# Patient Record
Sex: FEMALE | Race: WHITE | NOT HISPANIC OR LATINO | Employment: OTHER | URBAN - METROPOLITAN AREA
[De-identification: names, ages, dates, MRNs, and addresses within clinical notes are randomized per-mention and may not be internally consistent; named-entity substitution may affect disease eponyms.]

---

## 2017-08-17 DIAGNOSIS — Z12.31 ENCOUNTER FOR SCREENING MAMMOGRAM FOR MALIGNANT NEOPLASM OF BREAST: Primary | ICD-10-CM

## 2017-08-24 ENCOUNTER — HOSPITAL ENCOUNTER (OUTPATIENT)
Dept: RADIOLOGY | Facility: HOSPITAL | Age: 77
Discharge: HOME | End: 2017-08-24
Attending: OBSTETRICS & GYNECOLOGY
Payer: MEDICARE

## 2017-08-24 DIAGNOSIS — Z12.31 ENCOUNTER FOR SCREENING MAMMOGRAM FOR MALIGNANT NEOPLASM OF BREAST: ICD-10-CM

## 2017-08-24 PROCEDURE — 77067 SCR MAMMO BI INCL CAD: CPT | Mod: 26,,, | Performed by: RADIOLOGY

## 2017-08-24 PROCEDURE — 77063 BREAST TOMOSYNTHESIS BI: CPT | Mod: 26,,, | Performed by: RADIOLOGY

## 2017-08-24 PROCEDURE — 77067 SCR MAMMO BI INCL CAD: CPT | Mod: TC

## 2017-09-06 DIAGNOSIS — M89.9 DISORDER OF BONE: Primary | ICD-10-CM

## 2017-09-07 ENCOUNTER — HOSPITAL ENCOUNTER (OUTPATIENT)
Dept: RADIOLOGY | Facility: HOSPITAL | Age: 77
Discharge: HOME | End: 2017-09-07
Attending: FAMILY MEDICINE
Payer: MEDICARE

## 2017-09-07 DIAGNOSIS — M89.9 DISORDER OF BONE: ICD-10-CM

## 2017-09-07 PROCEDURE — 77080 DXA BONE DENSITY AXIAL: CPT | Mod: 26,,, | Performed by: RADIOLOGY

## 2017-09-07 PROCEDURE — 77080 DXA BONE DENSITY AXIAL: CPT | Mod: TC

## 2017-12-20 ENCOUNTER — HOSPITAL ENCOUNTER (INPATIENT)
Facility: HOSPITAL | Age: 77
LOS: 3 days | Discharge: HOME | DRG: 041 | End: 2017-12-23
Attending: EMERGENCY MEDICINE | Admitting: PSYCHIATRY & NEUROLOGY
Payer: MEDICARE

## 2017-12-20 DIAGNOSIS — Z92.82 S/P ADMN TPA IN DIFF FAC W/N LAST 24 HR BEF ADM TO CRNT FAC: ICD-10-CM

## 2017-12-20 DIAGNOSIS — I63.412 EMBOLIC STROKE INVOLVING LEFT MIDDLE CEREBRAL ARTERY: Primary | ICD-10-CM

## 2017-12-20 DIAGNOSIS — Z95.0 PACEMAKER: ICD-10-CM

## 2017-12-20 DIAGNOSIS — I63.512 ACUTE ISCHEMIC LEFT MCA STROKE: ICD-10-CM

## 2017-12-20 DIAGNOSIS — Z92.82 TISSUE PLASMINOGEN ACTIVATOR (T-PA) ADMINISTERED AT OTHER FACILITY WITHIN 24 HOURS PRIOR TO CURRENT ADMISSION: ICD-10-CM

## 2017-12-20 DIAGNOSIS — I63.9 CEREBROVASCULAR ACCIDENT (CVA), UNSPECIFIED MECHANISM: ICD-10-CM

## 2017-12-20 DIAGNOSIS — I44.2 COMPLETE HEART BLOCK: ICD-10-CM

## 2017-12-20 DIAGNOSIS — Z95.0 S/P PLACEMENT OF CARDIAC PACEMAKER: ICD-10-CM

## 2017-12-20 PROBLEM — E78.5 HYPERLIPIDEMIA: Status: ACTIVE | Noted: 2017-12-20

## 2017-12-20 PROBLEM — R47.01 APHASIA: Status: ACTIVE | Noted: 2017-12-20

## 2017-12-20 PROBLEM — I10 ESSENTIAL HYPERTENSION: Status: ACTIVE | Noted: 2017-12-20

## 2017-12-20 LAB
ABO + RH BLD: NORMAL
ALBUMIN SERPL BCP-MCNC: 3.5 G/DL
ALP SERPL-CCNC: 91 U/L
ALT SERPL W/O P-5'-P-CCNC: 22 U/L
ANION GAP SERPL CALC-SCNC: 9 MMOL/L
APTT BLDCRRT: 26.3 SEC
AST SERPL-CCNC: 24 U/L
BACTERIA #/AREA URNS AUTO: ABNORMAL /HPF
BASOPHILS # BLD AUTO: 0.05 K/UL
BASOPHILS NFR BLD: 0.6 %
BILIRUB SERPL-MCNC: 0.6 MG/DL
BILIRUB UR QL STRIP: NEGATIVE
BLD GP AB SCN CELLS X3 SERPL QL: NORMAL
BUN SERPL-MCNC: 17 MG/DL
CALCIUM SERPL-MCNC: 9.4 MG/DL
CHLORIDE SERPL-SCNC: 105 MMOL/L
CHOLEST SERPL-MCNC: 229 MG/DL
CHOLEST/HDLC SERPL: 3.3 {RATIO}
CK MB SERPL-MCNC: 2.3 NG/ML
CK MB SERPL-RTO: 2.4 %
CK SERPL-CCNC: 97 U/L
CLARITY UR REFRACT.AUTO: CLEAR
CO2 SERPL-SCNC: 24 MMOL/L
COLOR UR AUTO: ABNORMAL
CREAT SERPL-MCNC: 0.8 MG/DL
DIFFERENTIAL METHOD: ABNORMAL
EOSINOPHIL # BLD AUTO: 0.1 K/UL
EOSINOPHIL NFR BLD: 1.2 %
ERYTHROCYTE [DISTWIDTH] IN BLOOD BY AUTOMATED COUNT: 14.1 %
EST. GFR  (AFRICAN AMERICAN): >60 ML/MIN/1.73 M^2
EST. GFR  (NON AFRICAN AMERICAN): >60 ML/MIN/1.73 M^2
ESTIMATED AVG GLUCOSE: 111 MG/DL
GLUCOSE SERPL-MCNC: 126 MG/DL
GLUCOSE UR QL STRIP: NEGATIVE
HBA1C MFR BLD HPLC: 5.5 %
HCT VFR BLD AUTO: 40.5 %
HDLC SERPL-MCNC: 69 MG/DL
HDLC SERPL: 30.1 %
HGB BLD-MCNC: 13 G/DL
HGB UR QL STRIP: ABNORMAL
HYALINE CASTS UR QL AUTO: 0 /LPF
IMM GRANULOCYTES # BLD AUTO: 0.02 K/UL
IMM GRANULOCYTES NFR BLD AUTO: 0.2 %
INR PPP: 1.3
KETONES UR QL STRIP: NEGATIVE
LDLC SERPL CALC-MCNC: 135.4 MG/DL
LEUKOCYTE ESTERASE UR QL STRIP: ABNORMAL
LYMPHOCYTES # BLD AUTO: 1.5 K/UL
LYMPHOCYTES NFR BLD: 16.7 %
MAGNESIUM SERPL-MCNC: 1.9 MG/DL
MCH RBC QN AUTO: 28.9 PG
MCHC RBC AUTO-ENTMCNC: 32.1 G/DL
MCV RBC AUTO: 90 FL
MICROSCOPIC COMMENT: ABNORMAL
MONOCYTES # BLD AUTO: 0.8 K/UL
MONOCYTES NFR BLD: 8.4 %
NEUTROPHILS # BLD AUTO: 6.5 K/UL
NEUTROPHILS NFR BLD: 72.9 %
NITRITE UR QL STRIP: NEGATIVE
NONHDLC SERPL-MCNC: 160 MG/DL
NRBC BLD-RTO: 0 /100 WBC
PH UR STRIP: 8 [PH] (ref 5–8)
PHOSPHATE SERPL-MCNC: 2.9 MG/DL
PLATELET # BLD AUTO: 286 K/UL
PMV BLD AUTO: 10.1 FL
POCT GLUCOSE: 132 MG/DL (ref 70–110)
POTASSIUM SERPL-SCNC: 4.1 MMOL/L
POTASSIUM SERPL-SCNC: 4.1 MMOL/L
PROT SERPL-MCNC: 6.8 G/DL
PROT UR QL STRIP: ABNORMAL
PROTHROMBIN TIME: 13.2 SEC
RBC # BLD AUTO: 4.5 M/UL
RBC #/AREA URNS AUTO: 3 /HPF (ref 0–4)
SODIUM SERPL-SCNC: 138 MMOL/L
SP GR UR STRIP: 1.02 (ref 1–1.03)
TRIGL SERPL-MCNC: 123 MG/DL
TROPONIN I SERPL DL<=0.01 NG/ML-MCNC: <0.006 NG/ML
TSH SERPL DL<=0.005 MIU/L-ACNC: 1.76 UIU/ML
URN SPEC COLLECT METH UR: ABNORMAL
UROBILINOGEN UR STRIP-ACNC: NEGATIVE EU/DL
WBC # BLD AUTO: 8.93 K/UL
WBC #/AREA URNS AUTO: 9 /HPF (ref 0–5)

## 2017-12-20 PROCEDURE — 99291 CRITICAL CARE FIRST HOUR: CPT | Mod: ,,, | Performed by: EMERGENCY MEDICINE

## 2017-12-20 PROCEDURE — 87086 URINE CULTURE/COLONY COUNT: CPT

## 2017-12-20 PROCEDURE — 84484 ASSAY OF TROPONIN QUANT: CPT

## 2017-12-20 PROCEDURE — A4216 STERILE WATER/SALINE, 10 ML: HCPCS | Performed by: PHYSICIAN ASSISTANT

## 2017-12-20 PROCEDURE — 80061 LIPID PANEL: CPT

## 2017-12-20 PROCEDURE — 85025 COMPLETE CBC W/AUTO DIFF WBC: CPT

## 2017-12-20 PROCEDURE — 25000003 PHARM REV CODE 250: Performed by: PHYSICIAN ASSISTANT

## 2017-12-20 PROCEDURE — 20000000 HC ICU ROOM

## 2017-12-20 PROCEDURE — 93010 ELECTROCARDIOGRAM REPORT: CPT | Mod: 76,,, | Performed by: INTERNAL MEDICINE

## 2017-12-20 PROCEDURE — 84100 ASSAY OF PHOSPHORUS: CPT

## 2017-12-20 PROCEDURE — 63600175 PHARM REV CODE 636 W HCPCS

## 2017-12-20 PROCEDURE — 93005 ELECTROCARDIOGRAM TRACING: CPT

## 2017-12-20 PROCEDURE — 86900 BLOOD TYPING SEROLOGIC ABO: CPT

## 2017-12-20 PROCEDURE — 25000003 PHARM REV CODE 250: Performed by: EMERGENCY MEDICINE

## 2017-12-20 PROCEDURE — 85730 THROMBOPLASTIN TIME PARTIAL: CPT

## 2017-12-20 PROCEDURE — 82553 CREATINE MB FRACTION: CPT

## 2017-12-20 PROCEDURE — 80053 COMPREHEN METABOLIC PANEL: CPT

## 2017-12-20 PROCEDURE — 83036 HEMOGLOBIN GLYCOSYLATED A1C: CPT

## 2017-12-20 PROCEDURE — 25500020 PHARM REV CODE 255: Performed by: EMERGENCY MEDICINE

## 2017-12-20 PROCEDURE — 86850 RBC ANTIBODY SCREEN: CPT

## 2017-12-20 PROCEDURE — 80307 DRUG TEST PRSMV CHEM ANLYZR: CPT

## 2017-12-20 PROCEDURE — 82962 GLUCOSE BLOOD TEST: CPT

## 2017-12-20 PROCEDURE — 84132 ASSAY OF SERUM POTASSIUM: CPT

## 2017-12-20 PROCEDURE — 83735 ASSAY OF MAGNESIUM: CPT

## 2017-12-20 PROCEDURE — 96365 THER/PROPH/DIAG IV INF INIT: CPT

## 2017-12-20 PROCEDURE — 85610 PROTHROMBIN TIME: CPT

## 2017-12-20 PROCEDURE — 81001 URINALYSIS AUTO W/SCOPE: CPT

## 2017-12-20 PROCEDURE — 99233 SBSQ HOSP IP/OBS HIGH 50: CPT | Mod: GC,,, | Performed by: PSYCHIATRY & NEUROLOGY

## 2017-12-20 PROCEDURE — 93010 ELECTROCARDIOGRAM REPORT: CPT | Mod: ,,, | Performed by: INTERNAL MEDICINE

## 2017-12-20 PROCEDURE — 99223 1ST HOSP IP/OBS HIGH 75: CPT | Mod: AI,GC,, | Performed by: PSYCHIATRY & NEUROLOGY

## 2017-12-20 PROCEDURE — 99291 CRITICAL CARE FIRST HOUR: CPT | Mod: 25

## 2017-12-20 PROCEDURE — 96366 THER/PROPH/DIAG IV INF ADDON: CPT

## 2017-12-20 PROCEDURE — 84443 ASSAY THYROID STIM HORMONE: CPT

## 2017-12-20 RX ORDER — LABETALOL HYDROCHLORIDE 5 MG/ML
10 INJECTION, SOLUTION INTRAVENOUS
Status: DISCONTINUED | OUTPATIENT
Start: 2017-12-20 | End: 2017-12-21

## 2017-12-20 RX ORDER — ONDANSETRON 2 MG/ML
INJECTION INTRAMUSCULAR; INTRAVENOUS
Status: COMPLETED
Start: 2017-12-20 | End: 2017-12-20

## 2017-12-20 RX ORDER — SODIUM CHLORIDE 9 MG/ML
INJECTION, SOLUTION INTRAVENOUS CONTINUOUS
Status: DISCONTINUED | OUTPATIENT
Start: 2017-12-20 | End: 2017-12-22

## 2017-12-20 RX ORDER — EPINEPHRINE 0.1 MG/ML
INJECTION INTRAVENOUS
Status: DISPENSED
Start: 2017-12-20 | End: 2017-12-21

## 2017-12-20 RX ORDER — AMITRIPTYLINE HYDROCHLORIDE 50 MG/1
50 TABLET, FILM COATED ORAL NIGHTLY
COMMUNITY
End: 2020-02-10 | Stop reason: SDUPTHER

## 2017-12-20 RX ORDER — SODIUM CHLORIDE 0.9 % (FLUSH) 0.9 %
3 SYRINGE (ML) INJECTION EVERY 8 HOURS
Status: DISCONTINUED | OUTPATIENT
Start: 2017-12-20 | End: 2017-12-23 | Stop reason: HOSPADM

## 2017-12-20 RX ORDER — ATORVASTATIN CALCIUM 20 MG/1
40 TABLET, FILM COATED ORAL DAILY
Status: DISCONTINUED | OUTPATIENT
Start: 2017-12-21 | End: 2017-12-23 | Stop reason: HOSPADM

## 2017-12-20 RX ORDER — NICARDIPINE HYDROCHLORIDE 0.2 MG/ML
5 INJECTION INTRAVENOUS CONTINUOUS
Status: DISCONTINUED | OUTPATIENT
Start: 2017-12-20 | End: 2017-12-21

## 2017-12-20 RX ORDER — EPINEPHRINE 1 MG/ML
INJECTION INTRAMUSCULAR; INTRAVENOUS; SUBCUTANEOUS
Status: DISCONTINUED
Start: 2017-12-20 | End: 2017-12-20 | Stop reason: WASHOUT

## 2017-12-20 RX ORDER — ATROPINE SULFATE 0.4 MG/ML
INJECTION, SOLUTION ENDOTRACHEAL; INTRAMEDULLARY; INTRAMUSCULAR; INTRAVENOUS; SUBCUTANEOUS
Status: DISPENSED
Start: 2017-12-20 | End: 2017-12-21

## 2017-12-20 RX ORDER — OMEPRAZOLE 20 MG/1
20 CAPSULE, DELAYED RELEASE ORAL DAILY
COMMUNITY
End: 2019-11-07

## 2017-12-20 RX ORDER — HYDROCHLOROTHIAZIDE 12.5 MG/1
12.5 CAPSULE ORAL DAILY PRN
COMMUNITY
End: 2019-02-26

## 2017-12-20 RX ORDER — ACETAMINOPHEN 500 MG
5000 TABLET ORAL DAILY
COMMUNITY

## 2017-12-20 RX ORDER — LOSARTAN POTASSIUM 50 MG/1
100 TABLET ORAL DAILY
Status: DISCONTINUED | OUTPATIENT
Start: 2017-12-21 | End: 2017-12-23 | Stop reason: HOSPADM

## 2017-12-20 RX ORDER — LOSARTAN POTASSIUM 100 MG/1
100 TABLET ORAL DAILY
COMMUNITY
End: 2019-02-26 | Stop reason: ALTCHOICE

## 2017-12-20 RX ORDER — ATORVASTATIN CALCIUM 80 MG/1
80 TABLET, FILM COATED ORAL DAILY
COMMUNITY
End: 2020-02-10 | Stop reason: SDUPTHER

## 2017-12-20 RX ADMIN — ONDANSETRON 4 MG: 2 INJECTION INTRAMUSCULAR; INTRAVENOUS at 10:12

## 2017-12-20 RX ADMIN — NICARDIPINE HYDROCHLORIDE 5 MG/HR: 0.2 INJECTION, SOLUTION INTRAVENOUS at 03:12

## 2017-12-20 RX ADMIN — Medication 3 ML: at 10:12

## 2017-12-20 RX ADMIN — IOHEXOL 100 ML: 350 INJECTION, SOLUTION INTRAVENOUS at 03:12

## 2017-12-20 RX ADMIN — SODIUM CHLORIDE: 0.9 INJECTION, SOLUTION INTRAVENOUS at 08:12

## 2017-12-20 NOTE — H&P
History & Physical  Neurocritical Care    Admit Date: 12/20/2017  LOS: 0    Code Status: Full Code     CC: Left sided hemiparesis and aphasia started at 1100 today    SUBJECTIVE:     History of Present Illness:  Mrs. Abdul is 76 y/o  female with unknown significant PMHx. Patient denies any significant PMHx or take any medications at home.  Patient was alone in the ED room, no family members around. Patient was transfer from outside facility s/p tPA. Patient presented to [facility] in [place] with L sided hemiparesis and aphasia. As per chart review, patient was with  having lunch around 11:00 am when  noted L sided weakness and aphasia. Patient was brought in to [facility], and stroke code was activated, with initially NIHSS of 8. Patient received tPA@12:43 and it finalized @ 13:45 pm, then [place] [facility] [place]. Patient had CT stroke multi-phase w/o LVO noted. Patient is alert and awake but not oriented. She is able to follow some commands. Mild L nasolabial fold flattened was noted. Left hemiparesis resolved while patient was receiving tPA. She has loss of comprehension and she was unable to name some objects/read full sentences, also R hemineglect was noted.  Patient admitted to [facility] for Q1h neurocheck.  Patient was c/o mild headache. She denies dizziness, blurred vision, difficulty speaking, activity like seizures, sob, chest pain, abd pain, nauseas/vomiting.     No past medical history on file.  No past surgical history on file.  No current facility-administered medications on file prior to encounter.      No current outpatient prescriptions on file prior to encounter.     Review of patient's allergies indicates:  No Known Allergies  No family history on file.  Social History   Substance Use Topics    Smoking status: Not on file    Smokeless tobacco: Not on file    Alcohol use Not on file      Review of Symptoms:  Constitutional: Denies  fevers, weight loss, chills, or weakness.  Eyes: Denies changes in vision.  ENT: Denies dysphagia, nasal discharge, ear pain or discharge.  Cardiovascular: Denies chest pain, palpitations, orthopnea, or claudication.  Respiratory: Denies shortness of breath, cough, hemoptysis, or wheezing.  GI: Denies nausea/vomitting, hematochezia, melena, abd pain, or changes in appetite.  : Denies dysuria, incontinence, or hematuria.  Musculoskeletal: Denies joint pain or myalgias.  Skin/breast: Denies rashes, lumps, lesions, or discharge.  Neurologic: Denies dizziness, vertigo, or paresthesias. C/o headache  Psychiatric: Denies changes in mood or hallucinations.  Endocrine: Denies polyuria, polydipsia, heat/cold intolerance.  Hematologic/Lymph: Denies lymphadenopathy, easy bruising or easy bleeding.  Allergic/Immunologic: Denies rash, rhinitis.     OBJECTIVE:   Vital Signs (Most Recent):   Temp: 98.1 °F (36.7 °C) (12/20/17 1519)  Pulse: (!) 56 (12/20/17 1600)  Resp: 16 (12/20/17 1600)  BP: (!) 162/70 (12/20/17 1600)  SpO2: 98 % (12/20/17 1600)    Vital Signs (24h Range):   Temp:  [98.1 °F (36.7 °C)] 98.1 °F (36.7 °C)  Pulse:  [55-76] 56  Resp:  [16] 16  SpO2:  [98 %] 98 %  BP: (162-193)/(70-81) 162/70    ICP/CPP (Last 24h):        I & O (Last 24h):  No intake or output data in the 24 hours ending 12/20/17 1620  Physical Exam:  GA: Alert, comfortable, no acute distress.   HEENT: No scleral icterus or JVD.   Pulmonary: Clear to auscultation A/P/L. No wheezing, crackles, or rhonchi.  Cardiac: RRR S1 & S2 w/o rubs/murmurs/gallops.   Abdominal: Bowel sounds present x 4. No appreciable hepatosplenomegaly.  Skin: No jaundice, rashes, or visible lesions.  Neuro:  --GCS: E4 V5M6  --Mental Status: Alert, no oriented, follows some commands  --CN II-XII grossly intact, except L flattened nasolabial fold noted  --Pupils 3mm, PERRL.   --Corneal reflex, gag, cough intact.  --LUE strength: 5/5  --RUE strength: 5/5  --LLE strength: 5/5  --RLE  strength: 5/5    NIH Stroke Scale     1a. Level of consciousness 0=alert; keenly responsive   1b. LOC questions 1=Answers one task correctly   1c. LOC commands 1=Answers one task correctly   2. Best gaze 0=normal   3. Visual 0=No visual loss   4.  Facial palsy 1=Minor paralysis (flattened nasolabial fold, asymmetric on smiling)   5.  Motor left arm 0=No drift, limb holds 90 (or 45) degrees for full 10 seconds   5b. Motor right arm 0=No drift, limb holds 90 (or 45) degrees for full 10 seconds   6a. Motor left leg 0=No drift, limb holds 90 (or 45) degrees for full 10 seconds   6b. Motor right leg 0=No drift, limb holds 90 (or 45) degrees for full 10 seconds   7. Limb ataxia 0=Absent   8. Sensory 0=Normal; no sensory loss   9. Best language 1=Mild to moderate aphasia; some obvious loss of fluency or facility of comprehension without significant limitation on ideas expressed or form of expression.   10. Dysarthria 0=Normal   11.  Extinction and inattention 1=Visual, tactile, auditory, spatial or personal inattention or extinction to bilateral simultaneous stimulation in one of the sensory modalities    Total 5         Lines/Drains/Airway:          Nutrition/Tube Feeds:   Current Diet Order   Procedures    Diet NPO     If pass on Nursing Dysphagia Screen, then can give sips of water and medications.  No food intake until passes SHADI or evaluated by speech.       Labs:  ABG: No results for input(s): PH, PO2, PCO2, HCO3, POCSATURATED, BE in the last 24 hours.  BMP:No results for input(s): NA, K, CL, CO2, BUN, CREATININE, GLU, MG, PHOS in the last 24 hours.  LFT: No results found for: AST, ALT, GGT, ALKPHOS, BILITOT, ALBUMIN, PROT  CBC: No results found for: WBC, HGB, HCT, MCV, PLT  Microbiology x 7d:   Microbiology Results (last 7 days)     ** No results found for the last 168 hours. **        Imaging:  I personally reviewed the above image.    ASSESSMENT/PLAN:   Mrs. Abdul is 76 y/o  female with unknown  significant PMHx, who was transfer from outside facility s/p tPA. Patient presented to [facility] in [place] with L sided hemiparesis and aphasia. Patient received tPA@12:43 and it finalized @ 13:45 pm. Patient admitted to [facility] for Q1h neurocheck  Patient Active Problem List    Diagnosis Date Noted    S/P admn tPA in diff fac w/n last 24 hr bef adm to crnt fac 12/20/2017     Neuro/pain:   -Presented with L sided hemiparesis and aphasia, initial NIHSS was 8, received tPA@12:43, no paresis noted and NIHSS is 5, persists receptive aphasia  -Neuro q 1hr  -HOB >30  -CT stroke multi-phase pending official report. No show LVO  -MRI ordered  -After 24 hours window, start on ASA 81 mg for life/Plavix 75 mg for 21 days  -Neuro vascular team consulted  -PT/OT/ST consulted    Pulmonary:   -No current issues    Cardiac:   -Goal Bp< 185/105 mmHg  -Labetalol and hydralazine PRN  -EKG ordered  -ECHO ordered    Renal:    -Pending labs  -Strict I/O    ID:   -Afebrile, CBC ordered    Hem/Onc:   -CBC, PTT, PT/INR ordered    Endocrine:    -Lipid panel, A1c ordered, TSH  -No hx of DM or HLD  -SSI and hypoglycemia protocol ordered  -POCT glucose Q6hr ordered  -Keep Bg<160  -Start Atorvastatin 40 mg for secondary stroke prevention    Fluids/Electrolytes/Nutrition/GI:   -Keep NPO until pass swallow test  -Bowel movement regimen ordered  -Zofran 4 mg Q6h PRN for nauseas  -Pending CMP, Mg, Phosphorus    Uninterrupted Critical Care/Counseling Time (not including procedures):  35 min    MACARIO [facility]enoPho[facility] Neuro Critical Care

## 2017-12-20 NOTE — HPI
Ms. Abdul is a 78yo F with hypertension and hyperlipidemia who presents to [facility] with left (?) sided weakness and aphasia. Last known normal at about 11am. Patient was seen via telestroke and recommended tPA at 12:43pm. Soon after tPA started patient's symptoms improved. In transport to [facility] [place] her blood pressure remained elevated up to 230 systolic so cardene drip was started. CTA MP on arrival was negative for LVO. Patient does not have focal weakness but is still aphasic and confused.

## 2017-12-20 NOTE — SUBJECTIVE & OBJECTIVE
No past medical history on file.  No past surgical history on file.  No family history on file.  Social History   Substance Use Topics    Smoking status: Not on file    Smokeless tobacco: Not on file    Alcohol use Not on file     Review of patient's allergies indicates:  No Known Allergies    Medications: I have reviewed the current medication administration record.      (Not in a hospital admission)    Review of Systems   Constitutional: Negative for fever.   HENT: Negative for trouble swallowing.    Eyes: Negative for visual disturbance.   Respiratory: Negative for shortness of breath.    Cardiovascular: Negative for chest pain.   Gastrointestinal: Positive for nausea. Negative for vomiting.   Endocrine: Negative for polyuria.   Genitourinary: Negative for dysuria.   Musculoskeletal: Negative for gait problem.   Skin: Negative for rash.   Allergic/Immunologic: Negative for immunocompromised state.   Neurological: Positive for speech difficulty and headaches. Negative for facial asymmetry, weakness and numbness.   Psychiatric/Behavioral: Positive for confusion. Negative for agitation and behavioral problems.     Objective:     Vital Signs (Most Recent):  Temp: 98.1 °F (36.7 °C) (12/20/17 1519)  Pulse: (!) 56 (12/20/17 1600)  Resp: 16 (12/20/17 1600)  BP: (!) 162/70 (12/20/17 1600)  SpO2: 98 % (12/20/17 1600)    Vital Signs Range (Last 24H):  Temp:  [98.1 °F (36.7 °C)]   Pulse:  [56-76]   Resp:  [16]   BP: (162-180)/(70-76)   SpO2:  [98 %]     Physical Exam   Constitutional: She appears well-developed and well-nourished. No distress.   HENT:   Head: Normocephalic and atraumatic.   Eyes: EOM are normal. Pupils are equal, round, and reactive to light.   Neck: Normal range of motion. Neck supple.   Cardiovascular: Normal rate.    Pulmonary/Chest: Effort normal.   Abdominal: Soft.   Musculoskeletal: Normal range of motion.   Neurological: She is alert.   Oriented to self, not time    Skin: Skin is warm and dry.  She is not diaphoretic.   Psychiatric:   confused       Neurological Exam:   LOC: alert  Attention Span: poor  Language: Global aphasia  Articulation: No dysarthria  Orientation: Not oriented to time  Visual Fields: Full  EOM (CN III, IV, VI): Full/intact  Pupils (CN II, III): PERRL  Facial Sensation (CN V): Normal  Facial Movement (CN VII): Symmetric facial expression    Gag Reflex: present  Reflexes: not performed  Motor: no drift x4 extremities  Cebellar: No evidence of appendicular or axial ataxia  Sensation: Intact to light touch, temperature and vibration  Tone: Normal tone throughout      Laboratory:  CMP: No results for input(s): GLUCOSE, CALCIUM, ALBUMIN, PROT, NA, K, CO2, CL, BUN, CREATININE, ALKPHOS, ALT, AST, BILITOT in the last 24 hours.  CBC: No results for input(s): WBC, RBC, HGB, HCT, PLT, MCV, MCH, MCHC in the last 168 hours.  Lipid Panel: No results for input(s): CHOL, LDLCALC, HDL, TRIG in the last 168 hours.  Coagulation: No results for input(s): PT, INR, APTT in the last 168 hours.  Hgb A1C: No results for input(s): HGBA1C in the last 168 hours.  TSH: No results for input(s): TSH in the last 168 hours.    Diagnostic Results:      Brain imaging:  [facility] 12/20/17   Awaiting official read  Patient has extensive white matter disease and generalized atrophy       Vessel Imaging:  [facility]  12/20/17: No LVO noted    Cardiac Evaluation:   ECHO pending   ECG: sinus tachycardia

## 2017-12-20 NOTE — CONSULTS
[facility]  Vascular Neurology  Comprehensive Stroke Center  Consult Note    Inpatient consult to Vascular (Stroke) Neurology  Consult performed by: NAIDA JOHNSTON  Consult ordered by: HELIO VEGA  Reason for consult: aphasia    Inpatient consult to Vascular (Stroke) Neurology  Consult performed by: NAIDA JOHNSTON  Consult ordered by: KAYLA VEGA  Reason for consult: aphasia        Assessment/Plan:     Patient is a 77 y.o. year old female with:    * Acute ischemic left MCA stroke    Ms. Abdul is a 76yo F with aphasia s/p tPA. Reportedly had RSW prior to tPA administration but no weakness appreciated on exam at [facility] [place]. CTA MP without LVO. She is admitted to [facility] post-tPA    -Antithrombotics for secondary stroke prevention: None: Reason:  Hold all Antithrombotics x 24 hours after IV t-PA administration  -Statins for secondary stroke prevention and hyperlipidemia, if present: Atorvastatin- 40 mg oral daily, check LDL   -Aggressive risk factor modification: Hypertension, hyperlipidemia  -Rehab Efforts: Physical Therapy, Occupational Therapy, Speech and Language Pathology  -Diagnostics: Ordered/Pending HgbA1C to assess blood glucose level   Lipid Profile to assess cholesterol levels   MRI head without contrast to assess brain parenchyma   Trans-thoracic cardiac echo to assess cardiac function/status   TSH to assess thyroid function  -VTE Prophylaxis: None: Reason for No Pharmacological VTE Prophylaxis: Mechanical prophylaxis: Place SCDs         S/P admn tPA in diff fac w/n last 24 hr bef adm to crnt fac    Administered at 12:43 pm on 12/20/17         Aphasia    Likely secondary to ischemic infarct   Speech therapy evaluate & treat         Hyperlipidemia    Stroke risk factor  Patient reportedly takes medication at home for cholesterol but does not recall name  Check lipid panel  Atorvastatin 40mg daily         Essential hypertension    Stroke risk factor  Goal BP <180 post tPA             STROKE DOCUMENTATION     Acute Stroke Times   Last Known Normal Date: 12/20/17  Last Known Normal Time: 1100  Symptom Onset Date: 12/20/17  Symptom Onset Time: 1100  Stroke Team Called Date: 12/20/17  Stroke Team Called Time: 1519 (arrival to [facility] [place])  Stroke Team Arrival Date: 12/20/17  Stroke Team Arrival Time: 1519  CT Interpretation Time: 1526  Decision to Treat Time for Alteplase: 1243  Decision to Treat Time for IR:  (n/a )    NIH Scale:  1a. Level Of Consciousness: 0-->Alert: keenly responsive  1b. LOC Questions: 1-->Answers one question correctly  1c. LOC Commands: 0-->Performs both tasks correctly  2. Best Gaze: 0-->Normal  3. Visual: 0-->No visual loss  4. Facial Palsy: 0-->Normal symmetrical movements  5a. Motor Arm, Left: 0-->No drift: limb holds 90 (or 45) degrees for full 10 secs  5b. Motor Arm, Right: 0-->No drift: limb holds 90 (or 45) degrees for full 10 secs  6a. Motor Leg, Left: 0-->No drift: leg holds 30 degree position for full 5 secs  6b. Motor Leg, Right: 0-->No drift: leg holds 30 degree position for full 5 secs  7. Limb Ataxia: 0-->Absent  8. Sensory: 0-->Normal: no sensory loss  9. Best Language: 1-->Mild-to-moderate aphasia: some obvious loss of fluency or facility of comprehension, without significant limitation on ideas expressed or form of expression. Reduction of speech and/or comprehension, however, makes conversation. . . (see row details)  10. Dysarthria: 0-->Normal  11. Extinction and Inattention (formerly Neglect): 0-->No abnormality  Total (NIH Stroke Scale): 2    Modified Adams Score: 0  [place] Coma Scale:14   ABCD2 Score:    [id number] Score:   HAS -BLED Score:   ICH Score:   Hunt & Bravo Classification:       Thrombolysis Candidate? Yes, given prior to arrival at outside hospital      Interventional Revascularization Candidate?   Is the patient eligible for mechanical endovascular reperfusion (JYOTI)?  No; No large vessel occlusion      Hemorrhagic change of an Ischemic  Stroke: Does this patient have an ischemic stroke with hemorrhagic changes? No     Subjective:     History of Present Illness:  Ms. Abdul is a 78yo F with hypertension and hyperlipidemia who presents to [facility] with left (?) sided weakness and aphasia. Last known normal at about 11am. Patient was seen via telestroke and recommended tPA at 12:43pm. Soon after tPA started patient's symptoms improved. In transport to [facility] her blood pressure remained elevated up to 230 systolic so cardene drip was started. CTA MP on arrival was negative for LVO. Patient does not have focal weakness but is still aphasic and confused.         No past medical history on file.  No past surgical history on file.  No family history on file.  Social History   Substance Use Topics    Smoking status: Not on file    Smokeless tobacco: Not on file    Alcohol use Not on file     Review of patient's allergies indicates:  No Known Allergies    Medications: I have reviewed the current medication administration record.      (Not in a hospital admission)    Review of Systems   Constitutional: Negative for fever.   HENT: Negative for trouble swallowing.    Eyes: Negative for visual disturbance.   Respiratory: Negative for shortness of breath.    Cardiovascular: Negative for chest pain.   Gastrointestinal: Positive for nausea. Negative for vomiting.   Endocrine: Negative for polyuria.   Genitourinary: Negative for dysuria.   Musculoskeletal: Negative for gait problem.   Skin: Negative for rash.   Allergic/Immunologic: Negative for immunocompromised state.   Neurological: Positive for speech difficulty and headaches. Negative for facial asymmetry, weakness and numbness.   Psychiatric/Behavioral: Positive for confusion. Negative for agitation and behavioral problems.     Objective:     Vital Signs (Most Recent):  Temp: 98.1 °F (36.7 °C) (12/20/17 1519)  Pulse: (!) 56 (12/20/17 1600)  Resp: 16 (12/20/17 1600)  BP: (!) 162/70 (12/20/17  1600)  SpO2: 98 % (12/20/17 1600)    Vital Signs Range (Last 24H):  Temp:  [98.1 °F (36.7 °C)]   Pulse:  [56-76]   Resp:  [16]   BP: (162-180)/(70-76)   SpO2:  [98 %]     Physical Exam   Constitutional: She appears well-developed and well-nourished. No distress.   HENT:   Head: Normocephalic and atraumatic.   Eyes: EOM are normal. Pupils are equal, round, and reactive to light.   Neck: Normal range of motion. Neck supple.   Cardiovascular: Normal rate.    Pulmonary/Chest: Effort normal.   Abdominal: Soft.   Musculoskeletal: Normal range of motion.   Neurological: She is alert.   Oriented to self, not time    Skin: Skin is warm and dry. She is not diaphoretic.   Psychiatric:   confused       Neurological Exam:   LOC: alert  Attention Span: poor  Language: Global aphasia  Articulation: No dysarthria  Orientation: Not oriented to time  Visual Fields: Full  EOM (CN III, IV, VI): Full/intact  Pupils (CN II, III): PERRL  Facial Sensation (CN V): Normal  Facial Movement (CN VII): Symmetric facial expression    Gag Reflex: present  Reflexes: not performed  Motor: no drift x4 extremities  Cebellar: No evidence of appendicular or axial ataxia  Sensation: Intact to light touch, temperature and vibration  Tone: Normal tone throughout      Laboratory:  CMP: No results for input(s): GLUCOSE, CALCIUM, ALBUMIN, PROT, NA, K, CO2, CL, BUN, CREATININE, ALKPHOS, ALT, AST, BILITOT in the last 24 hours.  CBC: No results for input(s): WBC, RBC, HGB, HCT, PLT, MCV, MCH, MCHC in the last 168 hours.  Lipid Panel: No results for input(s): CHOL, LDLCALC, HDL, TRIG in the last 168 hours.  Coagulation: No results for input(s): PT, INR, APTT in the last 168 hours.  Hgb A1C: No results for input(s): HGBA1C in the last 168 hours.  TSH: No results for input(s): TSH in the last 168 hours.    Diagnostic Results:      Brain imaging:  [facility] 12/20/17   Awaiting official read  Patient has extensive white matter disease and generalized atrophy       Vessel  Imaging:  CTA  12/20/17: No LVO noted    Cardiac Evaluation:   ECHO pending   ECG: sinus tachycardia       Nicol Forman PA-C  Comprehensive Stroke Center  Department of Vascular Neurology   [facility]

## 2017-12-20 NOTE — ED PROVIDER NOTES
Encounter Date: 12/20/2017    SCRIBE #1 NOTE: I, Moose Kendall, am scribing for, and in the presence of,  Dr. Barboza. I have scribed the entire note.       History     Chief Complaint   Patient presents with    Transfer     TPA transfer from [place], left sided weakness at 1100 while eating lunch       The patient is a 77 y.o. Female who had an onset of speaking difficulty at 11:00 AM . She was seen at an outside facility, given tPA and transferred here.      The history is provided by medical records and the patient.     Review of patient's allergies indicates:  No Known Allergies  Past Medical History:   Diagnosis Date    Hypertension     Kidney stones     TIA (transient ischemic attack)      Past Surgical History:   Procedure Laterality Date    CERVICAL FUSION      CHOLECYSTECTOMY      HYSTERECTOMY      SHOULDER SURGERY       History reviewed. No pertinent family history.  Social History   Substance Use Topics    Smoking status: Former Smoker     Years: 20.00    Smokeless tobacco: Never Used    Alcohol use Not on file      Comment: Glass of wine each night     Review of Systems   Unable to perform ROS: Acuity of condition       Physical Exam     Initial Vitals [12/20/17 1519]   BP Pulse Resp Temp SpO2   (!) 180/76 76 16 98.1 °F (36.7 °C) 98 %      MAP       110.67         Physical Exam    Nursing note and vitals reviewed.  HENT:   Head: Normocephalic and atraumatic.   Mouth/Throat: Oropharynx is clear and moist.   Eyes: Conjunctivae and EOM are normal. Pupils are equal, round, and reactive to light.   Neck: Normal range of motion. Neck supple. No JVD present.   Cardiovascular: Normal rate, regular rhythm and normal heart sounds.   No murmur heard.  Pulmonary/Chest: Breath sounds normal. No respiratory distress. She has no wheezes. She has no rhonchi. She has no rales.   Abdominal: Soft. Bowel sounds are normal. She exhibits no mass. There is no tenderness. There is no rebound and no guarding.    Neurological: She has normal strength. No cranial nerve deficit or sensory deficit.   Expressive aphasia.   Skin: Skin is warm. No rash noted.         ED Course   Procedures  Labs Reviewed   COMPREHENSIVE METABOLIC PANEL - Abnormal; Notable for the following:        Result Value    Glucose 126 (*)     All other components within normal limits    Narrative:     LAVENDER SHARED   LIPID PANEL - Abnormal; Notable for the following:     Cholesterol 229 (*)     All other components within normal limits    Narrative:     LAVENDER SHARED   CBC W/ AUTO DIFFERENTIAL - Abnormal; Notable for the following:     Lymph% 16.7 (*)     All other components within normal limits    Narrative:     LAVENDER SHARED   PROTIME-INR - Abnormal; Notable for the following:     Prothrombin Time 13.2 (*)     INR 1.3 (*)     All other components within normal limits    Narrative:     LAVENDER SHARED   URINALYSIS - Abnormal; Notable for the following:     Protein, UA 2+ (*)     Occult Blood UA 1+ (*)     Leukocytes, UA 1+ (*)     All other components within normal limits    Narrative:     YELLOW AND GRAY TOPS   URINALYSIS MICROSCOPIC - Abnormal; Notable for the following:     WBC, UA 9 (*)     All other components within normal limits    Narrative:     YELLOW AND GRAY TOPS   POCT GLUCOSE - Abnormal; Notable for the following:     POCT Glucose 132 (*)     All other components within normal limits   CULTURE, URINE   CULTURE, URINE   TSH    Narrative:     LAVENDER SHARED   TROPONIN I    Narrative:     LAVENDER SHARED   CK-MB    Narrative:     LAVENDER SHARED   MAGNESIUM    Narrative:     LAVENDER SHARED   PHOSPHORUS    Narrative:     LAVENDER SHARED   APTT    Narrative:     LAVENDER SHARED   HEMOGLOBIN A1C   DRUGS OF ABUSE SCREEN, BLOOD   TYPE & SCREEN   POCT GLUCOSE MONITORING CONTINUOUS             Medical Decision Making:   History:   Old Medical Records: I decided to obtain old medical records.  Clinical Tests:   Lab Tests: Ordered and  Reviewed  Radiological Study: Ordered and Reviewed  Medical Tests: Ordered and Reviewed  ED Management:  3:45 PM  In an emergent consult with the stroke neurology and neurocritical care, continued cardene drip and admitted to the ICU.            Scribe Attestation:   Scribe #1: I performed the above scribed service and the documentation accurately describes the services I performed. I attest to the accuracy of the note.    Attending Attestation:         Attending Critical Care:   Critical Care Times:   Direct Patient Care (initial evaluation, reassessments, and time considering the case)................................................................20 minutes.   Additional History from reviewing old medical records or taking additional history from the family, EMS, PCP, etc.......................4 minutes.   Ordering, Reviewing, and Interpreting Diagnostic Studies...............................................................................................................3 minutes.   Documentation..................................................................................................................................................................................3 minutes.   Consultation with other Physicians. .................................................................................................................................................3 minutes.   ==============================================================  · Total Critical Care Time - exclusive of procedural time: 33 minutes.  ==============================================================              ED Course      Clinical Impression:   The primary encounter diagnosis was Cerebrovascular accident (CVA), unspecified mechanism. A diagnosis of S/P admn tPA in diff fac w/n last 24 hr bef adm to crnt fac was also pertinent to this visit.                           Pete Barboza MD  12/20/17 1931

## 2017-12-20 NOTE — ASSESSMENT & PLAN NOTE
Stroke risk factor  Patient reportedly takes medication at home for cholesterol but does not recall name  Check lipid panel  Atorvastatin 40mg daily

## 2017-12-20 NOTE — ASSESSMENT & PLAN NOTE
Ms. Abdul is a 78yo F with aphasia s/p tPA. Reportedly had RSW prior to tPA administration but no weakness appreciated on exam at [facility]. CTA MP without LVO. She is admitted to [facility] post-tPA    -Antithrombotics for secondary stroke prevention: None: Reason:  Hold all Antithrombotics x 24 hours after IV t-PA administration  -Statins for secondary stroke prevention and hyperlipidemia, if present: Atorvastatin- 40 mg oral daily, check LDL   -Aggressive risk factor modification: Hypertension, hyperlipidemia  -Rehab Efforts: Physical Therapy, Occupational Therapy, Speech and Language Pathology  -Diagnostics: Ordered/Pending HgbA1C to assess blood glucose level   Lipid Profile to assess cholesterol levels   MRI head without contrast to assess brain parenchyma   Trans-thoracic cardiac echo to assess cardiac function/status   TSH to assess thyroid function  -VTE Prophylaxis: None: Reason for No Pharmacological VTE Prophylaxis: Mechanical prophylaxis: Place SCDs

## 2017-12-21 ENCOUNTER — ANESTHESIA (OUTPATIENT)
Dept: MEDSURG UNIT | Facility: HOSPITAL | Age: 77
DRG: 041 | End: 2017-12-21
Payer: MEDICARE

## 2017-12-21 ENCOUNTER — ANESTHESIA EVENT (OUTPATIENT)
Dept: MEDSURG UNIT | Facility: HOSPITAL | Age: 77
DRG: 041 | End: 2017-12-21
Payer: MEDICARE

## 2017-12-21 LAB
ALBUMIN SERPL BCP-MCNC: 3.5 G/DL
ALP SERPL-CCNC: 77 U/L
ALT SERPL W/O P-5'-P-CCNC: 21 U/L
ANION GAP SERPL CALC-SCNC: 12 MMOL/L
APTT BLDCRRT: <21 SEC
AST SERPL-CCNC: 21 U/L
BACTERIA UR CULT: NO GROWTH
BASOPHILS # BLD AUTO: 0.03 K/UL
BASOPHILS NFR BLD: 0.2 %
BILIRUB SERPL-MCNC: 0.6 MG/DL
BUN SERPL-MCNC: 23 MG/DL
CALCIUM SERPL-MCNC: 9.3 MG/DL
CHLORIDE SERPL-SCNC: 109 MMOL/L
CO2 SERPL-SCNC: 22 MMOL/L
CREAT SERPL-MCNC: 1 MG/DL
DIASTOLIC DYSFUNCTION: NO
DIFFERENTIAL METHOD: ABNORMAL
EOSINOPHIL # BLD AUTO: 0 K/UL
EOSINOPHIL NFR BLD: 0 %
ERYTHROCYTE [DISTWIDTH] IN BLOOD BY AUTOMATED COUNT: 14.4 %
EST. GFR  (AFRICAN AMERICAN): >60 ML/MIN/1.73 M^2
EST. GFR  (NON AFRICAN AMERICAN): 53.7 ML/MIN/1.73 M^2
ESTIMATED PA SYSTOLIC PRESSURE: 21.66
GLUCOSE SERPL-MCNC: 187 MG/DL
HCT VFR BLD AUTO: 34 %
HGB BLD-MCNC: 11.4 G/DL
IMM GRANULOCYTES # BLD AUTO: 0.06 K/UL
IMM GRANULOCYTES NFR BLD AUTO: 0.5 %
INR PPP: 1.5
LYMPHOCYTES # BLD AUTO: 0.7 K/UL
LYMPHOCYTES NFR BLD: 6 %
MAGNESIUM SERPL-MCNC: 2.2 MG/DL
MCH RBC QN AUTO: 29.1 PG
MCHC RBC AUTO-ENTMCNC: 33.5 G/DL
MCV RBC AUTO: 87 FL
MITRAL VALVE MOBILITY: NORMAL
MITRAL VALVE REGURGITATION: NORMAL
MONOCYTES # BLD AUTO: 0.4 K/UL
MONOCYTES NFR BLD: 3.4 %
NEUTROPHILS # BLD AUTO: 10.9 K/UL
NEUTROPHILS NFR BLD: 89.9 %
NRBC BLD-RTO: 0 /100 WBC
PHOSPHATE SERPL-MCNC: 4.3 MG/DL
PLATELET # BLD AUTO: 251 K/UL
PMV BLD AUTO: 10.4 FL
POCT GLUCOSE: 141 MG/DL (ref 70–110)
POTASSIUM SERPL-SCNC: 4.6 MMOL/L
PROT SERPL-MCNC: 6.6 G/DL
PROTHROMBIN TIME: 15.6 SEC
RBC # BLD AUTO: 3.92 M/UL
RETIRED EF AND QEF - SEE NOTES: 68 (ref 55–65)
SODIUM SERPL-SCNC: 143 MMOL/L
WBC # BLD AUTO: 12.15 K/UL

## 2017-12-21 PROCEDURE — A4216 STERILE WATER/SALINE, 10 ML: HCPCS | Performed by: PHYSICIAN ASSISTANT

## 2017-12-21 PROCEDURE — D9220A PRA ANESTHESIA: Mod: ANES,,, | Performed by: ANESTHESIOLOGY

## 2017-12-21 PROCEDURE — 63600175 PHARM REV CODE 636 W HCPCS: Performed by: STUDENT IN AN ORGANIZED HEALTH CARE EDUCATION/TRAINING PROGRAM

## 2017-12-21 PROCEDURE — 37000009 HC ANESTHESIA EA ADD 15 MINS: Performed by: INTERNAL MEDICINE

## 2017-12-21 PROCEDURE — 33210 INSERT ELECTRD/PM CATH SNGL: CPT

## 2017-12-21 PROCEDURE — 33208 INSRT HEART PM ATRIAL & VENT: CPT | Mod: KX,,, | Performed by: INTERNAL MEDICINE

## 2017-12-21 PROCEDURE — 63600175 PHARM REV CODE 636 W HCPCS: Performed by: NURSE ANESTHETIST, CERTIFIED REGISTERED

## 2017-12-21 PROCEDURE — 27200188 HC TRANSDUCER, ART ADULT/PEDS

## 2017-12-21 PROCEDURE — 93306 TTE W/DOPPLER COMPLETE: CPT | Mod: 26,,, | Performed by: INTERNAL MEDICINE

## 2017-12-21 PROCEDURE — 93005 ELECTROCARDIOGRAM TRACING: CPT

## 2017-12-21 PROCEDURE — 99233 SBSQ HOSP IP/OBS HIGH 50: CPT | Mod: GC,,, | Performed by: PSYCHIATRY & NEUROLOGY

## 2017-12-21 PROCEDURE — 80053 COMPREHEN METABOLIC PANEL: CPT

## 2017-12-21 PROCEDURE — D9220A PRA ANESTHESIA: Mod: CRNA,,, | Performed by: NURSE ANESTHETIST, CERTIFIED REGISTERED

## 2017-12-21 PROCEDURE — 20000000 HC ICU ROOM

## 2017-12-21 PROCEDURE — C1898 LEAD, PMKR, OTHER THAN TRANS: HCPCS

## 2017-12-21 PROCEDURE — 85610 PROTHROMBIN TIME: CPT

## 2017-12-21 PROCEDURE — 37000008 HC ANESTHESIA 1ST 15 MINUTES: Performed by: INTERNAL MEDICINE

## 2017-12-21 PROCEDURE — 25000003 PHARM REV CODE 250

## 2017-12-21 PROCEDURE — 25000003 PHARM REV CODE 250: Performed by: PHYSICIAN ASSISTANT

## 2017-12-21 PROCEDURE — 92523 SPEECH SOUND LANG COMPREHEN: CPT

## 2017-12-21 PROCEDURE — 63600175 PHARM REV CODE 636 W HCPCS: Performed by: NURSE PRACTITIONER

## 2017-12-21 PROCEDURE — 83735 ASSAY OF MAGNESIUM: CPT

## 2017-12-21 PROCEDURE — 36620 INSERTION CATHETER ARTERY: CPT

## 2017-12-21 PROCEDURE — 02H63JZ INSERTION OF PACEMAKER LEAD INTO RIGHT ATRIUM, PERCUTANEOUS APPROACH: ICD-10-PCS | Performed by: INTERNAL MEDICINE

## 2017-12-21 PROCEDURE — 25000003 PHARM REV CODE 250: Performed by: NURSE ANESTHETIST, CERTIFIED REGISTERED

## 2017-12-21 PROCEDURE — 02HK3JZ INSERTION OF PACEMAKER LEAD INTO RIGHT VENTRICLE, PERCUTANEOUS APPROACH: ICD-10-PCS | Performed by: INTERNAL MEDICINE

## 2017-12-21 PROCEDURE — 93306 TTE W/DOPPLER COMPLETE: CPT

## 2017-12-21 PROCEDURE — 85025 COMPLETE CBC W/AUTO DIFF WBC: CPT

## 2017-12-21 PROCEDURE — 0JH606Z INSERTION OF PACEMAKER, DUAL CHAMBER INTO CHEST SUBCUTANEOUS TISSUE AND FASCIA, OPEN APPROACH: ICD-10-PCS | Performed by: INTERNAL MEDICINE

## 2017-12-21 PROCEDURE — 99233 SBSQ HOSP IP/OBS HIGH 50: CPT | Mod: ,,, | Performed by: PSYCHIATRY & NEUROLOGY

## 2017-12-21 PROCEDURE — 63600175 PHARM REV CODE 636 W HCPCS

## 2017-12-21 PROCEDURE — 84100 ASSAY OF PHOSPHORUS: CPT

## 2017-12-21 PROCEDURE — 85730 THROMBOPLASTIN TIME PARTIAL: CPT

## 2017-12-21 PROCEDURE — 25500020 PHARM REV CODE 255

## 2017-12-21 RX ORDER — MIDAZOLAM HYDROCHLORIDE 1 MG/ML
INJECTION INTRAMUSCULAR; INTRAVENOUS
Status: DISPENSED
Start: 2017-12-21 | End: 2017-12-21

## 2017-12-21 RX ORDER — MIDAZOLAM HYDROCHLORIDE 1 MG/ML
INJECTION INTRAMUSCULAR; INTRAVENOUS
Status: COMPLETED
Start: 2017-12-21 | End: 2017-12-21

## 2017-12-21 RX ORDER — INSULIN ASPART 100 [IU]/ML
1-10 INJECTION, SOLUTION INTRAVENOUS; SUBCUTANEOUS EVERY 6 HOURS PRN
Status: DISCONTINUED | OUTPATIENT
Start: 2017-12-21 | End: 2017-12-23 | Stop reason: HOSPADM

## 2017-12-21 RX ORDER — PROPOFOL 10 MG/ML
VIAL (ML) INTRAVENOUS
Status: DISCONTINUED | OUTPATIENT
Start: 2017-12-21 | End: 2017-12-21

## 2017-12-21 RX ORDER — FENTANYL CITRATE 50 UG/ML
INJECTION, SOLUTION INTRAMUSCULAR; INTRAVENOUS
Status: COMPLETED
Start: 2017-12-21 | End: 2017-12-21

## 2017-12-21 RX ORDER — CEFAZOLIN SODIUM 1 G/3ML
2 INJECTION, POWDER, FOR SOLUTION INTRAMUSCULAR; INTRAVENOUS
Status: DISCONTINUED | OUTPATIENT
Start: 2017-12-21 | End: 2017-12-21 | Stop reason: ALTCHOICE

## 2017-12-21 RX ORDER — ATROPINE SULFATE 0.4 MG/ML
0.4 INJECTION, SOLUTION ENDOTRACHEAL; INTRAMEDULLARY; INTRAMUSCULAR; INTRAVENOUS; SUBCUTANEOUS ONCE
Status: COMPLETED | OUTPATIENT
Start: 2017-12-21 | End: 2017-12-21

## 2017-12-21 RX ORDER — LABETALOL HYDROCHLORIDE 5 MG/ML
10 INJECTION, SOLUTION INTRAVENOUS
Status: DISCONTINUED | OUTPATIENT
Start: 2017-12-21 | End: 2017-12-22

## 2017-12-21 RX ORDER — SENNOSIDES 8.6 MG/1
8.6 TABLET ORAL DAILY
Status: DISCONTINUED | OUTPATIENT
Start: 2017-12-21 | End: 2017-12-23 | Stop reason: HOSPADM

## 2017-12-21 RX ORDER — PHENYLEPHRINE HYDROCHLORIDE 10 MG/ML
INJECTION INTRAVENOUS
Status: DISCONTINUED | OUTPATIENT
Start: 2017-12-21 | End: 2017-12-21

## 2017-12-21 RX ORDER — GLUCAGON 1 MG
1 KIT INJECTION
Status: DISCONTINUED | OUTPATIENT
Start: 2017-12-21 | End: 2017-12-23 | Stop reason: HOSPADM

## 2017-12-21 RX ORDER — CEFAZOLIN SODIUM 1 G/3ML
2 INJECTION, POWDER, FOR SOLUTION INTRAMUSCULAR; INTRAVENOUS
Status: DISCONTINUED | OUTPATIENT
Start: 2017-12-21 | End: 2017-12-21

## 2017-12-21 RX ORDER — FENTANYL CITRATE 50 UG/ML
INJECTION, SOLUTION INTRAMUSCULAR; INTRAVENOUS
Status: DISPENSED
Start: 2017-12-21 | End: 2017-12-21

## 2017-12-21 RX ORDER — ACETAMINOPHEN 325 MG/1
325 TABLET ORAL EVERY 4 HOURS PRN
Status: DISCONTINUED | OUTPATIENT
Start: 2017-12-21 | End: 2017-12-23 | Stop reason: HOSPADM

## 2017-12-21 RX ORDER — ASPIRIN 325 MG
325 TABLET ORAL DAILY
Status: DISCONTINUED | OUTPATIENT
Start: 2017-12-22 | End: 2017-12-23 | Stop reason: HOSPADM

## 2017-12-21 RX ORDER — PROPOFOL 10 MG/ML
VIAL (ML) INTRAVENOUS CONTINUOUS PRN
Status: DISCONTINUED | OUTPATIENT
Start: 2017-12-21 | End: 2017-12-21

## 2017-12-21 RX ORDER — FENTANYL CITRATE 50 UG/ML
25 INJECTION, SOLUTION INTRAMUSCULAR; INTRAVENOUS
Status: DISCONTINUED | OUTPATIENT
Start: 2017-12-21 | End: 2017-12-22

## 2017-12-21 RX ORDER — FENTANYL CITRATE 50 UG/ML
INJECTION, SOLUTION INTRAMUSCULAR; INTRAVENOUS
Status: DISCONTINUED | OUTPATIENT
Start: 2017-12-21 | End: 2017-12-21

## 2017-12-21 RX ORDER — SODIUM CHLORIDE 9 MG/ML
INJECTION, SOLUTION INTRAVENOUS CONTINUOUS PRN
Status: DISCONTINUED | OUTPATIENT
Start: 2017-12-21 | End: 2017-12-21

## 2017-12-21 RX ORDER — HEPARIN SODIUM 5000 [USP'U]/ML
5000 INJECTION, SOLUTION INTRAVENOUS; SUBCUTANEOUS EVERY 8 HOURS
Status: DISCONTINUED | OUTPATIENT
Start: 2017-12-21 | End: 2017-12-22

## 2017-12-21 RX ORDER — MIDAZOLAM HYDROCHLORIDE 1 MG/ML
6 INJECTION INTRAMUSCULAR; INTRAVENOUS ONCE
Status: COMPLETED | OUTPATIENT
Start: 2017-12-21 | End: 2017-12-21

## 2017-12-21 RX ORDER — FENTANYL CITRATE 50 UG/ML
100 INJECTION, SOLUTION INTRAMUSCULAR; INTRAVENOUS ONCE
Status: COMPLETED | OUTPATIENT
Start: 2017-12-21 | End: 2017-12-21

## 2017-12-21 RX ORDER — LIDOCAINE HYDROCHLORIDE 10 MG/ML
INJECTION INFILTRATION; PERINEURAL
Status: DISPENSED
Start: 2017-12-21 | End: 2017-12-21

## 2017-12-21 RX ADMIN — MIDAZOLAM HYDROCHLORIDE 6 MG: 1 INJECTION INTRAMUSCULAR; INTRAVENOUS at 04:12

## 2017-12-21 RX ADMIN — PHENYLEPHRINE HYDROCHLORIDE 200 MCG: 10 INJECTION INTRAVENOUS at 03:12

## 2017-12-21 RX ADMIN — FENTANYL CITRATE 25 MCG: 50 INJECTION, SOLUTION INTRAMUSCULAR; INTRAVENOUS at 03:12

## 2017-12-21 RX ADMIN — FENTANYL CITRATE 25 MCG: 50 INJECTION, SOLUTION INTRAMUSCULAR; INTRAVENOUS at 04:12

## 2017-12-21 RX ADMIN — PHENYLEPHRINE HYDROCHLORIDE 200 MCG: 10 INJECTION INTRAVENOUS at 04:12

## 2017-12-21 RX ADMIN — DEXTROSE 2 G: 50 INJECTION, SOLUTION INTRAVENOUS at 11:12

## 2017-12-21 RX ADMIN — Medication 3 ML: at 01:12

## 2017-12-21 RX ADMIN — FENTANYL CITRATE 100 MCG: 50 INJECTION, SOLUTION INTRAMUSCULAR; INTRAVENOUS at 04:12

## 2017-12-21 RX ADMIN — PROPOFOL 20 MG: 10 INJECTION, EMULSION INTRAVENOUS at 03:12

## 2017-12-21 RX ADMIN — PROPOFOL 80 MCG/KG/MIN: 10 INJECTION, EMULSION INTRAVENOUS at 03:12

## 2017-12-21 RX ADMIN — SODIUM CHLORIDE: 0.9 INJECTION, SOLUTION INTRAVENOUS at 03:12

## 2017-12-21 RX ADMIN — MIDAZOLAM HYDROCHLORIDE 6 MG: 1 INJECTION, SOLUTION INTRAMUSCULAR; INTRAVENOUS at 04:12

## 2017-12-21 RX ADMIN — ATROPINE SULFATE 0.4 MG: 0.4 INJECTION, SOLUTION INTRAMUSCULAR; INTRAVENOUS; SUBCUTANEOUS at 01:12

## 2017-12-21 RX ADMIN — CEFAZOLIN 2 G: 330 INJECTION, POWDER, FOR SOLUTION INTRAMUSCULAR; INTRAVENOUS at 04:12

## 2017-12-21 RX ADMIN — Medication 3 ML: at 09:12

## 2017-12-21 NOTE — SUBJECTIVE & OBJECTIVE
Interval History:   Patient overnight required PPM implantation. Consented provided by  and orders placed for dual MRI compatible [facility] PPM. Patient is currently V-pacing through the TVP at 80.     History reviewed. No pertinent family history.    Social History     Social History    Marital status:      Spouse name: N/A    Number of children: N/A    Years of education: N/A     Occupational History    Not on file.     Social History Main Topics    Smoking status: Former Smoker     Years: 20.00    Smokeless tobacco: Never Used    Alcohol use Not on file      Comment: Glass of wine each night    Drug use: No    Sexual activity: Not on file     Other Topics Concern    Not on file     Social History Narrative    No narrative on file         Review of Systems   Reason unable to perform ROS: AMS.     Objective:     Vital Signs (Most Recent):  Temp: 98.1 °F (36.7 °C) (12/21/17 0700)  Pulse: 79 (12/21/17 0800)  Resp: 20 (12/21/17 0800)  BP: (!) 158/93 (12/21/17 0800)  SpO2: 99 % (12/21/17 0800) Vital Signs (24h Range):  Temp:  [98.1 °F (36.7 °C)-99.2 °F (37.3 °C)] 98.1 °F (36.7 °C)  Pulse:  [28-79] 79  Resp:  [14-24] 20  SpO2:  [92 %-99 %] 99 %  BP: (106-193)/(55-98) 158/93  Arterial Line BP: (149-175)/(58-66) 159/62     Weight: 61.1 kg (134 lb 11.2 oz)  Body mass index is 27.21 kg/m².     SpO2: 99 %  O2 Device (Oxygen Therapy): nasal cannula    Physical Exam   Constitutional: She appears well-developed and well-nourished.   HENT:   Head: Normocephalic and atraumatic.   Eyes: EOM are normal. Pupils are equal, round, and reactive to light.   Neck: Normal range of motion. Neck supple. No JVD present.   Cardiovascular: Normal rate, regular rhythm, normal heart sounds and intact distal pulses.    TVP in place   Pulmonary/Chest: Effort normal and breath sounds normal.   Abdominal: Soft. Bowel sounds are normal.   Musculoskeletal: Normal range of motion. She exhibits no edema.   Neurological:   Patient  exhibits incontrollable movements.    Vitals reviewed.      Significant Labs:   EP:   Recent Labs  Lab 12/20/17  1554 12/20/17  2046 12/21/17  0101 12/21/17  0104     --  143  --    K 4.1 4.1 4.6  --      --  109  --    CO2 24  --  22*  --    *  --  187*  --    BUN 17  --  23  --    CREATININE 0.8  --  1.0  --    CALCIUM 9.4  --  9.3  --    PROT 6.8  --  6.6  --    ALBUMIN 3.5  --  3.5  --    BILITOT 0.6  --  0.6  --    ALKPHOS 91  --  77  --    AST 24  --  21  --    ALT 22  --  21  --    ANIONGAP 9  --  12  --    ESTGFRAFRICA >60.0  --  >60.0  --    EGFRNONAA >60.0  --  53.7*  --    WBC 8.93  --  12.15  --    HGB 13.0  --  11.4*  --    HCT 40.5  --  34.0*  --      --  251  --    INR 1.3*  --   --  1.5*       Significant Imaging: Echocardiogram: 2D echo with color flow doppler: No results found for this or any previous visit.     Pending 2D echo

## 2017-12-21 NOTE — SUBJECTIVE & OBJECTIVE
Past Medical History:   Diagnosis Date    Hypertension     Kidney stones     TIA (transient ischemic attack)        Past Surgical History:   Procedure Laterality Date    CERVICAL FUSION      CHOLECYSTECTOMY      HYSTERECTOMY      SHOULDER SURGERY         Review of patient's allergies indicates:  No Known Allergies    No current facility-administered medications on file prior to encounter.      No current outpatient prescriptions on file prior to encounter.     Family History     None        Social History Main Topics    Smoking status: Former Smoker     Years: 20.00    Smokeless tobacco: Never Used    Alcohol use Not on file      Comment: Glass of wine each night    Drug use: No    Sexual activity: Not on file     Review of Systems   Constitution: Negative for chills and fever.   HENT: Negative for congestion and sore throat.    Eyes: Negative for pain.   Cardiovascular: Negative for chest pain and dyspnea on exertion.   Respiratory: Negative for hemoptysis and shortness of breath.    Musculoskeletal: Negative for arthritis and back pain.   Gastrointestinal: Negative for abdominal pain, nausea and vomiting.   Neurological: Positive for focal weakness.     Objective:     Vital Signs (Most Recent):  Temp: 98.1 °F (36.7 °C) (12/20/17 1519)  Pulse: (!) 56 (12/20/17 2000)  Resp: 18 (12/20/17 2000)  BP: (!) 116/55 (12/20/17 2000)  SpO2: 95 % (12/20/17 2000) Vital Signs (24h Range):  Temp:  [98.1 °F (36.7 °C)] 98.1 °F (36.7 °C)  Pulse:  [54-76] 56  Resp:  [15-18] 18  SpO2:  [95 %-98 %] 95 %  BP: (116-193)/(55-81) 116/55     Weight: 50 kg (110 lb 3.7 oz)  There is no height or weight on file to calculate BMI.    SpO2: 95 %  O2 Device (Oxygen Therapy): room air    Physical Exam   Constitutional: She is oriented to person, place, and time. She appears well-developed and well-nourished.   HENT:   Head: Normocephalic and atraumatic.   Eyes: EOM are normal. Pupils are equal, round, and reactive to light.   Neck:  Normal range of motion. Neck supple. No JVD present.   Cardiovascular: Normal heart sounds and intact distal pulses.    bradycardic   Pulmonary/Chest: Effort normal and breath sounds normal. No respiratory distress.   Abdominal: Soft. Bowel sounds are normal. There is no tenderness.   Musculoskeletal: Normal range of motion. She exhibits no edema.   Neurological: She is alert and oriented to person, place, and time.   Focal weakness exhibited in her left arm   Vitals reviewed.      Significant Labs:   EP:   Recent Labs  Lab 12/20/17  1554 12/20/17  2046     --    K 4.1 4.1     --    CO2 24  --    *  --    BUN 17  --    CREATININE 0.8  --    CALCIUM 9.4  --    PROT 6.8  --    ALBUMIN 3.5  --    BILITOT 0.6  --    ALKPHOS 91  --    AST 24  --    ALT 22  --    ANIONGAP 9  --    ESTGFRAFRICA >60.0  --    EGFRNONAA >60.0  --    WBC 8.93  --    HGB 13.0  --    HCT 40.5  --      --    INR 1.3*  --        Significant Imaging: Echocardiogram: 2D echo with color flow doppler: No results found for this or any previous visit.

## 2017-12-21 NOTE — ED NOTES
No change in respiratory status. Pt is drowsy and will fall asleep unless stimulated. Family at bedside. Family updated on plan of care.

## 2017-12-21 NOTE — PLAN OF CARE
Problem: Physical Therapy Goal  Goal: Physical Therapy Goal  Goals to be met by: 12/29/2017    Patient will increase functional independence with mobility by performing:    Supine to sit with Minimal Assistance.  Sit to supine with Minimal Assistance.   Sit to stand transfer with Minimal Assistance using No Assistive Device and Rolling Walker.  Bed to chair transfer via Stand Pivot with Minimal Assistance using No Assistive Device and Rolling Walker.  Gait  x 50 feet with Minimal Assistance using No Assistive Device and Rolling Walker.    Dynamic standing for 10 minutes with Minimal Assistance using No Assistive Device.  Able to tolerate exercise for 15-20 reps with independence.  Patient and family able to teachback stroke & positioning education independently.  Ascend/descend 1 flight of  stairs with right Handrails Minimal Assistance using No Assistive Device.    Outcome: Ongoing (interventions implemented as appropriate)    Pt would benefit from Rehab upon discharge. PT eval completed.  Appropriate to transfer with: therapy  Carmen Claudio PT, DPT  12/21/2017  Pager: [phone]

## 2017-12-21 NOTE — NURSING
Called Etta to bedside to address pt hr of 29.  Cards called and will come to bedside as well.  Patient is asymptomatic and easy to arouse. No issues with bp noted.

## 2017-12-21 NOTE — SUBJECTIVE & OBJECTIVE
Interval History:     Review of Systems   Unable to perform ROS: Acuity of condition   Constitutional: Negative for fever.   HENT: Negative for trouble swallowing.    Eyes: Negative for visual disturbance.   Respiratory: Negative for shortness of breath.    Cardiovascular: Negative for chest pain.   Gastrointestinal: Negative for abdominal pain.   Genitourinary: Negative for dysuria.   Neurological: Negative for facial asymmetry, weakness and headaches.   Psychiatric/Behavioral: Negative for agitation and behavioral problems.     Objective:     Vitals:  Temp: 98.2 °F (36.8 °C) (12/21/17 1100)  Pulse: 80 (12/21/17 1400)  Resp: 16 (12/21/17 1400)  BP: (!) 166/76 (12/21/17 1400)  SpO2: 98 % (12/21/17 1400)    Temp:  [98.1 °F (36.7 °C)-99.2 °F (37.3 °C)] 98.2 °F (36.8 °C)  Pulse:  [28-80] 80  Resp:  [14-24] 16  SpO2:  [92 %-99 %] 98 %  BP: (106-193)/(55-98) 166/76  Arterial Line BP: (125-175)/(50-66) 146/56         Resp Rate Total:  [14 br/min-24 br/min] 14 br/min    12/20 0701 - 12/21 0700  In: 545.8 [I.V.:545.8]  Out: 1300 [Urine:1300]    Physical Exam   Constitutional: No distress.   HENT:   Head: Normocephalic and atraumatic.   Eyes: EOM are normal. Pupils are equal, round, and reactive to light.   Neck: Normal range of motion. Neck supple.   Cardiovascular:   No murmur heard.  Pulmonary/Chest: Effort normal.   Abdominal: Bowel sounds are normal.   Neurological: She is alert. No sensory deficit. She exhibits normal muscle tone. GCS eye subscore is 4. GCS verbal subscore is 5. GCS motor subscore is 6.   Alert, receptive dysphasia R marina neglect.  CN: II-XII R UMN VII  Motor: LUE  5 /5 / RUE 5/5  Tone normal bilaterally             LLE  5 /5 /  RLE 5 /5  Tone normal bilaterally     Unable to test gait due to level of consciousness.    Medications:  Continuous  sodium chloride 0.9% Last Rate: 50 mL/hr at 12/21/17 1400   niCARdipine Last Rate: Stopped (12/20/17 2016)   Scheduled  atorvastatin 40 mg Daily   fentaNYL      losartan 100 mg Daily   midazolam     midazolam     sodium chloride 0.9% 3 mL Q8H   PRN  ceFAZolin (ANCEF) IVPB 2 g On Call Procedure   dextrose 50% 12.5 g PRN   fentaNYL 25 mcg Q1H PRN   glucagon (human recombinant) 1 mg PRN   insulin aspart 1-10 Units Q6H PRN   labetalol 10 mg PRN     Today I personally reviewed pertinent medications, lines/drains/airways, imaging, cardiology, lab results, microbiology results, notably:

## 2017-12-21 NOTE — ASSESSMENT & PLAN NOTE
Mrs. Abdul is 76 y/o  female with unknown significant PMHx here with CHB with intermittent narrow and wide escape. TPA given within 24 hrs. Patient is hemodynamically stable and conversant with her care givers. Follows commands. In addition with stimulation her HR increases.     Plan:  - Would place transcutaneous pacer pads if the patient becomes hemodynamically unstable  - Would order atropine to bedside and have pacer at bedside to pace  - Would place cordis if needed and safe from a perspective of s/p TPA    After discussion with the Neuro Critical care staff, TPA clears out of system within 24 hrs. Will plan for permanent PPM tomorrow with Dr. Agosto. Will place orders    Discussed with Dr. Agosto,

## 2017-12-21 NOTE — PROGRESS NOTES
[facility]  Neurocritical Care  Progress Note    Admit Date: 12/20/2017  Service Date: 12/21/2017  Length of Stay: 1    Subjective:     Chief Complaint: Acute ischemic left MCA stroke    History of Present Illness: No notes on file    Hospital Course: 12/20: s/p t-PA for Lt MCA / CTA negative for large vessel occlussion / transcutaneous pacer for heart block   12/21: NAEON. Dual chamber pacemaker today    Interval History:     Review of Systems   Unable to perform ROS: Acuity of condition   Constitutional: Negative for fever.   HENT: Negative for trouble swallowing.    Eyes: Negative for visual disturbance.   Respiratory: Negative for shortness of breath.    Cardiovascular: Negative for chest pain.   Gastrointestinal: Negative for abdominal pain.   Genitourinary: Negative for dysuria.   Neurological: Negative for facial asymmetry, weakness and headaches.   Psychiatric/Behavioral: Negative for agitation and behavioral problems.     Objective:     Vitals:  Temp: 98.2 °F (36.8 °C) (12/21/17 1100)  Pulse: 80 (12/21/17 1400)  Resp: 16 (12/21/17 1400)  BP: (!) 166/76 (12/21/17 1400)  SpO2: 98 % (12/21/17 1400)    Temp:  [98.1 °F (36.7 °C)-99.2 °F (37.3 °C)] 98.2 °F (36.8 °C)  Pulse:  [28-80] 80  Resp:  [14-24] 16  SpO2:  [92 %-99 %] 98 %  BP: (106-193)/(55-98) 166/76  Arterial Line BP: (125-175)/(50-66) 146/56         Resp Rate Total:  [14 br/min-24 br/min] 14 br/min    12/20 0701 - 12/21 0700  In: 545.8 [I.V.:545.8]  Out: 1300 [Urine:1300]    Physical Exam   Constitutional: No distress.   HENT:   Head: Normocephalic and atraumatic.   Eyes: EOM are normal. Pupils are equal, round, and reactive to light.   Neck: Normal range of motion. Neck supple.   Cardiovascular:   No murmur heard.  Pulmonary/Chest: Effort normal.   Abdominal: Bowel sounds are normal.   Neurological: She is alert. No sensory deficit. She exhibits normal muscle tone. GCS eye subscore is 4. GCS verbal subscore is 5. GCS motor subscore  is 6.   Alert, receptive dysphasia R marina neglect.  CN: II-XII R UMN VII  Motor: LUE  5 /5 / RUE 5/5  Tone normal bilaterally             LLE  5 /5 /  RLE 5 /5  Tone normal bilaterally     Unable to test gait due to level of consciousness.    Medications:  Continuous  sodium chloride 0.9% Last Rate: 50 mL/hr at 12/21/17 1400   niCARdipine Last Rate: Stopped (12/20/17 2016)   Scheduled  atorvastatin 40 mg Daily   fentaNYL     losartan 100 mg Daily   midazolam     midazolam     sodium chloride 0.9% 3 mL Q8H   PRN  ceFAZolin (ANCEF) IVPB 2 g On Call Procedure   dextrose 50% 12.5 g PRN   fentaNYL 25 mcg Q1H PRN   glucagon (human recombinant) 1 mg PRN   insulin aspart 1-10 Units Q6H PRN   labetalol 10 mg PRN     Today I personally reviewed pertinent medications, lines/drains/airways, imaging, cardiology, lab results, microbiology results, notably:      Assessment/Plan:     Neuro   * Acute ischemic left MCA stroke    - 76 yo F with aphasia s/p tPA. Reportedly had RSW prior to tPA administration but no weakness appreciated on exam at [facility]   - CTA MP without LVO.   - No concerns for immediate post t-PA complications    Plan:   - F/u CT head / MRI undone for cardiac pacer placement   - SBP target < 180 mm Hg  - statins for secondary stroke prevention / ASA / DVT prophylaxis post cardiac procedure   - Echo normal EF / concerns for complete heart block / cardiology following, appreciate recs   - Rehab Efforts: Physical Therapy, Occupational Therapy, Speech and Language Pathology  - Mechanical prophylaxis for now.         Aphasia    - receptive aphasia +   - see section under stroke         Cardiac/Vascular   Complete heart block    - Echo : Normal left ventricular systolic function (EF 65-70%). Normal right ventricular systolic function . Normal left ventricular diastolic function. The estimated PA systolic pressure is 22 mmHg. No wall motion abnormalities    - admission concerns: Rhythm strip taken at bedside show CHB with  intermittent narrow and wide ventricular escape. The patient has a Vent rate of 39-43.  - cardio on-board, appreciate recs  - s/p transcutaneous pacer pads for hemodynamic instability   - Plan for dual chamber pacemaker today.  - shall avoid AV yulissa blocking agents such as nicardipine           Hyperlipidemia    - continue statins         Essential hypertension    - SBP < 180   - restarted home ARBII        Hematology   S/P admn tPA in diff fac w/n last 24 hr bef adm to crnt fac    - see section under stroke             Prophylaxis:  Venous Thromboembolism: mechanical  Stress Ulcer: None  Ventilator Pneumonia: not applicable     Activity Orders          None        Full Code    Gallo Martínez MD  Neurocritical Care  [facility]

## 2017-12-21 NOTE — PLAN OF CARE
12/21/17 1625   Discharge Assessment   Assessment Type Discharge Planning Assessment   Confirmed/corrected address and phone number on facesheet? Yes   Assessment information obtained from? Caregiver   Expected Length of Stay (days) 5   Communicated expected length of stay with patient/caregiver yes   Prior to hospitilization cognitive status: Alert/Oriented   Prior to hospitalization functional status: Independent   Current cognitive status: Alert/Oriented   Current Functional Status: Needs Assistance   Lives With spouse   Able to Return to Prior Arrangements unable to determine at this time (comments)   Is patient able to care for self after discharge? Unable to determine at this time (comments)   Who are your caregiver(s) and their phone number(s)? Jesus Chantell ()  [phone]   Patient's perception of discharge disposition other (comments)  (lucille)   Readmission Within The Last 30 Days no previous admission in last 30 days   Patient currently being followed by outpatient case management? No   Patient currently receives any other outside agency services? No   Equipment Currently Used at Home none   Do you have any problems affording any of your prescribed medications? No   Is the patient taking medications as prescribed? yes   Does the patient have transportation home? Yes   Transportation Available family or friend will provide   Does the patient receive services at the [facility]? No   Discharge Plan A Rehab   Discharge Plan B Home with family;Home Health   Patient/Family In Agreement With Plan yes         PCP:  Adiel Boss MD      Pharmacy:    [address] [place]  [facility]  [address] [place] [id number]  Phone: [phone] Fax: [phone]        Emergency Contacts:  Extended Emergency Contact Information  Primary Emergency Contact: Jesus Abdul  Address: [address] [place] [address] [place] [address] [facility]  Home Phone:  [phone]  Relation: Spouse      Insurance:  Payor: [employer] / Plan: [employer] CHOICES 65 / Product Type: Medicare Advantage /     Augusta Jacob RN, CCRN-K, [facility]  Neuro-Critical Care   X [phone]

## 2017-12-21 NOTE — PLAN OF CARE
Problem: SLP Goal  Goal: SLP Goal  Goals to met 12/28  1 pt will participate in swallow eval  2 Pt will recall 3/3 speech strategies with supervision  3 Pt will complete mental manipulation task with 60% acc and mod a  4 Pt will follow 3 step commands with 60% acc and mod a   5 pt will name 8 items during word fluency with min a

## 2017-12-21 NOTE — PROGRESS NOTES
[facility]  Cardiac Electrophysiology  Progress Note    Admission Date: 12/20/2017  Code Status: Full Code   Attending Physician: Mauri Larose MD   Expected Discharge Date:   Principal Problem:Acute ischemic left MCA stroke    Subjective:     Interval History:   Patient overnight required PPM implantation. Consented provided by  and orders placed for dual MRI compatible [facility] PPM. Patient is currently V-pacing through the TVP at 80.     History reviewed. No pertinent family history.    Social History     Social History    Marital status:      Spouse name: N/A    Number of children: N/A    Years of education: N/A     Occupational History    Not on file.     Social History Main Topics    Smoking status: Former Smoker     Years: 20.00    Smokeless tobacco: Never Used    Alcohol use Not on file      Comment: Glass of wine each night    Drug use: No    Sexual activity: Not on file     Other Topics Concern    Not on file     Social History Narrative    No narrative on file         Review of Systems   Reason unable to perform ROS: AMS.     Objective:     Vital Signs (Most Recent):  Temp: 98.1 °F (36.7 °C) (12/21/17 0700)  Pulse: 79 (12/21/17 0800)  Resp: 20 (12/21/17 0800)  BP: (!) 158/93 (12/21/17 0800)  SpO2: 99 % (12/21/17 0800) Vital Signs (24h Range):  Temp:  [98.1 °F (36.7 °C)-99.2 °F (37.3 °C)] 98.1 °F (36.7 °C)  Pulse:  [28-79] 79  Resp:  [14-24] 20  SpO2:  [92 %-99 %] 99 %  BP: (106-193)/(55-98) 158/93  Arterial Line BP: (149-175)/(58-66) 159/62     Weight: 61.1 kg (134 lb 11.2 oz)  Body mass index is 27.21 kg/m².     SpO2: 99 %  O2 Device (Oxygen Therapy): nasal cannula    Physical Exam   Constitutional: She appears well-developed and well-nourished.   HENT:   Head: Normocephalic and atraumatic.   Eyes: EOM are normal. Pupils are equal, round, and reactive to light.   Neck: Normal range of motion. Neck supple. No JVD present.   Cardiovascular: Normal rate, regular  rhythm, normal heart sounds and intact distal pulses.    TVP in place   Pulmonary/Chest: Effort normal and breath sounds normal.   Abdominal: Soft. Bowel sounds are normal.   Musculoskeletal: Normal range of motion. She exhibits no edema.   Neurological:   Patient exhibits incontrollable movements.    Vitals reviewed.      Significant Labs:   EP:   Recent Labs  Lab 12/20/17  1554 12/20/17  2046 12/21/17  0101 12/21/17  0104     --  143  --    K 4.1 4.1 4.6  --      --  109  --    CO2 24  --  22*  --    *  --  187*  --    BUN 17  --  23  --    CREATININE 0.8  --  1.0  --    CALCIUM 9.4  --  9.3  --    PROT 6.8  --  6.6  --    ALBUMIN 3.5  --  3.5  --    BILITOT 0.6  --  0.6  --    ALKPHOS 91  --  77  --    AST 24  --  21  --    ALT 22  --  21  --    ANIONGAP 9  --  12  --    ESTGFRAFRICA >60.0  --  >60.0  --    EGFRNONAA >60.0  --  53.7*  --    WBC 8.93  --  12.15  --    HGB 13.0  --  11.4*  --    HCT 40.5  --  34.0*  --      --  251  --    INR 1.3*  --   --  1.5*       Significant Imaging: Echocardiogram: 2D echo with color flow doppler: No results found for this or any previous visit.     Pending 2D echo    Assessment and Plan:     Complete heart block    Mrs. Abdul is 76 y/o  female with unknown significant PMHx here with CHB with intermittent narrow and wide escape. TPA given within 24 hrs. Patient is hemodynamically stable and conversant with her care givers. However she follows commands intermittently and displays involuntary jerking movements. S/p TVP placement.    Plan:  - Plan for PPM (as long as EF is normal, may need to place ICD). Will plan for MRI compatible.  - Consented and orders placed  - Please no heparin ppx or dvt ppx  - Diet NPO currently.    Discussed with Dr. Agosto,             Issac Simmons MD  Cardiac Electrophysiology  [facility]

## 2017-12-21 NOTE — ASSESSMENT & PLAN NOTE
- Echo : Normal left ventricular systolic function (EF 65-70%). Normal right ventricular systolic function . Normal left ventricular diastolic function. The estimated PA systolic pressure is 22 mmHg. No wall motion abnormalities    - admission concerns: Rhythm strip taken at bedside show CHB with intermittent narrow and wide ventricular escape. The patient has a Vent rate of 39-43.  - cardio on-board, appreciate recs  - s/p transcutaneous pacer pads for hemodynamic instability   - Plan for dual chamber pacemaker today.  - shall avoid AV yulissa blocking agents such as nicardipine

## 2017-12-21 NOTE — PROVIDER PROGRESS NOTES - EMERGENCY DEPT.
Encounter Date: 12/20/2017    ED Physician Progress Notes        Physician Note:   Heart block. I discusssed with neural ICU team. Since she is ICU now , cardiology césar be consulted.

## 2017-12-21 NOTE — PT/OT/SLP EVAL
"Speech Language Pathology Evaluation  Cognitive Communication    Patient Name:  Maricel Abdul   MRN:  [id number]  Admitting Diagnosis: Acute ischemic left MCA stroke    Recommendations:     Recommendations:                General Recommendations:  Speech/language therapy, Cognitive-linguistic therapy and swallow eval  Diet recommendations:  NPO, Other (see comments) (pt npo for procedure; unable to assess), NPO   Aspiration Precautions: Strict aspiration precautions   General Precautions: Standard,    Communication strategies:  provide increased time to answer    History:     Past Medical History:   Diagnosis Date    Hypertension     Kidney stones     TIA (transient ischemic attack)        Past Surgical History:   Procedure Laterality Date    CERVICAL FUSION      CHOLECYSTECTOMY      HYSTERECTOMY      SHOULDER SURGERY         Social History: Patient lives with her     Occupation/hobbies/homemaking: retired; GED      Subjective   " Wait a minute"  Patient goals: go home    Objective:   Cognitive Status:    Pt was awake/alert with good participation in session. She was oriented to person, place, month, year and carlos. She recalled up to 5 digits and words immediately. After a delay, she recalled 3/3 objects with cues. She was able to solve simple problems but unable to generate multiple solutions. She was unable to sequence 4 items or compare/contrast items. Pt with delays in responding and often repeated the question only with cues needed to answer the question. Repetition of task needed frequently. She named only 7 items during word fluency with max cues when 15-20 is wnl. Pt restless and moving non stop during the eval despite being in bilateral wrists restraints.      Receptive Language:   Pt responded to complex questions with 80% acc and followed one and two step commands. Pt unable to follow more complex commands.     Expressive Language:   Pt named 5/6 pictured objects. Mild perseverations noted " during eval. She completed responsive naming task with 100% acc    Motor Speech:  Dysarthria mild    Voice:   WFL    Visual-Spatial:  to be tested    Reading:   to be tested     Written Expression:   to be tested    Treatment: Swallow eval could not be initiated as pt npo for placement of pacemaker. Education provided to pt and family re: role of speech tx and poc. White board updated. All questions answered.     Assessment:   Maricel Abdul is a 77 y.o. female with an SLP diagnosis of Cognitive-Linguistic Impairment.  She presents with cognitive impairments and mild dysarthria.     Goals:    SLP Goals        Problem: SLP Goal    Goal Priority Disciplines Outcome   SLP Goal     SLP                    Plan:   · Patient to be seen:  5 x/week   · Plan of Care expires:     · Plan of Care reviewed with:  patient, spouse, family   · SLP Follow-Up:  Yes       Discharge recommendations:  Discharge Facility/Level Of Care Needs: other (see comments) (TBD)       Time Tracking:   SLP Treatment Date:   12/21/17  Speech Start Time:  0817  Speech Stop Time:  0834     Speech Total Time (min):  17 min    Billable Minutes: Eval 17     SANDRA Yousif, CCC-SLP  12/21/2017

## 2017-12-21 NOTE — SUBJECTIVE & OBJECTIVE
Neurologic Chief Complaint: Aphasia, confusion, LSW    Subjective:     Interval History: Patient is seen for follow-up neurological assessment and treatment recommendations:   Patient with transcutaneous pacing today and to go for pacemaker placement.   Improved neurologically. Awaiting repeat CTH. Hope to have PM that is MRI compatible.     HPI, Past Medical, Family, and Social History remains the same as documented in the initial encounter.     Review of Systems   Constitutional: Negative for fever.   Respiratory: Negative for shortness of breath.    Cardiovascular: Negative for chest pain.   Neurological: Negative for headaches.     Scheduled Meds:   atorvastatin  40 mg Oral Daily    losartan  100 mg Oral Daily    senna  8.6 mg Oral Daily    sodium chloride 0.9%  3 mL Intravenous Q8H     Continuous Infusions:   sodium chloride 0.9% 50 mL/hr at 12/21/17 1500     PRN Meds:ceFAZolin (ANCEF) IVPB, dextrose 50%, fentaNYL, glucagon (human recombinant), insulin aspart, labetalol    Objective:     Vital Signs (Most Recent):  Temp: 98.9 °F (37.2 °C) (12/21/17 1500)  Pulse: 79 (12/21/17 1500)  Resp: 18 (12/21/17 1500)  BP: (!) 166/76 (12/21/17 1400)  SpO2: (!) 94 % (12/21/17 1500)  BP Location: Left arm    Vital Signs Range (Last 24H):  Temp:  [98.1 °F (36.7 °C)-99.2 °F (37.3 °C)]   Pulse:  [28-80]   Resp:  [14-24]   BP: (106-166)/(55-98)   SpO2:  [92 %-99 %]   Arterial Line BP: (125-175)/(45-66)   BP Location: Left arm    Physical Exam   Constitutional: She appears well-developed and well-nourished. No distress.   Eyes: EOM are normal. Pupils are equal, round, and reactive to light.   Neck: Normal range of motion.   Pulmonary/Chest: Effort normal.   Musculoskeletal:   Abnormal hyperactive writhing movement of all 4 extremities    Skin: Skin is warm and dry. She is not diaphoretic.       Neurological Exam:   LOC: alert  Attention Span: Good   Language: No aphasia  Articulation: No dysarthria  Visual Fields: Full  EOM  (CN III, IV, VI): Full/intact  Facial Movement (CN VII): Symmetric facial expression    Cebellar: No evidence of appendicular or axial ataxia  Sensation: Intact to light touch, temperature and vibration  Tone: Normal tone throughout    Laboratory:  CMP:   Recent Labs  Lab 12/21/17  0101   CALCIUM 9.3   ALBUMIN 3.5   PROT 6.6      K 4.6   CO2 22*      BUN 23   CREATININE 1.0   ALKPHOS 77   ALT 21   AST 21   BILITOT 0.6     BMP:   Recent Labs  Lab 12/21/17  0101      K 4.6      CO2 22*   BUN 23   CREATININE 1.0   CALCIUM 9.3     CBC:   Recent Labs  Lab 12/21/17  0101   WBC 12.15   RBC 3.92*   HGB 11.4*   HCT 34.0*      MCV 87   MCH 29.1   MCHC 33.5     Lipid Panel:   Recent Labs  Lab 12/20/17  1554   CHOL 229*   LDLCALC 135.4   HDL 69   TRIG 123     Coagulation:   Recent Labs  Lab 12/21/17  0104   INR 1.5*   APTT <21.0     Platelet Aggregation Study: No results for input(s): PLTAGG, PLTAGINTERP, PLTAGREGLACO, ADPPLTAGGREG in the last 168 hours.  Hgb A1C:   Recent Labs  Lab 12/20/17  1554   HGBA1C 5.5     TSH:   Recent Labs  Lab 12/20/17  1554   TSH 1.760       Diagnostic Results     Brain Imaging   12/21/17 CTH:  No evidence of acute intracranial pathology       Vessel Imaging   CTA MP 12/20/17:   No evidence of acute hemorrhage or major vascular distribution infarct.  Chronic microvascular scanner changes generalized cerebral volume loss.  Mild atherosclerotic change without evidence of high-grade stenosis or major branch occlusion.  2-3 mm inferior projected outpouching from the communicating segment of the left ICA may reflect an infundibulum at the origin of a nonvisualized posterior communicating artery or an aneurysm. This cannot be definitively differentiated with CTA.  Bilateral subcentimeter thyroid nodules.    Cardiac Imaging   ECHO 12/21/17:   CONCLUSIONS     1 - Concentric remodeling.     2 - Normal left ventricular systolic function (EF 65-70%).     3 - Normal right  ventricular systolic function .     4 - Normal left ventricular diastolic function.     5 - The estimated PA systolic pressure is 22 mmHg.     6 - No wall motion abnormalities

## 2017-12-21 NOTE — PLAN OF CARE
Problem: SLP Goal  Goal: SLP Goal  Pt npo for pacemaker placement later today so unable to initiate swallow eval. Speech lang eval completed.     SANDRA Yousif, CCC-SLP  12/21/2017

## 2017-12-21 NOTE — PROGRESS NOTES
Transported patient to EP lab via bed with transport monitor, oxygen, TVD in place, and the patient's chart with consents. SBAR to EP RN .

## 2017-12-21 NOTE — HPI
Mrs. Abdul is 78 y/o  female with unknown significant PMHx who presented acutely to the [facility] with L sided hemiparesis and aphasia. Stroke code was activated and the patient received TPA at @12:43 and it finalized @ 13:45 pm today. Then the patient was transfered to [facility] for a higher level of care. EP was consulted for asymptomatic complete heart block.     The patient was noted at [facility] to have 2:1 AV conduction at 12:35 PM. She then in between 07:50 PM and 08:00 PM she had developed complete heart block as EKG taken at 07:50 shows sinus bradycardia. Currently the patient is conversant and denies any pain. She awakes to voice and tactile stimulation. BP is slightly hypertensive in keeping with permissive hypertension.     Rhythm strip taken at bedside show CHB with intermittent narrow and wide ventricular escape. The patient has a Vent rate of 39-43.

## 2017-12-21 NOTE — PROCEDURES
Maricel Abdul is a 77 y.o. female patient.    Temp: 99.2 °F (37.3 °C) (12/20/17 2300)  Pulse: (!) 43 (12/21/17 0000)  Resp: (!) 22 (12/21/17 0000)  BP: (!) 150/67 (12/21/17 0000)  SpO2: 95 % (12/21/17 0000)  Weight: 50 kg (110 lb 3.7 oz) (12/20/17 1519)       Central Line  Date/Time: 12/21/2017 4:17 AM  Performed by: KARLIE MOHAN.  Indications: hemodynamic monitoring  Anesthesia: local infiltration  Preparation: skin prepped with ChloraPrep  Location details: right internal jugular  Catheter type: Cordis  Catheter size: 6 Fr  Ultrasound guidance: yes  Manometry: Yes  Number of attempts: 1  Post-procedure: line sutured,  chlorhexidine patch,  sterile dressing applied and blood return through all ports  Comments: TVP inserted to 28cm to achieve ventricular capture. Pt without underlying escape. Pacing threshold 0.8mA. Rate set at 80bpm. Pacing output set at 5mA. CXR and EKG will be done post procedure          Karlie Mohan  12/21/2017

## 2017-12-21 NOTE — ASSESSMENT & PLAN NOTE
- 78 yo F with aphasia s/p tPA. Reportedly had RSW prior to tPA administration but no weakness appreciated on exam at [facility]   - CTA MP without LVO.   - No concerns for immediate post t-PA complications    Plan:   - F/u CT head / MRI undone for cardiac pacer placement   - SBP target < 180 mm Hg  - statins for secondary stroke prevention / ASA / DVT prophylaxis post cardiac procedure   - Echo normal EF / concerns for complete heart block / cardiology following, appreciate recs   - Rehab Efforts: Physical Therapy, Occupational Therapy, Speech and Language Pathology  - Mechanical prophylaxis for now.

## 2017-12-21 NOTE — NURSING
Patient [facility]    Type of Stroke ischemic    TPA start time and end time 1345    Patients current symptoms include L sided weakness and aphasia.    Will continue to monitor and treat per POC

## 2017-12-21 NOTE — HOSPITAL COURSE
Ms. Abdul was admitted to [facility] for 24-hr close monitoring following tPA administration. At that time, complete heart block was noted on telemetry so Cardiology consulted. Transcutaneous pacer placed and once she was outside the 24-hr tPA window, pacemaker was placed on 12/21/17. Her confusion and neuro exam improved, and pt was stable to step down from ICU.   We were unable to obtain MRI Brain to definitely rule out acute infarct d/t recent pacemaker placement. Repeat CT Head negative for signs of acute stroke. At this time, etiology of her presentation is unclear. Considering resolution of her neurologic symptoms, possibly t-PA aborted stroke vs TIA vs other transient neuromuscular syndrome. Recommend conservatively treating as acute infarct, including ASA 325mg Daily and Atorvastatin 40mg Daily, as well as maximal risk factor modification (HTN, HLD, diet, exercise, tobacco abstinence, etc.) for secondary stroke prevention.  Ms. Abdul was evaluated and treated by PT, OT, and SLP while admitted who recommended discharge to home with no further therapy needs. She was instructed to follow up with Vascular Neurology in 4-6 weeks, with PCP for hospital f/u within 1 week, and Cardiology/Pacemaker follow-ups in 1 week and 3 months, respectively. She was also found to have bilateral upper and lower extremity hyperactive movements during admission, has been going on for several weeks/months per pt and , so outpatient referral was placed to establish care with General Neurology as well, at their earliest convenience.  Pt's confusion completely resolved and she was neurologically stable for discharge home with .

## 2017-12-21 NOTE — ED NOTES
LOC: The patient is awake, alert and confused with a flat affect, the patient is oriented x 1 and speaking appropriately.  APPEARANCE: Patient is anxious, patient is clean and well groomed, patient's clothing is properly fastened.  SKIN: The skin is warm and dry, patient has normal skin turgor and moist mucus membranes, skin intact, no breakdown or brusing noted.  MUSKULOSKELETAL: Patient moving all extremities well, no obvious swelling or deformities noted.  RESPIRATORY: Airway is open and patent, respirations are spontaneous, patient has a normal effort and rate. Breath sounds are clear and equal bilaterally.  CARDIAC: Normal heart sounds. No peripheral edema.  ABDOMEN: Soft and non tender to palpation, no distention noted. Bowel sounds present.  NEURO: See neuro flowsheet.

## 2017-12-21 NOTE — PROGRESS NOTES
Received patient back to room 7078 s/p pacemaker placement. VSS, no distress. L chest incision with dressing clean, dry, intact. L arm in sling. TVD removed. Patient 100% paced at 90. See flowsheets for more details.

## 2017-12-21 NOTE — CONSULTS
[facility]  Cardiac Electrophysiology  Consult Note    Admission Date: 12/20/2017  Code Status: Full Code   Attending Provider: Mauri Larose MD  Consulting Provider: Issac Roth MD  Principal Problem:Acute ischemic left MCA stroke    Inpatient consult to Electrophysiology  Consult performed by: ISSAC ROTH  Consult ordered by: LYDIA ANDERSON  Reason for consult: CHB        Subjective:     Chief Complaint:  CHB     HPI:   Mrs. Abdul is 76 y/o  female with unknown significant PMHx who presented acutely to the [facility] with L sided hemiparesis and aphasia. Stroke code was activated and the patient received TPA at @12:43 and it finalized @ 13:45 pm today. Then the patient was transfered [place] [facility] [place] higher level of care. EP was consulted for asymptomatic complete heart block.     The patient was noted at [facility] to have 2:1 AV conduction at 12:35 PM. She then in between 07:50 PM and 08:00 PM she had developed complete heart block as EKG taken at 07:50 shows sinus bradycardia. Currently the patient is conversant and denies any pain. She awakes to voice and tactile stimulation. BP is slightly hypertensive in keeping with permissive hypertension.     Rhythm strip taken at bedside show CHB with intermittent narrow and wide ventricular escape. The patient has a Vent rate of 39-43.       Past Medical History:   Diagnosis Date    Hypertension     Kidney stones     TIA (transient ischemic attack)        Past Surgical History:   Procedure Laterality Date    CERVICAL FUSION      CHOLECYSTECTOMY      HYSTERECTOMY      SHOULDER SURGERY         Review of patient's allergies indicates:  No Known Allergies    No current facility-administered medications on file prior to encounter.      No current outpatient prescriptions on file prior to encounter.     Family History     None        Social History Main Topics    Smoking status: Former Smoker      Years: 20.00    Smokeless tobacco: Never Used    Alcohol use Not on file      Comment: Glass of wine each night    Drug use: No    Sexual activity: Not on file     Review of Systems   Constitution: Negative for chills and fever.   HENT: Negative for congestion and sore throat.    Eyes: Negative for pain.   Cardiovascular: Negative for chest pain and dyspnea on exertion.   Respiratory: Negative for hemoptysis and shortness of breath.    Musculoskeletal: Negative for arthritis and back pain.   Gastrointestinal: Negative for abdominal pain, nausea and vomiting.   Neurological: Positive for focal weakness.     Objective:     Vital Signs (Most Recent):  Temp: 98.1 °F (36.7 °C) (12/20/17 1519)  Pulse: (!) 56 (12/20/17 2000)  Resp: 18 (12/20/17 2000)  BP: (!) 116/55 (12/20/17 2000)  SpO2: 95 % (12/20/17 2000) Vital Signs (24h Range):  Temp:  [98.1 °F (36.7 °C)] 98.1 °F (36.7 °C)  Pulse:  [54-76] 56  Resp:  [15-18] 18  SpO2:  [95 %-98 %] 95 %  BP: (116-193)/(55-81) 116/55     Weight: 50 kg (110 lb 3.7 oz)  There is no height or weight on file to calculate BMI.    SpO2: 95 %  O2 Device (Oxygen Therapy): room air    Physical Exam   Constitutional: She is oriented to person, place, and time. She appears well-developed and well-nourished.   HENT:   Head: Normocephalic and atraumatic.   Eyes: EOM are normal. Pupils are equal, round, and reactive to light.   Neck: Normal range of motion. Neck supple. No JVD present.   Cardiovascular: Normal heart sounds and intact distal pulses.    bradycardic   Pulmonary/Chest: Effort normal and breath sounds normal. No respiratory distress.   Abdominal: Soft. Bowel sounds are normal. There is no tenderness.   Musculoskeletal: Normal range of motion. She exhibits no edema.   Neurological: She is alert and oriented to person, place, and time.   Focal weakness exhibited in her left arm   Vitals reviewed.      Significant Labs:   EP:   Recent Labs  Lab 12/20/17  1554 12/20/17 2046     --     K 4.1 4.1     --    CO2 24  --    *  --    BUN 17  --    CREATININE 0.8  --    CALCIUM 9.4  --    PROT 6.8  --    ALBUMIN 3.5  --    BILITOT 0.6  --    ALKPHOS 91  --    AST 24  --    ALT 22  --    ANIONGAP 9  --    ESTGFRAFRICA >60.0  --    EGFRNONAA >60.0  --    WBC 8.93  --    HGB 13.0  --    HCT 40.5  --      --    INR 1.3*  --        Significant Imaging: Echocardiogram: 2D echo with color flow doppler: No results found for this or any previous visit.    Assessment and Plan:     Complete heart block    Mrs. Abdul is 78 y/o  female with unknown significant PMHx here with CHB with intermittent narrow and wide escape. TPA given within 24 hrs. Patient is hemodynamically stable and conversant with her care givers. Follows commands. In addition with stimulation her HR increases.     Plan:  - Would place transcutaneous pacer pads if the patient becomes hemodynamically unstable  - Would order atropine to bedside and have pacer at bedside to pace  - Would place cordis if needed and safe from a perspective of s/p TPA  - Please avoid AV yulissa blocking agents such as nicardipine    After discussion with the Neuro Critical care staff, TPA clears out of system within 24 hrs. Will plan for permanent PPM tomorrow with Dr. Agosto. Will need formal 2D echo done before then for EF    Discussed with Dr. Agosto,             Thank you for your consult. I will follow-up with patient. Please contact us if you have any additional questions.    Issac Simmons MD  Cardiac Electrophysiology  [facility]

## 2017-12-21 NOTE — PROGRESS NOTES
Patient has still not voided since catheter removed. Bladder scan performed, 225 cc noted in bladder. Called NCC team, Dr. Walsh aware that patient has not voided.

## 2017-12-21 NOTE — NURSING
Iris/Katarzyna with [facility] came to bedside to address need for transcutaneous pacing vs TVP.  It was decided that pt need TVP.  Pt then had 11 second pause and TVP was then decided.  Pt was agitated and restless, prns given to address the agitation.  Tried transcutan. Pacing but still unable to capture.  Proceeded with TVP and successful at a rate of 80.  Pt was then cooperative and vs remained stable.

## 2017-12-21 NOTE — PT/OT/SLP EVAL
Physical Therapy Evaluation    Patient Name:  Maricel Abdul   MRN:  [id number]  Admitting Diagnosis:  Acute ischemic left MCA stroke   Recent Surgery: Procedure(s) (LRB):  INSERTION-PACEMAKER-DUAL (Left) Day of Surgery    Recommendations:     Discharge Recommendations:  Rehab  Discharge Equipment Recommendations: none   Barriers to discharge: Decreased caregiver support    Plan:     During this hospitalization, patient to be seen 4 x/week to address the above listed problems via gait training, therapeutic activities, therapeutic exercises, neuromuscular re-education  · Plan of Care Expires:  01/20/18   Plan of Care Reviewed with: patient, spouse, family    This Plan of care has been discussed with the patient who was involved in its development and understands and is in agreement with the identified goals and treatment plan    History:     Patient lives with their spouse in [place], [place]  in a two story home has 5 steps at entrance with right railing.      Patient has 1 flights of steps indoors with right railing.         Bedroom:  full access, on 2nd floor  Bathroom:  full access, on 2nd floor    Patient reports being independent with ADLs.  Patient uses DME as follows: none  Patient owns following equipment but does not use:  none  Activity level: active, goes to the gym every 2-3 days, primarily cardiorespiratory/circuit training.   Work: retired.   Drive: yes.   Managing Medicines/Managing Home: yes.   Hand dominance: right.   Wears glasses: no  Hobbies: going to the [employer].  Upon discharge, patient will have assistance from  and family.    Past Medical History:   Diagnosis Date    Hypertension     Kidney stones     TIA (transient ischemic attack)        Past Surgical History:   Procedure Laterality Date    CERVICAL FUSION      CHOLECYSTECTOMY      HYSTERECTOMY      SHOULDER SURGERY         Subjective     Communicated with RN prior to session.  Patient found supine upon PT entry to room, agreeable to  "evaluation.  Pt's  and children present during session.  "Do you know what time I'm going to the pacemaker?"  Chief Complaint: "feeling like I was having a stroke."  Patient comments/goals: to return home  Pain/Comfort:  · Pain Rating 1: 0/10  · Pain Rating Post-Intervention 1: 0/10    Objective:     Patient found with: arterial line, blood pressure cuff, peripheral IV, pulse ox (continuous), telemetry, SCD, restraints (CVP)     General Precautions: Standard, Cardiac fall, aspiration   Orthopedic Precautions:N/A   Braces: N/A     Exam:    · Mental Status: Patient is oriented to Person, Place, Time and Situation and follows 100% of verbal commands. Alert  · Skin: Visible skin intact  · Edema: none noted  · Sensation: Intact  · Posture: forward head  · Hearing: Intact  · Vision:  Intact  · Coordination: intact finger to nose bilaterally. Pt able to perform task smoothly, however when not focused on certain task, pt with dysmetric choreac movements of BUE (family states pt has a tremor prior to stroke, however current movements are much different than her normal)    · Range of Motion:  · RUE: WFL  · LUE: WFL  · RLE: WFL  · LLE: WFL    Upper Extremity Strength  (R) UE  (L) UE    Shoulder flexion: 5/5 Shoulder flexion: 5/5   Elbow flexion: 5/5 Elbow flexion: 5/5   Elbow extension: 5/5 Elbow extension: 5/5   Wrist flexion: 5/5 Wrist flexion: 5/5   Wrist extension: 5/5 Wrist extension: 5/5    5/5 : 5/5     Lower Extremity Strength  (R) LE  (L) UE    Hip Flexion: 5/5 Hip flexion: 5/5   Knee flexion: 5/5 Knee flexion: 5/5   Knee extension: 5/5 Knee extension 5/5   Ankle dorsiflexion: 5/5 Ankle dorsiflexion: 5/5   Ankle plantarflexion: 5/5 Ankle plantarflexion: 5/5     Functional Mobility:  Bed Mobility:   · Patient completed Rolling/Turning to Left with  contact guard assistance  · Patient completed Rolling/Turning to Right with contact guard assistance  · Patient completed Scooting/Bridging with contact guard " assistance  · Did not c/o sup<>sit due to pt preparing for pacemaker placement procedure with external pacer line.    Therapeutic Activities & Exercises:   Education:  Patient provided with daily orientation and goals of this PT session. Patient and family agreed to participate in session. Patient and family aware of patient's deficits and therapy progression. They were educated to transfer with therapy only. Time provided for therapeutic counseling and discussion of health disposition. All questions answered to patient's and family's satisfaction, within scope of PT practice; voiced no other concerns. White board updated in patient's room, RN notified of session.    Patient left supine, with head in midline, neutral pelvis & heels floated for skin protection with all lines intact, call button in reach, restraints reapplied at end of session and RN notified    AM-PAC 6 CLICK MOBILITY  Total Score:19     Assessment:     Maricel Abdul is a 77 y.o. female admitted with a medical diagnosis of Acute ischemic left MCA stroke.  She presents with the following impairments/functional limitations:  impaired balance, gait instability, impaired self care skills, impaired functional mobilty, decreased lower extremity function, decreased upper extremity function. Pt with chorea movements of BUE and head throughout evaluation. Pt able to perform directed tasks with BUE, however when not following instruction, pt with moderate constant movement. Maricel Cristobals deficits effect their ability to safely and independently participate in self-care tasks and functional mobility which increases their caregiver burden. Due to her physical therapy diagnosis of debility and deconditioning, they continue to benefit from acute PT services to address the following limitations: high fall risk, weakness, instability, the need for supervised instruction of exercise. Maricel Cristobals deficits effect their roles and responsibilities in which  they were able to complete prior to admit. Education was provided to patient & family regarding importance of continued participation in therapy, patient's progress and discharge disposition. Pt would benefit from Rehab upon discharge to improve quality of life and focus on recovery of impairments.     Rehab Prognosis: good; patient would benefit from acute skilled PT services to address these deficits and reach maximum level of function.      GOALS:    Physical Therapy Goals        Problem: Physical Therapy Goal    Goal Priority Disciplines Outcome Goal Variances Interventions   Physical Therapy Goal     PT/OT, PT Ongoing (interventions implemented as appropriate)     Description:  Goals to be met by: 12/29/2017    Patient will increase functional independence with mobility by performing:    Supine to sit with Minimal Assistance.  Sit to supine with Minimal Assistance.   Sit to stand transfer with Minimal Assistance using No Assistive Device and Rolling Walker.  Bed to chair transfer via Stand Pivot with Minimal Assistance using No Assistive Device and Rolling Walker.  Gait  x 50 feet with Minimal Assistance using No Assistive Device and Rolling Walker.    Dynamic standing for 10 minutes with Minimal Assistance using No Assistive Device.  Able to tolerate exercise for 15-20 reps with independence.  Patient and family able to teachback stroke & positioning education independently.  Ascend/descend 1 flight of  stairs with right Handrails Minimal Assistance using No Assistive Device.                       Clinical Decision Making:   HISTORY: Co-morbidities and personal factors that may impact the plan of care  Co-morbidities:   [] Time since onset of injury / illness / exacerbation [] Status of current condition []Patient's cognitive status and safety concerns  [x] Multiple Medical Problems  Personal Factors:  [] Patient's age [] Prior Level of function [] Patient's home situation (environment and family support) []  Patient's level of motivation [] Expected progression of patient  EXAMINATION: Body Structures and Functions, activity limitations and participation restrictions that may impact the plan of care  Body Regions:  [x] Head [x] Neck  [x] Trunk [x] Upper Extremity [x] Lower Extremity  Body Systems:  [] For communication ability, affect, cognition, language, and learning style: the assessment of the ability to make needs known, consciousness, orientation, expected emotional /behavioral responses, and learning preferences  [x] For the neuromuscular system: a general assessment of gross coordinated movement (eg, balance, gait, locomotion, transfers, and transitions) and motor function (motor control and motor learning)  [] For the musculoskeletal system: the assessment of gross symmetry, gross range of motion, gross strength, height, and weight  [] For the integumentary system: the assessment of pliability(texture), presence of scar formation, skin color, and skin integrity  [x] For cardiovascular/pulmonary system: the assessment of heart rate, respiratory rate, blood pressure, and edema   Activity limitations:   [] Patient's cognitive status and safety concerns [] Status of current condition      [] Weight bearing restriction [] Cardiopulmunary Restriction  Participation Restrictions:   [] Goals and goal agreement with the patient  [] Rehab potential (prognosis) and probable outcome    CLINICAL PRESENTATION:  [] stable [x] evolving [] unstable    On examination of body system using standardized tests and measures patient presents with 1-2 elements from any of the following: body structures and functions, activity limitations, and/or participation restrictions.  Leading to a clinical presentation that is considered evolving with changing characteristics  Evaluation Complexity:  Moderate - 80264      Time Tracking:     PT Received On: 12/21/17  PT Start Time: 1401     PT Stop Time: 1422  PT Total Time (min): 21 min      Billable Minutes: Evaluation 21    Carmen Claudio PT, DPT  12/21/2017  Pager:[phone]

## 2017-12-21 NOTE — ASSESSMENT & PLAN NOTE
Mrs. Abdul is 78 y/o  female with unknown significant PMHx here with CHB with intermittent narrow and wide escape. TPA given within 24 hrs. Patient is hemodynamically stable and conversant with her care givers. However she follows commands intermittently and displays involuntary jerking movements. S/p TVP placement.    Plan:  - Plan for PPM (as long as EF is normal, may need to place ICD). Will plan for MRI compatible.  - Consented and orders placed  - Please no heparin ppx or dvt ppx  - Diet NPO currently.    Discussed with Dr. Agosto,

## 2017-12-21 NOTE — NURSING
Simmons at bedside to trasncutaneous pace patient but unable to capture.  Pts bp and mental status has not changed.  Cards still here at bedside addressing 11 second pause

## 2017-12-21 NOTE — HOSPITAL COURSE
12/20: s/p t-PA for Lt MCA / CTA negative for large vessel occlussion / transcutaneous pacer for heart block   12/21: NAEON. Dual chamber pacemaker   12/22: NAEON. Repeat CT head stable. S/p DC PPM placement.

## 2017-12-21 NOTE — ANESTHESIA PREPROCEDURE EVALUATION
[facility]  Anesthesia Pre-Operative Evaluation         Patient Name: Maricel Abdul  YOB: 1940  MRN: [id number]    SUBJECTIVE:     Pre-operative evaluation for Procedure(s) (LRB):  INSERTION-PACEMAKER-DUAL (Left)     12/21/2017    Mrs. Abdul is 76 y/o  female with largely unknown significant PMHx, HTN, and kidney stones who presented acutely to the [facility] with L sided hemiparesis and aphasia. Stroke code was activated and the patient received TPA at @12:43 and it finalized @ 13:45 pm today. Then the patient was [facility]fered [facility] [place] [facility] for a higher level of care. Found to be in asymptomatic complete heart block. Patient now presents for the above procedure(s).    LDA:        Peripheral IV - Single Lumen 12/21/17 0500 Right Wrist (Active)   Site Assessment Clean;Dry;Intact;No redness;No swelling 12/21/2017 11:00 AM   Line Status Infusing 12/21/2017 11:00 AM   Dressing Status Clean;Dry;Intact 12/21/2017 11:00 AM   Dressing Intervention Dressing reinforced 12/21/2017 11:00 AM   Dressing Change Due 12/25/17 12/21/2017 11:00 AM   Site Change Due 12/25/17 12/21/2017 11:00 AM   Reason Not Rotated Not due 12/21/2017 11:00 AM   Number of days: 0            Peripheral IV - Single Lumen 12/21/17 1100 Left Antecubital (Active)   Site Assessment Clean;Dry;Intact;No redness;No swelling 12/21/2017 11:00 AM   Line Status Blood return noted;Flushed;Saline locked 12/21/2017 11:00 AM   Dressing Status Clean;Dry;Intact 12/21/2017 11:00 AM   Dressing Intervention New dressing 12/21/2017 11:00 AM   Dressing Change Due 12/25/17 12/21/2017 11:00 AM   Site Change Due 12/25/17 12/21/2017 11:00 AM   Reason Not Rotated Not due 12/21/2017 11:00 AM   Number of days: 0            Arterial Line 12/21/17 0400 Right Radial (Active)   Site Assessment Clean;Dry;Intact;No redness;No swelling 12/21/2017 11:00 AM   Line Status Pulsatile blood flow 12/21/2017 11:00 AM   Art Line Waveform  Appropriate;Square wave test performed 12/21/2017 11:00 AM   Arterial Line Interventions Zeroed and calibrated;Leveled;Flushed per protocol;Connections checked and tightened 12/21/2017 11:00 AM   Color/Movement/Sensation Capillary refill less than 3 sec 12/21/2017 11:00 AM   Dressing Type Transparent 12/21/2017 11:00 AM   Dressing Status Biopatch in place;Clean;Dry;Intact 12/21/2017 11:00 AM   Dressing Intervention Dressing reinforced 12/21/2017 11:00 AM   Dressing Change Due 12/28/17 12/21/2017 11:00 AM   Number of days: 0       Prev airway: None documented.    Drips:    sodium chloride 0.9% 50 mL/hr at 12/21/17 1100    niCARdipine Stopped (12/20/17 2016)       Patient Active Problem List   Diagnosis    S/P admn tPA in diff fac w/n last 24 hr bef adm to crnt fac    Aphasia    Acute ischemic left MCA stroke    Essential hypertension    Hyperlipidemia    Complete heart block       Review of patient's allergies indicates:  No Known Allergies    Current Inpatient Medications:   atorvastatin  40 mg Oral Daily    fentaNYL        losartan  100 mg Oral Daily    midazolam        midazolam        sodium chloride 0.9%  3 mL Intravenous Q8H       No current facility-administered medications on file prior to encounter.      No current outpatient prescriptions on file prior to encounter.       Past Surgical History:   Procedure Laterality Date    CERVICAL FUSION      CHOLECYSTECTOMY      HYSTERECTOMY      SHOULDER SURGERY         Social History     Social History    Marital status:      Spouse name: N/A    Number of children: N/A    Years of education: N/A     Occupational History    Not on file.     Social History Main Topics    Smoking status: Former Smoker     Years: 20.00    Smokeless tobacco: Never Used    Alcohol use Not on file      Comment: Glass of wine each night    Drug use: No    Sexual activity: Not on file     Other Topics Concern    Not on file     Social History Narrative    No  narrative on file       OBJECTIVE:     Vital Signs Range (Last 24H):  Temp:  [36.7 °C (98.1 °F)-37.3 °C (99.2 °F)]   Pulse:  [28-80]   Resp:  [14-24]   BP: (106-193)/(55-98)   SpO2:  [92 %-99 %]   Arterial Line BP: (125-175)/(56-66)       CBC:   Recent Labs      17   1554  17   0101   WBC  8.93  12.15   RBC  4.50  3.92*   HGB  13.0  11.4*   HCT  40.5  34.0*   PLT  286  251   MCV  90  87   MCH  28.9  29.1   MCHC  32.1  33.5       CMP:   Recent Labs      17   1554  17   2046  17   0101   NA  138   --   143   K  4.1  4.1  4.6   CL  105   --   109   CO2  24   --   22*   BUN  17   --   23   CREATININE  0.8   --   1.0   GLU  126*   --   187*   MG  1.9   --   2.2   PHOS  2.9   --   4.3   CALCIUM  9.4   --   9.3   ALBUMIN  3.5   --   3.5   PROT  6.8   --   6.6   ALKPHOS  91   --   77   ALT  22   --   21   AST  24   --   21   BILITOT  0.6   --   0.6       INR:  Recent Labs      17   1554  17   0104   INR  1.3*  1.5*   APTT  26.3  <21.0       Diagnostic Studies:     EK/20/17    Test Reason : R00.0  Blood Pressure : 172/072 mmHG  Vent. Rate : 054 BPM     Atrial Rate : 108 BPM     P-R Int : 168 ms          QRS Dur : 132 ms      QT Int : 510 ms       P-R-T Axes : 078 062 074 degrees     QTc Int : 483 ms    Sinus tachycardia with 2nd degree A-V block with 2:1 A-V conduction  Biatrial enlargement  Right bundle branch block  Abnormal ECG  No previous ECGs available  Confirmed by RAO GELLER MD ([id number]) on 2017 11:05:28 AM    2D ECHO:  Results for orders placed or performed during the hospital encounter of 17   Echo doppler color flow   Result Value Ref Range    EF 68 55 - 65    Mitral Valve Regurgitation TRIVIAL     Diastolic Dysfunction No     Est. PA Systolic Pressure 21.66     Mitral Valve Mobility NORMAL          ASSESSMENT/PLAN:         Anesthesia Evaluation    I have reviewed the Patient Summary Reports.    I have reviewed the Nursing Notes.      Review of  Systems  Anesthesia Hx:  History of prior surgery of interest to airway management or planning: Denies Family Hx of Anesthesia complications.   Denies Personal Hx of Anesthesia complications.   Hematology/Oncology:  Hematology Normal   Oncology Normal     EENT/Dental:EENT/Dental Normal   Cardiovascular:   Hypertension Dysrhythmias    Pulmonary:  Pulmonary Normal    Renal/:   Chronic Renal Disease    Musculoskeletal:  Musculoskeletal Normal    Neurological:   TIA, CVA    Endocrine:  Endocrine Normal    Dermatological:  Skin Normal    Psych:  Psychiatric Normal           Physical Exam  General:  Well nourished    Airway/Jaw/Neck:  Airway Findings: Mouth Opening: Normal General Airway Assessment: Adult  Mallampati: II  TM Distance: Normal, at least 6 cm  Jaw/Neck Findings:  Neck ROM: Normal ROM     Eyes/Ears/Nose:  EYES/EARS/NOSE FINDINGS: Normal   Dental:  Dental Findings: Periodontal disease, Mild   Chest/Lungs:  Chest/Lungs Findings: Clear to auscultation, Normal Respiratory Rate     Heart/Vascular:  Heart Findings: Rate: Tachycardia  Heart Murmur  Vascular Findings: Normal    Abdomen:  Abdomen Findings: Normal    Musculoskeletal:  Musculoskeletal Findings: Normal    Mental Status:  Mental Status Findings:  Confusion         Anesthesia Plan  Type of Anesthesia, risks & benefits discussed:  Anesthesia Type:  general, MAC  Patient's Preference:   Intra-op Monitoring Plan: standard ASA monitors, arterial line and central line  Intra-op Monitoring Plan Comments:   Post Op Pain Control Plan: multimodal analgesia and per primary service following discharge from PACU  Post Op Pain Control Plan Comments:   Induction:   IV  Beta Blocker:  Patient is not currently on a Beta-Blocker (No further documentation required).       Informed Consent: Patient representative understands risks and agrees with Anesthesia plan.  Questions answered. Anesthesia consent signed with patient representative.  ASA Score: 4     Day of Surgery  Review of History & Physical: I have interviewed and examined the patient. I have reviewed the patient's H&P dated:  There are no significant changes.  H&P update referred to the surgeon.         Ready For Surgery From Anesthesia Perspective.

## 2017-12-21 NOTE — PLAN OF CARE
Problem: Patient Care Overview  Goal: Plan of Care Review  Plan of care reviewed with patient, spouse, and family at 1745. Questions and concerns addressed. Patient and spouse voice understanding. See flowsheets for more details.

## 2017-12-21 NOTE — PROGRESS NOTES
TVP placed and the patient's HR is 80. Would still recommend to keep the patient on trans-cutaneous pads. Will reassess the patient for PPM vs ICD tomorrow. At the earliest the patient will be an afternoon case due to TPA given the day before at noon.    Issac Simmons MD, PGY-4

## 2017-12-21 NOTE — TRANSFER OF CARE
"Anesthesia Transfer of Care Note    Patient: Maricel Abdul    Procedure(s) Performed: Procedure(s) (LRB):  INSERTION-PACEMAKER-DUAL (Left)    Patient location: ICU    Anesthesia Type: general    Transport from OR: Transported from OR on 6-10 L/min O2 by face mask with adequate spontaneous ventilation    Post pain: adequate analgesia    Post assessment: no apparent anesthetic complications    Post vital signs: stable    Level of consciousness: awake, alert and oriented    Nausea/Vomiting: no nausea/vomiting    Complications: none    Transfer of care protocol was followed      Last vitals:   Visit Vitals  BP (!) 166/76 (BP Location: Left arm, Patient Position: Lying)   Pulse 79   Temp 37.2 °C (98.9 °F) (Oral)   Resp 18   Ht 4' 11" (1.499 m)   Wt 61.1 kg (134 lb 11.2 oz)   SpO2 (!) 94%   Breastfeeding? No   BMI 27.21 kg/m²     "

## 2017-12-21 NOTE — PROGRESS NOTES
1345: Transported patient to CT scan via bed with transport monitor, oxygen, and TVP in place. VSS, no distress.     1400: Back in patients room from CT . VSS, no distress. Stroke team at bedside, update given on plan of care. Family at bedside.

## 2017-12-22 PROBLEM — E78.2 MIXED HYPERLIPIDEMIA: Status: ACTIVE | Noted: 2017-12-20

## 2017-12-22 PROBLEM — I63.412 EMBOLIC STROKE INVOLVING LEFT MIDDLE CEREBRAL ARTERY: Status: ACTIVE | Noted: 2017-12-20

## 2017-12-22 PROBLEM — I63.9 CEREBROVASCULAR ACCIDENT (CVA): Status: ACTIVE | Noted: 2017-12-22

## 2017-12-22 LAB
ALBUMIN SERPL BCP-MCNC: 3.1 G/DL
ALP SERPL-CCNC: 67 U/L
ALT SERPL W/O P-5'-P-CCNC: 25 U/L
AMPHETAMINES SERPL QL: NEGATIVE
ANION GAP SERPL CALC-SCNC: 6 MMOL/L
APTT BLDCRRT: 22.7 SEC
AST SERPL-CCNC: 28 U/L
BARBITURATES SERPL QL SCN: NEGATIVE
BASOPHILS # BLD AUTO: 0.04 K/UL
BASOPHILS NFR BLD: 0.3 %
BENZODIAZ SERPL QL: NEGATIVE
BILIRUB SERPL-MCNC: 0.5 MG/DL
BUN SERPL-MCNC: 28 MG/DL
CALCIUM SERPL-MCNC: 8.3 MG/DL
CANNABINOIDS SERPL QL: NEGATIVE
CHLORIDE SERPL-SCNC: 112 MMOL/L
CO2 SERPL-SCNC: 26 MMOL/L
COCAINE, BLOOD: NEGATIVE
CREAT SERPL-MCNC: 0.7 MG/DL
DIFFERENTIAL METHOD: ABNORMAL
EOSINOPHIL # BLD AUTO: 0 K/UL
EOSINOPHIL NFR BLD: 0.3 %
ERYTHROCYTE [DISTWIDTH] IN BLOOD BY AUTOMATED COUNT: 14.6 %
EST. GFR  (AFRICAN AMERICAN): >60 ML/MIN/1.73 M^2
EST. GFR  (NON AFRICAN AMERICAN): >60 ML/MIN/1.73 M^2
ETHANOL SERPL-MCNC: NEGATIVE MG/DL
GLUCOSE SERPL-MCNC: 92 MG/DL
HCT VFR BLD AUTO: 32 %
HGB BLD-MCNC: 10.3 G/DL
IMM GRANULOCYTES # BLD AUTO: 0.06 K/UL
IMM GRANULOCYTES NFR BLD AUTO: 0.5 %
INR PPP: 1.1
LYMPHOCYTES # BLD AUTO: 1 K/UL
LYMPHOCYTES NFR BLD: 8.1 %
MAGNESIUM SERPL-MCNC: 2.1 MG/DL
MCH RBC QN AUTO: 28.8 PG
MCHC RBC AUTO-ENTMCNC: 32.2 G/DL
MCV RBC AUTO: 89 FL
METHADONE SERPL QL SCN: NEGATIVE
MONOCYTES # BLD AUTO: 1.2 K/UL
MONOCYTES NFR BLD: 9.3 %
NEUTROPHILS # BLD AUTO: 10.2 K/UL
NEUTROPHILS NFR BLD: 81.5 %
NRBC BLD-RTO: 0 /100 WBC
OPIATES SERPL QL SCN: NEGATIVE
PCP SERPL QL SCN: NEGATIVE
PHOSPHATE SERPL-MCNC: 2.2 MG/DL
PLATELET # BLD AUTO: 200 K/UL
PMV BLD AUTO: 9.9 FL
POCT GLUCOSE: 74 MG/DL (ref 70–110)
POCT GLUCOSE: 76 MG/DL (ref 70–110)
POCT GLUCOSE: 90 MG/DL (ref 70–110)
POCT GLUCOSE: 93 MG/DL (ref 70–110)
POTASSIUM SERPL-SCNC: 3.6 MMOL/L
POTASSIUM SERPL-SCNC: 4 MMOL/L
PROPOXYPH SERPL QL: NEGATIVE
PROT SERPL-MCNC: 6.2 G/DL
PROTHROMBIN TIME: 11.4 SEC
RBC # BLD AUTO: 3.58 M/UL
SODIUM SERPL-SCNC: 144 MMOL/L
WBC # BLD AUTO: 12.52 K/UL

## 2017-12-22 PROCEDURE — A4216 STERILE WATER/SALINE, 10 ML: HCPCS | Performed by: PHYSICIAN ASSISTANT

## 2017-12-22 PROCEDURE — 63600175 PHARM REV CODE 636 W HCPCS: Performed by: PSYCHIATRY & NEUROLOGY

## 2017-12-22 PROCEDURE — 83735 ASSAY OF MAGNESIUM: CPT

## 2017-12-22 PROCEDURE — 25000003 PHARM REV CODE 250: Performed by: PHYSICIAN ASSISTANT

## 2017-12-22 PROCEDURE — 20600001 HC STEP DOWN PRIVATE ROOM

## 2017-12-22 PROCEDURE — 92610 EVALUATE SWALLOWING FUNCTION: CPT

## 2017-12-22 PROCEDURE — 99233 SBSQ HOSP IP/OBS HIGH 50: CPT | Mod: GC,,, | Performed by: PSYCHIATRY & NEUROLOGY

## 2017-12-22 PROCEDURE — 85730 THROMBOPLASTIN TIME PARTIAL: CPT

## 2017-12-22 PROCEDURE — 99233 SBSQ HOSP IP/OBS HIGH 50: CPT | Mod: ,,, | Performed by: PSYCHIATRY & NEUROLOGY

## 2017-12-22 PROCEDURE — 85610 PROTHROMBIN TIME: CPT

## 2017-12-22 PROCEDURE — 84100 ASSAY OF PHOSPHORUS: CPT

## 2017-12-22 PROCEDURE — 97530 THERAPEUTIC ACTIVITIES: CPT

## 2017-12-22 PROCEDURE — 93010 ELECTROCARDIOGRAM REPORT: CPT | Mod: ,,, | Performed by: INTERNAL MEDICINE

## 2017-12-22 PROCEDURE — 84132 ASSAY OF SERUM POTASSIUM: CPT

## 2017-12-22 PROCEDURE — 93005 ELECTROCARDIOGRAM TRACING: CPT

## 2017-12-22 PROCEDURE — 25000003 PHARM REV CODE 250: Performed by: PSYCHIATRY & NEUROLOGY

## 2017-12-22 PROCEDURE — 63600175 PHARM REV CODE 636 W HCPCS: Performed by: STUDENT IN AN ORGANIZED HEALTH CARE EDUCATION/TRAINING PROGRAM

## 2017-12-22 PROCEDURE — 27000221 HC OXYGEN, UP TO 24 HOURS

## 2017-12-22 PROCEDURE — 25000003 PHARM REV CODE 250: Performed by: STUDENT IN AN ORGANIZED HEALTH CARE EDUCATION/TRAINING PROGRAM

## 2017-12-22 PROCEDURE — 80053 COMPREHEN METABOLIC PANEL: CPT

## 2017-12-22 PROCEDURE — 25000003 PHARM REV CODE 250: Performed by: NURSE PRACTITIONER

## 2017-12-22 PROCEDURE — 85025 COMPLETE CBC W/AUTO DIFF WBC: CPT

## 2017-12-22 RX ORDER — SODIUM,POTASSIUM PHOSPHATES 280-250MG
2 POWDER IN PACKET (EA) ORAL
Status: DISCONTINUED | OUTPATIENT
Start: 2017-12-22 | End: 2017-12-22

## 2017-12-22 RX ORDER — POTASSIUM CHLORIDE 20 MEQ/15ML
40 SOLUTION ORAL
Status: DISCONTINUED | OUTPATIENT
Start: 2017-12-22 | End: 2017-12-22

## 2017-12-22 RX ORDER — CEPHALEXIN 500 MG/1
500 CAPSULE ORAL EVERY 8 HOURS
Status: DISCONTINUED | OUTPATIENT
Start: 2017-12-23 | End: 2017-12-23 | Stop reason: HOSPADM

## 2017-12-22 RX ORDER — CEFAZOLIN SODIUM 1 G/3ML
2 INJECTION, POWDER, FOR SOLUTION INTRAMUSCULAR; INTRAVENOUS
Status: ACTIVE | OUTPATIENT
Start: 2017-12-22 | End: 2017-12-22

## 2017-12-22 RX ADMIN — ASPIRIN 325 MG ORAL TABLET 325 MG: 325 PILL ORAL at 08:12

## 2017-12-22 RX ADMIN — HEPARIN SODIUM 5000 UNITS: 5000 INJECTION, SOLUTION INTRAVENOUS; SUBCUTANEOUS at 06:12

## 2017-12-22 RX ADMIN — POTASSIUM & SODIUM PHOSPHATES POWDER PACK 280-160-250 MG 2 PACKET: 280-160-250 PACK at 11:12

## 2017-12-22 RX ADMIN — SENNOSIDES 8.6 MG: 8.6 TABLET, FILM COATED ORAL at 08:12

## 2017-12-22 RX ADMIN — Medication 3 ML: at 06:12

## 2017-12-22 RX ADMIN — POTASSIUM & SODIUM PHOSPHATES POWDER PACK 280-160-250 MG 2 PACKET: 280-160-250 PACK at 03:12

## 2017-12-22 RX ADMIN — POTASSIUM & SODIUM PHOSPHATES POWDER PACK 280-160-250 MG 2 PACKET: 280-160-250 PACK at 07:12

## 2017-12-22 RX ADMIN — DEXTROSE 2 G: 50 INJECTION, SOLUTION INTRAVENOUS at 08:12

## 2017-12-22 RX ADMIN — LOSARTAN POTASSIUM 100 MG: 50 TABLET, FILM COATED ORAL at 08:12

## 2017-12-22 RX ADMIN — ATORVASTATIN CALCIUM 40 MG: 20 TABLET, FILM COATED ORAL at 08:12

## 2017-12-22 RX ADMIN — Medication 3 ML: at 03:12

## 2017-12-22 RX ADMIN — POTASSIUM CHLORIDE 40 MEQ: 20 SOLUTION ORAL at 03:12

## 2017-12-22 NOTE — NURSING TRANSFER
Nursing Transfer Note      12/22/2017     Transfer to  747    Transfer via WC    Transfer with telemetry box    Transported by RN x 1    Medicines sent: N/A    Chart send with patient: YES    Notified: family with patient    Patient reassessed at: 16:45pm     Upon arrival to floor: Report given to Javier Laws RN

## 2017-12-22 NOTE — PROGRESS NOTES
[facility]  Vascular Neurology  Comprehensive Stroke Center  Progress Note    Assessment/Plan:     * Embolic stroke involving left middle cerebral artery    Ms. Abdul is a 76yo F with aphasia s/p tPA. Reportedly had LSW prior to tPA administration but no weakness appreciated on exam [place] [facility] CTA MP without LVO. She is admitted to [facility] post-tPA  Improved near to baseline neurologically now. Repeat CTH negative. Unclear etiology at this time.   Patient has complete heart block, cardiology placed PM 12/21/17.     -Antithrombotics for secondary stroke prevention: None: Reason:  ASA 325mg daily   -Statins for secondary stroke prevention and hyperlipidemia, if present: Atorvastatin- 40 mg oral daily   -Aggressive risk factor modification: Hypertension, hyperlipidemia  -Rehab Efforts: Physical Therapy, Occupational Therapy, Speech and Language Pathology; awaiting OT recs- patient has significantly improved and is recommended for PT OP  -Diagnostics: Ordered/Pending - none pending  -VTE Prophylaxis: heparin sq 5000u TID        S/P admn tPA in diff fac w/n last 24 hr bef adm to crnt fac    Administered at 12:43 pm on 12/20/17         Aphasia    Likely secondary to ischemic infarct; improved to baseline  Speech therapy evaluate & treat         Complete heart block    S/p pacemaker placement   Unable to have MRI at this time        Mixed hyperlipidemia    Stroke risk factor  Patient reportedly takes atorvastatin 40mg daily at home    Consider increasing atorvastatin to 80mg daily         Essential hypertension    Stroke risk factor  Goal BP <180 post tPA             12/21/17 Cardiology consulted for heart block; transcutaneous pacer placed and to go for pacemaker placement today. Confusion improved. Patient with bilateral upper and lower extremity hyperactive movements.   12/22/17 Pacemaker placed 12/21. [place]. Patient reports back to baseline although still with extraneous movements of extremities.  Stepping down to NSU.     STROKE DOCUMENTATION   Acute Stroke Times   Last Known Normal Date: 12/20/17  Last Known Normal Time: 1100  Symptom Onset Date: 12/20/17  Symptom Onset Time: 1100  Stroke Team Called Date: 12/20/17  Stroke Team Called Time: 1519 (arrival to [facility])  Stroke Team Arrival Date: 12/20/17  Stroke Team Arrival Time: 1519  CT Interpretation Time: 1526  Decision to Treat Time for Alteplase: 1243  Decision to Treat Time for IR:  (n/a )    NIH Scale:  1a. Level Of Consciousness: 0-->Alert: keenly responsive  1b. LOC Questions: 0-->Answers both questions correctly  1c. LOC Commands: 0-->Performs both tasks correctly  2. Best Gaze: 0-->Normal  3. Visual: 0-->No visual loss  4. Facial Palsy: 0-->Normal symmetrical movements  5a. Motor Arm, Left: 0-->No drift: limb holds 90 (or 45) degrees for full 10 secs  5b. Motor Arm, Right: 0-->No drift: limb holds 90 (or 45) degrees for full 10 secs  6a. Motor Leg, Left: 0-->No drift: leg holds 30 degree position for full 5 secs  6b. Motor Leg, Right: 0-->No drift: leg holds 30 degree position for full 5 secs  7. Limb Ataxia: 0-->Absent  8. Sensory: 0-->Normal: no sensory loss  9. Best Language: 0-->No aphasia: normal  10. Dysarthria: 0-->Normal  11. Extinction and Inattention (formerly Neglect): 0-->No abnormality  Total (NIH Stroke Scale): 0       Modified [place] Score: 0  [place] Coma Scale:    ABCD2 Score:    [id number] Score:   HAS -BLED Score:   ICH Score:   Hunt & Bravo Classification:      Hemorrhagic change of an Ischemic Stroke: Does this patient have an ischemic stroke with hemorrhagic changes? No     Neurologic Chief Complaint: Aphasia, confusion, LSW    Subjective:     Interval History: Patient is seen for follow-up neurological assessment and treatment recommendations:   Pacemaker placed yesterday. NAEON. Awaiting OT recs - likely able to d/c tomorrow home with outpatient therapy.       HPI, Past Medical, Family, and Social History remains the same as  documented in the initial encounter.     Review of Systems   Constitutional: Negative for fever.   Respiratory: Negative for shortness of breath.    Cardiovascular: Negative for chest pain.   Neurological: Negative for headaches.     Scheduled Meds:   aspirin  325 mg Oral Daily    atorvastatin  40 mg Oral Daily    [START ON 12/23/2017] cephALEXin  500 mg Oral Q8H    losartan  100 mg Oral Daily    senna  8.6 mg Oral Daily    sodium chloride 0.9%  3 mL Intravenous Q8H     Continuous Infusions:    PRN Meds:acetaminophen, dextrose 50%, glucagon (human recombinant), insulin aspart    Objective:     Vital Signs (Most Recent):  Temp: 98.8 °F (37.1 °C) (12/22/17 1648)  Pulse: 94 (12/22/17 1648)  Resp: 18 (12/22/17 1648)  BP: (!) 177/91 (12/22/17 1648)  SpO2: 95 % (12/22/17 1648)  BP Location: Right arm    Vital Signs Range (Last 24H):  Temp:  [97.9 °F (36.6 °C)-99.4 °F (37.4 °C)]   Pulse:  [84-94]   Resp:  [15-52]   BP: (143-177)/()   SpO2:  [93 %-100 %]   Arterial Line BP: (130-164)/(48-60)   BP Location: Right arm    Physical Exam   Constitutional: She appears well-developed and well-nourished. No distress.   Eyes: EOM are normal.   Neck: Normal range of motion.   Pulmonary/Chest: Effort normal.   Musculoskeletal: Normal range of motion.   Skin: Skin is warm and dry. She is not diaphoretic.   Psychiatric: She has a normal mood and affect.       Neurological Exam:   LOC: alert  Attention Span: Good   Language: No aphasia  Articulation: No dysarthria  Visual Fields: Full  EOM (CN III, IV, VI): Full/intact  Facial Movement (CN VII): Symmetric facial expression    Cebellar: No evidence of appendicular or axial ataxia  Sensation: Intact to light touch, temperature and vibration  Tone: Normal tone throughout    Laboratory:  CMP:     Recent Labs  Lab 12/22/17  0030 12/22/17  0736   CALCIUM 8.3*  --    ALBUMIN 3.1*  --    PROT 6.2  --      --    K 3.6 4.0   CO2 26  --    *  --    BUN 28*  --    CREATININE  0.7  --    ALKPHOS 67  --    ALT 25  --    AST 28  --    BILITOT 0.5  --      BMP:     Recent Labs  Lab 12/22/17  0030 12/22/17  0736     --    K 3.6 4.0   *  --    CO2 26  --    BUN 28*  --    CREATININE 0.7  --    CALCIUM 8.3*  --      CBC:     Recent Labs  Lab 12/22/17  0030   WBC 12.52   RBC 3.58*   HGB 10.3*   HCT 32.0*      MCV 89   MCH 28.8   MCHC 32.2     Lipid Panel:     Recent Labs  Lab 12/20/17  1554   CHOL 229*   LDLCALC 135.4   HDL 69   TRIG 123     Coagulation:     Recent Labs  Lab 12/22/17  0030   INR 1.1   APTT 22.7     Platelet Aggregation Study: No results for input(s): PLTAGG, PLTAGINTERP, PLTAGREGLACO, ADPPLTAGGREG in the last 168 hours.  Hgb A1C:     Recent Labs  Lab 12/20/17  1554   HGBA1C 5.5     TSH:     Recent Labs  Lab 12/20/17  1554   TSH 1.760       Diagnostic Results     Brain Imaging   12/21/17 CTH:  No evidence of acute intracranial pathology       Vessel Imaging   CTA MP 12/20/17:   No evidence of acute hemorrhage or major vascular distribution infarct.  Chronic microvascular scanner changes generalized cerebral volume loss.  Mild atherosclerotic change without evidence of high-grade stenosis or major branch occlusion.  2-3 mm inferior projected outpouching from the communicating segment of the left ICA may reflect an infundibulum at the origin of a nonvisualized posterior communicating artery or an aneurysm. This cannot be definitively differentiated with CTA.  Bilateral subcentimeter thyroid nodules.    Cardiac Imaging   ECHO 12/21/17:   CONCLUSIONS     1 - Concentric remodeling.     2 - Normal left ventricular systolic function (EF 65-70%).     3 - Normal right ventricular systolic function .     4 - Normal left ventricular diastolic function.     5 - The estimated PA systolic pressure is 22 mmHg.     6 - No wall motion abnormalities      Nicol Forman PA-C  Comprehensive Stroke Center  Department of Vascular Neurology   [facility]

## 2017-12-22 NOTE — PROGRESS NOTES
Pt refused sub q heparin. Education provided on purpose of medication and benefits. Pt still refuses medication.

## 2017-12-22 NOTE — PLAN OF CARE
Problem: Physical Therapy Goal  Goal: Physical Therapy Goal  Revisded Goals:    Goals to be met by: 12/30/2017    Patient will increase functional independence with mobility by performing:    Supine to sit with Modified Harris.  Sit to supine with Modified Harris.   Sit to stand transfer with Modified Harris using No Assistive Device.  Bed to chair transfer via Stand Pivot with Modified Harris using No Assistive Device.  Gait  x 200 feet with Modified Harris using No Assistive Device.    Dynamic standing for 10 minutes with Supervision using No Assistive Device.  Able to tolerate exercise for 15-20 reps with independence.  Patient and family able to teachback stroke & positioning education independently.  Ascend/descend 1 stairs with right Handrails Supervision using No Assistive Device.    Goals to be met by: 12/29/2017    Patient will increase functional independence with mobility by performing:    Supine to sit with Minimal Assistance. MET  Sit to supine with Minimal Assistance. MET  Sit to stand transfer with Minimal Assistance using No Assistive Device and Rolling Walker. MET  Bed to chair transfer via Stand Pivot with Minimal Assistance using No Assistive Device and Rolling Walker. MET  Gait  x 50 feet with Minimal Assistance using No Assistive Device and Rolling Walker.  NT  Dynamic standing for 10 minutes with Minimal Assistance using No Assistive Device. MET  Able to tolerate exercise for 15-20 reps with independence. NT  Patient and family able to teachback stroke & positioning education independently. NOT MET  Ascend/descend 1 flight of  stairs with right Handrails Minimal Assistance using No Assistive Device. NT     Outcome: Ongoing (interventions implemented as appropriate)    Goals remain appropriate.  Appropriate to transfer with: RN/PCT x 1 person CGA  Carmen Claudio PT, DPT  12/22/2017  Pager: [phone]

## 2017-12-22 NOTE — PROGRESS NOTES
Notified Tara Brantley NP  team pt pulling out arterial line after multiple attempts of redirection. Pt also pulling at IJ in neck and ekg leads. Per orders ok to place right soft wrist restraint on pt. Will continue top monitor..

## 2017-12-22 NOTE — ASSESSMENT & PLAN NOTE
- 78 yo F with aphasia s/p tPA. Reportedly had RSW prior to tPA administration but no weakness appreciated on exam at [facility]   - CTA MP without LVO. No concerns for immediate post t-PA complications  - F/u CT head: No evidence of acute intracranial pathology.    Plan:   - MRI undone for cardiac pacer placement   - SBP target < 180 mm Hg  - Asa / statins for secondary stroke prevention   - Echo normal EF / concerns for complete heart block / S/p DC PPM Holding heparin products post cardiac procedure, as per cardiology recs   - Rehab Efforts: Physical Therapy, Occupational Therapy, Speech and Language Pathology  - Mechanical prophylaxis for now.

## 2017-12-22 NOTE — PLAN OF CARE
Problem: Patient Care Overview  Goal: Plan of Care Review  Outcome: Ongoing (interventions implemented as appropriate)   12/22/17 0629   Coping/Psychosocial   Plan Of Care Reviewed With patient;spouse     POC reviewed with pt and pt spouse at 0500. Pt verbalized understanding. Questions and concerns addressed. No acute events overnight. Pt progressing toward goals. Will continue to monitor. See flowsheets for full assessment and VS info     Goal: Individualization & Mutuality  12.20.17 Pt from [place] for ischemic stroke s/p TPA    Past Medical History:  No date: Hypertension  No date: Kidney stones  No date: TIA (transient ischemic attack)    Past Surgical History:  No date: CERVICAL FUSION  No date: CHOLECYSTECTOMY  No date: HYSTERECTOMY  No date: SHOULDER SURGERY    Pt likes dim lights and warm blankets    Restraints 12.20.17@0600 L/R soft wrist restraints : removing medical devices   Outcome: Ongoing (interventions implemented as appropriate)   12/21/17 0900   Mutuality/Individual Preferences   What Anxieties, Fears, Concerns, or Questions Do You Have About Your Care? Walking up steps at my house   What Information Would Help Us Give You More Personalized Care? Patient likes warm room with blankets

## 2017-12-22 NOTE — PLAN OF CARE
SW advised by PT that the Pt no longer needs Rehab but will need OutPt OT likely. OT needs orders to complete the eval. Advised PA and orders are being placed.     Prachi Sylvester LMSW  Neurocritical Care   [facility]  [id number]

## 2017-12-22 NOTE — PT/OT/SLP PROGRESS
"Physical Therapy Treatment    Patient Name:  Maricel Abdul   MRN:  [id number]  Admitting Diagnosis: Embolic stroke involving left middle cerebral artery  Recent Surgery: Procedure(s) (LRB):  INSERTION-PACEMAKER-DUAL (Left) 1 Day Post-Op    Recommendations:     Discharge Recommendations:  home (no PT needs, may require OT)   Discharge Equipment Recommendations: none   Barriers to discharge: None    Plan:     During this hospitalization, patient to be seen 4 x/week to address the listed problems via gait training, therapeutic activities, therapeutic exercises, neuromuscular re-education  · Plan of Care Expires:  01/20/18   Plan of Care Reviewed with: patient, family    This Plan of care has been discussed with the patient who was involved in its development and understands and is in agreement with the identified goals and treatment plan    Subjective     Communicated with RN prior to session.  Patient found seated in bedside chair upon PT entry to room, agreeable to treatment.    "I'm used to going a mile a minutes, I take care of everybody"    Pain/Comfort:  · Pain Rating 1: 0/10  · Pain Rating Post-Intervention 1: 0/10    Objective:     Patient found with: blood pressure cuff, peripheral IV, pulse ox (continuous), telemetry   Mental Status: Patient is oriented to Person, Place, Time and Situation and follows 100% of verbal commands. Alert    General Precautions: Standard, Cardiac aspiration, fall, pacemaker   Orthopedic Precautions:N/A   Braces: Sling and swathe     Functional Mobility:  Bed Mobility:   · Patient found / returned to bedside chair     Transfers:   · Patient completed Sit <> Stand Transfer with stand by assistance  with  no assistive device   · Patient completed Stand <> Sit Transfer with stand by assistance  with  no assistive device     Gait:  · Patient ambulated: 40ft   · Patient required: standy by assistance  · Patient used:  No Assistive Device  · Gait Pattern observed: reciprocal gait  · Gait " Deviation(s): no deviations noted    Stairs:  NT due to lines present and pt with continued UE chorea    TINETTI BALANCE ASSESSMENT TOOL    Flacoetti ME, Josesito TF, Jorden R, Fall Risk Index for elderly patients based on number of chronic disabilities.Am J Med [id number]    Assistive Device  Normally Used: No  Assistive Device During Testing: No     BALANCE SECTION  Patient is seated in hard, armless chair;    1.Sitting Balance:   Steady; safe = 1  2.Rises from chair :  Able, without using arms = 2  3.Attempts to arise :  Able to arise, 1 attempt = 2  4.Immediate standing Balance (first 5 seconds) : Steady without walker or other support = 2  5.Standing balance: Narrow stance without support = 2  6.Nudged : Steady = 2  7.Eyes closed: Steady = 1  8.Turning 360 degrees:  Continuous = 1 and Steady = 1  9.Sitting Down : Safe, smooth motion = 2    Balance Score:  16/16    GAIT SECTION  Patient stands with therapist, walks across room (+/- aids), first at usual pace, then at rapid pace.    10.Initiation of Gait (Immediately after told to go.): No hesitancy = 1  11.Step length and height :  Step through R=1 and Step through L=1  12.Foot Clearance :  L & R foot clears floor=2  13.Step symmetry: Right & Left step length appear equal = 1  14.Step continuity : Steps appear continuous = 1  15.Path: Straight without walking aid = 2  16.Trunk : No sway, no flexion, no use of arms, and no use of walking aid = 2  17.Walking Time: Heels amost touching while walking = 1    Gait Score: 12/12  Balance score: 16/16  Total Score=Balance + Gait Score: 28/28     Risk Indicators:    Tinetti Tool Score   Risk of Falls   ?18    High   19-23   Moderate   ?24   Low    Therapeutic Activities & Exercises:   Education:  Provided patient and family education on pacemaker precautions and how to put on sling and swathe.  Patient provided with daily orientation and goals of this PT session. Patient and family agreed to participate in session.  Patient and family aware of patient's deficits and therapy progression. They were educated to transfer with RN/PCT only; CGA of 1 person. Encouraged patient to perform following daily exercises & mobility to increase endurance and decrease effcts of bedrest: to sit UIC majority of day. Time provided for therapeutic counseling and discussion of health disposition. All questions answered to patient's and family's satisfaction, within scope of PT practice; voiced no other concerns. White board updated in patient's room, RN notified of session.    Patient left up in chair, with head in midline, neutral pelvis & heels floated for skin protection with all lines intact, call button in reach, RN notified and family present    AM-PAC 6 CLICK MOBILITY  Turning over in bed (including adjusting bedclothes, sheets and blankets)?: 4  Sitting down on and standing up from a chair with arms (e.g., wheelchair, bedside commode, etc.): 3  Moving from lying on back to sitting on the side of the bed?: 3  Moving to and from a bed to a chair (including a wheelchair)?: 3  Need to walk in hospital room?: 3  Climbing 3-5 steps with a railing?: 3  Total Score: 19     Assessment:     Maricel Abdul is a 77 y.o. female admitted with a medical diagnosis of Embolic stroke involving left middle cerebral artery.   Patient is making progress towards goals, participating well in this session. Pt with noted improvement with balance and functional mobility. Maricel Cristobals deficits effect their ability to safely and independently participate in self-care tasks and functional mobility which increases their caregiver burden. Due to her physical therapy diagnosis of debility and deconditioning, they continue to benefit from acute PT services to address the following limitations: high fall risk, weakness, instability, the need for supervised instruction of exercise. Maricel Cristobals deficits effect their roles and responsibilities in which they were  able to complete prior to admit. Education was provided to patient & family regarding importance of continued participation in therapy, patient's progress and discharge disposition. Pt would benefit from OPOT upon discharge to improve quality of life and focus on recovery of impairments.     Problem List: impaired functional mobilty, impaired self care skills.  Rehab Prognosis: good; patient would benefit from acute skilled PT services to address these deficits and reach maximum level of function.      GOALS:    Physical Therapy Goals        Problem: Physical Therapy Goal    Goal Priority Disciplines Outcome Goal Variances Interventions   Physical Therapy Goal     PT/OT, PT Ongoing (interventions implemented as appropriate)     Description:  Revisded Goals:    Goals to be met by: 12/30/2017    Patient will increase functional independence with mobility by performing:    Supine to sit with Modified [place].  Sit to supine with Modified [place].   Sit to stand transfer with Modified [place] using No Assistive Device.  Bed to chair transfer via Stand Pivot with Modified [place] using No Assistive Device.  Gait  x 200 feet with Modified [place] using No Assistive Device.    Dynamic standing for 10 minutes with Supervision using No Assistive Device.  Able to tolerate exercise for 15-20 reps with independence.  Patient and family able to teachback stroke & positioning education independently.  Ascend/descend 1 stairs with right Handrails Supervision using No Assistive Device.    Goals to be met by: 12/29/2017    Patient will increase functional independence with mobility by performing:    Supine to sit with Minimal Assistance. MET  Sit to supine with Minimal Assistance. MET  Sit to stand transfer with Minimal Assistance using No Assistive Device and Rolling Walker. MET  Bed to chair transfer via Stand Pivot with Minimal Assistance using No Assistive Device and Rolling Walker. MET  Gait  x 50 feet  with Minimal Assistance using No Assistive Device and Rolling Walker.  NT  Dynamic standing for 10 minutes with Minimal Assistance using No Assistive Device. MET  Able to tolerate exercise for 15-20 reps with independence. NT  Patient and family able to teachback stroke & positioning education independently. NOT MET  Ascend/descend 1 flight of  stairs with right Handrails Minimal Assistance using No Assistive Device. NT                       Time Tracking:     PT Received On: 12/22/17  PT Start Time: 1130     PT Stop Time: 1149  PT Total Time (min): 19 min     Billable Minutes:   · Therapeutic Activity 19    Treatment Type: Treatment  PT/PTA: PT       Carmen Claudio PT, DPT  12/22/2017  Pager: [phone]

## 2017-12-22 NOTE — SUBJECTIVE & OBJECTIVE
Interval History:     Review of Systems   Unable to perform ROS: Acuity of condition   Constitutional: Negative for fever.   HENT: Negative for trouble swallowing.    Eyes: Negative for visual disturbance.   Respiratory: Negative for shortness of breath.    Cardiovascular: Negative for chest pain.   Gastrointestinal: Negative for abdominal pain.   Genitourinary: Negative for dysuria.   Neurological: Negative for facial asymmetry, weakness and headaches.   Psychiatric/Behavioral: Negative for agitation and behavioral problems.     Objective:     Vitals:  Temp: 98.1 °F (36.7 °C) (12/22/17 0305)  Pulse: 85 (12/22/17 0930)  Resp: (!) 45 (12/22/17 0930)  BP: (!) 172/118 (12/22/17 0900)  SpO2: 99 % (12/22/17 0930)    Temp:  [97.7 °F (36.5 °C)-99.4 °F (37.4 °C)] 98.1 °F (36.7 °C)  Pulse:  [79-92] 85  Resp:  [15-66] 45  SpO2:  [93 %-100 %] 99 %  BP: (121-172)/() 172/118  Arterial Line BP: (130-164)/(45-62) 164/60        12/21 0701 - 12/22 0700  In: 1950 [I.V.:1900]  Out: -     Physical Exam   Constitutional: No distress.   HENT:   Head: Normocephalic and atraumatic.   Eyes: EOM are normal. Pupils are equal, round, and reactive to light.   Neck: Normal range of motion. Neck supple.   Cardiovascular:   No murmur heard.  Pulmonary/Chest: Effort normal.   Abdominal: Bowel sounds are normal.   Neurological: She is alert. No sensory deficit. She exhibits normal muscle tone. GCS eye subscore is 4. GCS verbal subscore is 5. GCS motor subscore is 6.   Alert, receptive aphasia improved   CN: II-XII ?R UMN VII   Motor: LUE  5 /5 / RUE 5/5  Tone normal bilaterally             LLE  5 /5 /  RLE 5 /5  Tone normal bilaterally     Unable to test gait due to level of consciousness.    Medications:  Continuous    sodium chloride 0.9% Last Rate: 50 mL/hr at 12/22/17 0701   Scheduled    aspirin 325 mg Daily   atorvastatin 40 mg Daily   ceFAZolin (ANCEF) IVPB 2 g Q8H   [START ON 12/23/2017] cephALEXin 500 mg Q8H   losartan 100 mg Daily    senna 8.6 mg Daily   sodium chloride 0.9% 3 mL Q8H   PRN    acetaminophen 325 mg Q4H PRN   dextrose 50% 12.5 g PRN   fentaNYL 25 mcg Q1H PRN   glucagon (human recombinant) 1 mg PRN   insulin aspart 1-10 Units Q6H PRN   labetalol 10 mg PRN   potassium chloride 10% 40 mEq PRN   potassium, sodium phosphates 2 packet PRN     Today I personally reviewed pertinent medications, lines/drains/airways, imaging, cardiology, lab results, microbiology results, notably:

## 2017-12-22 NOTE — PROGRESS NOTES
[facility]  Cardiac Electrophysiology  Progress Note    Admission Date: 12/20/2017  Code Status: Full Code   Attending Physician: Mauri Larose MD   Expected Discharge Date:   Principal Problem:Embolic stroke involving left middle cerebral artery    Subjective:     Interval History:   S/p dual chamber [facility] [place] PPM yesterday. No events on telemetry. V-Pacing currently in 90's. Evidence of intrinsic narrow conduction around 08:15 AM. Wound shows no signs of infection and is C/d/i. No hematoma noted. Patient is more interactive currently today.     History reviewed. No pertinent family history.    Social History     Social History    Marital status:      Spouse name: N/A    Number of children: N/A    Years of education: N/A     Occupational History    Not on file.     Social History Main Topics    Smoking status: Former Smoker     Years: 20.00    Smokeless tobacco: Never Used    Alcohol use Not on file      Comment: Glass of wine each night    Drug use: No    Sexual activity: Not on file     Other Topics Concern    Not on file     Social History Narrative    No narrative on file         Review of Systems   Constitution: Negative for chills and fever.   Cardiovascular: Negative for chest pain and palpitations.   Respiratory: Negative for cough and shortness of breath.    Musculoskeletal: Negative for arthritis and back pain.        Soreness post-procedurally    Gastrointestinal: Negative for abdominal pain and diarrhea.   Neurological: Positive for focal weakness.     Objective:     Vital Signs (Most Recent):  Temp: 98.1 °F (36.7 °C) (12/22/17 0305)  Pulse: 88 (12/22/17 0701)  Resp: (!) 45 (12/22/17 0701)  BP: (!) 169/80 (12/22/17 0701)  SpO2: 98 % (12/22/17 0701) Vital Signs (24h Range):  Temp:  [97.7 °F (36.5 °C)-99.4 °F (37.4 °C)] 98.1 °F (36.7 °C)  Pulse:  [79-92] 88  Resp:  [15-49] 45  SpO2:  [93 %-100 %] 98 %  BP: (110-169)/() 169/80  Arterial Line BP: (125-164)/(45-62)  164/60     Weight: 61.1 kg (134 lb 11.2 oz)  Body mass index is 27.21 kg/m².     SpO2: 98 %  O2 Device (Oxygen Therapy): nasal cannula    Physical Exam   Constitutional: She is oriented to person, place, and time. She appears well-developed and well-nourished.   HENT:   Head: Normocephalic and atraumatic.   Eyes: EOM are normal. Pupils are equal, round, and reactive to light.   Neck: Normal range of motion. Neck supple. No JVD present.   Cardiovascular: Normal rate, regular rhythm, normal heart sounds and intact distal pulses.    Pulmonary/Chest: Effort normal and breath sounds normal.   Abdominal: Soft. Bowel sounds are normal.   Musculoskeletal: Normal range of motion. She exhibits no edema.   Incision site is clean, dry and intact.   Neurological: She is alert and oriented to person, place, and time. She has normal reflexes.       Vitals reviewed.      Significant Labs:   EP:   Recent Labs  Lab 12/20/17  1554  12/21/17  0101 12/21/17  0104 12/22/17  0030 12/22/17  0736     --  143  --  144  --    K 4.1  < > 4.6  --  3.6 4.0     --  109  --  112*  --    CO2 24  --  22*  --  26  --    *  --  187*  --  92  --    BUN 17  --  23  --  28*  --    CREATININE 0.8  --  1.0  --  0.7  --    CALCIUM 9.4  --  9.3  --  8.3*  --    PROT 6.8  --  6.6  --  6.2  --    ALBUMIN 3.5  --  3.5  --  3.1*  --    BILITOT 0.6  --  0.6  --  0.5  --    ALKPHOS 91  --  77  --  67  --    AST 24  --  21  --  28  --    ALT 22  --  21  --  25  --    ANIONGAP 9  --  12  --  6*  --    ESTGFRAFRICA >60.0  --  >60.0  --  >60.0  --    EGFRNONAA >60.0  --  53.7*  --  >60.0  --    WBC 8.93  --  12.15  --  12.52  --    HGB 13.0  --  11.4*  --  10.3*  --    HCT 40.5  --  34.0*  --  32.0*  --      --  251  --  200  --    INR 1.3*  --   --  1.5* 1.1  --    < > = values in this interval not displayed.    Significant Imaging: Echocardiogram: 2D echo with color flow doppler:   Results for orders placed or performed during the hospital  encounter of 12/20/17   Echo doppler color flow   Result Value Ref Range    EF 68 55 - 65    Mitral Valve Regurgitation TRIVIAL     Diastolic Dysfunction No     Est. PA Systolic Pressure 21.66     Mitral Valve Mobility NORMAL             Assessment and Plan:     Complete heart block    Mrs. Abdul is 76 y/o  female with unknown significant PMHx here with CHB with intermittent narrow and wide escape. S/p DC PPM placment    Final recs:  - Sling to left arm - wear for 48 hours, then only at night for 6 weeks.  - NO HEPARIN PRODUCTS  - Keflex 500 mg TID for 5 days at discharge (or after the 2 doses of IV antibiotics if still in the hospital). Already got 2 doses of Ancef here.  - No lifting left elbow above shoulder height  - No lifting over 5 pounds  - No driving for 1 week and for 4 weeks if patient uses left arm to make turns  - Patient may shower in 48 hours, do not let beam of shower hit site directly and no scrubbing in area  - Follow up in device clinic in 1 week and with Dr. Arango in 3 months    Ep will sign off    Please print and give these instructions to the patient,    Discussed with Dr. Valenzuela,             Issac Simmons MD  Cardiac Electrophysiology  [facility]

## 2017-12-22 NOTE — PROGRESS NOTES
[facility]  Vascular Neurology  Comprehensive Stroke Center  Progress Note    Assessment/Plan:     * Acute ischemic left MCA stroke    Ms. Abdul is a 76yo F with aphasia s/p tPA. Reportedly had LSW prior to tPA administration but no weakness appreciated on exam at [facility]. CTA MP without LVO. She is admitted to [facility] post-tPA  Improved near to baseline neurologically now. Unclear etiology at this time.   Patient has complete heart block, cardiology placed PM 12/21/17.     -Antithrombotics for secondary stroke prevention: None: Reason:  Hold all Antithrombotics x 24 hours after IV t-PA administration (okay to restart ASA 325mg 24 hours post-tpa)  -Statins for secondary stroke prevention and hyperlipidemia, if present: Atorvastatin- 40 mg oral daily, check LDL   -Aggressive risk factor modification: Hypertension, hyperlipidemia  -Rehab Efforts: Physical Therapy, Occupational Therapy, Speech and Language Pathology  -Diagnostics: Ordered/Pending    MRI head without contrast to assess brain parenchyma  -VTE Prophylaxis: None: Reason for No Pharmacological VTE Prophylaxis: Mechanical prophylaxis: Place SCDs, ok to start heparin sq 24 hours post-tPA infusion         S/P admn tPA in diff fac w/n last 24 hr bef adm to crnt fac    Administered at 12:43 pm on 12/20/17         Aphasia    Likely secondary to ischemic infarct   Speech therapy evaluate & treat         Complete heart block    S/p pacemaker placement         Hyperlipidemia    Stroke risk factor  Patient reportedly takes atorvastatin 40mg daily at home    Consider increasing atorvastatin to 80mg daily         Essential hypertension    Stroke risk factor  Goal BP <180 post tPA             12/21/17 Cardiology consulted for heart block; transcutaneous pacer placed and to go for pacemaker placement today. Confusion improved. Patient with bilateral upper and lower extremity hyperactive movements.     STROKE DOCUMENTATION   Acute Stroke Times   Last  Known Normal Date: 12/20/17  Last Known Normal Time: 1100  Symptom Onset Date: 12/20/17  Symptom Onset Time: 1100  Stroke Team Called Date: 12/20/17  Stroke Team Called Time: 1519 (arrival to [facility])  Stroke Team Arrival Date: 12/20/17  Stroke Team Arrival Time: 1519  CT Interpretation Time: 1526  Decision to Treat Time for Alteplase: 1243  Decision to Treat Time for IR:  (n/a )    NIH Scale:  1a. Level Of Consciousness: 0-->Alert: keenly responsive  1b. LOC Questions: 1-->Answers one question correctly  1c. LOC Commands: 0-->Performs both tasks correctly  2. Best Gaze: 0-->Normal  3. Visual: 0-->No visual loss  4. Facial Palsy: 0-->Normal symmetrical movements  5a. Motor Arm, Left: 0-->No drift: limb holds 90 (or 45) degrees for full 10 secs  5b. Motor Arm, Right: 0-->No drift: limb holds 90 (or 45) degrees for full 10 secs  6a. Motor Leg, Left: 0-->No drift: leg holds 30 degree position for full 5 secs  6b. Motor Leg, Right: 0-->No drift: leg holds 30 degree position for full 5 secs  7. Limb Ataxia: 0-->Absent  8. Sensory: 0-->Normal: no sensory loss  9. Best Language: 0-->No aphasia: normal  10. Dysarthria: 0-->Normal  11. Extinction and Inattention (formerly Neglect): 0-->No abnormality  Total (NIH Stroke Scale): 1       Modified [place] Score: 0  [place] Coma Scale:    ABCD2 Score:    [id number] Score:   HAS -BLED Score:   ICH Score:   Hunt & Bravo Classification:      Hemorrhagic change of an Ischemic Stroke: Does this patient have an ischemic stroke with hemorrhagic changes? No     Neurologic Chief Complaint: Aphasia, confusion, LSW    Subjective:     Interval History: Patient is seen for follow-up neurological assessment and treatment recommendations:   Patient with transcutaneous pacing today and to go for pacemaker placement.   Improved neurologically. Awaiting repeat CTH. Hope to have PM that is MRI compatible.     HPI, Past Medical, Family, and Social History remains the same as documented in the initial  encounter.     Review of Systems   Constitutional: Negative for fever.   Respiratory: Negative for shortness of breath.    Cardiovascular: Negative for chest pain.   Neurological: Negative for headaches.     Scheduled Meds:   atorvastatin  40 mg Oral Daily    losartan  100 mg Oral Daily    senna  8.6 mg Oral Daily    sodium chloride 0.9%  3 mL Intravenous Q8H     Continuous Infusions:   sodium chloride 0.9% 50 mL/hr at 12/21/17 1500     PRN Meds:ceFAZolin (ANCEF) IVPB, dextrose 50%, fentaNYL, glucagon (human recombinant), insulin aspart, labetalol    Objective:     Vital Signs (Most Recent):  Temp: 98.9 °F (37.2 °C) (12/21/17 1500)  Pulse: 79 (12/21/17 1500)  Resp: 18 (12/21/17 1500)  BP: (!) 166/76 (12/21/17 1400)  SpO2: (!) 94 % (12/21/17 1500)  BP Location: Left arm    Vital Signs Range (Last 24H):  Temp:  [98.1 °F (36.7 °C)-99.2 °F (37.3 °C)]   Pulse:  [28-80]   Resp:  [14-24]   BP: (106-166)/(55-98)   SpO2:  [92 %-99 %]   Arterial Line BP: (125-175)/(45-66)   BP Location: Left arm    Physical Exam   Constitutional: She appears well-developed and well-nourished. No distress.   Eyes: EOM are normal. Pupils are equal, round, and reactive to light.   Neck: Normal range of motion.   Pulmonary/Chest: Effort normal.   Musculoskeletal:   Abnormal hyperactive writhing movement of all 4 extremities    Skin: Skin is warm and dry. She is not diaphoretic.       Neurological Exam:   LOC: alert  Attention Span: Good   Language: No aphasia  Articulation: No dysarthria  Visual Fields: Full  EOM (CN III, IV, VI): Full/intact  Facial Movement (CN VII): Symmetric facial expression    Cebellar: No evidence of appendicular or axial ataxia  Sensation: Intact to light touch, temperature and vibration  Tone: Normal tone throughout    Laboratory:  CMP:   Recent Labs  Lab 12/21/17  0101   CALCIUM 9.3   ALBUMIN 3.5   PROT 6.6      K 4.6   CO2 22*      BUN 23   CREATININE 1.0   ALKPHOS 77   ALT 21   AST 21   BILITOT 0.6      BMP:   Recent Labs  Lab 12/21/17  0101      K 4.6      CO2 22*   BUN 23   CREATININE 1.0   CALCIUM 9.3     CBC:   Recent Labs  Lab 12/21/17  0101   WBC 12.15   RBC 3.92*   HGB 11.4*   HCT 34.0*      MCV 87   MCH 29.1   MCHC 33.5     Lipid Panel:   Recent Labs  Lab 12/20/17  1554   CHOL 229*   LDLCALC 135.4   HDL 69   TRIG 123     Coagulation:   Recent Labs  Lab 12/21/17  0104   INR 1.5*   APTT <21.0     Platelet Aggregation Study: No results for input(s): PLTAGG, PLTAGINTERP, PLTAGREGLACO, ADPPLTAGGREG in the last 168 hours.  Hgb A1C:   Recent Labs  Lab 12/20/17  1554   HGBA1C 5.5     TSH:   Recent Labs  Lab 12/20/17  1554   TSH 1.760       Diagnostic Results     Brain Imaging   12/21/17 CTH:  No evidence of acute intracranial pathology       Vessel Imaging   CTA MP 12/20/17:   No evidence of acute hemorrhage or major vascular distribution infarct.  Chronic microvascular scanner changes generalized cerebral volume loss.  Mild atherosclerotic change without evidence of high-grade stenosis or major branch occlusion.  2-3 mm inferior projected outpouching from the communicating segment of the left ICA may reflect an infundibulum at the origin of a nonvisualized posterior communicating artery or an aneurysm. This cannot be definitively differentiated with CTA.  Bilateral subcentimeter thyroid nodules.    Cardiac Imaging   ECHO 12/21/17:   CONCLUSIONS     1 - Concentric remodeling.     2 - Normal left ventricular systolic function (EF 65-70%).     3 - Normal right ventricular systolic function .     4 - Normal left ventricular diastolic function.     5 - The estimated PA systolic pressure is 22 mmHg.     6 - No wall motion abnormalities      Nicol Forman PA-C  Comprehensive Stroke Center  Department of Vascular Neurology   [facility]

## 2017-12-22 NOTE — PLAN OF CARE
CM phoned patient's PCP's office to schedule follow up appointment.  PCP's office closed today at noon.  Unable to schedule follow up     Augusta Jacob RN, CCRN-K, [facility]  Neuro-Critical Care   [phone]

## 2017-12-22 NOTE — ASSESSMENT & PLAN NOTE
Stroke risk factor  Patient reportedly takes atorvastatin 40mg daily at home    Consider increasing atorvastatin to 80mg daily

## 2017-12-22 NOTE — PLAN OF CARE
Appreciate EP input and recs.   PT/OT today   Continue secondary stroke prevention measures.   Possible transfer to [facility]

## 2017-12-22 NOTE — ASSESSMENT & PLAN NOTE
- Echo : Normal left ventricular systolic function (EF 65-70%). Normal right ventricular systolic function . Normal left ventricular diastolic function. The estimated PA systolic pressure is 22 mmHg. No wall motion abnormalities  - admission concerns: Rhythm strip taken at bedside show CHB with intermittent narrow and wide ventricular escape. The patient has a Vent rate of 39-43.    - As per appreciate recs for S/P DC PPM     - Sling to left arm - wear for 48 hours, then only at night for 6 weeks.  - NO HEPARIN PRODUCTS  - Keflex 500 mg TID for 5 days at discharge (or after the 2 doses of IV antibiotics if still in the hospital). Already got 2 doses of Ancef here.  - No lifting left elbow above shoulder height  - No lifting over 5 pounds  - No driving for 1 week and for 4 weeks if patient uses left arm to make turns  - Patient may shower in 48 hours, do not let beam of shower hit site directly and no scrubbing in area  - Follow up in device clinic in 1 week and with Dr. Arango in 3 months

## 2017-12-22 NOTE — PROGRESS NOTES
[facility]  Neurocritical Care  Progress Note    Admit Date: 12/20/2017  Service Date: 12/22/2017  Length of Stay: 2    Subjective:     Chief Complaint: Embolic stroke involving left middle cerebral artery    History of Present Illness: No notes on file    Hospital Course: 12/20: s/p t-PA for Lt MCA / CTA negative for large vessel occlussion / transcutaneous pacer for heart block   12/21: NAEON. Dual chamber pacemaker   12/22: NAEON. Repeat CT head stable. S/p DC PPM placement.     Interval History:     Review of Systems   Unable to perform ROS: Acuity of condition   Constitutional: Negative for fever.   HENT: Negative for trouble swallowing.    Eyes: Negative for visual disturbance.   Respiratory: Negative for shortness of breath.    Cardiovascular: Negative for chest pain.   Gastrointestinal: Negative for abdominal pain.   Genitourinary: Negative for dysuria.   Neurological: Negative for facial asymmetry, weakness and headaches.   Psychiatric/Behavioral: Negative for agitation and behavioral problems.     Objective:     Vitals:  Temp: 98.1 °F (36.7 °C) (12/22/17 0305)  Pulse: 85 (12/22/17 0930)  Resp: (!) 45 (12/22/17 0930)  BP: (!) 172/118 (12/22/17 0900)  SpO2: 99 % (12/22/17 0930)    Temp:  [97.7 °F (36.5 °C)-99.4 °F (37.4 °C)] 98.1 °F (36.7 °C)  Pulse:  [79-92] 85  Resp:  [15-66] 45  SpO2:  [93 %-100 %] 99 %  BP: (121-172)/() 172/118  Arterial Line BP: (130-164)/(45-62) 164/60        12/21 0701 - 12/22 0700  In: 1950 [I.V.:1900]  Out: -     Physical Exam   Constitutional: No distress.   HENT:   Head: Normocephalic and atraumatic.   Eyes: EOM are normal. Pupils are equal, round, and reactive to light.   Neck: Normal range of motion. Neck supple.   Cardiovascular:   No murmur heard.  Pulmonary/Chest: Effort normal.   Abdominal: Bowel sounds are normal.   Neurological: She is alert. No sensory deficit. She exhibits normal muscle tone. GCS eye subscore is 4. GCS verbal subscore is 5. GCS  motor subscore is 6.   Alert, receptive aphasia improved   CN: II-XII ?R UMN VII   Motor: LUE  5 /5 / RUE 5/5  Tone normal bilaterally             LLE  5 /5 /  RLE 5 /5  Tone normal bilaterally     Unable to test gait due to level of consciousness.    Medications:  Continuous    sodium chloride 0.9% Last Rate: 50 mL/hr at 12/22/17 0701   Scheduled    aspirin 325 mg Daily   atorvastatin 40 mg Daily   ceFAZolin (ANCEF) IVPB 2 g Q8H   [START ON 12/23/2017] cephALEXin 500 mg Q8H   losartan 100 mg Daily   senna 8.6 mg Daily   sodium chloride 0.9% 3 mL Q8H   PRN    acetaminophen 325 mg Q4H PRN   dextrose 50% 12.5 g PRN   fentaNYL 25 mcg Q1H PRN   glucagon (human recombinant) 1 mg PRN   insulin aspart 1-10 Units Q6H PRN   labetalol 10 mg PRN   potassium chloride 10% 40 mEq PRN   potassium, sodium phosphates 2 packet PRN     Today I personally reviewed pertinent medications, lines/drains/airways, imaging, cardiology, lab results, microbiology results, notably:      Assessment/Plan:     Neuro   * Embolic stroke involving left middle cerebral artery    - 76 yo F with aphasia s/p tPA. Reportedly had RSW prior to tPA administration but no weakness appreciated on exam at [facility]   - CTA MP without LVO. No concerns for immediate post t-PA complications  - F/u CT head: No evidence of acute intracranial pathology.    Plan:   - MRI undone for cardiac pacer placement   - SBP target < 180 mm Hg  - Asa / statins for secondary stroke prevention   - Echo normal EF / concerns for complete heart block / S/p DC PPM Holding heparin products post cardiac procedure, as per cardiology recs   - Rehab Efforts: Physical Therapy, Occupational Therapy, Speech and Language Pathology  - Mechanical prophylaxis for now.         Aphasia    - receptive aphasia + improved   - see section under stroke         Cardiac/Vascular   Complete heart block    - Echo : Normal left ventricular systolic function (EF 65-70%). Normal right ventricular systolic function .  Normal left ventricular diastolic function. The estimated PA systolic pressure is 22 mmHg. No wall motion abnormalities  - admission concerns: Rhythm strip taken at bedside show CHB with intermittent narrow and wide ventricular escape. The patient has a Vent rate of 39-43.    - As per appreciate recs for S/P DC PPM     - Sling to left arm - wear for 48 hours, then only at night for 6 weeks.  - NO HEPARIN PRODUCTS  - Keflex 500 mg TID for 5 days at discharge (or after the 2 doses of IV antibiotics if still in the hospital). Already got 2 doses of Ancef here.  - No lifting left elbow above shoulder height  - No lifting over 5 pounds  - No driving for 1 week and for 4 weeks if patient uses left arm to make turns  - Patient may shower in 48 hours, do not let beam of shower hit site directly and no scrubbing in area  - Follow up in device clinic in 1 week and with Dr. Arango in 3 months           Mixed hyperlipidemia    - continue statins         Essential hypertension    - SBP < 180   - restarted home ARBII        Hematology   S/P admn tPA in diff fac w/n last 24 hr bef adm to crnt fac    - see section under stroke             Prophylaxis:  Venous Thromboembolism: mechanical  Stress Ulcer: None  Ventilator Pneumonia: not applicable     Activity Orders          None        Full Code    Gallo Martínez MD  Neurocritical Care  [facility]

## 2017-12-22 NOTE — PT/OT/SLP EVAL
"Speech Language Pathology Evaluation  Bedside Swallow    Patient Name:  Maricel Abdul   MRN:  [id number]  Admitting Diagnosis: Embolic stroke involving left middle cerebral artery    Recommendations:                 General Recommendations:  Speech/language therapy  Diet recommendations:  Regular, Thin   Aspiration Precautions: Standard aspiration precautions   General Precautions: Standard, fall, aspiration  Communication strategies:  none    History:     Past Medical History:   Diagnosis Date    Hypertension     Kidney stones     TIA (transient ischemic attack)        Past Surgical History:   Procedure Laterality Date    CERVICAL FUSION      CHOLECYSTECTOMY      HYSTERECTOMY      SHOULDER SURGERY         Social History: Patient lives with spouose.      Prior diet: regular with thin      Subjective     "I am okay...tired"  Patient goals: go home     Objective:     Oral Musculature Evaluation  · Oral Musculature: WFL  · Dentition: present and adequate  · Mucosal Quality: good  · Mandibular Strength and Mobility: WFL  · Oral Labial Strength and Mobility: WFL  · Lingual Strength and Mobility: WFL  · Velar Elevation: WFL  · Buccal Strength and Mobility: WFL  · Volitional Cough: strong  · Volitional Swallow: no delay  · Voice Prior to PO Intake: wfl    Bedside Swallow Eval:   Consistencies Assessed:  · Thin liquids 2 teaspoons then 6 bolus controlled sips from a cup  · Puree 2 teaspoons  · Solids 1 cracker     Oral Phase:   · WFL    Pharyngeal Phase:   · no overt clinical signs/symptoms of aspiration  · no overt clinical signs/symptoms of pharyngeal dysphagia    Compensatory Strategies  · None      Assessment:     Maricel Abdul is a 77 y.o. female with oral and pharyngeal phases of swallow wfl    Goals:    SLP Goals        Problem: SLP Goal    Goal Priority Disciplines Outcome   SLP Goal     SLP Ongoing (interventions implemented as appropriate)                   Plan:     · Patient to be seen:  5 x/week "   · Plan of Care expires:  01/19/18  · Plan of Care reviewed with:  patient, family   · SLP Follow-Up:  Yes       Discharge recommendations:  rehabilitation facility   Barriers to Discharge:  None    Time Tracking:     SLP Treatment Date:   12/22/17  Speech Start Time:  0750  Speech Stop Time:  0800     Speech Total Time (min):  10 min    Billable Minutes: Eval Swallow and Oral Function 10    Caitlin Slaughter MA, CCC-SLP  12/22/2017

## 2017-12-22 NOTE — ASSESSMENT & PLAN NOTE
Ms. Abdul is a 76yo F with aphasia s/p tPA. Reportedly had LSW prior to tPA administration but no weakness appreciated [place] [facility] without LVO. She is admitted to [facility] post-tPA  Improved near to baseline neurologically now. Repeat CTH negative. Unclear etiology at this time.   Patient has complete heart block, cardiology placed PM 12/21/17.     -Antithrombotics for secondary stroke prevention: None: Reason:  ASA 325mg daily   -Statins for secondary stroke prevention and hyperlipidemia, if present: Atorvastatin- 40 mg oral daily   -Aggressive risk factor modification: Hypertension, hyperlipidemia  -Rehab Efforts: Physical Therapy, Occupational Therapy, Speech and Language Pathology; awaiting OT recs- patient has significantly improved and is recommended for PT OP  -Diagnostics: Ordered/Pending - none pending  -VTE Prophylaxis: heparin sq 5000u TID

## 2017-12-22 NOTE — PROGRESS NOTES
Pt arrived to rm 747. Pt ambulated to bed from wheelchair. Pt oriented to rm, call light within reach. Pt instructed to call before getting out of bed, bed alarm set. VS and neuro exam stable. NAD. Will continue to monitor.

## 2017-12-22 NOTE — SUBJECTIVE & OBJECTIVE
Interval History:   S/p dual chamber [facility] PPM yesterday. No events on telemetry. A-Pacing currently. Wound shows no signs of infection and is C/d/i. No hematoma noted. Patient is more interactive currently today.     History reviewed. No pertinent family history.    Social History     Social History    Marital status:      Spouse name: N/A    Number of children: N/A    Years of education: N/A     Occupational History    Not on file.     Social History Main Topics    Smoking status: Former Smoker     Years: 20.00    Smokeless tobacco: Never Used    Alcohol use Not on file      Comment: Glass of wine each night    Drug use: No    Sexual activity: Not on file     Other Topics Concern    Not on file     Social History Narrative    No narrative on file         Review of Systems   Constitution: Negative for chills and fever.   Cardiovascular: Negative for chest pain and palpitations.   Respiratory: Negative for cough and shortness of breath.    Musculoskeletal: Negative for arthritis and back pain.        Soreness post-procedurally    Gastrointestinal: Negative for abdominal pain and diarrhea.   Neurological: Positive for focal weakness.     Objective:     Vital Signs (Most Recent):  Temp: 98.1 °F (36.7 °C) (12/22/17 0305)  Pulse: 88 (12/22/17 0701)  Resp: (!) 45 (12/22/17 0701)  BP: (!) 169/80 (12/22/17 0701)  SpO2: 98 % (12/22/17 0701) Vital Signs (24h Range):  Temp:  [97.7 °F (36.5 °C)-99.4 °F (37.4 °C)] 98.1 °F (36.7 °C)  Pulse:  [79-92] 88  Resp:  [15-49] 45  SpO2:  [93 %-100 %] 98 %  BP: (110-169)/() 169/80  Arterial Line BP: (125-164)/(45-62) 164/60     Weight: 61.1 kg (134 lb 11.2 oz)  Body mass index is 27.21 kg/m².     SpO2: 98 %  O2 Device (Oxygen Therapy): nasal cannula    Physical Exam   Constitutional: She is oriented to person, place, and time. She appears well-developed and well-nourished.   HENT:   Head: Normocephalic and atraumatic.   Eyes: EOM are normal. Pupils are equal,  round, and reactive to light.   Neck: Normal range of motion. Neck supple. No JVD present.   Cardiovascular: Normal rate, regular rhythm, normal heart sounds and intact distal pulses.    Pulmonary/Chest: Effort normal and breath sounds normal.   Abdominal: Soft. Bowel sounds are normal.   Musculoskeletal: Normal range of motion. She exhibits no edema.   Incision site is clean, dry and intact.   Neurological: She is alert and oriented to person, place, and time. She has normal reflexes.       Vitals reviewed.      Significant Labs:   EP:   Recent Labs  Lab 12/20/17  1554  12/21/17  0101 12/21/17  0104 12/22/17  0030 12/22/17  0736     --  143  --  144  --    K 4.1  < > 4.6  --  3.6 4.0     --  109  --  112*  --    CO2 24  --  22*  --  26  --    *  --  187*  --  92  --    BUN 17  --  23  --  28*  --    CREATININE 0.8  --  1.0  --  0.7  --    CALCIUM 9.4  --  9.3  --  8.3*  --    PROT 6.8  --  6.6  --  6.2  --    ALBUMIN 3.5  --  3.5  --  3.1*  --    BILITOT 0.6  --  0.6  --  0.5  --    ALKPHOS 91  --  77  --  67  --    AST 24  --  21  --  28  --    ALT 22  --  21  --  25  --    ANIONGAP 9  --  12  --  6*  --    ESTGFRAFRICA >60.0  --  >60.0  --  >60.0  --    EGFRNONAA >60.0  --  53.7*  --  >60.0  --    WBC 8.93  --  12.15  --  12.52  --    HGB 13.0  --  11.4*  --  10.3*  --    HCT 40.5  --  34.0*  --  32.0*  --      --  251  --  200  --    INR 1.3*  --   --  1.5* 1.1  --    < > = values in this interval not displayed.    Significant Imaging: Echocardiogram: 2D echo with color flow doppler:   Results for orders placed or performed during the hospital encounter of 12/20/17   Echo doppler color flow   Result Value Ref Range    EF 68 55 - 65    Mitral Valve Regurgitation TRIVIAL     Diastolic Dysfunction No     Est. PA Systolic Pressure 21.66     Mitral Valve Mobility NORMAL

## 2017-12-22 NOTE — PLAN OF CARE
Problem: SLP Goal  Goal: SLP Goal  Outcome: Ongoing (interventions implemented as appropriate)  Regular diet with thin liquids recommended.    Caitlin Slaughter MA/DEENA-SLP  Speech Language Pathologist  Pager [phone]  12/22/2017

## 2017-12-22 NOTE — ASSESSMENT & PLAN NOTE
Ms. Abdul is a 78yo F with aphasia s/p tPA. Reportedly had LSW prior to tPA administration but no weakness appreciated on exam at [facility] [place]. CTA MP without LVO. She is admitted to [facility] post-tPA  Improved near to baseline neurologically now. Unclear etiology at this time.   Patient has complete heart block, cardiology placed PM 12/21/17.     -Antithrombotics for secondary stroke prevention: None: Reason:  Hold all Antithrombotics x 24 hours after IV t-PA administration (okay to restart ASA 325mg 24 hours post-tpa)  -Statins for secondary stroke prevention and hyperlipidemia, if present: Atorvastatin- 40 mg oral daily, check LDL   -Aggressive risk factor modification: Hypertension, hyperlipidemia  -Rehab Efforts: Physical Therapy, Occupational Therapy, Speech and Language Pathology  -Diagnostics: Ordered/Pending    MRI head without contrast to assess brain parenchyma  -VTE Prophylaxis: None: Reason for No Pharmacological VTE Prophylaxis: Mechanical prophylaxis: Place SCDs, ok to start heparin sq 24 hours post-tPA infusion

## 2017-12-22 NOTE — SUBJECTIVE & OBJECTIVE
Neurologic Chief Complaint: Aphasia, confusion, LSW    Subjective:     Interval History: Patient is seen for follow-up neurological assessment and treatment recommendations:   Pacemaker placed yesterday. NAEON. Awaiting OT recs - likely able to d/c tomorrow home with outpatient therapy.       HPI, Past Medical, Family, and Social History remains the same as documented in the initial encounter.     Review of Systems   Constitutional: Negative for fever.   Respiratory: Negative for shortness of breath.    Cardiovascular: Negative for chest pain.   Neurological: Negative for headaches.     Scheduled Meds:   aspirin  325 mg Oral Daily    atorvastatin  40 mg Oral Daily    [START ON 12/23/2017] cephALEXin  500 mg Oral Q8H    losartan  100 mg Oral Daily    senna  8.6 mg Oral Daily    sodium chloride 0.9%  3 mL Intravenous Q8H     Continuous Infusions:    PRN Meds:acetaminophen, dextrose 50%, glucagon (human recombinant), insulin aspart    Objective:     Vital Signs (Most Recent):  Temp: 98.8 °F (37.1 °C) (12/22/17 1648)  Pulse: 94 (12/22/17 1648)  Resp: 18 (12/22/17 1648)  BP: (!) 177/91 (12/22/17 1648)  SpO2: 95 % (12/22/17 1648)  BP Location: Right arm    Vital Signs Range (Last 24H):  Temp:  [97.9 °F (36.6 °C)-99.4 °F (37.4 °C)]   Pulse:  [84-94]   Resp:  [15-52]   BP: (143-177)/()   SpO2:  [93 %-100 %]   Arterial Line BP: (130-164)/(48-60)   BP Location: Right arm    Physical Exam   Constitutional: She appears well-developed and well-nourished. No distress.   Eyes: EOM are normal.   Neck: Normal range of motion.   Pulmonary/Chest: Effort normal.   Musculoskeletal: Normal range of motion.   Skin: Skin is warm and dry. She is not diaphoretic.   Psychiatric: She has a normal mood and affect.       Neurological Exam:   LOC: alert  Attention Span: Good   Language: No aphasia  Articulation: No dysarthria  Visual Fields: Full  EOM (CN III, IV, VI): Full/intact  Facial Movement (CN VII): Symmetric facial expression     Cebellar: No evidence of appendicular or axial ataxia  Sensation: Intact to light touch, temperature and vibration  Tone: Normal tone throughout    Laboratory:  CMP:     Recent Labs  Lab 12/22/17  0030 12/22/17  0736   CALCIUM 8.3*  --    ALBUMIN 3.1*  --    PROT 6.2  --      --    K 3.6 4.0   CO2 26  --    *  --    BUN 28*  --    CREATININE 0.7  --    ALKPHOS 67  --    ALT 25  --    AST 28  --    BILITOT 0.5  --      BMP:     Recent Labs  Lab 12/22/17  0030 12/22/17  0736     --    K 3.6 4.0   *  --    CO2 26  --    BUN 28*  --    CREATININE 0.7  --    CALCIUM 8.3*  --      CBC:     Recent Labs  Lab 12/22/17  0030   WBC 12.52   RBC 3.58*   HGB 10.3*   HCT 32.0*      MCV 89   MCH 28.8   MCHC 32.2     Lipid Panel:     Recent Labs  Lab 12/20/17  1554   CHOL 229*   LDLCALC 135.4   HDL 69   TRIG 123     Coagulation:     Recent Labs  Lab 12/22/17  0030   INR 1.1   APTT 22.7     Platelet Aggregation Study: No results for input(s): PLTAGG, PLTAGINTERP, PLTAGREGLACO, ADPPLTAGGREG in the last 168 hours.  Hgb A1C:     Recent Labs  Lab 12/20/17  1554   HGBA1C 5.5     TSH:     Recent Labs  Lab 12/20/17  1554   TSH 1.760       Diagnostic Results     Brain Imaging   12/21/17 CTH:  No evidence of acute intracranial pathology       Vessel Imaging   CTA MP 12/20/17:   No evidence of acute hemorrhage or major vascular distribution infarct.  Chronic microvascular scanner changes generalized cerebral volume loss.  Mild atherosclerotic change without evidence of high-grade stenosis or major branch occlusion.  2-3 mm inferior projected outpouching from the communicating segment of the left ICA may reflect an infundibulum at the origin of a nonvisualized posterior communicating artery or an aneurysm. This cannot be definitively differentiated with CTA.  Bilateral subcentimeter thyroid nodules.    Cardiac Imaging   ECHO 12/21/17:   CONCLUSIONS     1 - Concentric remodeling.     2 - Normal left ventricular  systolic function (EF 65-70%).     3 - Normal right ventricular systolic function .     4 - Normal left ventricular diastolic function.     5 - The estimated PA systolic pressure is 22 mmHg.     6 - No wall motion abnormalities

## 2017-12-22 NOTE — ASSESSMENT & PLAN NOTE
Mrs. Abdul is 76 y/o  female with unknown significant PMHx here with CHB with intermittent narrow and wide escape. S/p DC PPM placment    Final recs:  - Sling to left arm - wear for 48 hours, then only at night for 6 weeks.  - NO HEPARIN PRODUCTS  - Keflex 500 mg TID for 5 days at discharge (or after the 2 doses of IV antibiotics if still in the hospital)  - No lifting left elbow above shoulder height  - No lifting over 5 pounds  - No driving for 1 week and for 4 weeks if patient uses left arm to make turns  - Patient may shower in 48 hours, do not let beam of shower hit site directly and no scrubbing in area  - Follow up in device clinic in 1 week and with Dr. Arango in 3 months    Discussed with Dr. Valenzuela,

## 2017-12-23 VITALS
TEMPERATURE: 98 F | RESPIRATION RATE: 18 BRPM | DIASTOLIC BLOOD PRESSURE: 99 MMHG | BODY MASS INDEX: 27.15 KG/M2 | OXYGEN SATURATION: 93 % | HEIGHT: 59 IN | WEIGHT: 134.69 LBS | HEART RATE: 97 BPM | SYSTOLIC BLOOD PRESSURE: 177 MMHG

## 2017-12-23 LAB
ALBUMIN SERPL BCP-MCNC: 3.2 G/DL
ALP SERPL-CCNC: 81 U/L
ALT SERPL W/O P-5'-P-CCNC: 25 U/L
ANION GAP SERPL CALC-SCNC: 8 MMOL/L
AST SERPL-CCNC: 33 U/L
BASOPHILS # BLD AUTO: 0.05 K/UL
BASOPHILS NFR BLD: 0.6 %
BILIRUB SERPL-MCNC: 1 MG/DL
BUN SERPL-MCNC: 12 MG/DL
CALCIUM SERPL-MCNC: 8.9 MG/DL
CHLORIDE SERPL-SCNC: 106 MMOL/L
CO2 SERPL-SCNC: 27 MMOL/L
CREAT SERPL-MCNC: 0.6 MG/DL
DIFFERENTIAL METHOD: ABNORMAL
EOSINOPHIL # BLD AUTO: 0.2 K/UL
EOSINOPHIL NFR BLD: 2.1 %
ERYTHROCYTE [DISTWIDTH] IN BLOOD BY AUTOMATED COUNT: 13.5 %
EST. GFR  (AFRICAN AMERICAN): >60 ML/MIN/1.73 M^2
EST. GFR  (NON AFRICAN AMERICAN): >60 ML/MIN/1.73 M^2
GLUCOSE SERPL-MCNC: 101 MG/DL
HCT VFR BLD AUTO: 33.1 %
HGB BLD-MCNC: 11.1 G/DL
IMM GRANULOCYTES # BLD AUTO: 0.04 K/UL
IMM GRANULOCYTES NFR BLD AUTO: 0.4 %
LYMPHOCYTES # BLD AUTO: 0.9 K/UL
LYMPHOCYTES NFR BLD: 10.3 %
MAGNESIUM SERPL-MCNC: 1.9 MG/DL
MCH RBC QN AUTO: 28.9 PG
MCHC RBC AUTO-ENTMCNC: 33.5 G/DL
MCV RBC AUTO: 86 FL
MONOCYTES # BLD AUTO: 1 K/UL
MONOCYTES NFR BLD: 11.3 %
NEUTROPHILS # BLD AUTO: 6.8 K/UL
NEUTROPHILS NFR BLD: 75.3 %
NRBC BLD-RTO: 0 /100 WBC
PHOSPHATE SERPL-MCNC: 2.4 MG/DL
PLATELET # BLD AUTO: 192 K/UL
PMV BLD AUTO: 9.8 FL
POCT GLUCOSE: 77 MG/DL (ref 70–110)
POCT GLUCOSE: 96 MG/DL (ref 70–110)
POTASSIUM SERPL-SCNC: 3.7 MMOL/L
PROT SERPL-MCNC: 6.6 G/DL
RBC # BLD AUTO: 3.84 M/UL
SODIUM SERPL-SCNC: 141 MMOL/L
WBC # BLD AUTO: 9.01 K/UL

## 2017-12-23 PROCEDURE — 84100 ASSAY OF PHOSPHORUS: CPT

## 2017-12-23 PROCEDURE — A4216 STERILE WATER/SALINE, 10 ML: HCPCS | Performed by: PHYSICIAN ASSISTANT

## 2017-12-23 PROCEDURE — G8989 SELF CARE D/C STATUS: HCPCS | Mod: CI

## 2017-12-23 PROCEDURE — G8988 SELF CARE GOAL STATUS: HCPCS | Mod: CI

## 2017-12-23 PROCEDURE — 97530 THERAPEUTIC ACTIVITIES: CPT

## 2017-12-23 PROCEDURE — 97165 OT EVAL LOW COMPLEX 30 MIN: CPT

## 2017-12-23 PROCEDURE — G8987 SELF CARE CURRENT STATUS: HCPCS | Mod: CI

## 2017-12-23 PROCEDURE — 85025 COMPLETE CBC W/AUTO DIFF WBC: CPT

## 2017-12-23 PROCEDURE — 25000003 PHARM REV CODE 250: Performed by: PHYSICIAN ASSISTANT

## 2017-12-23 PROCEDURE — 99233 SBSQ HOSP IP/OBS HIGH 50: CPT | Mod: GC,,, | Performed by: PSYCHIATRY & NEUROLOGY

## 2017-12-23 PROCEDURE — 83735 ASSAY OF MAGNESIUM: CPT

## 2017-12-23 PROCEDURE — 36415 COLL VENOUS BLD VENIPUNCTURE: CPT

## 2017-12-23 PROCEDURE — 80053 COMPREHEN METABOLIC PANEL: CPT

## 2017-12-23 PROCEDURE — 25000003 PHARM REV CODE 250: Performed by: STUDENT IN AN ORGANIZED HEALTH CARE EDUCATION/TRAINING PROGRAM

## 2017-12-23 RX ORDER — CEPHALEXIN 500 MG/1
500 CAPSULE ORAL EVERY 8 HOURS
Qty: 13 CAPSULE | Refills: 0 | Status: SHIPPED | OUTPATIENT
Start: 2017-12-23 | End: 2018-03-28

## 2017-12-23 RX ORDER — ASPIRIN 325 MG
325 TABLET ORAL DAILY
Refills: 0 | COMMUNITY
Start: 2017-12-24 | End: 2023-01-03

## 2017-12-23 RX ORDER — HYDROCHLOROTHIAZIDE 12.5 MG/1
12.5 TABLET ORAL DAILY
Status: DISCONTINUED | OUTPATIENT
Start: 2017-12-23 | End: 2017-12-23 | Stop reason: HOSPADM

## 2017-12-23 RX ADMIN — Medication 3 ML: at 06:12

## 2017-12-23 RX ADMIN — HYDROCHLOROTHIAZIDE 12.5 MG: 12.5 TABLET ORAL at 01:12

## 2017-12-23 RX ADMIN — CEPHALEXIN 500 MG: 500 CAPSULE ORAL at 06:12

## 2017-12-23 RX ADMIN — Medication 3 ML: at 02:12

## 2017-12-23 RX ADMIN — LOSARTAN POTASSIUM 100 MG: 50 TABLET, FILM COATED ORAL at 09:12

## 2017-12-23 RX ADMIN — ATORVASTATIN CALCIUM 40 MG: 20 TABLET, FILM COATED ORAL at 09:12

## 2017-12-23 RX ADMIN — ASPIRIN 325 MG ORAL TABLET 325 MG: 325 PILL ORAL at 09:12

## 2017-12-23 RX ADMIN — CEPHALEXIN 500 MG: 500 CAPSULE ORAL at 01:12

## 2017-12-23 RX ADMIN — SENNOSIDES 8.6 MG: 8.6 TABLET, FILM COATED ORAL at 09:12

## 2017-12-23 NOTE — NURSING
Patient awake, alert and responsive, oriented x 4. Vital signs within parameters. No significant changes this shift. Administered medications as ordered. No complaints of pain. Able to ambulate in the bathroom  With stable gait and with assist.

## 2017-12-23 NOTE — PT/OT/SLP EVAL
"Occupational Therapy   Evaluation and Discharge Note    Name: Maricel Abdul  MRN: [id number]  Admitting Diagnosis:  Embolic stroke involving left middle cerebral artery 2 Days Post-Op    Recommendations:     Discharge Recommendations: home  Discharge Equipment Recommendations:  none  Barriers to discharge:  None    History:     Occupational Profile:  Patient resides in [place] with  [facility] bedroom on the 3rd floor; 4 steps to enter, rail on the right.  PTA patient independent with ADLs including driving.  Patient is right handed.  Currently owns no DME.  Patient is right handed.  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  Hobbies:  Reading, hilda, painting, caring for dog.  Roles/Responsibilities:  Wife, mother, grandmother, cooking, laundry.     Past Medical History:   Diagnosis Date    Hypertension     Kidney stones     TIA (transient ischemic attack)        Past Surgical History:   Procedure Laterality Date    CERVICAL FUSION      CHOLECYSTECTOMY      HYSTERECTOMY      SHOULDER SURGERY         Subjective     Patient:  "My hands weren't moving right.  It came on suddenly.  I think I move pretty good now."  Communicated with: nurse prior to session.  Pain/Comfort:  · Pain Rating 1: 0/10  · Pain Rating Post-Intervention 1: 0/10    Objective:     Patient found with: telemetry (left UE sling--recent pacemaker placement)  Family present.   General Precautions: Standard, pacemaker, aspiration, fall   Orthopedic Precautions:N/A   Braces: Sling and swathe     Occupational Performance:    Bed Mobility:    · Patient completed Rolling/Turning to Left with  modified independence  · Patient completed Rolling/Turning to Right with modified independence  · Patient completed Scooting/Bridging with modified independence  · Patient completed Supine to Sit with modified independence  · Patient completed Sit to Supine with modified independence    Functional " Mobility/Transfers:  · Patient completed Sit <> Stand Transfer with modified independence  with  no assistive device   · Patient completed Bed <> Chair Transfer using Stand Pivot technique with modified independence with no assistive device  · Patient completed Toilet Transfer Stand Pivot technique with modified independence with  no AD    Activities of Daily Living:  · Feeding:  modified independence while seated EOB  · Grooming: modified independence while standing  · UB Dressing: minimum assistance to guide over left UE  · LB Dressing: modified independence while seated EOB  · Toileting: modified independence with use of std commode    Cognitive/Visual Perceptual:  Cognitive/Psychosocial Skills:     -       Oriented to: Person, Place, Time and Situation   -       Follows Commands/attention:Follows one-step commands  -       Communication: clear/fluent  -       Memory: No Deficits noted  -       Safety awareness/insight to disability: intact   -       Mood/Affect/Coping skills/emotional control: Appropriate to situation  Visual/Perceptual:      -Intact    Physical Exam:  Postural examination/scapula alignment:    -       Rounded shoulders  Edema:  none   Upper Extremity Range of Motion:     -       Right Upper Extremity: WNL  -       Left Upper Extremity: WNL  Upper Extremity Strength:    -       Right Upper Extremity: WNL  -       Left Upper Extremity: WNL    Patient left supine with all lines intact and call button in reach    AMPA 6 Click:  AMPA Total Score: 23    Treatment & Education:  Patient/ Family education provided for stroke warning signs, prevention guidelines and personal risk factors.  Patient/ Family verbalizing understanding via teach back method.   Patient education provided on role of OT and need for no further OT upon discharge.  Patient and family instructed on need to call for assistance for toileting needs and when getting up. No deficits noted with handwriting. Patient's functional status  "and disposition recommendation discussed with patient,  and MD.  White board updated in patient's room.  OT asked if there were any other questions; patient/ family had no further questions.       Education:    Assessment:     Maricel Abdul is a 77 y.o. female with a medical diagnosis of Embolic stroke involving left middle cerebral artery and presents without performance deficits of physical skills; cognitive skills or psychosocial skills.  Able to organize occupational performance in a timely and safe manner; demonstrating skills necessary to successfully and appropriately participate in everyday tasks and social situations.  No activity limitations noted. No further OT needed.         Clinical Decision Makin.  OT Low:  "Pt evaluation falls under low complexity for evaluation coding due to performance deficits noted in 1-3 areas as stated above and 0 co-morbities affecting current functional status. Data obtained from problem focused assessments. No modifications or assistance was required for completion of evaluation. Only brief occupational profile and history review completed."     Plan:     During this hospitalization, patient does not require further acute OT services.  Please re-consult if situation changes.    · Plan of Care Reviewed with: patient, spouse    This Plan of care has been discussed with the patient who was involved in its development and understands and is in agreement with the identified goals and treatment plan    GOALS:    Occupational Therapy Goals     Not on file          Multidisciplinary Problems (Resolved)        Problem: Occupational Therapy Goal    Goal Priority Disciplines Outcome Interventions   Occupational Therapy Goal   (Resolved)     OT, PT/OT Outcome(s) achieved    Description:  Goals not set 2* no further OT needed                    Time Tracking:     OT Date of Treatment: 17  OT Start Time: 727  OT Stop Time: 0750  OT Total Time (min): 23 " min    Billable Minutes:Evaluation 13  Therapeutic Activity 10    MIGUEL Camilo  12/23/2017

## 2017-12-23 NOTE — NURSING
Patient awake, alert and responsive, oriented x 4.Discharge instructions given to patient, discontinued Iv. Wheelchair provided to patient. Left the unit at 14:50 pm accompanied by family.

## 2017-12-23 NOTE — PLAN OF CARE
Problem: Occupational Therapy Goal  Goal: Occupational Therapy Goal  Goals not set 2* no further OT needed  Outcome: Outcome(s) achieved Date Met: 12/23/17  OT evaluation completed.  MIGUEL Camilo  12/23/2017

## 2017-12-24 NOTE — PROGRESS NOTES
[facility]  Vascular Neurology  Comprehensive Stroke Center  Progress Note    Assessment/Plan:     * Embolic stroke involving left middle cerebral artery    Ms. Abdul is a 76yo F with aphasia s/p tPA. Reportedly had LSW prior to tPA administration but [place] weakness [place] [facility] [place]A [place] without LVO. She is admitted to [facility] post-tPA  Improved near to baseline neurologically now. Repeat CTH negative. Unclear etiology at this time.   Patient has complete heart block, cardiology placed PM 12/21/17.     -Antithrombotics for secondary stroke prevention: None: Reason:  ASA 325mg daily   -Statins for secondary stroke prevention and hyperlipidemia, if present: Atorvastatin- 40 mg oral daily   -Aggressive risk factor modification: Hypertension, hyperlipidemia  -Rehab Efforts: Physical Therapy, Occupational Therapy, Speech and Language Pathology; Recommending home  -Diagnostics: Ordered/Pending - none pending  -VTE Prophylaxis: heparin sq 5000u TID        Complete heart block    S/p pacemaker placement this admission  Unable to have MRI at this time        Aphasia    Likely secondary to ischemic infarct; improved to baseline  Speech therapy evaluate & treat         S/P admn tPA in diff fac w/n last 24 hr bef adm to crnt fac    Completed window of close monitoring in NCC 24 hours post-administration        Mixed hyperlipidemia    Stroke risk factor  Patient reportedly takes atorvastatin 40mg daily at home    Consider increasing atorvastatin to 80mg daily         Essential hypertension    Stroke risk factor  Goal BP <180 post tPA             12/21/17 Cardiology consulted for heart block; transcutaneous pacer placed and to go for pacemaker placement today. Confusion improved. Patient with bilateral upper and lower extremity hyperactive movements.   12/22/17 Pacemaker placed 12/21. [place]. Patient reports back to baseline although still with extraneous movements of extremities. Stepping down to  NSU.   12/23 - OT recommending d/c to home. Resumed home BP regimen. Pt stable for d/c with VN and Gen Neuro f/u.    STROKE DOCUMENTATION   Acute Stroke Times   Last Known Normal Date: 12/20/17  Last Known Normal Time: 1100  Symptom Onset Date: 12/20/17  Symptom Onset Time: 1100  Stroke Team Called Date: 12/20/17  Stroke Team Called Time: 1519 (arrival to [facility] [place])  Stroke Team Arrival Date: 12/20/17  Stroke Team Arrival Time: 1519  CT Interpretation Time: 1526  Decision to Treat Time for Alteplase: 1243  Decision to Treat Time for IR:  (n/a )    NIH Scale:  1a. Level Of Consciousness: 0-->Alert: keenly responsive  1b. LOC Questions: 0-->Answers both questions correctly  1c. LOC Commands: 0-->Performs both tasks correctly  2. Best Gaze: 0-->Normal  3. Visual: 0-->No visual loss  4. Facial Palsy: 0-->Normal symmetrical movements  5a. Motor Arm, Left: 0-->No drift: limb holds 90 (or 45) degrees for full 10 secs  5b. Motor Arm, Right: 0-->No drift: limb holds 90 (or 45) degrees for full 10 secs  6a. Motor Leg, Left: 0-->No drift: leg holds 30 degree position for full 5 secs  6b. Motor Leg, Right: 0-->No drift: leg holds 30 degree position for full 5 secs  7. Limb Ataxia: 0-->Absent  8. Sensory: 0-->Normal: no sensory loss  9. Best Language: 0-->No aphasia: normal  10. Dysarthria: 0-->Normal  11. Extinction and Inattention (formerly Neglect): 0-->No abnormality  Total (NIH Stroke Scale): 0       Modified [place] Score: 0  [place] Coma Scale:    ABCD2 Score:    [id number] Score:   HAS -BLED Score:   ICH Score:   Hunt & Bravo Classification:      Hemorrhagic change of an Ischemic Stroke: Does this patient have an ischemic stroke with hemorrhagic changes? No     Neurologic Chief Complaint: Aphasia, confusion, LSW    Subjective:     Interval History: Patient is seen for follow-up neurological assessment and treatment recommendations: [place]. OT recommending d/c to home. Resumed home BP regimen. Pt stable for d/c with VN and Gen  Neuro f/u.      HPI, Past Medical, Family, and Social History remains the same as documented in the initial encounter.     Review of Systems   Constitutional: Negative for fever.   Respiratory: Negative for shortness of breath.    Cardiovascular: Negative for chest pain.   Neurological: Negative for headaches.     Scheduled Meds:    Continuous Infusions:    PRN Meds:    Objective:     Vital Signs (Most Recent):  Temp: 97.9 °F (36.6 °C) (12/23/17 1117)  Pulse: 97 (12/23/17 1117)  Resp: 18 (12/23/17 1117)  BP: (!) 177/99 (12/23/17 1117)  SpO2: (!) 93 % (12/23/17 1117)  BP Location: Right arm    Vital Signs Range (Last 24H):  Temp:  [96.9 °F (36.1 °C)-98.6 °F (37 °C)]   Pulse:  []   Resp:  [16-20]   BP: (143-189)/(78-99)   SpO2:  [93 %-99 %]   BP Location: Right arm    Physical Exam   Constitutional: She appears well-developed and well-nourished. No distress.   Eyes: EOM are normal.   Neck: Normal range of motion.   Pulmonary/Chest: Effort normal.   Musculoskeletal: Normal range of motion.   Skin: Skin is warm and dry. She is not diaphoretic.   Psychiatric: She has a normal mood and affect.       Neurological Exam:   LOC: alert  Attention Span: Good   Language: No aphasia  Articulation: No dysarthria  Visual Fields: Full  EOM (CN III, IV, VI): Full/intact  Facial Movement (CN VII): Symmetric facial expression    Cebellar: No evidence of appendicular or axial ataxia  Sensation: Intact to light touch, temperature and vibration  Tone: Normal tone throughout    Laboratory:  CMP:     Recent Labs  Lab 12/23/17  0842   CALCIUM 8.9   ALBUMIN 3.2*   PROT 6.6      K 3.7   CO2 27      BUN 12   CREATININE 0.6   ALKPHOS 81   ALT 25   AST 33   BILITOT 1.0     BMP:     Recent Labs  Lab 12/23/17  0842      K 3.7      CO2 27   BUN 12   CREATININE 0.6   CALCIUM 8.9     CBC:     Recent Labs  Lab 12/23/17  0842   WBC 9.01   RBC 3.84*   HGB 11.1*   HCT 33.1*      MCV 86   MCH 28.9   MCHC 33.5     Lipid  Panel:     Recent Labs  Lab 12/20/17  1554   CHOL 229*   LDLCALC 135.4   HDL 69   TRIG 123     Coagulation:     Recent Labs  Lab 12/22/17  0030   INR 1.1   APTT 22.7     Platelet Aggregation Study: No results for input(s): PLTAGG, PLTAGINTERP, PLTAGREGLACO, ADPPLTAGGREG in the last 168 hours.  Hgb A1C:     Recent Labs  Lab 12/20/17  1554   HGBA1C 5.5     TSH:     Recent Labs  Lab 12/20/17  1554   TSH 1.760       Diagnostic Results     Brain Imaging   12/21/17 CTH:  No evidence of acute intracranial pathology       Vessel Imaging   CTA MP 12/20/17:   No evidence of acute hemorrhage or major vascular distribution infarct.  Chronic microvascular scanner changes generalized cerebral volume loss.  Mild atherosclerotic change without evidence of high-grade stenosis or major branch occlusion.  2-3 mm inferior projected outpouching from the communicating segment of the left ICA may reflect an infundibulum at the origin of a nonvisualized posterior communicating artery or an aneurysm. This cannot be definitively differentiated with CTA.  Bilateral subcentimeter thyroid nodules.    Cardiac Imaging   ECHO 12/21/17:   CONCLUSIONS     1 - Concentric remodeling.     2 - Normal left ventricular systolic function (EF 65-70%).     3 - Normal right ventricular systolic function .     4 - Normal left ventricular diastolic function.     5 - The estimated PA systolic pressure is 22 mmHg.     6 - No wall motion abnormalities      Serina Millan PA-C  Comprehensive Stroke Center  Department of Vascular Neurology   [facility]

## 2017-12-24 NOTE — SUBJECTIVE & OBJECTIVE
Neurologic Chief Complaint: Aphasia, confusion, LSW    Subjective:     Interval History: Patient is seen for follow-up neurological assessment and treatment recommendations: [place]. OT recommending d/c to home. Resumed home BP regimen. Pt stable for d/c with VN and Gen Neuro f/u.      HPI, Past Medical, Family, and Social History remains the same as documented in the initial encounter.     Review of Systems   Constitutional: Negative for fever.   Respiratory: Negative for shortness of breath.    Cardiovascular: Negative for chest pain.   Neurological: Negative for headaches.     Scheduled Meds:    Continuous Infusions:    PRN Meds:    Objective:     Vital Signs (Most Recent):  Temp: 97.9 °F (36.6 °C) (12/23/17 1117)  Pulse: 97 (12/23/17 1117)  Resp: 18 (12/23/17 1117)  BP: (!) 177/99 (12/23/17 1117)  SpO2: (!) 93 % (12/23/17 1117)  BP Location: Right arm    Vital Signs Range (Last 24H):  Temp:  [96.9 °F (36.1 °C)-98.6 °F (37 °C)]   Pulse:  []   Resp:  [16-20]   BP: (143-189)/(78-99)   SpO2:  [93 %-99 %]   BP Location: Right arm    Physical Exam   Constitutional: She appears well-developed and well-nourished. No distress.   Eyes: EOM are normal.   Neck: Normal range of motion.   Pulmonary/Chest: Effort normal.   Musculoskeletal: Normal range of motion.   Skin: Skin is warm and dry. She is not diaphoretic.   Psychiatric: She has a normal mood and affect.       Neurological Exam:   LOC: alert  Attention Span: Good   Language: No aphasia  Articulation: No dysarthria  Visual Fields: Full  EOM (CN III, IV, VI): Full/intact  Facial Movement (CN VII): Symmetric facial expression    Cebellar: No evidence of appendicular or axial ataxia  Sensation: Intact to light touch, temperature and vibration  Tone: Normal tone throughout    Laboratory:  CMP:     Recent Labs  Lab 12/23/17  0842   CALCIUM 8.9   ALBUMIN 3.2*   PROT 6.6      K 3.7   CO2 27      BUN 12   CREATININE 0.6   ALKPHOS 81   ALT 25   AST 33   BILITOT  1.0     BMP:     Recent Labs  Lab 12/23/17  0842      K 3.7      CO2 27   BUN 12   CREATININE 0.6   CALCIUM 8.9     CBC:     Recent Labs  Lab 12/23/17  0842   WBC 9.01   RBC 3.84*   HGB 11.1*   HCT 33.1*      MCV 86   MCH 28.9   MCHC 33.5     Lipid Panel:     Recent Labs  Lab 12/20/17  1554   CHOL 229*   LDLCALC 135.4   HDL 69   TRIG 123     Coagulation:     Recent Labs  Lab 12/22/17  0030   INR 1.1   APTT 22.7     Platelet Aggregation Study: No results for input(s): PLTAGG, PLTAGINTERP, PLTAGREGLACO, ADPPLTAGGREG in the last 168 hours.  Hgb A1C:     Recent Labs  Lab 12/20/17  1554   HGBA1C 5.5     TSH:     Recent Labs  Lab 12/20/17  1554   TSH 1.760       Diagnostic Results     Brain Imaging   12/21/17 CTH:  No evidence of acute intracranial pathology       Vessel Imaging   CTA MP 12/20/17:   No evidence of acute hemorrhage or major vascular distribution infarct.  Chronic microvascular scanner changes generalized cerebral volume loss.  Mild atherosclerotic change without evidence of high-grade stenosis or major branch occlusion.  2-3 mm inferior projected outpouching from the communicating segment of the left ICA may reflect an infundibulum at the origin of a nonvisualized posterior communicating artery or an aneurysm. This cannot be definitively differentiated with CTA.  Bilateral subcentimeter thyroid nodules.    Cardiac Imaging   ECHO 12/21/17:   CONCLUSIONS     1 - Concentric remodeling.     2 - Normal left ventricular systolic function (EF 65-70%).     3 - Normal right ventricular systolic function .     4 - Normal left ventricular diastolic function.     5 - The estimated PA systolic pressure is 22 mmHg.     6 - No wall motion abnormalities

## 2017-12-24 NOTE — ASSESSMENT & PLAN NOTE
Ms. Abdul is a 76yo F with aphasia s/p tPA. Reportedly had LSW prior to tPA administration but no weakness appreciated on exam at [facility]. CTA MP without LVO. She is admitted to [facility] post-tPA  Improved near to baseline neurologically now. Repeat CTH negative. Unclear etiology at this time.   Patient has complete heart block, cardiology placed PM 12/21/17.     -Antithrombotics for secondary stroke prevention: None: Reason:  ASA 325mg daily   -Statins for secondary stroke prevention and hyperlipidemia, if present: Atorvastatin- 40 mg oral daily   -Aggressive risk factor modification: Hypertension, hyperlipidemia  -Rehab Efforts: Physical Therapy, Occupational Therapy, Speech and Language Pathology; Recommending home  -Diagnostics: Ordered/Pending - none pending  -VTE Prophylaxis: heparin sq 5000u TID

## 2017-12-25 PROBLEM — R47.01 APHASIA: Status: ACTIVE | Noted: 2017-12-25

## 2017-12-25 PROBLEM — E78.2 MIXED HYPERLIPIDEMIA: Status: ACTIVE | Noted: 2017-12-25

## 2017-12-25 PROBLEM — I44.2 COMPLETE HEART BLOCK: Status: ACTIVE | Noted: 2017-12-25

## 2017-12-25 PROBLEM — I10 ESSENTIAL HYPERTENSION: Status: ACTIVE | Noted: 2017-12-25

## 2017-12-26 ENCOUNTER — PATIENT OUTREACH (OUTPATIENT)
Dept: ADMINISTRATIVE | Facility: CLINIC | Age: 77
End: 2017-12-26

## 2017-12-26 NOTE — PT/OT/SLP DISCHARGE
Physical Therapy Discharge Summary    Name: Maricel Abdul  MRN: [id number]   Principal Problem: Embolic stroke involving left middle cerebral artery     Patient Discharged from acute Physical Therapy on 12/26/17.  Please refer to prior PT noted date on 12/22/17 for functional status.     Assessment:     Patient appropriate for care in another setting.    Objective:     GOALS:    Physical Therapy Goals        Problem: Physical Therapy Goal    Goal Priority Disciplines Outcome Goal Variances Interventions   Physical Therapy Goal     PT/OT, PT Ongoing (interventions implemented as appropriate)     Description:  Revisded Goals:    Goals to be met by: 12/30/2017    Patient will increase functional independence with mobility by performing:    Supine to sit with Modified [place].  Sit to supine with Modified [place].   Sit to stand transfer with Modified [place] using No Assistive Device.  Bed to chair transfer via Stand Pivot with Modified [place] using No Assistive Device.  Gait  x 200 feet with Modified [place] using No Assistive Device.    Dynamic standing for 10 minutes with Supervision using No Assistive Device.  Able to tolerate exercise for 15-20 reps with independence.  Patient and family able to teachback stroke & positioning education independently.  Ascend/descend 1 stairs with right Handrails Supervision using No Assistive Device.    Goals to be met by: 12/29/2017    Patient will increase functional independence with mobility by performing:    Supine to sit with Minimal Assistance. MET  Sit to supine with Minimal Assistance. MET  Sit to stand transfer with Minimal Assistance using No Assistive Device and Rolling Walker. MET  Bed to chair transfer via Stand Pivot with Minimal Assistance using No Assistive Device and Rolling Walker. MET  Gait  x 50 feet with Minimal Assistance using No Assistive Device and Rolling Walker.  NT  Dynamic standing for 10 minutes with Minimal Assistance using  No Assistive Device. MET  Able to tolerate exercise for 15-20 reps with independence. NT  Patient and family able to teachback stroke & positioning education independently. NOT MET  Ascend/descend 1 flight of  stairs with right Handrails Minimal Assistance using No Assistive Device. NT                       Reasons for Discontinuation of Therapy Services  Transfer to alternate level of care.      Plan:     Patient Discharged to: Home no PT services needed.    Carmen Claudio, PT  12/26/2017

## 2017-12-26 NOTE — PATIENT INSTRUCTIONS
Stroke (Completed)    You have had a mild stroke, or cerebrovascular accident (CVA). This is caused by a loss of blood flow to part of your brain. This can occur when a blood clot forms inside the carotid artery (main artery from the heart to the brain) or inside the heart. When the clot travels to the brain, it can lodge in a blood vessel and block blood flow. The other common cause of stroke is a gradual narrowing of the arteries in the brain due to buildup of fatty deposits (plaque).  Symptoms  Blocked blood flow in different areas of the brain can cause different symptoms. If you have had a stroke before, a new one may be different. A memory aid for the basic signs of a stroke is F.A.S.T.  F.A.S.T.  · F: Face drooping, or numbness on one side. This may be more noticeable when you ask the affected person to smile.  · A: Arm weakness or numbness. The affected person may have trouble using or lifting one side.  · S: Speech difficulty. Speech may be slurred or hard to understand. The affected person may also use the wrong words.  · T: Time to call 911. Time is critical in treating a stroke. Call 911 as soon as you suspect a stroke has happened--even a small one. The sooner treatment is started the better, even if the symptoms go away.  Other common symptoms of a stroke include:  · Having difficulty getting the right words to come out  · Weakness in one leg  · Numbness on one side  · Difficulty walking  · Trouble with coordination  · Trouble with vision  · Headache  · Confusion  · Dizziness  Treatment  After you have had a stroke, you are at risk of having another. Be sure to follow up with your healthcare provider for further evaluation and treatment. If problems are found, your healthcare provider will recommend treatment with medicines and/or procedures.  To reduce your chance of having another stroke, you may be prescribed medicines. These include medicines to prevent blood clots, such as antiplatelet or  anticoagulant medicines.  Home care  · Rest at home and avoid exertion for the next few days.  · If your healthcare provider has prescribed medicines, take them as directed.  Follow-up care  Follow up with your healthcare provider, or as advised. Additional tests may be needed. If you had an X-ray, CT scan, MRI, or ECG (electrocardiogram), it will be reviewed by a specialist. You will be notified of any new findings that will affect your care.  Call 911  Contact emergency services right away if any of these occur:  · Any of your stroke symptoms worsen  · New problems with speech, confusion, vision, walking, coordination, facial droop, or weakness or numbness on one side of your body  · Severe headache, fainting spell, dizziness, or seizure  · Chest pain or shortness of breath  Remember F.A.S.T. (described above). If you notice warning signs and symptoms of stroke, CALL 911 without delay.  Date Last Reviewed: 9/21/2015  © 9197-5454 [employer]. [address] [place] [address]. All rights reserved. This information is not intended as a substitute for professional medical care. Always follow your healthcare professional's instructions.        Stroke (Completed)    You have had a mild stroke, or cerebrovascular accident (CVA). This is caused by a loss of blood flow to part of your brain. This can occur when a blood clot forms inside the carotid artery (main artery from the heart to the brain) or inside the heart. When the clot travels to the brain, it can lodge in a blood vessel and block blood flow. The other common cause of stroke is a gradual narrowing of the arteries in the brain due to buildup of fatty deposits (plaque).  Symptoms  Blocked blood flow in different areas of the brain can cause different symptoms. If you have had a stroke before, a new one may be different. A memory aid for the basic signs of a stroke is F.A.S.T.  F.A.S.T.  · F: Face drooping, or numbness on one side. This may  be more noticeable when you ask the affected person to smile.  · A: Arm weakness or numbness. The affected person may have trouble using or lifting one side.  · S: Speech difficulty. Speech may be slurred or hard to understand. The affected person may also use the wrong words.  · T: Time to call 911. Time is critical in treating a stroke. Call 911 as soon as you suspect a stroke has happened--even a small one. The sooner treatment is started the better, even if the symptoms go away.  Other common symptoms of a stroke include:  · Having difficulty getting the right words to come out  · Weakness in one leg  · Numbness on one side  · Difficulty walking  · Trouble with coordination  · Trouble with vision  · Headache  · Confusion  · Dizziness  Treatment  After you have had a stroke, you are at risk of having another. Be sure to follow up with your healthcare provider for further evaluation and treatment. If problems are found, your healthcare provider will recommend treatment with medicines and/or procedures.  To reduce your chance of having another stroke, you may be prescribed medicines. These include medicines to prevent blood clots, such as antiplatelet or anticoagulant medicines.  Home care  · Rest at home and avoid exertion for the next few days.  · If your healthcare provider has prescribed medicines, take them as directed.  Follow-up care  Follow up with your healthcare provider, or as advised. Additional tests may be needed. If you had an X-ray, CT scan, MRI, or ECG (electrocardiogram), it will be reviewed by a specialist. You will be notified of any new findings that will affect your care.  Call 911  Contact emergency services right away if any of these occur:  · Any of your stroke symptoms worsen  · New problems with speech, confusion, vision, walking, coordination, facial droop, or weakness or numbness on one side of your body  · Severe headache, fainting spell, dizziness, or seizure  · Chest pain or  shortness of breath  Remember F.A.S.T. (described above). If you notice warning signs and symptoms of stroke, CALL 911 without delay.  Date Last Reviewed: 9/21/2015  © 8240-8665 [employer] [address] [place] [address], [place] [address]. All rights reserved. This information is not intended as a substitute for professional medical care. Always follow your healthcare professional's instructions.

## 2017-12-27 DIAGNOSIS — I44.2 COMPLETE HEART BLOCK: Primary | ICD-10-CM

## 2017-12-27 NOTE — DISCHARGE SUMMARY
[facility]  Vascular Neurology  Comprehensive Stroke Center  Discharge Summary     Summary:     Admit Date: 12/20/2017  3:22 PM    Discharge Date and Time: 12/23/2017  3:03 PM    Attending Physician: Anibal Diaz MD    Discharge Provider: Serina Millan PA-C    History of Present Illness: Ms. Abdul is a 76yo F with hypertension and hyperlipidemia who presents to [facility] with left (?) sided weakness and aphasia. Last known normal at about 11am. Patient was seen via telestroke and recommended tPA at 12:43pm. Soon after tPA started patient's symptoms improved. In transport to [facility] her blood pressure remained elevated up to 230 systolic so cardene drip was started. CTA MP on arrival was negative for LVO. Patient does not have focal weakness but is still aphasic and confused.     Hospital Course (synopsis of major diagnoses, care, treatment, and services provided during the course of the hospital stay): Ms. Abdul was admitted to [facility] for 24-hr close monitoring following tPA administration. At that time, complete heart block was noted on telemetry so Cardiology consulted. Transcutaneous pacer placed and once she was outside the 24-hr tPA window, pacemaker was placed on 12/21/17. Her confusion and neuro exam improved, and pt was stable to step down from ICU.   We were unable to obtain MRI Brain to definitely rule out acute infarct d/t recent pacemaker placement. Repeat CT Head negative for signs of acute stroke. At this time, etiology of her presentation is unclear. Considering resolution of her neurologic symptoms, possibly t-PA aborted stroke vs TIA vs other transient neuromuscular syndrome. Recommend conservatively treating as acute infarct, including ASA 325mg Daily and Atorvastatin 40mg Daily, as well as maximal risk factor modification (HTN, HLD, diet, exercise, tobacco abstinence, etc.) for secondary stroke prevention.  Ms. Abdul was evaluated and treated by PT, OT, and SLP while  admitted who recommended discharge to home with no further therapy needs. She was instructed to follow up with Vascular Neurology in 4-6 weeks, with PCP for hospital f/u within 1 week, and Cardiology/Pacemaker follow-ups in 1 week and 3 months, respectively. She was also found to have bilateral upper and lower extremity hyperactive movements during admission, has been going on for several weeks/months per pt and , so outpatient referral was placed to establish care with General Neurology as well, at their earliest convenience.  Pt's confusion completely resolved and she was neurologically stable for discharge home with .    STROKE DOCUMENTATION   Acute Stroke Times   Last Known Normal Date: 12/20/17  Last Known Normal Time: 1100  Symptom Onset Date: 12/20/17  Symptom Onset Time: 1100  Stroke Team Called Date: 12/20/17  Stroke Team Called Time: 1519 (arrival to [facility])  Stroke Team Arrival Date: 12/20/17  Stroke Team Arrival Time: 1519  CT Interpretation Time: 1526  Decision to Treat Time for Alteplase: 1243  Decision to Treat Time for IR:  (n/a )     NIH Scale:  1a. Level Of Consciousness: 0-->Alert: keenly responsive  1b. LOC Questions: 0-->Answers both questions correctly  1c. LOC Commands: 0-->Performs both tasks correctly  2. Best Gaze: 0-->Normal  3. Visual: 0-->No visual loss  4. Facial Palsy: 0-->Normal symmetrical movements  5a. Motor Arm, Left: 0-->No drift: limb holds 90 (or 45) degrees for full 10 secs  5b. Motor Arm, Right: 0-->No drift: limb holds 90 (or 45) degrees for full 10 secs  6a. Motor Leg, Left: 0-->No drift: leg holds 30 degree position for full 5 secs  6b. Motor Leg, Right: 0-->No drift: leg holds 30 degree position for full 5 secs  7. Limb Ataxia: 0-->Absent  8. Sensory: 0-->Normal: no sensory loss  9. Best Language: 0-->No aphasia: normal  10. Dysarthria: 0-->Normal  11. Extinction and Inattention (formerly Neglect): 0-->No abnormality  Total (NIH Stroke Scale): 0        Modified  Neola Score: 0  Kalen Coma Scale:    ABCD2 Score:    [id number]-BNP Score:   HAS -BLED Score:   ICH Score:   Hunt & Bravo Classification:       Assessment/Plan:         Diagnostic Results        Brain Imaging   12/21/17 CTH:  No evidence of acute intracranial pathology          Vessel Imaging   CTA MP 12/20/17:   No evidence of acute hemorrhage or major vascular distribution infarct.  Chronic microvascular scanner changes generalized cerebral volume loss.  Mild atherosclerotic change without evidence of high-grade stenosis or major branch occlusion.  2-3 mm inferior projected outpouching from the communicating segment of the left ICA may reflect an infundibulum at the origin of a nonvisualized posterior communicating artery or an aneurysm. This cannot be definitively differentiated with CTA.  Bilateral subcentimeter thyroid nodules.       Cardiac Imaging   ECHO 12/21/17:   CONCLUSIONS     1 - Concentric remodeling.     2 - Normal left ventricular systolic function (EF 65-70%).     3 - Normal right ventricular systolic function .     4 - Normal left ventricular diastolic function.     5 - The estimated PA systolic pressure is 22 mmHg.     6 - No wall motion abnormalities        Interventions: IV t-PA    Complications: None    Disposition: Home or Self Care    Final Active Diagnoses:    Diagnosis Date Noted POA    PRINCIPAL PROBLEM:  Embolic stroke involving left middle cerebral artery [I63.412] 12/20/2017 Yes    Aphasia [R47.01] 12/25/2017 Yes    Complete heart block [I44.2] 12/25/2017 Yes    S/P admn tPA in diff fac w/n last 24 hr bef adm to crnt fac [Z92.82] 12/20/2017 Not Applicable    Essential hypertension [I10] 12/25/2017 Yes    Mixed hyperlipidemia [E78.2] 12/25/2017 Yes    Cerebrovascular accident (CVA) [I63.9] 12/22/2017 Yes      Problems Resolved During this Admission:    Diagnosis Date Noted Date Resolved POA     * Embolic stroke involving left middle cerebral artery    76yo F with aphasia s/p tPA.  Reportedly had LSW prior to tPA administration but no weakness appreciated on exam [place] [facility] CTA MP without LVO. She is admitted to [facility] post-tPA  Improved near to baseline neurologically now. Repeat CTH negative. Unclear etiology at this time.   Patient has complete heart block, cardiology placed PM 12/21/17.     -Antithrombotics for secondary stroke prevention: None: Reason:  ASA 325mg daily   -Statins for secondary stroke prevention and hyperlipidemia, if present: Atorvastatin- 40 mg oral daily   -Aggressive risk factor modification: Hypertension, hyperlipidemia  -Rehab Efforts: Physical Therapy, Occupational Therapy, Speech and Language Pathology; Recommending home        Complete heart block    S/p pacemaker placement this admission  Unable to have MRI at this time        Aphasia    Possibly secondary to infarct; Improved to baseline        S/P admn tPA in diff fac w/n last 24 hr bef adm to crnt fac    Completed window of close monitoring in [facility] 24 hours post-administration        Mixed hyperlipidemia    Stroke risk factor  , Goal <70  Atorvastatin 40mg        Essential hypertension    Stroke risk factor  Goal BP <140            Recommendations:     Post-discharge complication risks: Falls    Stroke Education given to: patient and family    Follow-up in Stroke Clinic in 4-6 weeks.    Discharge Plan:  Antithrombotics: Aspirin 325mg  Statin: Atorvastatin 40mg  Aggresive risk factor modification:  Hypertension  High Cholesterol  Diet  Exercise    Follow Up:  Follow-up Information     Adiel Boss MD. Schedule an appointment as soon as possible for a visit in 1 week.    Specialty:  Family Medicine  Why:  Hospital follow up  Contact information:  [address]  [address]  [facility] [place] [address]  [phone]             [facility] VASCULAR NEUROLOGY In 4 weeks.    Specialty:  Vascular Neurology  Why:  We will call you for this appt  Contact information:  [address]  [place]  [place] [id number] [phone]           Hadley Arango MD In 3 months.    Specialties:  Electrophysiology, Cardiology  Why:  Call to schedule this appt  Contact information:  [address]  [facility] [id number] [phone]             [facility] In 1 week.    Specialty:  Cardiology  Why:  Pt with PM placed 12/21/17, 1 week follow-up recommended  Contact information:  [address]  [facility] [address] [phone]           [facility] NEUROLOGY.    Specialty:  Neurology  Why:  Pt with developing chorea and memory issues over several weeks to months; Needs to establish care  Contact information:  [address]  [facility] [id number] [phone]                 Patient Instructions:     Other restrictions (specify):   Order Comments: POST-PACEMAKER CARE INSTRUCTIONS:  - Sling to left arm - wear for 48 hours, then only at night for 6 weeks.  - Keflex 500 mg TID for 5 days at discharge.  - No lifting left elbow above shoulder height  - No lifting over 5 pounds  - No driving for 1 week and for 4 weeks if patient uses left arm to make turns  - Patient may shower in 48 hours, do not let beam of shower hit site directly and no scrubbing in area  - Follow up in device clinic in 1 week and with Dr. Arango in 3 months         Medications:  Reconciled Home Medications:   Discharge Medication List as of 12/23/2017  1:22 PM      START taking these medications    Details   aspirin 325 MG tablet Take 1 tablet (325 mg total) by mouth once daily., Starting Sun 12/24/2017, Until Mon 12/24/2018, OTC      cephALEXin (KEFLEX) 500 MG capsule Take 1 capsule (500 mg total) by mouth every 8 (eight) hours., Starting Sat 12/23/2017, Normal         CONTINUE these medications which have NOT CHANGED    Details   amitriptyline (ELAVIL) 50 MG tablet Take 50 mg by mouth every evening., Historical Med      atorvastatin (LIPITOR) 40 MG tablet Take 40 mg by mouth once daily., Historical Med       cholecalciferol, vitamin D3, (VITAMIN D3) 5,000 unit Tab Take 5,000 Units by mouth once daily., Historical Med      hydroCHLOROthiazide (MICROZIDE) 12.5 mg capsule Take 12.5 mg by mouth daily as needed., Historical Med      losartan (COZAAR) 100 MG tablet Take 100 mg by mouth once daily., Historical Med      omeprazole (PRILOSEC) 20 MG capsule Take 20 mg by mouth once daily., Historical Med             Serina Millan PA-C  [facility] Stroke Center  Department of Vascular Neurology   [facility]

## 2017-12-27 NOTE — ANESTHESIA POSTPROCEDURE EVALUATION
"Anesthesia Post Evaluation    Patient: Maricel Abdul    Procedure(s) Performed: Procedure(s) (LRB):  INSERTION-PACEMAKER-DUAL (Left)    Final Anesthesia Type: general  Patient location during evaluation: ICU  Patient participation: Yes- Able to Participate  Level of consciousness: confused  Post-procedure vital signs: reviewed and stable  Pain management: adequate  Airway patency: patent  PONV status at discharge: No PONV  Anesthetic complications: no      Cardiovascular status: hemodynamically stable  Respiratory status: unassisted, spontaneous ventilation and room air  Hydration status: euvolemic          Visit Vitals  BP (!) 177/99   Pulse 97   Temp 36.6 °C (97.9 °F)   Resp 18   Ht 4' 11" (1.499 m)   Wt 61.1 kg (134 lb 11.2 oz)   SpO2 (!) 93%   Breastfeeding? No   BMI 27.21 kg/m²       Pain/Ana Maria Score: No Data Recorded      "

## 2017-12-27 NOTE — ASSESSMENT & PLAN NOTE
76yo F with aphasia s/p tPA. Reportedly had LSW prior to tPA administration but no weakness appreciated on exam at [facility]. CTA MP without LVO. She is admitted to [facility] post-tPA  Improved near to baseline neurologically now. Repeat CTH negative. Unclear etiology at this time.   Patient has complete heart block, cardiology placed PM 12/21/17.     -Antithrombotics for secondary stroke prevention: None: Reason:  ASA 325mg daily   -Statins for secondary stroke prevention and hyperlipidemia, if present: Atorvastatin- 40 mg oral daily   -Aggressive risk factor modification: Hypertension, hyperlipidemia  -Rehab Efforts: Physical Therapy, Occupational Therapy, Speech and Language Pathology; Recommending home

## 2017-12-29 ENCOUNTER — CLINICAL SUPPORT (OUTPATIENT)
Dept: ELECTROPHYSIOLOGY | Facility: CLINIC | Age: 77
End: 2017-12-29
Attending: INTERNAL MEDICINE
Payer: MEDICARE

## 2017-12-29 DIAGNOSIS — I44.2 COMPLETE HEART BLOCK: ICD-10-CM

## 2017-12-29 PROCEDURE — 93280 PM DEVICE PROGR EVAL DUAL: CPT | Mod: S$GLB,,, | Performed by: INTERNAL MEDICINE

## 2018-01-03 NOTE — SIGNIFICANT EVENT
Central Line  Date/Time: 12/21/2017 4:17 AM  Performed by: KARLIE MOHAN  Indications: hemodynamic monitoring  Anesthesia: local infiltration  Preparation: skin prepped with ChloraPrep  Location details: right internal jugular  Catheter type: Cordis  Catheter size: 6 Fr  Ultrasound guidance: yes  Manometry: Yes  Number of attempts: 1  Post-procedure: line sutured,  chlorhexidine patch,  sterile dressing applied and blood return through all ports  Comments: TVP inserted to 28cm to achieve ventricular capture. Pt without underlying escape. Pacing threshold 0.8mA. Rate set at 80bpm. Pacing output set at 5mA. CXR and EKG will be done post procedure

## 2018-01-08 ENCOUNTER — TELEPHONE (OUTPATIENT)
Dept: NEUROSURGERY | Facility: HOSPITAL | Age: 78
End: 2018-01-08

## 2018-01-08 NOTE — TELEPHONE ENCOUNTER
"Attempted to contact patient via number on file.  No answer.  The following message was left for patient to return call "Hello.  This is a message for Margaret Abdul.  My name is Pancho and I am a nurse at [facility].  If you could give me call back at [phone] between the hours of 08:00 AM and 04:00 PM, Monday through Friday.  Thanks so much and have a great day."  Will try again later.   "

## 2018-01-09 ENCOUNTER — TELEPHONE (OUTPATIENT)
Dept: NEUROSURGERY | Facility: HOSPITAL | Age: 78
End: 2018-01-09

## 2018-01-09 NOTE — TELEPHONE ENCOUNTER
"Attempted to contact patient via number on file.  No answer.  The following message was left for patient to return call "Hello.  This is a message for Margaret Abdul.  My name is Pancho and I am a nurse at [facility].  If you could give me call back at [phone] between the hours of 08:00 AM and 04:00 PM, Monday through Friday.  Thanks so much and have a great day."  Will try again later.   "

## 2018-01-10 ENCOUNTER — TELEPHONE (OUTPATIENT)
Dept: NEUROSURGERY | Facility: HOSPITAL | Age: 78
End: 2018-01-10

## 2018-01-10 NOTE — TELEPHONE ENCOUNTER
"Attempted to contact patient via number on file.  No answer.  The following message was left for patient to return call "Hello.  This is a message for Margaret Abdul.  My name is Pancho and I am a nurse at [facility].  If you could give me call back at [phone] between the hours of 08:00 AM and 04:00 PM, Monday through Friday.  Thanks so much and have a great day."  Third unsuccessful attempt.   "

## 2018-01-11 ENCOUNTER — TELEPHONE (OUTPATIENT)
Dept: NEUROSURGERY | Facility: HOSPITAL | Age: 78
End: 2018-01-11

## 2018-01-11 ENCOUNTER — TELEPHONE (OUTPATIENT)
Dept: NEUROLOGY | Facility: CLINIC | Age: 78
End: 2018-01-11

## 2018-01-11 NOTE — TELEPHONE ENCOUNTER
----- Message from Isidro Bellamy RN sent at 1/11/2018  8:32 AM CST -----  Hey everyone,     I was wondering if you could schedule Mrs. Abdul a follow up appointment in 4-6 weeks with a stroke provider.     Thanks so much for all you do for myself and our stroke patients,  HUA FigueroaN-RN

## 2018-01-11 NOTE — TELEPHONE ENCOUNTER
Patient returning call.  Spoke with patient.  Risk factors specific to patient for stroke discussed with teach back implemented.  Patient verbalized understanding of discharge instructions and medications.  Patient was asked about discharge appointments and follow up care.  No follow appointment scheduled thus far.  Message sent to [facility] on patient's behalf to schedule follow up appointment. Warning signs discussed with teach back discussion method implemented.  Notified to seek immediate medical help via 911 if new or worsening stroke symptoms occur.  Patient relayed no new questions or comments at this time.  Instructed to call Stroke Central with any further questions.

## 2018-01-23 ENCOUNTER — TELEPHONE (OUTPATIENT)
Dept: ELECTROPHYSIOLOGY | Facility: CLINIC | Age: 78
End: 2018-01-23

## 2018-01-23 NOTE — TELEPHONE ENCOUNTER
----- Message from Hadley Arango MD sent at 1/22/2018  5:05 PM CST -----  Regarding: RE: Remote Alert- Okay to turn OFF V% pacing alert?  yes  ----- Message -----  From: Doreen Wolfe  Sent: 1/22/2018   9:35 AM  To: Hadley Arango MD, Felipe Balderas  Subject: Remote Alert- Okay to turn OFF V% pacing mary#    Dr. Arango,    Intrinsic rhythm - SR with AVB, 100% .  Okay to turn OFF V percent pacing greater than limit alert?    PPM check and f/u Joanne scheduled 3/28/18.    Thanks,  Doreen

## 2018-02-05 ENCOUNTER — OFFICE VISIT (OUTPATIENT)
Dept: NEUROLOGY | Facility: CLINIC | Age: 78
End: 2018-02-05
Payer: MEDICARE

## 2018-02-05 VITALS
DIASTOLIC BLOOD PRESSURE: 77 MMHG | HEART RATE: 90 BPM | SYSTOLIC BLOOD PRESSURE: 138 MMHG | WEIGHT: 126.31 LBS | BODY MASS INDEX: 25.46 KG/M2 | HEIGHT: 59 IN

## 2018-02-05 DIAGNOSIS — G45.1 HEMISPHERIC CAROTID ARTERY SYNDROME: Primary | ICD-10-CM

## 2018-02-05 PROCEDURE — 1159F MED LIST DOCD IN RCRD: CPT | Mod: S$GLB,,, | Performed by: PSYCHIATRY & NEUROLOGY

## 2018-02-05 PROCEDURE — 1126F AMNT PAIN NOTED NONE PRSNT: CPT | Mod: S$GLB,,, | Performed by: PSYCHIATRY & NEUROLOGY

## 2018-02-05 PROCEDURE — 99213 OFFICE O/P EST LOW 20 MIN: CPT | Mod: S$GLB,,, | Performed by: PSYCHIATRY & NEUROLOGY

## 2018-02-05 PROCEDURE — 3008F BODY MASS INDEX DOCD: CPT | Mod: S$GLB,,, | Performed by: PSYCHIATRY & NEUROLOGY

## 2018-02-05 PROCEDURE — 99999 PR PBB SHADOW E&M-EST. PATIENT-LVL III: CPT | Mod: PBBFAC,,, | Performed by: PSYCHIATRY & NEUROLOGY

## 2018-02-05 NOTE — PROGRESS NOTES
Maricel Abdul is a 77 y.o. year old female that  presents for stroke follow up. She is accompany by her .  Chief Complaint   Patient presents with    Hospital Follow Up   CC: stroke     Assessment and Plan: 78 y/o with hypertension, hyperlipidemia, complete AV block s/p pacemaker placement on 12/21/17,  who was admitted to [facility] on 12/20/17 after receiving iv alteplase at [facility] via telestroke due to acute onset left sided weakness and aphasia.   Her syndrome rapidly improved after iv thrombolysis and CT head showed no acute abnormality. She was unable to get MRI due to pacemaker but her stroke work up as detailed below was unrevealing except for cholesterol 229 and .  Left cortical TIA vs tPA aborted left MCA infarct.  Currently modified Dionisio Stroke scale 0.   Left cortical TIA vs tPA aborted left MCA infarct: continue secondary stroke prevention with aspirin and atorvastatin. Encouraged daily exercise and eating a healthy diet.   HTN: stroke risk factors, continue losartan and HCTZ  HLD, stroke risk factor, on atorvastatin 40 mg daily. Need to recheck lipid profile in a month.  Complete AV block s/p pacemaker placement, as per cardiology.        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   Her syndrome rapidly improved after iv thrombolysis and CT head showed no acute abnormality. She was unable to get MRI due to pacemaker but her stroke work up as detailed below was unrevealing except for cholesterol 229 and .  Currently on aspirin 325 mg and atorvastatin 40 mg respectively.  Patient denies experiencing any symptoms suggestive of a recurrent cerebrovascular insult.  No spell to suggest a seizure.  Today, she denies HA, vertigo,  double vision, difficulty swallowing, unsteadiness, gait impairment, falls, focal weakness or numbness, slurred speech, language or vision impairment.  She is back to fully independent and functional at this moment.     Cerebrovascular History:  History of Present Illness: Ms. Abdul is a 76yo F with hypertension and hyperlipidemia who presents to [facility] with left (?) sided weakness and aphasia. Last known normal at about 11am. Patient was seen via telestroke and recommended tPA at 12:43pm. Soon after tPA started patient's symptoms improved. In [place] [facility] her blood pressure remained elevated up to 230 systolic so cardene drip was started. CTA MP on arrival was negative for LVO. Patient does not have focal weakness but is still aphasic and confused.      Hospital Course (synopsis of major diagnoses, care, treatment, and services provided during the course of the hospital stay): Ms. Abdul was admitted to [facility] for 24-hr close monitoring following tPA administration. At that time, complete heart block was noted on telemetry so Cardiology consulted. Transcutaneous pacer placed and once she was outside the 24-hr tPA window, pacemaker was placed on 12/21/17. Her confusion and neuro exam improved, and pt was stable to step down from ICU.   We were unable to obtain MRI Brain to definitely rule out acute infarct d/t recent pacemaker placement. Repeat CT Head negative for signs of acute stroke. At this time, etiology of her presentation is unclear. Considering resolution of her neurologic symptoms, possibly t-PA aborted stroke vs TIA vs other transient neuromuscular syndrome. Recommend conservatively treating as acute infarct, including ASA 325mg Daily and Atorvastatin 40mg Daily, as well as maximal risk factor modification (HTN, HLD, diet, exercise, tobacco abstinence, etc.) for secondary stroke prevention.  Ms. Abdul was evaluated and treated by PT, OT, and SLP while admitted who recommended discharge to  home with no further therapy needs. She was instructed to follow up with Vascular Neurology in 4-6 weeks, with PCP for hospital f/u within 1 week, and Cardiology/Pacemaker follow-ups in 1 week and 3 months, respectively. She was also found to have bilateral upper and lower extremity hyperactive movements during admission, has been going on for several weeks/months per pt and , so outpatient referral was placed to establish care with General Neurology as well, at their earliest convenience.  Pt's confusion completely resolved and she was neurologically stable for discharge home with .     STROKE DOCUMENTATION   Acute Stroke Times   Last Known Normal Date: 12/20/17  Last Known Normal Time: 1100  Symptom Onset Date: 12/20/17  Symptom Onset Time: 1100  Stroke Team Called Date: 12/20/17  Stroke Team Called Time: 1519 (arrival to [facility])  Stroke Team Arrival Date: 12/20/17  Stroke Team Arrival Time: 1519  CT Interpretation Time: 1526  Decision to Treat Time for Alteplase: 1243  Decision to Treat Time for IR:  (n/a )      NIH Scale:  1a. Level Of Consciousness: 0-->Alert: keenly responsive  1b. LOC Questions: 0-->Answers both questions correctly  1c. LOC Commands: 0-->Performs both tasks correctly  2. Best Gaze: 0-->Normal  3. Visual: 0-->No visual loss  4. Facial Palsy: 0-->Normal symmetrical movements  5a. Motor Arm, Left: 0-->No drift: limb holds 90 (or 45) degrees for full 10 secs  5b. Motor Arm, Right: 0-->No drift: limb holds 90 (or 45) degrees for full 10 secs  6a. Motor Leg, Left: 0-->No drift: leg holds 30 degree position for full 5 secs  6b. Motor Leg, Right: 0-->No drift: leg holds 30 degree position for full 5 secs  7. Limb Ataxia: 0-->Absent  8. Sensory: 0-->Normal: no sensory loss  9. Best Language: 0-->No aphasia: normal  10. Dysarthria: 0-->Normal  11. Extinction and Inattention (formerly Neglect): 0-->No abnormality  Total (NIH Stroke Scale): 0           Modified Shawano Score: 0  Kalen Coma  Scale:    ABCD2 Score:    [id number] Score:   HAS -BLED Score:   ICH Score:   Hunt & Bravo Classification:      Diagnostic Results         Brain Imaging   12/21/17 CTH:  No evidence of acute intracranial pathology           Vessel Imaging   CTA MP 12/20/17:   No evidence of acute hemorrhage or major vascular distribution infarct.  Chronic microvascular scanner changes generalized cerebral volume loss.  Mild atherosclerotic change without evidence of high-grade stenosis or major branch occlusion.  2-3 mm inferior projected outpouching from the communicating segment of the left ICA may reflect an infundibulum at the origin of a nonvisualized posterior communicating artery or an aneurysm. This cannot be definitively differentiated with CTA.  Bilateral subcentimeter thyroid nodules.        Cardiac Imaging   ECHO 12/21/17:   CONCLUSIONS     1 - Concentric remodeling.     2 - Normal left ventricular systolic function (EF 65-70%).     3 - Normal right ventricular systolic function .     4 - Normal left ventricular diastolic function.     5 - The estimated PA systolic pressure is 22 mmHg.     6 - No wall motion abnormalities           Interventions: IV t-PA     Complications: None     Disposition: Home or Self Care       Past Medical History:   Diagnosis Date    Hypertension     Kidney stones     TIA (transient ischemic attack)      Social History     Social History    Marital status:      Spouse name: N/A    Number of children: N/A    Years of education: N/A     Occupational History    Not on file.     Social History Main Topics    Smoking status: Former Smoker     Years: 20.00    Smokeless tobacco: Never Used    Alcohol use Not on file      Comment: Glass of wine each night    Drug use: No    Sexual activity: Not on file     Other Topics Concern    Not on file     Social History Narrative    No narrative on file     Past Surgical History:   Procedure Laterality Date    CERVICAL FUSION       "CHOLECYSTECTOMY      HYSTERECTOMY      SHOULDER SURGERY       No family history on file.        Review of Systems  General ROS: negative for chills, fever or weight loss  Psychological ROS: negative for hallucination, depression or suicidal ideation  Ophthalmic ROS: negative for blurry vision, photophobia or eye pain  ENT ROS: negative for epistaxis, sore throat or rhinorrhea  Respiratory ROS: no cough, shortness of breath, or wheezing  Cardiovascular ROS: no chest pain or dyspnea on exertion  Gastrointestinal ROS: no abdominal pain, change in bowel habits, or black/ bloody stools  Genito-Urinary ROS: no dysuria, trouble voiding, or hematuria  Musculoskeletal ROS: negative for gait disturbance or muscular weakness  Neurological ROS: no syncope or seizures; no ataxia  Dermatological ROS: negative for pruritis, rash and jaundice      Physical Exam:  /77   Pulse 90   Ht 4' 11" (1.499 m)   Wt 57.3 kg (126 lb 5.2 oz)   BMI 25.51 kg/m²   General appearance: alert, cooperative, no distress  Constitutional:Oriented to person, place, and time.appears well-developed and well-nourished.   HEENT: Normocephalic, atraumatic, neck symmetrical, no nasal discharge   Eyes: conjunctivae/corneas clear, PERRL, EOM's intact  Lungs: clear to auscultation bilaterally, no dullness to percussion bilaterally  Heart: regular rate and rhythm without rub; no displacement of the PMI   Abdomen: soft, non-tender; bowel sounds normoactive; no organomegaly  Extremities: extremities symmetric; no clubbing, cyanosis, or edema  Integument: Skin color, texture, turgor normal; no rashes; hair distrubution normal  Neurologic: Alert and oriented X 3, normal strength, normal coordination and gait  Psychiatric: no pressured speech; normal affect; no evidence of impaired cognition     LABS:    Complete Blood Count  Lab Results   Component Value Date    RBC 3.84 (L) 12/23/2017    HGB 11.1 (L) 12/23/2017    HCT 33.1 (L) 12/23/2017    MCV 86 " 12/23/2017    MCH 28.9 12/23/2017    MCHC 33.5 12/23/2017    RDW 13.5 12/23/2017     12/23/2017    MPV 9.8 12/23/2017    GRAN 6.8 12/23/2017    GRAN 75.3 (H) 12/23/2017    LYMPH 0.9 (L) 12/23/2017    LYMPH 10.3 (L) 12/23/2017    MONO 1.0 12/23/2017    MONO 11.3 12/23/2017    EOS 0.2 12/23/2017    BASO 0.05 12/23/2017    EOSINOPHIL 2.1 12/23/2017    BASOPHIL 0.6 12/23/2017    DIFFMETHOD Automated 12/23/2017       Comprehensive Metabolic Panel  Lab Results   Component Value Date     12/23/2017    BUN 12 12/23/2017    CREATININE 0.6 12/23/2017     12/23/2017    K 3.7 12/23/2017     12/23/2017    PROT 6.6 12/23/2017    ALBUMIN 3.2 (L) 12/23/2017    BILITOT 1.0 12/23/2017    AST 33 12/23/2017    ALKPHOS 81 12/23/2017    CO2 27 12/23/2017    ALT 25 12/23/2017    ANIONGAP 8 12/23/2017    EGFRNONAA >60.0 12/23/2017    ESTGFRAFRICA >60.0 12/23/2017       TSH  Lab Results   Component Value Date    TSH 1.760 12/20/2017         Assessment: 78 y/o with hypertension, hyperlipidemia, complete AV block s/p pacemaker placement on 12/21/17,  who was admitted to [facility] on 12/20/17 after receiving iv alteplase at [facility] via telestroke due to acute onset left sided weakness and aphasia.   Her syndrome rapidly improved after iv thrombolysis and CT head showed no acute abnormality. She was unable to get MRI due to pacemaker but her stroke work up as detailed below was unrevealing except for cholesterol 229 and .  Left cortical TIA vs tPA aborted left MCA infarct.  Currently modified Oxford Stroke scale 0.   No diagnosis found.  There were no encounter diagnoses.      Plan:  1) Left cortical TIA vs tPA aborted left MCA infarct: continue secondary stroke prevention with aspirin and atorvastatin. Encouraged daily exercise and eating a healthy diet.  2) HTN: stroke risk factors, continue losartan and HCTZ  3) HLD, stroke risk factor, on atorvastatin 40 mg daily. Need to recheck lipid profile  in a month.  4) Complete AV block s/p pacemaker placement, as per cardiology.      No orders of the defined types were placed in this encounter.          Anibal Diaz MD

## 2018-02-05 NOTE — LETTER
February 5, 2018      No Recipients           [facility]  [address]  [facility] [address]  Phone: [phone]          Patient: Maricel Abdul   MR Number: [id number]   YOB: 1940   Date of Visit: 2/5/2018       Dear :    Thank you for referring Maricel Abdul to me for evaluation. Attached you will find relevant portions of my assessment and plan of care.    If you have questions, please do not hesitate to call me. I look forward to following Maricel Abdul along with you.    Sincerely,    Anibal Diaz MD    Enclosure  CC:  No Recipients    If you would like to receive this communication electronically, please contact [email or web address] or [phone] to request more information on [employer] Link access.    For providers and/or their staff who would like to refer a patient to [facility], please contact us through our one-stop-shop provider referral line, [facility], at [phone].    If you feel you have received this communication in error or would no longer like to receive these types of communications, please e-mail [email or web address]

## 2018-03-28 ENCOUNTER — CLINICAL SUPPORT (OUTPATIENT)
Dept: ELECTROPHYSIOLOGY | Facility: CLINIC | Age: 78
End: 2018-03-28
Payer: MEDICARE

## 2018-03-28 ENCOUNTER — HOSPITAL ENCOUNTER (OUTPATIENT)
Dept: CARDIOLOGY | Facility: CLINIC | Age: 78
Discharge: HOME | End: 2018-03-28
Payer: MEDICARE

## 2018-03-28 ENCOUNTER — OFFICE VISIT (OUTPATIENT)
Dept: ELECTROPHYSIOLOGY | Facility: CLINIC | Age: 78
End: 2018-03-28
Payer: MEDICARE

## 2018-03-28 ENCOUNTER — TELEPHONE (OUTPATIENT)
Dept: ADMINISTRATIVE | Facility: OTHER | Age: 78
End: 2018-03-28

## 2018-03-28 VITALS
SYSTOLIC BLOOD PRESSURE: 146 MMHG | HEART RATE: 81 BPM | DIASTOLIC BLOOD PRESSURE: 80 MMHG | HEIGHT: 59 IN | WEIGHT: 130.94 LBS | BODY MASS INDEX: 26.4 KG/M2

## 2018-03-28 DIAGNOSIS — I10 ESSENTIAL HYPERTENSION: ICD-10-CM

## 2018-03-28 DIAGNOSIS — Z95.0 CARDIAC PACEMAKER IN SITU: ICD-10-CM

## 2018-03-28 DIAGNOSIS — I44.2 COMPLETE HEART BLOCK: Primary | ICD-10-CM

## 2018-03-28 DIAGNOSIS — I44.2 COMPLETE HEART BLOCK: ICD-10-CM

## 2018-03-28 DIAGNOSIS — I63.412 EMBOLIC STROKE INVOLVING LEFT MIDDLE CEREBRAL ARTERY: ICD-10-CM

## 2018-03-28 DIAGNOSIS — I44.2 CHB (COMPLETE HEART BLOCK): ICD-10-CM

## 2018-03-28 PROCEDURE — 93000 ELECTROCARDIOGRAM COMPLETE: CPT | Mod: S$GLB,,, | Performed by: INTERNAL MEDICINE

## 2018-03-28 PROCEDURE — 3077F SYST BP >= 140 MM HG: CPT | Mod: CPTII,S$GLB,, | Performed by: NURSE PRACTITIONER

## 2018-03-28 PROCEDURE — 99214 OFFICE O/P EST MOD 30 MIN: CPT | Mod: S$GLB,,, | Performed by: NURSE PRACTITIONER

## 2018-03-28 PROCEDURE — 3079F DIAST BP 80-89 MM HG: CPT | Mod: CPTII,S$GLB,, | Performed by: NURSE PRACTITIONER

## 2018-03-28 PROCEDURE — 93280 PM DEVICE PROGR EVAL DUAL: CPT | Mod: S$GLB,,, | Performed by: INTERNAL MEDICINE

## 2018-03-28 PROCEDURE — 99999 PR PBB SHADOW E&M-EST. PATIENT-LVL III: CPT | Mod: PBBFAC,,, | Performed by: NURSE PRACTITIONER

## 2018-03-28 RX ORDER — SERTRALINE HYDROCHLORIDE 50 MG/1
50 TABLET, FILM COATED ORAL DAILY
COMMUNITY
Start: 2018-02-26 | End: 2020-02-10 | Stop reason: SDUPTHER

## 2018-03-28 RX ORDER — LORAZEPAM 0.5 MG/1
TABLET ORAL
COMMUNITY
Start: 2018-01-26 | End: 2020-02-10

## 2018-03-28 RX ORDER — HYDROCODONE BITARTRATE AND ACETAMINOPHEN 5; 325 MG/1; MG/1
TABLET ORAL
COMMUNITY
Start: 2018-01-23 | End: 2019-02-26

## 2018-03-28 RX ORDER — MULTIVITAMIN
1 TABLET ORAL DAILY
COMMUNITY

## 2018-03-28 RX ORDER — METOPROLOL SUCCINATE 25 MG/1
25 TABLET, EXTENDED RELEASE ORAL DAILY
COMMUNITY
Start: 2018-02-26 | End: 2020-02-10 | Stop reason: SDUPTHER

## 2018-03-28 NOTE — PROGRESS NOTES
Subjective:    Patient ID:  Maricel Abdul is a 77 y.o. female who presents for a Pacemaker Check.     Maricel Abdul is a patient of Dr. Arango.    HPI     Mrs. Abdul is 78 y/o female who initially presented to [facility] w/CVA>>telemedicine>>tPA>>transferred to [facility]. She was found to be in CHB with intermittent, narrow and wide escape>>underwent DC PPM placement (12/21/17) without complication.     Since the procedure, Ms. Abdul reports feeling well. She denies chest pain, SOB/ISAACS, dizziness, palpitations, or syncope. She states that her energy level is good; she goes to the gym regularly and participates in the [employer] program>>no issues with this.     Recent cardiac studies:  Echo (12/21/17) CONCLUSIONS:     1 - Concentric remodeling.     2 - Normal left ventricular systolic function (EF 65-70%).     3 - Normal right ventricular systolic function .     4 - Normal left ventricular diastolic function.     5 - The estimated PA systolic pressure is 22 mmHg.     6 - No wall motion abnormalities.     Device Interrogation (03/28/18) reveals an intrinsic SR w/CHB with stable lead and device function. AMS x 3 noted (max episode duration 10 seconds); no NSVT. She paces 0% in the RA and 100% in the RV. Estimated battery longevity 9-10 years.     I reviewed today's ECG which demonstrated a V-paced rhythm at 81 bpm; , , and QTc 494.    Review of Systems   Constitution: Negative for diaphoresis and malaise/fatigue.   Eyes: Negative for double vision.   Cardiovascular: Negative for chest pain, dyspnea on exertion, irregular heartbeat, near-syncope, palpitations and syncope.   Respiratory: Negative for shortness of breath.    Skin: Negative.    Musculoskeletal: Negative.    Neurological: Negative for dizziness and light-headedness.   Psychiatric/Behavioral: Negative for altered mental status.        Objective:    Physical Exam   Constitutional: She is oriented to person, place, and time. She  appears well-developed and well-nourished.   HENT:   Head: Normocephalic and atraumatic.   Eyes: Pupils are equal, round, and reactive to light.   Cardiovascular: Normal rate and regular rhythm.    Pulmonary/Chest: Effort normal.   Neurological: She is alert and oriented to person, place, and time.   Skin: She is not diaphoretic.   Vitals reviewed.        Assessment:       1. Complete heart block    2. Cardiac pacemaker in situ    3. Embolic stroke involving left middle cerebral artery    4. Essential hypertension         Plan:       Ms. Abdul is doing well from a device perspective with stable lead and device function without sustained arrhythmia noted.     Continue current medication regimen and device settings.   Follow up in device clinic as scheduled.   Follow up in EP clinic in 1 year, sooner as needed.     JOHN Russell, APRN, FNP-C      (A copy of today's note was sent to Dr. Arango.)

## 2018-03-29 ENCOUNTER — TELEPHONE (OUTPATIENT)
Dept: ELECTROPHYSIOLOGY | Facility: CLINIC | Age: 78
End: 2018-03-29

## 2018-03-29 DIAGNOSIS — I44.2 CHB (COMPLETE HEART BLOCK): Primary | ICD-10-CM

## 2018-03-29 NOTE — ASSESSMENT & PLAN NOTE
Discharge Instructions reviewed with patient/family. Patient/family  states understanding. . Condition stable. Opportunity for questions provided. E-sign not available  Hard copy instructions signed.    Pt states verbal understanding of instructions Likely secondary to ischemic infarct   Speech therapy evaluate & treat

## 2018-05-01 ENCOUNTER — OFFICE VISIT (OUTPATIENT)
Dept: DERMATOLOGY | Facility: CLINIC | Age: 78
End: 2018-05-01
Payer: MEDICARE

## 2018-05-01 VITALS — BODY MASS INDEX: 26.21 KG/M2 | HEIGHT: 59 IN | WEIGHT: 130 LBS

## 2018-05-01 DIAGNOSIS — D48.9 NEOPLASM OF UNCERTAIN BEHAVIOR: Primary | ICD-10-CM

## 2018-05-01 PROCEDURE — 99499 UNLISTED E&M SERVICE: CPT | Mod: S$GLB,,, | Performed by: DERMATOLOGY

## 2018-05-01 PROCEDURE — 88305 TISSUE EXAM BY PATHOLOGIST: CPT | Performed by: PATHOLOGY

## 2018-05-01 PROCEDURE — 99999 PR PBB SHADOW E&M-EST. PATIENT-LVL III: CPT | Mod: PBBFAC,,, | Performed by: DERMATOLOGY

## 2018-05-01 RX ORDER — CLOTRIMAZOLE 1 %
CREAM (GRAM) TOPICAL
Qty: 30 G | Refills: 0 | Status: SHIPPED | OUTPATIENT
Start: 2018-05-01 | End: 2021-06-18

## 2018-05-01 NOTE — PROGRESS NOTES
Subjective:       Patient ID:  Maricel Abdul is a 77 y.o. female who presents for   Chief Complaint   Patient presents with    Rash     L cheek     Initial visit  No h/o skin cancer  Intense sun exposure in youth    Spot on left cheek x 2 years, treated with lotrisone with no resolution    + pacemaker      Rash  - Initial  Affected locations: left cheek  Duration: 2 years  Signs / symptoms: itching, redness and dryness  Severity: mild  Timing: constant  Aggravated by: picking  Treatments tried: lotrisone cream, neosporin.  Improvement on treatment: no relief        Review of Systems   Constitutional: Negative for fever, chills and fatigue.   Skin: Positive for itching, rash (L cheek) and dry skin. Negative for daily sunscreen use, activity-related sunscreen use and wears hat.   Hematologic/Lymphatic: Does not bruise/bleed easily.        Objective:    Physical Exam   Constitutional: She appears well-developed and well-nourished. No distress.   Neurological: She is alert and oriented to person, place, and time. She is not disoriented.   Psychiatric: She has a normal mood and affect.   Skin:   Areas Examined (abnormalities noted in diagram):   Head / Face Inspection Performed              Diagram Legend     Erythematous scaling macule/papule c/w actinic keratosis       Vascular papule c/w angioma      Pigmented verrucoid papule/plaque c/w seborrheic keratosis      Yellow umbilicated papule c/w sebaceous hyperplasia      Irregularly shaped tan macule c/w lentigo     1-2 mm smooth white papules consistent with Milia      Movable subcutaneous cyst with punctum c/w epidermal inclusion cyst      Subcutaneous movable cyst c/w pilar cyst      Firm pink to brown papule c/w dermatofibroma      Pedunculated fleshy papule(s) c/w skin tag(s)      Evenly pigmented macule c/w junctional nevus     Mildly variegated pigmented, slightly irregular-bordered macule c/w mildly atypical nevus      Flesh colored to evenly pigmented  papule c/w intradermal nevus       Pink pearly papule/plaque c/w basal cell carcinoma      Erythematous hyperkeratotic cursted plaque c/w SCC      Surgical scar with no sign of skin cancer recurrence      Open and closed comedones      Inflammatory papules and pustules      Verrucoid papule consistent consistent with wart     Erythematous eczematous patches and plaques     Dystrophic onycholytic nail with subungual debris c/w onychomycosis     Umbilicated papule    Erythematous-base heme-crusted tan verrucoid plaque consistent with inflamed seborrheic keratosis     Erythematous Silvery Scaling Plaque c/w Psoriasis     See annotation          Assessment / Plan:      Pathology Orders:     Normal Orders This Visit    Tissue Specimen To Pathology, Dermatology     Questions:    Directional Terms:  Other(comment)    Clinical information:  pink scaly thin plaque, failed cryo x 2, Bledsoe's vs sup BCC    Specific Site:  left cheek        Neoplasm of uncertain behavior  -     Tissue Specimen To Pathology, Dermatology    Shave biopsy procedure note:    Shave biopsy performed after verbal consent including risk of infection, scar, recurrence, need for additional treatment of site. Area prepped with alcohol, anesthetized with approximately 1.0cc of 1% lidocaine with epinephrine. Lesional tissue shaved with razor blade. Hemostasis achieved with application of aluminum chloride NO hyfrecation (pacemaker). No complications. Dressing applied. Wound care explained.             Follow-up in about 6 months (around 11/1/2018).

## 2018-05-01 NOTE — PATIENT INSTRUCTIONS
Shave Biopsy Wound Care    Your doctor has performed a shave biopsy today.  A band aid and vaseline ointment has been placed over the site.  This should remain in place for 24 hours.  It is recommended that you keep the area dry for the first 24 hours.  After 24 hours, you may remove the band aid and wash the area with warm soap and water and apply Vaseline jelly.  Many patients prefer to use Neosporin or Bacitracin ointment.  This is acceptable; however, know that you can develop an allergy to this medication even if you have used it safely for years.  It is important to keep the area moist.  Letting it dry out and get air slows healing time, and will worsen the scar.  Band aid is optional after first 24 hours.      If you notice increasing redness, tenderness, pain, or yellow drainage at the biopsy site, please notify your doctor.  These are signs of an infection.    If your biopsy site is bleeding, apply firm pressure for 15 minutes straight.  Repeat for another 15 minutes, if it is still bleeding.   If the surgical site continues to bleed, then please contact your doctor.       [facility]  [facility] [facility]ERMATOLOGY  [address] [place] [address]  Dept: [phone]

## 2018-05-01 NOTE — LETTER
May 1, 2018      No Recipients           [place] [facility]  [address] [place] [address]  Phone: [phone]          Patient: Maricel Abdul   MR Number: [id number]   YOB: 1940   Date of Visit: 5/1/2018       Dear :    Thank you for referring Maricel Abdul to me for evaluation. Attached you will find relevant portions of my assessment and plan of care.    If you have questions, please do not hesitate to call me. I look forward to following Maricel Abdul along with you.    Sincerely,    Jessica Bojorquez MD    Enclosure  CC:  No Recipients    If you would like to receive this communication electronically, please contact [email or web address] or [phone] to request more information on [employer] Link access.    For providers and/or their staff who would like to refer a patient to [facility], please contact us through our one-stop-shop provider referral line, [facility], at [phone].    If you feel you have received this communication in error or would no longer like to receive these types of communications, please e-mail [email or web address]

## 2018-05-08 ENCOUNTER — TELEPHONE (OUTPATIENT)
Dept: DERMATOLOGY | Facility: CLINIC | Age: 78
End: 2018-05-08

## 2018-05-08 NOTE — TELEPHONE ENCOUNTER
Spoke to pt. Informed Dr Bojorquez is out of the clinic until Friday. She will result path report then, we will call her with it. Verbalized understanding.

## 2018-05-08 NOTE — TELEPHONE ENCOUNTER
----- Message from Vicente Burt sent at 5/8/2018  3:02 PM CDT -----  Contact: Pt  Name of Who is Calling: Maricel      What is the request in detail: in regards to results      Can the clinic reply by [facility]: no      What [facility] Back [facility]: [phone]

## 2018-05-11 ENCOUNTER — TELEPHONE (OUTPATIENT)
Dept: DERMATOLOGY | Facility: CLINIC | Age: 78
End: 2018-05-11

## 2018-05-11 NOTE — TELEPHONE ENCOUNTER
----- Message from Zoë Griffin LPN sent at 5/11/2018  2:06 PM CDT -----  Explained results to nita-  Please set up with Shana masters to discuss efudex vs MOHS

## 2018-05-14 ENCOUNTER — TELEPHONE (OUTPATIENT)
Dept: DERMATOLOGY | Facility: CLINIC | Age: 78
End: 2018-05-14

## 2018-05-15 NOTE — TELEPHONE ENCOUNTER
5-18-18 called patient with path report and she is scheduled for a consult with Dr. Saldana on 5-23-18

## 2018-05-23 ENCOUNTER — INITIAL CONSULT (OUTPATIENT)
Dept: DERMATOLOGY | Facility: CLINIC | Age: 78
End: 2018-05-23
Payer: MEDICARE

## 2018-05-23 VITALS — SYSTOLIC BLOOD PRESSURE: 139 MMHG | DIASTOLIC BLOOD PRESSURE: 72 MMHG | HEART RATE: 71 BPM

## 2018-05-23 DIAGNOSIS — D04.39 SQUAMOUS CELL CARCINOMA IN SITU OF SKIN OF CHEEK: Primary | ICD-10-CM

## 2018-05-23 PROCEDURE — 99214 OFFICE O/P EST MOD 30 MIN: CPT | Mod: S$GLB,,, | Performed by: DERMATOLOGY

## 2018-05-23 PROCEDURE — 3078F DIAST BP <80 MM HG: CPT | Mod: CPTII,S$GLB,, | Performed by: DERMATOLOGY

## 2018-05-23 PROCEDURE — 3075F SYST BP GE 130 - 139MM HG: CPT | Mod: CPTII,S$GLB,, | Performed by: DERMATOLOGY

## 2018-05-23 PROCEDURE — 99999 PR PBB SHADOW E&M-EST. PATIENT-LVL III: CPT | Mod: PBBFAC,,, | Performed by: DERMATOLOGY

## 2018-05-23 NOTE — LETTER
May 24, 2018      Jessica Bojorquez MD  [address] [facility] [address]           [facility]  [address] [facility] [address] [place] [address]  Phone: [phone]          Patient: Maricel Abdul   MR Number: [id number]   YOB: 1940   Date of Visit: 5/23/2018       Dear Dr. Jessica Bojorquez:    Thank you for referring Maricel Abdul to me for evaluation. Attached you will find relevant portions of my assessment and plan of care.    If you have questions, please do not hesitate to call me. I look forward to following Maricel Abdul along with you.    Sincerely,    Wilton Saldana MD    Enclosure  CC:  No Recipients    If you would like to receive this communication electronically, please contact [email or web address] or [phone] to request more information on [employer] Link access.    For providers and/or their staff who would like to refer a patient to [facility], please contact us through our one-stop-shop provider referral line, [facility], at [phone].    If you feel you have received this communication in error or would no longer like to receive these types of communications, please e-mail [email or web address]

## 2018-05-23 NOTE — PROGRESS NOTES
ALLERGIES:  Patient has no known allergies.    CHIEF COMPLAINT:  This 77 y.o. 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 Consultation requested by Jessica Bojorquez MD.  The patient is accompanied to this visit by her  and daughter.    HISTORY OF PRESENT ILLNESS:   Location: left cheek  Duration: 12 months or more  Quality: persistent  Context: status post biopsy by Jessica Bojorquez MD; path = squamous cell carcinoma in situ; pathology accession #[id number], [facility]    Prior Treatment: none    See also the handwritten notes/diagrams scanned to chart for additional details.    Defibrillator: No  Pacemaker: Yes  Artificial heart valves: No  Artificial joints: No    REVIEW OF SYSTEMS:   General: general health good  Skin: has no previous history of skin cancer(s)  CV: has hypertension, has a pacemaker; no artificial valves, has no chest pain  Resp: has no shortness of breath  Endo: has no diabetes  Hem/Lymph: taking prescribed anticoagulants-ASPIRIN, has no easy bruising/bleeding  Allergy/Immuno: has allergies as noted above  GI: has no history of hepatitis  MS: as noted above     PAST MEDICAL HISTORY:  Past Medical History:   Diagnosis Date    Hypertension     Kidney stones     TIA (transient ischemic attack)        PAST SURGICAL HISTORY:  Past Surgical History:   Procedure Laterality Date    CERVICAL FUSION      CHOLECYSTECTOMY      HYSTERECTOMY      SHOULDER SURGERY          SOCIAL HISTORY:  Dependencies: smoking status as noted below  Social History   Substance Use Topics    Smoking status: Former Smoker     Years: 20.00    Smokeless tobacco: Never Used    Alcohol use Not on file      Comment: Glass of wine each night       PERTINENT MEDICATIONS:  See medications list.    Current Outpatient Prescriptions:     amitriptyline (ELAVIL) 50 MG tablet, Take 50 mg by mouth  every evening., Disp: , Rfl:     aspirin 325 MG tablet, Take 1 tablet (325 mg total) by mouth once daily., Disp: , Rfl: 0    atorvastatin (LIPITOR) 40 MG tablet, Take 40 mg by mouth once daily., Disp: , Rfl:     cholecalciferol, vitamin D3, (VITAMIN D3) 5,000 unit Tab, Take 5,000 Units by mouth once daily., Disp: , Rfl:     clotrimazole (LOTRIMIN) 1 % cream, AAA axilla BID PRN flare, Disp: 30 g, Rfl: 0    hydroCHLOROthiazide (MICROZIDE) 12.5 mg capsule, Take 12.5 mg by mouth daily as needed., Disp: , Rfl:     hydrocodone-acetaminophen 5-325mg (NORCO) 5-325 mg per tablet, , Disp: , Rfl:     LORazepam (ATIVAN) 0.5 MG tablet, , Disp: , Rfl:     losartan (COZAAR) 100 MG tablet, Take 100 mg by mouth once daily., Disp: , Rfl:     metoprolol succinate (TOPROL-XL) 25 MG 24 hr tablet, , Disp: , Rfl:     multivitamin (ONE DAILY MULTIVITAMIN) per tablet, Take 1 tablet by mouth once daily., Disp: , Rfl:     omeprazole (PRILOSEC) 20 MG capsule, Take 20 mg by mouth once daily., Disp: , Rfl:     sertraline (ZOLOFT) 50 MG tablet, , Disp: , Rfl:     ALLERGIES:  Patient has no known allergies.    EXAM:  See also the handwritten notes/diagrams scanned to chart for additional details.  Constitutional  General appearance: well-developed, well-nourished, well-kempt older white female    Eyes  Inspection of conjunctivae and lids reveals no abnormalities; sclerae anicteric  Neurologic/Psychiatric  Alert,  normal orientation to time, place, person  Normal mood and affect with no evidence of depression, anxiety, agitation  Skin: see photo(s)  Head: background moderate solar damage to exposed areas of skin; in addition, inspection/palpation reveals an ill-defined, approximately 1.5 cm pink, scaly plaque on the left cheek which feels freely movable over the underlying tissues on palpation; site(s) confirmed by reference to the photograph(s) in the chart taken at the time of the biopsy/biopsies by the referring physician and she  confirmed this as the site of the prior biopsy  Neck: examination reveals moderate chronic solar damage  Right upper extremity: examination reveals moderate chronic solar damage  Left upper extremity: examination reveals moderate chronic solar damage    ASSESSMENT: 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 In particular, the discussion included, but was not limited to, the following:    One alternative at this point would be to defer further treatment and observe the lesion. With small skin cancers of this kind, it is possible that a biopsy can be sufficient to definitively treat a small skin cancer of this kind. Alternatively, some skin cancers are slow growing and do not require immediate treatment. The potential advantage of this choice would be to avoid the need for possibly unnecessary additional surgery. Among the potential disadvantages of this would be the possibility of enlargement of the lesion, more extensive spread of the lesion or recurrence at a later date, which might necessitate a larger and more complex surgery.    Radiation treatment can be an effective treatment for this type of skin cancer. The usual course of treatment is every weekday for several weeks. Local irritation will result from treatment, although no systemic side effects are expected. The potential advantage of radiation treatment is that it avoids the need for surgery. Among the disadvantages of radiation treatment are the length of treatment, the local inflammatory response, the absence of pathologic confirmation of the removal of the  skin cancer, a possible increased risk of additional skin cancer in the treated area in later years, and a somewhat increased risk of recurrence at a later date.     Excisional surgery can be an effective treatment for this type of skin cancer. This would involve excision of the lesion with margin evaluation by submitting the specimen to a pathologist for either immediate marginal assessment via frozen section processing, or delayed marginal assessment by fixed-tissue processing. The potential advantage of this technique is that it offers a way of treating the lesion with some degree of histologic confirmation of tumor removal. Among the disadvantages of this treatment are the possible need for re-excision if marginal involvement is identified, a somewhat greater likelihood of recurrence as compared to Mohs' surgery because of the less comprehensive margin evaluation inherent in the technique, and the general potential risks of surgery, including allergic reactions to the anesthetic and other materials used, infection, injury to nerves in the area with consequent loss of sensation or muscle function, and scarring or distortion of surrounding structures.    Mohs' surgery is a very effective treatment for this type of skin cancer. The potential advantage of Mohs' surgery is that this technique offers the greatest possible certainty of knowing that the skin cancer has been completely removed, with the removal of the least amount of normal tissue. The potential disadvantages of Mohs' surgery include the duration of the surgery, the possible need for a separate surgery for reconstruction following tumor removal, and scarring as a result. In addition, general potential risks of surgery as noted above also apply to treatment via Mohs' surgery.    In light of the nature of this tumor and the location on the face in an area of increased risk of recurrence,  Mohs' micrographic surgery was thought to be the most appropriate  management choice, and this diagnosis is appropriate for treatment by Mohs' micrographic surgery.     Sufficient time was available for questions, and all questions were answered to her satisfaction. She fully understands the aims, risks, alternatives, and possible complications, and has elected to proceed with the surgery, and verbally consented to do so. The procedure will be scheduled in the near future.    Routine pre-op instructions were given to her.    --------------------------------------  Note: Some or all of this note may have been generated using voice recognition software. There may be voice recognition errors including grammatical and/or spelling errors found in the text. Attempts were made to correct these errors prior to signature.

## 2018-06-11 ENCOUNTER — PROCEDURE VISIT (OUTPATIENT)
Dept: DERMATOLOGY | Facility: CLINIC | Age: 78
End: 2018-06-11
Payer: MEDICARE

## 2018-06-11 VITALS
WEIGHT: 125 LBS | BODY MASS INDEX: 25.2 KG/M2 | SYSTOLIC BLOOD PRESSURE: 148 MMHG | HEART RATE: 80 BPM | HEIGHT: 59 IN | DIASTOLIC BLOOD PRESSURE: 80 MMHG

## 2018-06-11 DIAGNOSIS — D04.39 SQUAMOUS CELL CARCINOMA IN SITU (SCCIS) OF SKIN OF LEFT CHEEK: Primary | ICD-10-CM

## 2018-06-11 PROCEDURE — 17311 MOHS 1 STAGE H/N/HF/G: CPT | Mod: S$GLB,,, | Performed by: DERMATOLOGY

## 2018-06-11 PROCEDURE — 99499 UNLISTED E&M SERVICE: CPT | Mod: S$GLB,,, | Performed by: DERMATOLOGY

## 2018-06-11 PROCEDURE — 17312 MOHS ADDL STAGE: CPT | Mod: S$GLB,,, | Performed by: DERMATOLOGY

## 2018-06-11 PROCEDURE — 13132 CMPLX RPR F/C/C/M/N/AX/G/H/F: CPT | Mod: 51,S$GLB,, | Performed by: DERMATOLOGY

## 2018-06-11 NOTE — PROGRESS NOTES
ALLERGIES:   Patient has no known allergies.      Current Outpatient Prescriptions:     amitriptyline (ELAVIL) 50 MG tablet, Take 50 mg by mouth every evening., Disp: , Rfl:     aspirin 325 MG tablet, Take 1 tablet (325 mg total) by mouth once daily., Disp: , Rfl: 0    atorvastatin (LIPITOR) 40 MG tablet, Take 40 mg by mouth once daily., Disp: , Rfl:     cholecalciferol, vitamin D3, (VITAMIN D3) 5,000 unit Tab, Take 5,000 Units by mouth once daily., Disp: , Rfl:     clotrimazole (LOTRIMIN) 1 % cream, AAA axilla BID PRN flare, Disp: 30 g, Rfl: 0    hydroCHLOROthiazide (MICROZIDE) 12.5 mg capsule, Take 12.5 mg by mouth daily as needed., Disp: , Rfl:     hydrocodone-acetaminophen 5-325mg (NORCO) 5-325 mg per tablet, , Disp: , Rfl:     LORazepam (ATIVAN) 0.5 MG tablet, , Disp: , Rfl:     losartan (COZAAR) 100 MG tablet, Take 100 mg by mouth once daily., Disp: , Rfl:     metoprolol succinate (TOPROL-XL) 25 MG 24 hr tablet, , Disp: , Rfl:     multivitamin (ONE DAILY MULTIVITAMIN) per tablet, Take 1 tablet by mouth once daily., Disp: , Rfl:     omeprazole (PRILOSEC) 20 MG capsule, Take 20 mg by mouth once daily., Disp: , Rfl:     sertraline (ZOLOFT) 50 MG tablet, , Disp: , Rfl:   -------------------------------------------------------------  PROCEDURE: Mohs' Micrographic Surgery    SITE: left cheek    INDICATION: squamous cell carcinoma in situ in an area at increased risk of recurrence    CASE NUMBER: [id number]      ANESTHETIC: 7 mL 1% Lidocaine with Epinephrine 1:100,000    SURGICAL PREP: Ethanol and Hibiclens    SURGEON: Wilton Saldana MD    ASSISTANTS: Adiel Salomon CST    PREOPERATIVE DIAGNOSIS: squamous cell carcinoma in situ    POSTOPERATIVE DIAGNOSIS: squamous cell carcinoma in situ    PATHOLOGIC DIAGNOSIS: squamous cell carcinoma in situ    STAGES OF MOHS' SURGERY PERFORMED: two    TUMOR-FREE PLANE ACHIEVED: yes    HEMOSTASIS: Hyfrecation     SPECIMENS: two (one in stage A, one in stage  B)    INITIAL LESION SIZE: 1.7 x 2.1 cm    FINAL DEFECT SIZE: 2.0 x 2.3 cm    WOUND REPAIR/DISPOSITION: see below    NARRATIVE:    The patient is a 77 y.o.female referred by Jessica Bojorquez MD with a history of cancer on the left cheek which was biopsied - pathology accession #[id number], [facility]. Findings revealed squamous cell carcinoma in situ. Examination revealed an ill-defined pink, scaly/sclerotic plaque on the left cheek at the site of prior biopsy, which was confirmed by reference to the photograph taken at the previous patient visit. In light of the ill-defined nature of this tumor and the location on the face, Mohs' micrographic surgery was thought to be the most appropriate management choice, and this diagnosis is appropriate for treatment by Mohs' micrographic surgery.  I discussed it with the patient and she fully understands the aims, risks, alternatives, and possible complications, and elects to proceed.  There are no medical or surgical contraindications to the procedure.     A signed informed consent was obtained.    PROCEDURE:  The patient was placed in the semi-recumbent position on the operating table in the Mohs' Surgery Suite. The area in question was thoroughly prepped with ethanol and Hibiclens, with particular care to avoid application to the immediate periocular skin or introduction into the external auditory meatus. A sterile surgical marker was used to outline the clinically apparent margins of the involved area, and a narrow margin of normal-appearing skin. Reference marks were made at the periphery of the outlined area with the surgical marker. The proposed area of excision was measured and photographed. Local anesthesia as noted above was administered.  The total volume of anesthetic used throughout this portion of the procedure was as documented above. The area was prepared and draped in the standard manner. All of the grossly identifiable area of clinically and an  "underlying/peripheral layer was taken and processed by the Mohs' technique.  Hemostasis was obtained with the hyfrecator. Tissue was taken from any areas of residual marginal involvement (if present) and processed by the Mohs' technique in as many stages as needed until a tumor-free plane was achieved.    Colors of inks used in the reference nicks at epidermal margins (if present) and/or inking of non-epithelial edges, if applicable, is represented on the Mohs map as follows: solid lines represent red ink, dots represent blue ink, jagged lines represent black ink, curlicues represent green ink, "xxx" represents yellow ink.    The first Mohs' layer consisted of one section(s) with 5 slide(s) evaluated. Some residual tumor was noted at the margins of the first Mohs' layer. Histology of the specimen(s) showed some residual squamous cell carcinoma in situ at epidermal margins between the green and red nicks, manifested as full-thickness atypia and disorganization and nuclear pleomorphism of the keratinocytes in the epidermis, as noted on the Mohs map.       The second Mohs' layer consisted of one section(s) with 2 slide(s) evaluated. No residual tumor was noted at the margins of the second Mohs' layer. Histology of the specimen(s) showed chronic solar damage.    A total of two section(s) and 7 slide(s) were examined under the microscope via the Mohs technique.  A cancer free plane was reached after layer number two. Defect final size was as noted above.      The wound was covered with a nonadherent dressing between stages, and the patient allowed to wait in the waiting area during these periods. The final defect was photographed at the completion of the Mohs' procedure.    See the separate procedure note which follows regarding repair of the defect following Mohs' surgery.        -----------------------------------------------    REPAIR FOLLOWING MOHS' MICROGRAPHIC SURGERY    PREOPERATIVE DIAGNOSIS: defect following " Mohs' surgery for a squamous cell carcinoma in situ    POSTOPERATIVE DIAGNOSIS: same    PROCEDURE PERFORMED: complex linear closure     ANESTHETIC: 6.5 mL 1% Lidocaine with Epinephrine 1:100,000     SURGICAL PREP: Hibiclens    SURGEON: Wilton Saldana MD     ASSISTANTS: as above    LOCATION: left cheek      INDICATIONS:  Earlier in the day, the patient underwent Mohs' micrographic surgical excision of a squamous cell carcinoma in situ on the left cheek. Tumor free margins were achieved after layer number two.  Later in the day, the management of the resulting wound was addressed with the patient. I discussed the various wound management options with the patient and she fully understands the aims, risks, alternatives, and possible complications of the alternatives, and she elects to proceed with closure of the defect in the manner noted below.  There are no medical or surgical contraindications to the procedure.    A signed informed consent was previously obtained.    PROCEDURE:  Repair via complex closure:  The patient was returned to the procedure room following completion of the Mohs' procedure and final slide review. Because of the size, shape and location of the defect, simple closure could not be achieved without possible distortion of surrounding structures, excessive tension on the wound margins and an unacceptable risk of wound dehiscence, and the creation of standing cone deformities. Consideration was given to the site of the wound, the surrounding structures, and the orientation of closure necessary to provide the optimal functional and cosmetic outcome. After devoting time to these considerations, and to the orientation of the vectors of maximal skin tension surrounding the defect, the area was prepped again and a fusiform closure was outlined on the skin surrounding the defect with a sterile surgical marker, to minimize tension across the wound. Additional anesthetic was infiltrated into the tissues  surrounding the defect and the anticipated area of repair, to maintain anesthesia during the procedure. Preparation of the site for closure was then carried out by extending the defect through excision of triangles of superfluous tissue on either side of the wound to square the shoulders of the defect and to allow closure without distortion by standing cone deformities, creating a fusiform defect measuring 2.0 x 7.0 cm in size.  Wound margins were extensively undermined to allow advancement of the wound margins into the defect and to permit closure with minimal tension. After hemostasis was achieved with the hyfrecator, closure was accomplished with:      multiple #4-0 buried interrupted Vicryl suture(s) and    multiple #5-0 simple interrupted and horizontal mattress Prolene suture(s) and    two #5-0 running locked Prolene suture(s) for final approximation of the wound margins.    Total length of the final closure was 7.0 cm.     The site was photographed following completion of the repair. Final dressing consisted of petrolatum, Telfa and tape.    Estimated blood loss for the total procedure was less than 5 mL.    Total operative time including tissue processing in the Mohs' laboratory and microscopic Mohs' frozen section slide review was 2 hour(s). Verbal and written wound care instructions were given to the patient, and she expressed understanding of these instructions. The patient tolerated the procedure well and left the operating room in good condition; she is to return in 8 days for suture removal.     Dr. Saldana's cell phone number was given to the patient with instructions to call prn with any problems.

## 2018-06-19 ENCOUNTER — OFFICE VISIT (OUTPATIENT)
Dept: DERMATOLOGY | Facility: CLINIC | Age: 78
End: 2018-06-19
Payer: MEDICARE

## 2018-06-19 DIAGNOSIS — Z48.02 VISIT FOR SUTURE REMOVAL: Primary | ICD-10-CM

## 2018-06-19 PROCEDURE — 99999 PR PBB SHADOW E&M-EST. PATIENT-LVL II: CPT | Mod: PBBFAC,,, | Performed by: DERMATOLOGY

## 2018-06-19 PROCEDURE — 99024 POSTOP FOLLOW-UP VISIT: CPT | Mod: S$GLB,,, | Performed by: DERMATOLOGY

## 2018-06-19 NOTE — PROGRESS NOTES
CC: 77 y.o.female patient is here for suture removal.     HPI: Patient is one week(s) s/p Mohs' micrographic surgery, fresh tissue technique of a squamous cell carcinoma in situ on the left cheek, with subsequent repair   Patient reports no problems.    EXAM:  Sutures intact.  Wound healing well.  Good approximation of skin edges.  No undue erythema to surrounding skin or signs or symptoms of infection. Some residual greenish discoloration/bruisiong    IMPRESSION:  Healing well post Mohs' micrographic surgery and repair    PLAN:  Site cleaned with peroxide, sutures removed  Dressed with petrolatum, Telfa and tape  Reviewed further care and expected course  Followup 6 weeks; call prn sooner

## 2018-06-27 ENCOUNTER — PATIENT MESSAGE (OUTPATIENT)
Dept: ELECTROPHYSIOLOGY | Facility: CLINIC | Age: 78
End: 2018-06-27

## 2018-06-28 ENCOUNTER — CLINICAL SUPPORT (OUTPATIENT)
Dept: ELECTROPHYSIOLOGY | Facility: CLINIC | Age: 78
End: 2018-06-28
Attending: INTERNAL MEDICINE
Payer: MEDICARE

## 2018-06-28 DIAGNOSIS — I44.2 COMPLETE HEART BLOCK: ICD-10-CM

## 2018-06-28 PROCEDURE — 93296 REM INTERROG EVL PM/IDS: CPT | Mod: S$GLB,,, | Performed by: INTERNAL MEDICINE

## 2018-06-28 PROCEDURE — 93294 REM INTERROG EVL PM/LDLS PM: CPT | Mod: S$GLB,,, | Performed by: INTERNAL MEDICINE

## 2018-06-29 ENCOUNTER — TELEPHONE (OUTPATIENT)
Dept: ELECTROPHYSIOLOGY | Facility: CLINIC | Age: 78
End: 2018-06-29

## 2018-06-29 NOTE — TELEPHONE ENCOUNTER
Contacted the patient on this afternoon in relation to her remote pacemaker home monitor.  Informed patient she does not need to bring her monitor with her since she will only be going out of town for a week.  Patient verbalized understanding to all of the above.

## 2018-07-01 DIAGNOSIS — I44.2 CHB (COMPLETE HEART BLOCK): ICD-10-CM

## 2018-07-01 DIAGNOSIS — Z95.0 CARDIAC PACEMAKER IN SITU: Primary | ICD-10-CM

## 2018-08-03 ENCOUNTER — OFFICE VISIT (OUTPATIENT)
Dept: DERMATOLOGY | Facility: CLINIC | Age: 78
End: 2018-08-03
Payer: MEDICARE

## 2018-08-03 ENCOUNTER — TELEPHONE (OUTPATIENT)
Dept: DERMATOLOGY | Facility: CLINIC | Age: 78
End: 2018-08-03

## 2018-08-03 DIAGNOSIS — Z98.890 HISTORY OF MOH'S MICROGRAPHIC SURGERY FOR SKIN CANCER: Primary | ICD-10-CM

## 2018-08-03 DIAGNOSIS — Z85.828 HISTORY OF MOH'S MICROGRAPHIC SURGERY FOR SKIN CANCER: Primary | ICD-10-CM

## 2018-08-03 DIAGNOSIS — L76.31 POSTOPERATIVE HEMATOMA OF SKIN FOLLOWING DERMATOLOGIC PROCEDURE: ICD-10-CM

## 2018-08-03 PROCEDURE — 99999 PR PBB SHADOW E&M-EST. PATIENT-LVL I: CPT | Mod: PBBFAC,,, | Performed by: DERMATOLOGY

## 2018-08-03 PROCEDURE — 99024 POSTOP FOLLOW-UP VISIT: CPT | Mod: S$GLB,,, | Performed by: DERMATOLOGY

## 2018-08-03 NOTE — PROGRESS NOTES
CC: 78 y.o.female patient is here for followup     HPI: Patient is 6-7 week(s) s/p Mohs' micrographic surgery, fresh tissue technique, of a squamous cell carcinoma in situ on the left cheek; with subsequent repair   Patient reports some residual firmness    EXAM: Site appears well healed. There remains an approximately 1.5 cm subcutaneous firm mass deep the the upper portion of the well-healed scar    IMPRESSION: Well healed post Mohs' micrographic surgery  Probable residual small hematoma    PLAN:  Discussed anticipated course  Expect gradual resolution of residual firmness  Followup to Dr. Bojorquze in 2-3 months; prn to me

## 2018-10-02 ENCOUNTER — CLINICAL SUPPORT (OUTPATIENT)
Dept: ELECTROPHYSIOLOGY | Facility: CLINIC | Age: 78
End: 2018-10-02
Payer: MEDICARE

## 2018-10-02 DIAGNOSIS — I44.2 CHB (COMPLETE HEART BLOCK): ICD-10-CM

## 2018-10-02 DIAGNOSIS — Z95.0 CARDIAC PACEMAKER IN SITU: ICD-10-CM

## 2018-10-02 PROCEDURE — 93294 REM INTERROG EVL PM/LDLS PM: CPT | Mod: S$GLB,,, | Performed by: INTERNAL MEDICINE

## 2018-10-02 PROCEDURE — 93296 REM INTERROG EVL PM/IDS: CPT | Mod: S$GLB,,, | Performed by: INTERNAL MEDICINE

## 2019-01-02 ENCOUNTER — CLINICAL SUPPORT (OUTPATIENT)
Dept: CARDIOLOGY | Facility: HOSPITAL | Age: 79
End: 2019-01-02
Attending: INTERNAL MEDICINE
Payer: MEDICARE

## 2019-01-02 DIAGNOSIS — I44.2 CHB (COMPLETE HEART BLOCK): ICD-10-CM

## 2019-01-02 DIAGNOSIS — Z95.0 CARDIAC PACEMAKER IN SITU: ICD-10-CM

## 2019-01-02 PROCEDURE — 93294 REM INTERROG EVL PM/LDLS PM: CPT | Mod: ,,, | Performed by: INTERNAL MEDICINE

## 2019-01-02 PROCEDURE — 93296 REM INTERROG EVL PM/IDS: CPT

## 2019-01-02 PROCEDURE — 93294 CARDIAC DEVICE CHECK CHECK - REMOTE: ICD-10-PCS | Mod: ,,, | Performed by: INTERNAL MEDICINE

## 2019-01-03 DIAGNOSIS — Z95.0 CARDIAC PACEMAKER IN SITU: Primary | ICD-10-CM

## 2019-02-12 ENCOUNTER — PATIENT OUTREACH (OUTPATIENT)
Dept: ADMINISTRATIVE | Facility: HOSPITAL | Age: 79
End: 2019-02-12

## 2019-02-12 NOTE — LETTER
February 20, 2019    Maricel Abdul  [address] [facility] [address]             [facility]  [address]  [facility] [address]  Phone: [phone] Dear Maricel Abdul       [facility] is committed to your overall health.  To help you get the most out of each of your visits, we will review your information to make sure you are up to date on all of your recommended tests and/or procedures.       As a new patient to Dr CARMEN ESTRADA, we may not have your complete medical records. She has found that your chart shows you may be due for a:     LIPID PANEL     If you have had any of the above done at another facility, please bring the records or information with you so that your record at [facility] will be complete.       If you are currently taking medication, please bring it with you to your appointment for review.     Also, if you have any type of Advanced Directives, please bring them with you to your office visit so we may scan them into your chart.         Ashley Stephenson LPN Clinical Care Coordinator   [facility]   [address] [place] [address]   Phone [phone]   Fax [phone]

## 2019-02-20 NOTE — PROGRESS NOTES
"Attempted to outreach patient for pre-visit via "Kofax", no answer after a week. Sending outreach via Mail Out Letter now.    "

## 2019-02-26 ENCOUNTER — OFFICE VISIT (OUTPATIENT)
Dept: FAMILY MEDICINE | Facility: CLINIC | Age: 79
End: 2019-02-26
Payer: MEDICARE

## 2019-02-26 VITALS
HEART RATE: 88 BPM | OXYGEN SATURATION: 97 % | SYSTOLIC BLOOD PRESSURE: 174 MMHG | WEIGHT: 124.75 LBS | BODY MASS INDEX: 25.2 KG/M2 | TEMPERATURE: 98 F | DIASTOLIC BLOOD PRESSURE: 81 MMHG

## 2019-02-26 DIAGNOSIS — I10 ESSENTIAL HYPERTENSION: ICD-10-CM

## 2019-02-26 DIAGNOSIS — Z00.00 ANNUAL PHYSICAL EXAM: Primary | ICD-10-CM

## 2019-02-26 DIAGNOSIS — Z23 IMMUNIZATION DUE: ICD-10-CM

## 2019-02-26 DIAGNOSIS — E78.5 DYSLIPIDEMIA: ICD-10-CM

## 2019-02-26 PROCEDURE — 99999 PR PBB SHADOW E&M-EST. PATIENT-LVL III: ICD-10-PCS | Mod: PBBFAC,,, | Performed by: FAMILY MEDICINE

## 2019-02-26 PROCEDURE — 99387 INIT PM E/M NEW PAT 65+ YRS: CPT | Mod: 25,S$GLB,, | Performed by: FAMILY MEDICINE

## 2019-02-26 PROCEDURE — 3077F PR MOST RECENT SYSTOLIC BLOOD PRESSURE >= 140 MM HG: ICD-10-PCS | Mod: CPTII,S$GLB,, | Performed by: FAMILY MEDICINE

## 2019-02-26 PROCEDURE — 3077F SYST BP >= 140 MM HG: CPT | Mod: CPTII,S$GLB,, | Performed by: FAMILY MEDICINE

## 2019-02-26 PROCEDURE — 3079F PR MOST RECENT DIASTOLIC BLOOD PRESSURE 80-89 MM HG: ICD-10-PCS | Mod: CPTII,S$GLB,, | Performed by: FAMILY MEDICINE

## 2019-02-26 PROCEDURE — 90714 TD VACC NO PRESV 7 YRS+ IM: CPT | Mod: S$GLB,,, | Performed by: FAMILY MEDICINE

## 2019-02-26 PROCEDURE — 99387 PR PREVENTIVE VISIT,NEW,65 & OVER: ICD-10-PCS | Mod: 25,S$GLB,, | Performed by: FAMILY MEDICINE

## 2019-02-26 PROCEDURE — 3079F DIAST BP 80-89 MM HG: CPT | Mod: CPTII,S$GLB,, | Performed by: FAMILY MEDICINE

## 2019-02-26 PROCEDURE — 90714 TD VACCINE GREATER THAN OR EQUAL TO 7YO WITH PRESERVATIVE IM: ICD-10-PCS | Mod: S$GLB,,, | Performed by: FAMILY MEDICINE

## 2019-02-26 PROCEDURE — 99999 PR PBB SHADOW E&M-EST. PATIENT-LVL III: CPT | Mod: PBBFAC,,, | Performed by: FAMILY MEDICINE

## 2019-02-26 PROCEDURE — 90471 TD VACCINE GREATER THAN OR EQUAL TO 7YO WITH PRESERVATIVE IM: ICD-10-PCS | Mod: S$GLB,,, | Performed by: FAMILY MEDICINE

## 2019-02-26 PROCEDURE — 90471 IMMUNIZATION ADMIN: CPT | Mod: S$GLB,,, | Performed by: FAMILY MEDICINE

## 2019-02-26 RX ORDER — LOSARTAN POTASSIUM AND HYDROCHLOROTHIAZIDE 12.5; 1 MG/1; MG/1
1 TABLET ORAL DAILY
Qty: 90 TABLET | Refills: 1 | Status: SHIPPED | OUTPATIENT
Start: 2019-02-26 | End: 2020-02-10 | Stop reason: SDUPTHER

## 2019-02-26 NOTE — PROGRESS NOTES
Subjective:       Patient ID: Maricel Abdul is a 78 y.o. female.    Chief Complaint: Establish Care    HPI    Presents to clinic to establish care. Currently has no complaints. Blood pressure noted to be elevated. Denies headache, chest pain, vision changes, or shortness of breath.     Past Medical History:   Diagnosis Date    Hypertension     Kidney stones     TIA (transient ischemic attack)        Past Surgical History:   Procedure Laterality Date    CERVICAL FUSION      CHOLECYSTECTOMY      HYSTERECTOMY      INSERTION-PACEMAKER-DUAL Left 12/21/2017    [facility] CATH LAB    SHOULDER SURGERY         Family History   Problem Relation Age of Onset    Eczema Neg Hx     Psoriasis Neg Hx     Lupus Neg Hx     Melanoma Neg Hx        Review of patient's allergies indicates:   Allergen Reactions    Cortisone Nausea Only    Codeine Nausea And Vomiting           Social History     Substance and Sexual Activity   Drug Use No       Social History     Tobacco Use    Smoking status: Former Smoker     Years: 20.00    Smokeless tobacco: Never Used   Substance Use Topics    Alcohol use: Not on file     Comment: Glass of wine each night       Social History     Substance and Sexual Activity   Sexual Activity Not on file         Current Outpatient Medications:     amitriptyline (ELAVIL) 50 MG tablet, Take 50 mg by mouth every evening., Disp: , Rfl:     aspirin 325 MG tablet, Take 1 tablet (325 mg total) by mouth once daily., Disp: , Rfl: 0    atorvastatin (LIPITOR) 40 MG tablet, Take 40 mg by mouth once daily., Disp: , Rfl:     cholecalciferol, vitamin D3, (VITAMIN D3) 5,000 unit Tab, Take 5,000 Units by mouth once daily., Disp: , Rfl:     clotrimazole (LOTRIMIN) 1 % cream, AAA axilla BID PRN flare, Disp: 30 g, Rfl: 0    LORazepam (ATIVAN) 0.5 MG tablet, , Disp: , Rfl:     losartan (COZAAR) 100 MG tablet, Take 100 mg by mouth once daily., Disp: , Rfl:     metoprolol  succinate (TOPROL-XL) 25 MG 24 hr tablet, , Disp: , Rfl:     multivitamin (ONE DAILY MULTIVITAMIN) per tablet, Take 1 tablet by mouth once daily., Disp: , Rfl:     omeprazole (PRILOSEC) 20 MG capsule, Take 20 mg by mouth once daily., Disp: , Rfl:     sertraline (ZOLOFT) 50 MG tablet, , Disp: , Rfl:     Review of Systems   Constitutional: Negative for chills and fever.   HENT: Negative for congestion and sore throat.    Eyes: Negative for visual disturbance.   Respiratory: Negative for cough and shortness of breath.    Cardiovascular: Negative for chest pain.   Gastrointestinal: Negative for abdominal pain, constipation, diarrhea, nausea and vomiting.   Genitourinary: Negative for dysuria.   Musculoskeletal: Negative for joint swelling.   Skin: Negative for rash and wound.   Neurological: Negative for dizziness and headaches.   Hematological: Does not bruise/bleed easily.           Objective:          Vitals:    02/26/19 1506   BP: (!) 174/81   Pulse: 88   Temp: 97.9 °F (36.6 °C)   TempSrc: Oral   SpO2: 97%   Weight: 56.6 kg (124 lb 12.5 oz)       Physical Exam   Constitutional: She appears well-developed and well-nourished. She is cooperative. No distress.   HENT:   Head: Normocephalic and atraumatic.   Right Ear: Hearing and external ear normal.   Left Ear: Hearing and external ear normal.   Nose: Nose normal.   Mouth/Throat: Oropharynx is clear and moist.   Eyes: Conjunctivae, EOM and lids are normal.   Neck: Normal range of motion. Neck supple.   Cardiovascular: Normal rate, regular rhythm, normal heart sounds and normal pulses.   Pulmonary/Chest: Effort normal and breath sounds normal.   Abdominal: Soft. Normal appearance and bowel sounds are normal. There is no tenderness.   Musculoskeletal: Normal range of motion.   Neurological: She is alert.   Skin: Skin is warm. Capillary refill takes less than 2 seconds. No rash noted. No cyanosis.   Psychiatric: She has a normal mood and affect. Her speech is normal  and behavior is normal. Cognition and memory are normal.   Vitals reviewed.              Assessment/Plan     Maricel was seen today for establish care.    Diagnoses and all orders for this visit:    Annual physical exam       - Told to try diet and exercise to stay healthy.    Dyslipidemia  -     Lipid panel; Future    Essential hypertension  -     Uncontrolled today. Will d/c losartan 100mg and start losartan-hydrochlorothiazide 100-12.5 mg (HYZAAR) 100-12.5 mg Tab; Take 1 tablet by mouth once daily.  -     Comprehensive metabolic panel; Future    Immunization due  -     (In Office Administered) Td Vaccine      Follow-up for blood pressure meds adjustments.    Future Appointments   Date Time Provider Department Center   3/27/2019  9:00 AM BAUDILIO Ley [facility] MED [place]   4/9/2019  8:15 AM HOME MONITOR DEVICE CHECK, [facility] MELISSA Hurtado [facility]   4/24/2019  1:00 PM PACEMAKER, ICD [facility] MELISSA H[place]   4/24/2019  2:00 PM Lanette Serrano NP [facility] CARSON Hurtado [facility]   8/26/2019 10:40 AM Echo Lee MD [facility] MED [place]       Echo Lee MD  [facility]

## 2019-02-27 ENCOUNTER — LAB VISIT (OUTPATIENT)
Dept: LAB | Facility: HOSPITAL | Age: 79
End: 2019-02-27
Attending: FAMILY MEDICINE
Payer: MEDICARE

## 2019-02-27 DIAGNOSIS — I10 ESSENTIAL HYPERTENSION: ICD-10-CM

## 2019-02-27 DIAGNOSIS — E78.5 DYSLIPIDEMIA: ICD-10-CM

## 2019-02-27 LAB
ALBUMIN SERPL BCP-MCNC: 3.7 G/DL
ALP SERPL-CCNC: 102 U/L
ALT SERPL W/O P-5'-P-CCNC: 40 U/L
ANION GAP SERPL CALC-SCNC: 7 MMOL/L
AST SERPL-CCNC: 26 U/L
BILIRUB SERPL-MCNC: 0.5 MG/DL
BUN SERPL-MCNC: 17 MG/DL
CALCIUM SERPL-MCNC: 9.9 MG/DL
CHLORIDE SERPL-SCNC: 103 MMOL/L
CHOLEST SERPL-MCNC: 205 MG/DL
CHOLEST/HDLC SERPL: 2.8 {RATIO}
CO2 SERPL-SCNC: 30 MMOL/L
CREAT SERPL-MCNC: 0.7 MG/DL
EST. GFR  (AFRICAN AMERICAN): >60 ML/MIN/1.73 M^2
EST. GFR  (NON AFRICAN AMERICAN): >60 ML/MIN/1.73 M^2
GLUCOSE SERPL-MCNC: 111 MG/DL
HDLC SERPL-MCNC: 73 MG/DL
HDLC SERPL: 35.6 %
LDLC SERPL CALC-MCNC: 109.2 MG/DL
NONHDLC SERPL-MCNC: 132 MG/DL
POTASSIUM SERPL-SCNC: 4.1 MMOL/L
PROT SERPL-MCNC: 7 G/DL
SODIUM SERPL-SCNC: 140 MMOL/L
TRIGL SERPL-MCNC: 114 MG/DL

## 2019-02-27 PROCEDURE — 80053 COMPREHEN METABOLIC PANEL: CPT

## 2019-02-27 PROCEDURE — 80061 LIPID PANEL: CPT

## 2019-02-27 PROCEDURE — 36415 COLL VENOUS BLD VENIPUNCTURE: CPT | Mod: PO

## 2019-02-28 ENCOUNTER — TELEPHONE (OUTPATIENT)
Dept: FAMILY MEDICINE | Facility: CLINIC | Age: 79
End: 2019-02-28

## 2019-02-28 NOTE — TELEPHONE ENCOUNTER
----- Message from Echo Lee MD sent at 2/28/2019 12:36 PM CST -----  This patient's cholesterol is improving. Her electrolytes and kidney/liver function shows nothing of concern.

## 2019-03-27 ENCOUNTER — OFFICE VISIT (OUTPATIENT)
Dept: FAMILY MEDICINE | Facility: CLINIC | Age: 79
End: 2019-03-27
Payer: MEDICARE

## 2019-03-27 VITALS
SYSTOLIC BLOOD PRESSURE: 122 MMHG | DIASTOLIC BLOOD PRESSURE: 70 MMHG | HEART RATE: 88 BPM | TEMPERATURE: 98 F | BODY MASS INDEX: 26.04 KG/M2 | HEIGHT: 59 IN | WEIGHT: 129.19 LBS

## 2019-03-27 DIAGNOSIS — Z95.0 CARDIAC PACEMAKER IN SITU: ICD-10-CM

## 2019-03-27 DIAGNOSIS — I10 ESSENTIAL HYPERTENSION: Primary | ICD-10-CM

## 2019-03-27 DIAGNOSIS — I44.2 COMPLETE HEART BLOCK: ICD-10-CM

## 2019-03-27 PROCEDURE — 99213 OFFICE O/P EST LOW 20 MIN: CPT | Mod: S$GLB,,, | Performed by: NURSE PRACTITIONER

## 2019-03-27 PROCEDURE — 1101F PR PT FALLS ASSESS DOC 0-1 FALLS W/OUT INJ PAST YR: ICD-10-PCS | Mod: CPTII,S$GLB,, | Performed by: NURSE PRACTITIONER

## 2019-03-27 PROCEDURE — 3074F SYST BP LT 130 MM HG: CPT | Mod: CPTII,S$GLB,, | Performed by: NURSE PRACTITIONER

## 2019-03-27 PROCEDURE — 99999 PR PBB SHADOW E&M-EST. PATIENT-LVL V: ICD-10-PCS | Mod: PBBFAC,,, | Performed by: NURSE PRACTITIONER

## 2019-03-27 PROCEDURE — 1101F PT FALLS ASSESS-DOCD LE1/YR: CPT | Mod: CPTII,S$GLB,, | Performed by: NURSE PRACTITIONER

## 2019-03-27 PROCEDURE — 99999 PR PBB SHADOW E&M-EST. PATIENT-LVL V: CPT | Mod: PBBFAC,,, | Performed by: NURSE PRACTITIONER

## 2019-03-27 PROCEDURE — 3078F DIAST BP <80 MM HG: CPT | Mod: CPTII,S$GLB,, | Performed by: NURSE PRACTITIONER

## 2019-03-27 PROCEDURE — 3074F PR MOST RECENT SYSTOLIC BLOOD PRESSURE < 130 MM HG: ICD-10-PCS | Mod: CPTII,S$GLB,, | Performed by: NURSE PRACTITIONER

## 2019-03-27 PROCEDURE — 99213 PR OFFICE/OUTPT VISIT, EST, LEVL III, 20-29 MIN: ICD-10-PCS | Mod: S$GLB,,, | Performed by: NURSE PRACTITIONER

## 2019-03-27 PROCEDURE — 3078F PR MOST RECENT DIASTOLIC BLOOD PRESSURE < 80 MM HG: ICD-10-PCS | Mod: CPTII,S$GLB,, | Performed by: NURSE PRACTITIONER

## 2019-03-27 NOTE — PROGRESS NOTES
Subjective:       Patient ID: Maricel Abdul is a 78 y.o. female.    Chief Complaint: Hypertension    Ms. Abdul presents to the clinic today for one month follow up for hypertension.  She states she was unable to  losartan-hct from pharmacy because there was no new prescription.  Checked epic and prescription receipt was confirmed on 2/26/19.  However, blood pressure is normal today and she states it is around 130's/80's at home.  No headaches, chest pain, or shortness of breath.  She would like to get a Cardiologist in [place].    Review of Systems   Constitutional: Negative for fever.   Respiratory: Negative for shortness of breath.    Cardiovascular: Negative for chest pain.   Neurological: Negative for dizziness and headaches.       Objective:      Physical Exam   Constitutional: She is oriented to person, place, and time. She appears well-nourished. No distress.   HENT:   Head: Normocephalic and atraumatic.   Right Ear: External ear normal.   Left Ear: External ear normal.   Eyes: Right eye exhibits no discharge. Left eye exhibits no discharge.   Cardiovascular: Normal rate and regular rhythm. Exam reveals no gallop and no friction rub.   No murmur heard.  Pulmonary/Chest: Effort normal and breath sounds normal. No respiratory distress. She has no wheezes. She has no rales.   Neurological: She is alert and oriented to person, place, and time. Coordination normal.   Skin: Skin is warm and dry.   Psychiatric: She has a normal mood and affect. Her behavior is normal. Thought content normal.   Vitals reviewed.          Current Outpatient Medications:     amitriptyline (ELAVIL) 50 MG tablet, Take 50 mg by mouth every evening., Disp: , Rfl:     aspirin 325 MG tablet, Take 1 tablet (325 mg total) by mouth once daily., Disp: , Rfl: 0    atorvastatin (LIPITOR) 40 MG tablet, Take 40 mg by mouth once daily., Disp: , Rfl:     cholecalciferol, vitamin D3, (VITAMIN D3) 5,000 unit Tab, Take 5,000 Units by mouth  once daily., Disp: , Rfl:     clotrimazole (LOTRIMIN) 1 % cream, AAA axilla BID PRN flare, Disp: 30 g, Rfl: 0    LORazepam (ATIVAN) 0.5 MG tablet, , Disp: , Rfl:     losartan-hydrochlorothiazide 100-12.5 mg (HYZAAR) 100-12.5 mg Tab, Take 1 tablet by mouth once daily., Disp: 90 tablet, Rfl: 1    metoprolol succinate (TOPROL-XL) 25 MG 24 hr tablet, , Disp: , Rfl:     multivitamin (ONE DAILY MULTIVITAMIN) per tablet, Take 1 tablet by mouth once daily., Disp: , Rfl:     omeprazole (PRILOSEC) 20 MG capsule, Take 20 mg by mouth once daily., Disp: , Rfl:     sertraline (ZOLOFT) 50 MG tablet, , Disp: , Rfl:     vit A/vit C/vit E/zinc/copper (PRESERVISION AREDS ORAL), Take by mouth., Disp: , Rfl:   Assessment:       1. Essential hypertension    2. Complete heart block    3. Cardiac pacemaker in situ        Plan:       Essential hypertension  Discussed plan with Dr. Jesus Weinberg, recheck with nurse visit 2 weeks.  If BP normal then, continue losartan 100 mg daily.    Complete heart block  -     Ambulatory referral to Cardiology    Cardiac pacemaker in situ  -     Ambulatory referral to Cardiology    Patient readiness: acceptance and barriers:none    During the course of the visit the patient was educated and counseled about the following:     Hypertension:   Medication: no change.    Goals: Hypertension: Reduce Blood Pressure    Did patient meet goals/outcomes: Yes    The following self management tools provided: declined    Patient Instructions (the written plan) was given to the patient/family.     Time spent with patient: 15 minutes    Barriers to medications present (no )    Adverse reactions to current medications (no)    Over the counter medications reviewed (Yes)

## 2019-04-22 DIAGNOSIS — I44.2 CHB (COMPLETE HEART BLOCK): Primary | ICD-10-CM

## 2019-04-24 ENCOUNTER — OFFICE VISIT (OUTPATIENT)
Dept: ELECTROPHYSIOLOGY | Facility: CLINIC | Age: 79
End: 2019-04-24
Attending: INTERNAL MEDICINE
Payer: MEDICARE

## 2019-04-24 ENCOUNTER — CLINICAL SUPPORT (OUTPATIENT)
Dept: CARDIOLOGY | Facility: HOSPITAL | Age: 79
End: 2019-04-24
Attending: INTERNAL MEDICINE
Payer: MEDICARE

## 2019-04-24 ENCOUNTER — HOSPITAL ENCOUNTER (OUTPATIENT)
Dept: CARDIOLOGY | Facility: CLINIC | Age: 79
Discharge: HOME | End: 2019-04-24
Payer: MEDICARE

## 2019-04-24 VITALS
HEIGHT: 59 IN | HEART RATE: 90 BPM | WEIGHT: 120.56 LBS | DIASTOLIC BLOOD PRESSURE: 68 MMHG | BODY MASS INDEX: 24.31 KG/M2 | SYSTOLIC BLOOD PRESSURE: 124 MMHG

## 2019-04-24 DIAGNOSIS — Z95.0 CARDIAC PACEMAKER IN SITU: ICD-10-CM

## 2019-04-24 DIAGNOSIS — I44.2 CHB (COMPLETE HEART BLOCK): ICD-10-CM

## 2019-04-24 DIAGNOSIS — Z95.0 CARDIAC PACEMAKER IN SITU: Primary | ICD-10-CM

## 2019-04-24 DIAGNOSIS — I44.2 COMPLETE HEART BLOCK: ICD-10-CM

## 2019-04-24 DIAGNOSIS — I10 ESSENTIAL HYPERTENSION: ICD-10-CM

## 2019-04-24 PROCEDURE — 1101F PT FALLS ASSESS-DOCD LE1/YR: CPT | Mod: CPTII,S$GLB,, | Performed by: NURSE PRACTITIONER

## 2019-04-24 PROCEDURE — 3078F DIAST BP <80 MM HG: CPT | Mod: CPTII,S$GLB,, | Performed by: NURSE PRACTITIONER

## 2019-04-24 PROCEDURE — 3074F PR MOST RECENT SYSTOLIC BLOOD PRESSURE < 130 MM HG: ICD-10-PCS | Mod: CPTII,S$GLB,, | Performed by: NURSE PRACTITIONER

## 2019-04-24 PROCEDURE — 3074F SYST BP LT 130 MM HG: CPT | Mod: CPTII,S$GLB,, | Performed by: NURSE PRACTITIONER

## 2019-04-24 PROCEDURE — 99999 PR PBB SHADOW E&M-EST. PATIENT-LVL III: ICD-10-PCS | Mod: PBBFAC,,, | Performed by: NURSE PRACTITIONER

## 2019-04-24 PROCEDURE — 93000 ELECTROCARDIOGRAM COMPLETE: CPT | Mod: S$GLB,,, | Performed by: INTERNAL MEDICINE

## 2019-04-24 PROCEDURE — 99214 PR OFFICE/OUTPT VISIT, EST, LEVL IV, 30-39 MIN: ICD-10-PCS | Mod: S$GLB,,, | Performed by: NURSE PRACTITIONER

## 2019-04-24 PROCEDURE — 93280 PM DEVICE PROGR EVAL DUAL: CPT

## 2019-04-24 PROCEDURE — 99214 OFFICE O/P EST MOD 30 MIN: CPT | Mod: S$GLB,,, | Performed by: NURSE PRACTITIONER

## 2019-04-24 PROCEDURE — 99999 PR PBB SHADOW E&M-EST. PATIENT-LVL III: CPT | Mod: PBBFAC,,, | Performed by: NURSE PRACTITIONER

## 2019-04-24 PROCEDURE — 93000 RHYTHM STRIP: ICD-10-PCS | Mod: S$GLB,,, | Performed by: INTERNAL MEDICINE

## 2019-04-24 PROCEDURE — 3078F PR MOST RECENT DIASTOLIC BLOOD PRESSURE < 80 MM HG: ICD-10-PCS | Mod: CPTII,S$GLB,, | Performed by: NURSE PRACTITIONER

## 2019-04-24 PROCEDURE — 1101F PR PT FALLS ASSESS DOC 0-1 FALLS W/OUT INJ PAST YR: ICD-10-PCS | Mod: CPTII,S$GLB,, | Performed by: NURSE PRACTITIONER

## 2019-04-24 NOTE — Clinical Note
Can we please schedule Ms. Abdul with an echo soon? Please apologize that I told her we could delay it, but I really would like it now.Thanks!

## 2019-04-24 NOTE — LETTER
April 24, 2019      Hadley Arango MD  [address]  [facility] [address]           [facility] - Arrhythmia  [address]  [facility] [address]  Phone: [phone]  Fax: [phone]          Patient: Maricel Abdul   MR Number: [id number]   YOB: 1940   Date of Visit: 4/24/2019       Dear Dr. Hadley Arango:    Thank you for referring Maricel Abdul to me for evaluation. Attached you will find relevant portions of my assessment and plan of care.    If you have questions, please do not hesitate to call me. I look forward to following Maricel Abdul along with you.    Sincerely,    Lanette Serrano NP    Enclosure  CC:  No Recipients    If you would like to receive this communication electronically, please contact [email or web address] or [phone] to request more information on [employer] access.    For providers and/or their staff who would like to refer a patient to [facility], please contact us through our one-stop-shop provider referral line, [facility], at [phone].    If you feel you have received this communication in error or would no longer like to receive these types of communications, please e-mail [email or web address]

## 2019-06-20 ENCOUNTER — HOSPITAL ENCOUNTER (OUTPATIENT)
Facility: HOSPITAL | Age: 79
LOS: 1 days | Discharge: HOME | End: 2019-06-21
Attending: EMERGENCY MEDICINE | Admitting: HOSPITALIST
Payer: MEDICARE

## 2019-06-20 DIAGNOSIS — K92.1 HEMATOCHEZIA: Primary | ICD-10-CM

## 2019-06-20 DIAGNOSIS — K52.9 COLITIS: ICD-10-CM

## 2019-06-20 PROBLEM — E87.20 LACTIC ACIDOSIS: Status: ACTIVE | Noted: 2019-06-20

## 2019-06-20 LAB
ABO + RH BLD: NORMAL
ALBUMIN SERPL BCP-MCNC: 4.1 G/DL (ref 3.5–5.2)
ALP SERPL-CCNC: 94 U/L (ref 55–135)
ALT SERPL W/O P-5'-P-CCNC: 40 U/L (ref 10–44)
ANION GAP SERPL CALC-SCNC: 15 MMOL/L (ref 8–16)
AST SERPL-CCNC: 50 U/L (ref 10–40)
BASOPHILS # BLD AUTO: 0 K/UL (ref 0–0.2)
BASOPHILS NFR BLD: 0.1 % (ref 0–1.9)
BILIRUB SERPL-MCNC: 0.4 MG/DL (ref 0.1–1)
BLD GP AB SCN CELLS X3 SERPL QL: NORMAL
BUN SERPL-MCNC: 23 MG/DL (ref 8–23)
C DIFF GDH STL QL: NEGATIVE
C DIFF TOX A+B STL QL IA: NEGATIVE
CALCIUM SERPL-MCNC: 10.5 MG/DL (ref 8.7–10.5)
CHLORIDE SERPL-SCNC: 102 MMOL/L (ref 95–110)
CO2 SERPL-SCNC: 23 MMOL/L (ref 23–29)
CREAT SERPL-MCNC: 0.8 MG/DL (ref 0.5–1.4)
DIFFERENTIAL METHOD: ABNORMAL
EOSINOPHIL # BLD AUTO: 0 K/UL (ref 0–0.5)
EOSINOPHIL NFR BLD: 0.1 % (ref 0–8)
ERYTHROCYTE [DISTWIDTH] IN BLOOD BY AUTOMATED COUNT: 14.6 % (ref 11.5–14.5)
EST. GFR  (AFRICAN AMERICAN): >60 ML/MIN/1.73 M^2
EST. GFR  (NON AFRICAN AMERICAN): >60 ML/MIN/1.73 M^2
GLUCOSE SERPL-MCNC: 166 MG/DL (ref 70–110)
HCT VFR BLD AUTO: 37.8 % (ref 37–48.5)
HGB BLD-MCNC: 12.5 G/DL (ref 12–16)
INR PPP: 1 (ref 0.8–1.2)
LACTATE SERPL-SCNC: 2 MMOL/L (ref 0.5–2.2)
LACTATE SERPL-SCNC: 2.7 MMOL/L (ref 0.5–2.2)
LACTATE SERPL-SCNC: 3.4 MMOL/L (ref 0.5–2.2)
LYMPHOCYTES # BLD AUTO: 0.5 K/UL (ref 1–4.8)
LYMPHOCYTES NFR BLD: 3.4 % (ref 18–48)
MCH RBC QN AUTO: 28.9 PG (ref 27–31)
MCHC RBC AUTO-ENTMCNC: 33.1 G/DL (ref 32–36)
MCV RBC AUTO: 87 FL (ref 82–98)
MONOCYTES # BLD AUTO: 0.8 K/UL (ref 0.3–1)
MONOCYTES NFR BLD: 5 % (ref 4–15)
NEUTROPHILS # BLD AUTO: 14.4 K/UL (ref 1.8–7.7)
NEUTROPHILS NFR BLD: 91.4 % (ref 38–73)
PLATELET # BLD AUTO: 211 K/UL (ref 150–350)
PMV BLD AUTO: 7.2 FL (ref 9.2–12.9)
POTASSIUM SERPL-SCNC: 4.2 MMOL/L (ref 3.5–5.1)
PROT SERPL-MCNC: 7.4 G/DL (ref 6–8.4)
PROTHROMBIN TIME: 10.7 SEC (ref 9–12.5)
RBC # BLD AUTO: 4.34 M/UL (ref 4–5.4)
SODIUM SERPL-SCNC: 140 MMOL/L (ref 136–145)
WBC # BLD AUTO: 15.8 K/UL (ref 3.9–12.7)

## 2019-06-20 PROCEDURE — 83605 ASSAY OF LACTIC ACID: CPT

## 2019-06-20 PROCEDURE — 25000003 PHARM REV CODE 250: Performed by: EMERGENCY MEDICINE

## 2019-06-20 PROCEDURE — 94761 N-INVAS EAR/PLS OXIMETRY MLT: CPT

## 2019-06-20 PROCEDURE — 87040 BLOOD CULTURE FOR BACTERIA: CPT

## 2019-06-20 PROCEDURE — 96365 THER/PROPH/DIAG IV INF INIT: CPT | Mod: 59

## 2019-06-20 PROCEDURE — 96375 TX/PRO/DX INJ NEW DRUG ADDON: CPT

## 2019-06-20 PROCEDURE — 83605 ASSAY OF LACTIC ACID: CPT | Mod: 91

## 2019-06-20 PROCEDURE — 85025 COMPLETE CBC W/AUTO DIFF WBC: CPT

## 2019-06-20 PROCEDURE — 63600175 PHARM REV CODE 636 W HCPCS: Performed by: EMERGENCY MEDICINE

## 2019-06-20 PROCEDURE — 86850 RBC ANTIBODY SCREEN: CPT

## 2019-06-20 PROCEDURE — 96361 HYDRATE IV INFUSION ADD-ON: CPT

## 2019-06-20 PROCEDURE — 25000003 PHARM REV CODE 250: Performed by: INTERNAL MEDICINE

## 2019-06-20 PROCEDURE — 85610 PROTHROMBIN TIME: CPT

## 2019-06-20 PROCEDURE — 99285 EMERGENCY DEPT VISIT HI MDM: CPT | Mod: 25

## 2019-06-20 PROCEDURE — 25500020 PHARM REV CODE 255: Performed by: EMERGENCY MEDICINE

## 2019-06-20 PROCEDURE — 87449 NOS EACH ORGANISM AG IA: CPT

## 2019-06-20 PROCEDURE — 36415 COLL VENOUS BLD VENIPUNCTURE: CPT

## 2019-06-20 PROCEDURE — 25000003 PHARM REV CODE 250: Performed by: HOSPITALIST

## 2019-06-20 PROCEDURE — S0030 INJECTION, METRONIDAZOLE: HCPCS | Performed by: EMERGENCY MEDICINE

## 2019-06-20 PROCEDURE — G0378 HOSPITAL OBSERVATION PER HR: HCPCS

## 2019-06-20 PROCEDURE — 63600175 PHARM REV CODE 636 W HCPCS: Performed by: INTERNAL MEDICINE

## 2019-06-20 PROCEDURE — 80053 COMPREHEN METABOLIC PANEL: CPT

## 2019-06-20 RX ORDER — ONDANSETRON 2 MG/ML
4 INJECTION INTRAMUSCULAR; INTRAVENOUS EVERY 8 HOURS PRN
Status: DISCONTINUED | OUTPATIENT
Start: 2019-06-20 | End: 2019-06-21 | Stop reason: HOSPADM

## 2019-06-20 RX ORDER — HYDROCHLOROTHIAZIDE 12.5 MG/1
12.5 TABLET ORAL DAILY
Status: DISCONTINUED | OUTPATIENT
Start: 2019-06-20 | End: 2019-06-20

## 2019-06-20 RX ORDER — ASPIRIN 325 MG
325 TABLET ORAL DAILY
Status: DISCONTINUED | OUTPATIENT
Start: 2019-06-20 | End: 2019-06-21

## 2019-06-20 RX ORDER — AMITRIPTYLINE HYDROCHLORIDE 25 MG/1
50 TABLET, FILM COATED ORAL NIGHTLY
Status: DISCONTINUED | OUTPATIENT
Start: 2019-06-20 | End: 2019-06-21 | Stop reason: HOSPADM

## 2019-06-20 RX ORDER — SERTRALINE HYDROCHLORIDE 50 MG/1
50 TABLET, FILM COATED ORAL NIGHTLY
Status: DISCONTINUED | OUTPATIENT
Start: 2019-06-20 | End: 2019-06-21 | Stop reason: HOSPADM

## 2019-06-20 RX ORDER — METOPROLOL SUCCINATE 25 MG/1
25 TABLET, EXTENDED RELEASE ORAL DAILY
Status: DISCONTINUED | OUTPATIENT
Start: 2019-06-20 | End: 2019-06-21 | Stop reason: HOSPADM

## 2019-06-20 RX ORDER — LOSARTAN POTASSIUM AND HYDROCHLOROTHIAZIDE 12.5; 5 MG/1; MG/1
2 TABLET ORAL DAILY
Status: DISCONTINUED | OUTPATIENT
Start: 2019-06-20 | End: 2019-06-20 | Stop reason: RX

## 2019-06-20 RX ORDER — SODIUM CHLORIDE 0.9 % (FLUSH) 0.9 %
10 SYRINGE (ML) INJECTION
Status: DISCONTINUED | OUTPATIENT
Start: 2019-06-20 | End: 2019-06-20 | Stop reason: SDUPTHER

## 2019-06-20 RX ORDER — GLUCAGON 1 MG
1 KIT INJECTION
Status: DISCONTINUED | OUTPATIENT
Start: 2019-06-20 | End: 2019-06-21 | Stop reason: HOSPADM

## 2019-06-20 RX ORDER — ATORVASTATIN CALCIUM 40 MG/1
80 TABLET, FILM COATED ORAL DAILY
Status: DISCONTINUED | OUTPATIENT
Start: 2019-06-20 | End: 2019-06-21 | Stop reason: HOSPADM

## 2019-06-20 RX ORDER — METRONIDAZOLE 500 MG/100ML
500 INJECTION, SOLUTION INTRAVENOUS
Status: COMPLETED | OUTPATIENT
Start: 2019-06-20 | End: 2019-06-20

## 2019-06-20 RX ORDER — ONDANSETRON 2 MG/ML
4 INJECTION INTRAMUSCULAR; INTRAVENOUS EVERY 8 HOURS PRN
Status: DISCONTINUED | OUTPATIENT
Start: 2019-06-20 | End: 2019-06-20 | Stop reason: SDUPTHER

## 2019-06-20 RX ORDER — SODIUM CHLORIDE 9 MG/ML
1000 INJECTION, SOLUTION INTRAVENOUS
Status: COMPLETED | OUTPATIENT
Start: 2019-06-20 | End: 2019-06-20

## 2019-06-20 RX ORDER — LOSARTAN POTASSIUM 25 MG/1
100 TABLET ORAL DAILY
Status: DISCONTINUED | OUTPATIENT
Start: 2019-06-20 | End: 2019-06-20

## 2019-06-20 RX ORDER — SODIUM CHLORIDE 0.9 % (FLUSH) 0.9 %
10 SYRINGE (ML) INJECTION
Status: DISCONTINUED | OUTPATIENT
Start: 2019-06-20 | End: 2019-06-21 | Stop reason: HOSPADM

## 2019-06-20 RX ORDER — IBUPROFEN 200 MG
24 TABLET ORAL
Status: DISCONTINUED | OUTPATIENT
Start: 2019-06-20 | End: 2019-06-21 | Stop reason: HOSPADM

## 2019-06-20 RX ORDER — PANTOPRAZOLE SODIUM 40 MG/1
40 TABLET, DELAYED RELEASE ORAL DAILY
Status: DISCONTINUED | OUTPATIENT
Start: 2019-06-20 | End: 2019-06-21 | Stop reason: HOSPADM

## 2019-06-20 RX ORDER — ACETAMINOPHEN 325 MG/1
650 TABLET ORAL EVERY 6 HOURS PRN
Status: DISCONTINUED | OUTPATIENT
Start: 2019-06-20 | End: 2019-06-21 | Stop reason: HOSPADM

## 2019-06-20 RX ORDER — ACETAMINOPHEN 325 MG/1
650 TABLET ORAL EVERY 8 HOURS PRN
Status: DISCONTINUED | OUTPATIENT
Start: 2019-06-20 | End: 2019-06-20 | Stop reason: SDUPTHER

## 2019-06-20 RX ORDER — LORAZEPAM 0.5 MG/1
0.5 TABLET ORAL EVERY 12 HOURS PRN
Status: DISCONTINUED | OUTPATIENT
Start: 2019-06-20 | End: 2019-06-21 | Stop reason: HOSPADM

## 2019-06-20 RX ORDER — IBUPROFEN 200 MG
16 TABLET ORAL
Status: DISCONTINUED | OUTPATIENT
Start: 2019-06-20 | End: 2019-06-21 | Stop reason: HOSPADM

## 2019-06-20 RX ADMIN — HYDROCHLOROTHIAZIDE 12.5 MG: 12.5 TABLET ORAL at 12:06

## 2019-06-20 RX ADMIN — PANTOPRAZOLE SODIUM 40 MG: 40 TABLET, DELAYED RELEASE ORAL at 12:06

## 2019-06-20 RX ADMIN — METOPROLOL SUCCINATE 25 MG: 25 TABLET, EXTENDED RELEASE ORAL at 12:06

## 2019-06-20 RX ADMIN — SERTRALINE HYDROCHLORIDE 50 MG: 50 TABLET ORAL at 09:06

## 2019-06-20 RX ADMIN — IOHEXOL 75 ML: 350 INJECTION, SOLUTION INTRAVENOUS at 06:06

## 2019-06-20 RX ADMIN — PIPERACILLIN SODIUM AND TAZOBACTAM SODIUM 4.5 G: 4; .5 INJECTION, POWDER, LYOPHILIZED, FOR SOLUTION INTRAVENOUS at 06:06

## 2019-06-20 RX ADMIN — AMITRIPTYLINE HYDROCHLORIDE 50 MG: 25 TABLET, FILM COATED ORAL at 09:06

## 2019-06-20 RX ADMIN — ATORVASTATIN CALCIUM 80 MG: 40 TABLET, FILM COATED ORAL at 12:06

## 2019-06-20 RX ADMIN — ACETAMINOPHEN 650 MG: 325 TABLET ORAL at 12:06

## 2019-06-20 RX ADMIN — SODIUM CHLORIDE 1000 ML: 0.9 INJECTION, SOLUTION INTRAVENOUS at 02:06

## 2019-06-20 RX ADMIN — SODIUM CHLORIDE 500 ML: 0.9 INJECTION, SOLUTION INTRAVENOUS at 05:06

## 2019-06-20 RX ADMIN — SODIUM CHLORIDE 1000 ML: 0.9 INJECTION, SOLUTION INTRAVENOUS at 08:06

## 2019-06-20 RX ADMIN — LOSARTAN POTASSIUM 100 MG: 25 TABLET ORAL at 12:06

## 2019-06-20 RX ADMIN — CEFTRIAXONE 1 G: 1 INJECTION, SOLUTION INTRAVENOUS at 08:06

## 2019-06-20 RX ADMIN — METRONIDAZOLE 500 MG: 500 INJECTION, SOLUTION INTRAVENOUS at 08:06

## 2019-06-20 RX ADMIN — PIPERACILLIN SODIUM AND TAZOBACTAM SODIUM 4.5 G: 4; .5 INJECTION, POWDER, LYOPHILIZED, FOR SOLUTION INTRAVENOUS at 12:06

## 2019-06-20 NOTE — H&P
[facility] Hospital Medicine  History & Physical    Patient Name: Marciel Abdul  MRN: [id number]  Admission Date: 6/20/2019  Attending Physician: Benita Espitia MD   Primary Care Provider: Adiel Boss MD         Patient information was obtained from patient and ER records.     Subjective:     Principal Problem:<principal problem not specified>    Chief Complaint:   Chief Complaint   Patient presents with    Abdominal Pain     abdominal pain beginning this moring appoximately 2 am    Rectal Bleeding        HPI: 78 year old female with history of CVA, complete heart block with pacemaker here with 12 hours of persistent, bloody, painless diarrhea associated with fatigue.  She states that yesterday she was not feeling well and overnight she had an episode of bright red, bloody diarrhea that has persisted.  This has all been small volume and not associated with syncope.  She presented to the ED for evaluation and was found to be hemodynamically stable but with a lactic acidosis and evidence of a distal colitis on CT.  She has no endorsement of abdominal distension, emesis, fevers, or any other related complaints.     She has had a previous colonoscopy around one year ago that was significant for diverticulosis.     Past Medical History:   Diagnosis Date    Diverticulitis     Hypertension     Kidney stones     Shingles     TIA (transient ischemic attack) 12/2017       Past Surgical History:   Procedure Laterality Date    CARDIAC SURGERY      pacemaker    CERVICAL FUSION      CHOLECYSTECTOMY      HYSTERECTOMY      INSERTION-PACEMAKER-DUAL Left 12/21/2017    Performed by [facility] at [facility] CATH LAB    SHOULDER SURGERY         Review of patient's allergies indicates:   Allergen Reactions    Cortisone Nausea Only    Codeine Nausea And Vomiting       No current facility-administered medications on file prior to encounter.      Current Outpatient Medications on File Prior to  Encounter   Medication Sig    amitriptyline (ELAVIL) 50 MG tablet Take 50 mg by mouth every evening.    atorvastatin (LIPITOR) 80 MG tablet Take 80 mg by mouth once daily.     cholecalciferol, vitamin D3, (VITAMIN D3) 5,000 unit Tab Take 5,000 Units by mouth once daily.    LORazepam (ATIVAN) 0.5 MG tablet     losartan-hydrochlorothiazide 100-12.5 mg (HYZAAR) 100-12.5 mg Tab Take 1 tablet by mouth once daily.    metoprolol succinate (TOPROL-XL) 25 MG 24 hr tablet     multivitamin (ONE DAILY MULTIVITAMIN) per tablet Take 1 tablet by mouth once daily.    omeprazole (PRILOSEC) 20 MG capsule Take 20 mg by mouth once daily.    sertraline (ZOLOFT) 50 MG tablet     vit A/vit C/vit E/zinc/copper (PRESERVISION AREDS ORAL) Take by mouth.    aspirin 325 MG tablet Take 1 tablet (325 mg total) by mouth once daily.    clotrimazole (LOTRIMIN) 1 % cream AAA axilla BID PRN flare     Family History     Problem Relation (Age of Onset)    Cancer Father, Brother        Tobacco Use    Smoking status: Former Smoker     Years: 20.00    Smokeless tobacco: Never Used   Substance and Sexual Activity    Alcohol use: Not on file     Comment: Glass of wine each night    Drug use: No    Sexual activity: Not on file     Review of Systems   Constitutional: Positive for fatigue. Negative for activity change, appetite change and fever.   HENT: Negative.    Eyes: Negative.    Respiratory: Negative for chest tightness, shortness of breath and wheezing.    Cardiovascular: Negative for chest pain, palpitations and leg swelling.   Gastrointestinal: Positive for abdominal pain, blood in stool and diarrhea. Negative for abdominal distention and vomiting.   Genitourinary: Negative for dysuria and hematuria.   Neurological: Negative for headaches.   Hematological: Negative for adenopathy.   Psychiatric/Behavioral: Negative for confusion.     Objective:     Vital Signs (Most Recent):  Temp: 98.1 °F (36.7 °C) (06/20/19 1027)  Pulse: 104  (06/20/19 1027)  Resp: 16 (06/20/19 1027)  BP: (!) 169/81 (06/20/19 1027)  SpO2: 100 % (06/20/19 1027) Vital Signs (24h Range):  Temp:  [98.1 °F (36.7 °C)-98.4 °F (36.9 °C)] 98.1 °F (36.7 °C)  Pulse:  [] 104  Resp:  [16-17] 16  SpO2:  [95 %-100 %] 100 %  BP: (139-198)/(56-86) 169/81     Weight: 59.3 kg (130 lb 11.7 oz)  Body mass index is 26.4 kg/m².    Physical Exam   Constitutional: She is oriented to person, place, and time. She appears well-developed and well-nourished. No distress.   HENT:   Head: Normocephalic and atraumatic.   Eyes: Pupils are equal, round, and reactive to light.   Neck: Neck supple. No thyromegaly present.   Cardiovascular: Normal rate and regular rhythm. Exam reveals no gallop and no friction rub.   No murmur heard.  Pulmonary/Chest: Effort normal and breath sounds normal. No respiratory distress. She has no wheezes.   Abdominal: Soft. Bowel sounds are normal. She exhibits no distension. There is tenderness. There is no guarding.   Musculoskeletal: Normal range of motion. She exhibits no edema.   Neurological: She is alert and oriented to person, place, and time.   Skin: Skin is warm and dry. No erythema.   Psychiatric: She has a normal mood and affect.         CRANIAL NERVES     CN III, IV, VI   Pupils are equal, round, and reactive to light.       Significant Labs:   CBC:   Recent Labs   Lab 06/20/19  0552   WBC 15.80*   HGB 12.5   HCT 37.8          Significant Imaging: I have reviewed all pertinent imaging results/findings within the past 24 hours.    Assessment/Plan:     Colitis  - suspect ischemic vs. Infectious  - continue abx and supportive care  - potential GI consult for flex sig if no improvement      Lactic acidosis  - as above  - additional 1 liter fluid bolus  - recheck in two hours      Hematochezia  - suspect infectious or ischemic colitis.    - IBD less likely given time course  - continue zosyn  - NPO for now  - serial abdominal exams  - fluid bolus now and  recheck lactate  - if no improvement consider surgical consult      Complete heart block  - no acute issues  - patient with pacer      Mixed hyperlipidemia  - on statin      Essential hypertension  - stable  - hold diuretics while in house        VTE Risk Mitigation (From admission, onward)        Ordered     IP VTE HIGH RISK PATIENT  Once      06/20/19 1028     Place sequential compression device  Until discontinued      06/20/19 1028     Place JOSÉ MIGUEL hose  Until discontinued      06/20/19 1028             Isidro Centeno MD  Department of Hospital Medicine   [facility]

## 2019-06-20 NOTE — HPI
78 year old female with history of CVA, complete heart block with pacemaker here with 12 hours of persistent, bloody, painless diarrhea associated with fatigue.  She states that yesterday she was not feeling well and overnight she had an episode of bright red, bloody diarrhea that has persisted.  This has all been small volume and not associated with syncope.  She presented to the ED for evaluation and was found to be hemodynamically stable but with a lactic acidosis and evidence of a distal colitis on CT.  She has no endorsement of abdominal distension, emesis, fevers, or any other related complaints.     She has had a previous colonoscopy around one year ago that was significant for diverticulosis.

## 2019-06-20 NOTE — ASSESSMENT & PLAN NOTE
- suspect infectious or ischemic colitis.    - IBD less likely given time course  - continue zosyn  - NPO for now  - serial abdominal exams  - fluid bolus now and recheck lactate  - if no improvement consider surgical consult

## 2019-06-20 NOTE — PLAN OF CARE
"Problem: Adult Inpatient Plan of Care  Goal: Plan of Care Review  Outcome: Ongoing (interventions implemented as appropriate)  Pt arrived to unit from ER at approx. 1020 via wheelchair, Contact isolation precautions initiated, TEDS and SCDs applied, ambulates ad shayla, bloody liquid stools noted, specimen collected, 1000 cc of normal saline given, remains afebrile while receiving antibiotic therapy,Plan of care review with pt, pt states "understanding," IVF infusing without difficulty, no redness/swelling noted to IV site, will continue to monitor, observe and note any changes, safety maintain      "

## 2019-06-20 NOTE — ED PROVIDER NOTES
Encounter Date: 6/20/2019       History     Chief Complaint   Patient presents with    Abdominal Pain     abdominal pain beginning this moring appoximately 2 am    Rectal Bleeding     6-year-old female with a past medical history of hypertension kidney stone shingles and TIA presents emergency room for evaluation of intermittent lower crampy abdominal pain that some present since yesterday with several rounds of hematochezia since 2:00 a.m. this morning.  She denies any significant melena or hematemesis.  She has no chest pain shortness of breath syncope orthostasis or history of GI bleeding.  She takes aspirin but no other anticoagulants or antiplatelets.  She states she had a colonoscopy done a year ago which showed diverticulosis and polyps no history of colitis or diverticulitis        Review of patient's allergies indicates:   Allergen Reactions    Cortisone Nausea Only    Codeine Nausea And Vomiting     Past Medical History:   Diagnosis Date    Hypertension     Kidney stones     Shingles     TIA (transient ischemic attack)      Past Surgical History:   Procedure Laterality Date    CERVICAL FUSION      CHOLECYSTECTOMY      HYSTERECTOMY      INSERTION-PACEMAKER-DUAL Left 12/21/2017    [facility] at [facility] CATH LAB    SHOULDER SURGERY       Family History   Problem Relation Age of Onset    Eczema Neg Hx     Psoriasis Neg Hx     Lupus Neg Hx     Melanoma Neg Hx      Social History     Tobacco Use    Smoking status: Former Smoker     Years: 20.00    Smokeless tobacco: Never Used   Substance Use Topics    Alcohol use: Not on file     Comment: Glass of wine each night    Drug use: No     Review of Systems   Constitutional: Negative for fever.   HENT: Negative for ear pain, rhinorrhea and sore throat.    Eyes: Negative for pain and visual disturbance.   Respiratory: Negative for cough and shortness of breath.    Cardiovascular: Negative for chest pain.   Gastrointestinal: Positive  for abdominal pain and blood in stool. Negative for constipation, diarrhea, nausea and vomiting.   Genitourinary: Negative for difficulty urinating and dysuria.   Musculoskeletal: Negative for arthralgias.   Skin: Negative for rash.   Neurological: Negative for dizziness, syncope, weakness, light-headedness, numbness and headaches.   All other systems reviewed and are negative.      Physical Exam     Initial Vitals [06/20/19 0503]   BP Pulse Resp Temp SpO2   (!) 198/86 106 17 98.4 °F (36.9 °C) 96 %      MAP       --         Physical Exam    Constitutional: She appears well-developed and well-nourished. No distress.   HENT:   Head: Normocephalic and atraumatic.   Mouth/Throat: Oropharynx is clear and moist.   Eyes: EOM are normal. Pupils are equal, round, and reactive to light.   Pale conjunctiva   Neck: Neck supple. No JVD present.   Cardiovascular: Normal rate, regular rhythm, normal heart sounds and intact distal pulses. Exam reveals no gallop and no friction rub.    Pulmonary/Chest: Breath sounds normal. She has no wheezes. She has no rhonchi. She has no rales.   Abdominal: Soft. There is tenderness (Mild diffusely, but mostly in the bilateral lower quadrant).   Musculoskeletal: Normal range of motion.   Neurological: She is alert and oriented to person, place, and time. She has normal strength. She displays normal reflexes. No cranial nerve deficit. GCS score is 15. GCS eye subscore is 4. GCS verbal subscore is 5. GCS motor subscore is 6.   Skin: Skin is warm. Capillary refill takes less than 2 seconds. There is pallor.   Psychiatric: She has a normal mood and affect.         ED Course   Procedures  Labs Reviewed   CBC W/ AUTO DIFFERENTIAL   COMPREHENSIVE METABOLIC PANEL   PROTIME-INR   LACTIC ACID, PLASMA   TYPE & SCREEN          Imaging Results    None          Medical Decision Making:   Patient likely has colitis given my independent interpretation the CT.  Care is transferred to Dr. Jarrett at 7:00 a.m. to  follow up with CT results.  I anticipate admission.                      Clinical Impression:       ICD-10-CM ICD-9-CM   1. Hematochezia K92.1 578.1   2. Colitis K52.9 558.9                                Isidro Beatty MD  06/21/19 2207

## 2019-06-20 NOTE — ASSESSMENT & PLAN NOTE
- suspect ischemic vs. Infectious  - continue abx and supportive care  - potential GI consult for flex sig if no improvement

## 2019-06-20 NOTE — PLAN OF CARE
Attempted to meet with pt to complete her assessment.  Pt was resting and acknowledged that she would prefer to me come back later.      06/20/19 1130   Discharge Assessment   Assessment Type Discharge Planning Assessment

## 2019-06-20 NOTE — SUBJECTIVE & OBJECTIVE
Past Medical History:   Diagnosis Date    Diverticulitis     Hypertension     Kidney stones     Shingles     TIA (transient ischemic attack) 12/2017       Past Surgical History:   Procedure Laterality Date    CARDIAC SURGERY      pacemaker    CERVICAL FUSION      CHOLECYSTECTOMY      HYSTERECTOMY      INSERTION-PACEMAKER-DUAL Left 12/21/2017    Performed by Hadley Arango MD at [facility] CATH LAB    SHOULDER SURGERY         Review of patient's allergies indicates:   Allergen Reactions    Cortisone Nausea Only    Codeine Nausea And Vomiting       No current facility-administered medications on file prior to encounter.      Current Outpatient Medications on File Prior to Encounter   Medication Sig    amitriptyline (ELAVIL) 50 MG tablet Take 50 mg by mouth every evening.    atorvastatin (LIPITOR) 80 MG tablet Take 80 mg by mouth once daily.     cholecalciferol, vitamin D3, (VITAMIN D3) 5,000 unit Tab Take 5,000 Units by mouth once daily.    LORazepam (ATIVAN) 0.5 MG tablet     losartan-hydrochlorothiazide 100-12.5 mg (HYZAAR) 100-12.5 mg Tab Take 1 tablet by mouth once daily.    metoprolol succinate (TOPROL-XL) 25 MG 24 hr tablet     multivitamin (ONE DAILY MULTIVITAMIN) per tablet Take 1 tablet by mouth once daily.    omeprazole (PRILOSEC) 20 MG capsule Take 20 mg by mouth once daily.    sertraline (ZOLOFT) 50 MG tablet     vit A/vit C/vit E/zinc/copper (PRESERVISION AREDS ORAL) Take by mouth.    aspirin 325 MG tablet Take 1 tablet (325 mg total) by mouth once daily.    clotrimazole (LOTRIMIN) 1 % cream AAA axilla BID PRN flare     Family History     Problem Relation (Age of Onset)    Cancer Father, Brother        Tobacco Use    Smoking status: Former Smoker     Years: 20.00    Smokeless tobacco: Never Used   Substance and Sexual Activity    Alcohol use: Not on file     Comment: Glass of wine each night    Drug use: No    Sexual activity: Not on file     Review of Systems   Constitutional:  Positive for fatigue. Negative for activity change, appetite change and fever.   HENT: Negative.    Eyes: Negative.    Respiratory: Negative for chest tightness, shortness of breath and wheezing.    Cardiovascular: Negative for chest pain, palpitations and leg swelling.   Gastrointestinal: Positive for abdominal pain, blood in stool and diarrhea. Negative for abdominal distention and vomiting.   Genitourinary: Negative for dysuria and hematuria.   Neurological: Negative for headaches.   Hematological: Negative for adenopathy.   Psychiatric/Behavioral: Negative for confusion.     Objective:     Vital Signs (Most Recent):  Temp: 98.1 °F (36.7 °C) (06/20/19 1027)  Pulse: 104 (06/20/19 1027)  Resp: 16 (06/20/19 1027)  BP: (!) 169/81 (06/20/19 1027)  SpO2: 100 % (06/20/19 1027) Vital Signs (24h Range):  Temp:  [98.1 °F (36.7 °C)-98.4 °F (36.9 °C)] 98.1 °F (36.7 °C)  Pulse:  [] 104  Resp:  [16-17] 16  SpO2:  [95 %-100 %] 100 %  BP: (139-198)/(56-86) 169/81     Weight: 59.3 kg (130 lb 11.7 oz)  Body mass index is 26.4 kg/m².    Physical Exam   Constitutional: She is oriented to person, place, and time. She appears well-developed and well-nourished. No distress.   HENT:   Head: Normocephalic and atraumatic.   Eyes: Pupils are equal, round, and reactive to light.   Neck: Neck supple. No thyromegaly present.   Cardiovascular: Normal rate and regular rhythm. Exam reveals no gallop and no friction rub.   No murmur heard.  Pulmonary/Chest: Effort normal and breath sounds normal. No respiratory distress. She has no wheezes.   Abdominal: Soft. Bowel sounds are normal. She exhibits no distension. There is tenderness. There is no guarding.   Musculoskeletal: Normal range of motion. She exhibits no edema.   Neurological: She is alert and oriented to person, place, and time.   Skin: Skin is warm and dry. No erythema.   Psychiatric: She has a normal mood and affect.         CRANIAL NERVES     CN III, IV, VI   Pupils are equal,  round, and reactive to light.       Significant Labs:   CBC:   Recent Labs   Lab 06/20/19  0552   WBC 15.80*   HGB 12.5   HCT 37.8          Significant Imaging: I have reviewed all pertinent imaging results/findings within the past 24 hours.

## 2019-06-20 NOTE — NURSING
Patient arrived to unit via bed. Oriented to room. VSS. Patient rates pain 0/10. Educated on call light use and within reach, bed in low position w/ wheels locked, side rails up x 2, bed alarm set. IV antibiotics infusing per order. POC reviewed with patient. No questions at this time. Will continue to monitor.

## 2019-06-20 NOTE — PLAN OF CARE
Met with pt to complete her assessment.  Pt's spouse and daughter were in the room.  Pt, who is independent with her self care, drives, denies having any DME or home health services and lives with her spouse.  Pt's PCP is Dr. Boss, cardiologist is Dr. Rosas and insurance is N.  Pt's discharge disposition is home with no anticipated needs.       06/20/19 1415   Discharge Assessment   Assessment Type Discharge Planning Assessment   Confirmed/corrected address and phone number on facesheet? Yes   Assessment information obtained from? Patient   Prior to hospitilization cognitive status: Alert/Oriented   Prior to hospitalization functional status: Independent   Current cognitive status: Alert/Oriented   Current Functional Status: Independent   Lives With spouse   Able to Return to Prior Arrangements yes   Is patient able to care for self after discharge? Yes   Who are your caregiver(s) and their phone number(s)?   (spouse Jesus, [phone])   Patient's perception of discharge disposition home or selfcare   Readmission Within the Last 30 Days no previous admission in last 30 days   Patient currently being followed by outpatient case management? No   Patient currently receives any other outside agency services? No   Do you have any problems affording any of your prescribed medications? No  ([facility]   Is the patient taking medications as prescribed? yes   Does the patient have transportation home? Yes   Transportation Anticipated family or friend will provide   Does the patient receive services at the [facility]? No   Discharge Plan A Home with family   Discharge Plan B Home Health   Patient/Family in Agreement with Plan yes

## 2019-06-21 VITALS
OXYGEN SATURATION: 95 % | RESPIRATION RATE: 18 BRPM | DIASTOLIC BLOOD PRESSURE: 83 MMHG | BODY MASS INDEX: 26.36 KG/M2 | HEIGHT: 59 IN | WEIGHT: 130.75 LBS | TEMPERATURE: 99 F | HEART RATE: 93 BPM | SYSTOLIC BLOOD PRESSURE: 116 MMHG

## 2019-06-21 LAB
ALBUMIN SERPL BCP-MCNC: 3 G/DL (ref 3.5–5.2)
ALP SERPL-CCNC: 77 U/L (ref 55–135)
ALT SERPL W/O P-5'-P-CCNC: 94 U/L (ref 10–44)
ANION GAP SERPL CALC-SCNC: 8 MMOL/L (ref 8–16)
AST SERPL-CCNC: 84 U/L (ref 10–40)
BASOPHILS # BLD AUTO: 0 K/UL (ref 0–0.2)
BASOPHILS NFR BLD: 0.2 % (ref 0–1.9)
BILIRUB SERPL-MCNC: 0.7 MG/DL (ref 0.1–1)
BUN SERPL-MCNC: 11 MG/DL (ref 8–23)
CALCIUM SERPL-MCNC: 8.6 MG/DL (ref 8.7–10.5)
CHLORIDE SERPL-SCNC: 105 MMOL/L (ref 95–110)
CO2 SERPL-SCNC: 25 MMOL/L (ref 23–29)
CREAT SERPL-MCNC: 0.7 MG/DL (ref 0.5–1.4)
CRP SERPL-MCNC: 240.8 MG/L (ref 0–8.2)
DIFFERENTIAL METHOD: ABNORMAL
EOSINOPHIL # BLD AUTO: 0 K/UL (ref 0–0.5)
EOSINOPHIL NFR BLD: 0.2 % (ref 0–8)
ERYTHROCYTE [DISTWIDTH] IN BLOOD BY AUTOMATED COUNT: 15.1 % (ref 11.5–14.5)
EST. GFR  (AFRICAN AMERICAN): >60 ML/MIN/1.73 M^2
EST. GFR  (NON AFRICAN AMERICAN): >60 ML/MIN/1.73 M^2
GLUCOSE SERPL-MCNC: 122 MG/DL (ref 70–110)
HCT VFR BLD AUTO: 33.2 % (ref 37–48.5)
HGB BLD-MCNC: 11.2 G/DL (ref 12–16)
LYMPHOCYTES # BLD AUTO: 0.8 K/UL (ref 1–4.8)
LYMPHOCYTES NFR BLD: 5.7 % (ref 18–48)
MCH RBC QN AUTO: 29.1 PG (ref 27–31)
MCHC RBC AUTO-ENTMCNC: 33.7 G/DL (ref 32–36)
MCV RBC AUTO: 86 FL (ref 82–98)
MONOCYTES # BLD AUTO: 0.9 K/UL (ref 0.3–1)
MONOCYTES NFR BLD: 6.4 % (ref 4–15)
NEUTROPHILS # BLD AUTO: 12.9 K/UL (ref 1.8–7.7)
NEUTROPHILS NFR BLD: 87.5 % (ref 38–73)
PLATELET # BLD AUTO: 193 K/UL (ref 150–350)
PMV BLD AUTO: 7.6 FL (ref 9.2–12.9)
POTASSIUM SERPL-SCNC: 3 MMOL/L (ref 3.5–5.1)
PROCALCITONIN SERPL IA-MCNC: 0.22 NG/ML
PROT SERPL-MCNC: 6 G/DL (ref 6–8.4)
RBC # BLD AUTO: 3.85 M/UL (ref 4–5.4)
SODIUM SERPL-SCNC: 138 MMOL/L (ref 136–145)
WBC # BLD AUTO: 14.7 K/UL (ref 3.9–12.7)

## 2019-06-21 PROCEDURE — 84145 PROCALCITONIN (PCT): CPT

## 2019-06-21 PROCEDURE — 80053 COMPREHEN METABOLIC PANEL: CPT

## 2019-06-21 PROCEDURE — G0378 HOSPITAL OBSERVATION PER HR: HCPCS

## 2019-06-21 PROCEDURE — 99204 PR OFFICE/OUTPT VISIT, NEW, LEVL IV, 45-59 MIN: ICD-10-PCS | Mod: ,,, | Performed by: INTERNAL MEDICINE

## 2019-06-21 PROCEDURE — 96376 TX/PRO/DX INJ SAME DRUG ADON: CPT

## 2019-06-21 PROCEDURE — 25000003 PHARM REV CODE 250: Performed by: INTERNAL MEDICINE

## 2019-06-21 PROCEDURE — 25000003 PHARM REV CODE 250: Performed by: EMERGENCY MEDICINE

## 2019-06-21 PROCEDURE — 85025 COMPLETE CBC W/AUTO DIFF WBC: CPT

## 2019-06-21 PROCEDURE — 86140 C-REACTIVE PROTEIN: CPT

## 2019-06-21 PROCEDURE — 94761 N-INVAS EAR/PLS OXIMETRY MLT: CPT

## 2019-06-21 PROCEDURE — 25000003 PHARM REV CODE 250: Performed by: NURSE PRACTITIONER

## 2019-06-21 PROCEDURE — 63600175 PHARM REV CODE 636 W HCPCS: Performed by: INTERNAL MEDICINE

## 2019-06-21 PROCEDURE — 36415 COLL VENOUS BLD VENIPUNCTURE: CPT

## 2019-06-21 PROCEDURE — 99204 OFFICE O/P NEW MOD 45 MIN: CPT | Mod: ,,, | Performed by: INTERNAL MEDICINE

## 2019-06-21 RX ORDER — POTASSIUM CHLORIDE 20 MEQ/1
40 TABLET, EXTENDED RELEASE ORAL ONCE
Status: COMPLETED | OUTPATIENT
Start: 2019-06-21 | End: 2019-06-21

## 2019-06-21 RX ORDER — METRONIDAZOLE 500 MG/1
500 TABLET ORAL EVERY 8 HOURS
Qty: 21 TABLET | Refills: 0 | Status: SHIPPED | OUTPATIENT
Start: 2019-06-21 | End: 2019-06-21 | Stop reason: SDUPTHER

## 2019-06-21 RX ORDER — CIPROFLOXACIN 500 MG/1
500 TABLET ORAL EVERY 12 HOURS
Qty: 20 TABLET | Refills: 0 | Status: SHIPPED | OUTPATIENT
Start: 2019-06-21 | End: 2019-07-01

## 2019-06-21 RX ORDER — METRONIDAZOLE 500 MG/1
500 TABLET ORAL EVERY 8 HOURS
Qty: 30 TABLET | Refills: 0 | Status: SHIPPED | OUTPATIENT
Start: 2019-06-21 | End: 2019-07-01

## 2019-06-21 RX ORDER — CIPROFLOXACIN 500 MG/1
500 TABLET ORAL EVERY 12 HOURS
Qty: 14 TABLET | Refills: 0 | Status: SHIPPED | OUTPATIENT
Start: 2019-06-21 | End: 2019-06-21 | Stop reason: SDUPTHER

## 2019-06-21 RX ADMIN — PIPERACILLIN SODIUM AND TAZOBACTAM SODIUM 4.5 G: 4; .5 INJECTION, POWDER, LYOPHILIZED, FOR SOLUTION INTRAVENOUS at 10:06

## 2019-06-21 RX ADMIN — PANTOPRAZOLE SODIUM 40 MG: 40 TABLET, DELAYED RELEASE ORAL at 09:06

## 2019-06-21 RX ADMIN — POTASSIUM CHLORIDE 40 MEQ: 20 TABLET, EXTENDED RELEASE ORAL at 04:06

## 2019-06-21 RX ADMIN — METOPROLOL SUCCINATE 25 MG: 25 TABLET, EXTENDED RELEASE ORAL at 09:06

## 2019-06-21 RX ADMIN — ATORVASTATIN CALCIUM 80 MG: 40 TABLET, FILM COATED ORAL at 09:06

## 2019-06-21 RX ADMIN — POTASSIUM CHLORIDE 40 MEQ: 20 TABLET, EXTENDED RELEASE ORAL at 10:06

## 2019-06-21 RX ADMIN — PIPERACILLIN SODIUM AND TAZOBACTAM SODIUM 4.5 G: 4; .5 INJECTION, POWDER, LYOPHILIZED, FOR SOLUTION INTRAVENOUS at 03:06

## 2019-06-21 NOTE — NURSING
PIV removed. Tele removed. D/c instructions given and explained, pt and pt's  verbalized understanding. Pt aware to  rx's from pharmacy and of f/u appts. All questions answered. Pt left unit safely with all belongings via w/c escorted by transport tech.

## 2019-06-21 NOTE — PLAN OF CARE
06/21/19 1050   ENGLISH Message   Medicare Outpatient and Observation Notification regarding financial responsibility Given to patient/caregiver;Explained to patient/caregiver;Signed/date by patient/caregiver   Date ENGLISH was signed 06/21/19   Time ENGLISH was signed 1050

## 2019-06-21 NOTE — NURSING
Lanette Puri NP notified of potassium 3.0 via secure chat. Response received but no new orders at this time.    0945 orders obtained

## 2019-06-21 NOTE — PLAN OF CARE
06/21/19 1546   Final Note   Assessment Type Final Discharge Note   Anticipated Discharge Disposition Home   Hospital Follow Up  Appt(s) scheduled? Yes

## 2019-06-21 NOTE — PROGRESS NOTES
06/21/19 0745   Patient Assessment/Suction   Level of Consciousness (AVPU) alert   PRE-TX-O2   O2 Device (Oxygen Therapy) room air   SpO2 95 %   Pulse Oximetry Type Intermittent   $ Pulse Oximetry - Multiple Charge Pulse Oximetry - Multiple   Pulse 91   Resp 16

## 2019-06-21 NOTE — PLAN OF CARE
Problem: Adult Inpatient Plan of Care  Goal: Plan of Care Review  Outcome: Ongoing (interventions implemented as appropriate)  POC/Meds reviewed, pt verbalized understanding. Vitals stable. Afebrile. IVPB abx administered. Tele In place- Pacer. Up with standby BR. SCD's on. 1x bloody mucous stool. Denies nausea. Reposistions self. Hourly/Q2hr rounding performed, safety maintained. Bed in lowest position, wheels locked, SR up x2, call light in easy reach. No  complaints at this time. Will continue to monitor.

## 2019-06-21 NOTE — PLAN OF CARE
Hospital follows scheduled and updated the AVS and pt's nurse.      06/21/19 1545   Discharge Reassessment   Assessment Type Discharge Planning Reassessment

## 2019-06-21 NOTE — CONSULTS
[facility]     CC: Blood in stool    HPI 78 y.o. female with blood in stool, recent onset, associated with some abdominal discomfort and diarrhea, mild amount, with no alleviating/exacerbating factors.  She denies prior history of the same.  She states that she had colonoscopy with Dr. Joseph about a year ago and had diverticulosis and a few small polyps.  She states that her brother had colon cancer but he was over the age of 60 when diagnosed.  She denies weight loss or emesis but does state that she had nausea initially. She feels as though her BMs are slowly returning to normal in terms of form and frequency and that blood is resolving as well.  No other acute complaints.    Past Medical History:   Diagnosis Date    Diverticulitis     Hypertension     Kidney stones     Shingles     TIA (transient ischemic attack) 12/2017       Past Surgical History:   Procedure Laterality Date    CARDIAC SURGERY      pacemaker    CERVICAL FUSION      CHOLECYSTECTOMY      HYSTERECTOMY      INSERTION-PACEMAKER-DUAL Left 12/21/2017    Performed by a[facility]dley Arango MD at [facility] CATH LAB    SHOULDER SURGERY         Social History     Tobacco Use    Smoking status: Former Smoker     Years: 20.00    Smokeless tobacco: Never Used   Substance Use Topics    Alcohol use: Not on file     Comment: Glass of wine each night    Drug use: No       Family History   Problem Relation Age of Onset    Cancer Father         lung    Cancer Brother         colon cancer    Eczema Neg Hx     Psoriasis Neg Hx     Lupus Neg Hx     Melanoma Neg Hx        Review of Systems  General ROS: negative for - chills, fever or weight loss  Psychological ROS: negative for - hallucination, depression or suicidal ideation  Ophthalmic ROS: negative for - blurry vision, photophobia or eye pain  ENT ROS: negative for - epistaxis, sore throat or rhinorrhea  Respiratory ROS: no cough, shortness of breath, or wheezing  Cardiovascular ROS: no chest  "pain or dyspnea on exertion  Gastrointestinal ROS: + abdominal pain, blood in stool, diarrhea, all of which are improving  Genito-Urinary ROS: no dysuria, trouble voiding, or hematuria  Musculoskeletal ROS: negative for - arthralgia, myalgia, weakness  Neurological ROS: no syncope or seizures; no ataxia  Dermatological ROS: negative for pruritis, rash and jaundice    Physical Examination  /60   Pulse 90   Temp 97.4 °F (36.3 °C)   Resp 16   Ht 4' 11" (1.499 m)   Wt 59.3 kg (130 lb 11.7 oz)   SpO2 95%   Breastfeeding? No   BMI 26.40 kg/m²   General appearance: alert, cooperative, no distress  HENT: Normocephalic, atraumatic, neck symmetrical, no nasal discharge   Eyes: conjunctivae/corneas clear, PERRL, EOM's intact, sclera anicteric  Lungs: clear to auscultation bilaterally, no dullness to percussion bilaterally, symmetric expansion, breathing unlabored  Heart: regular rate and rhythm without rub; no displacement of the PMI   Abdomen: soft with TTP in LLQ + BS  Extremities: extremities symmetric; no clubbing, cyanosis, or edema  Integument: Skin color, texture, turgor normal; no rashes; hair distrubution normal, no jaundice  Neurologic: Alert and oriented X 3, no focal sensory or motor neurologic deficits  Psychiatric: no pressured speech; normal affect; no evidence of impaired cognition, no anxiety/depression     Labs:  Lab Results   Component Value Date    WBC 14.70 (H) 06/21/2019    HGB 11.2 (L) 06/21/2019    HCT 33.2 (L) 06/21/2019    MCV 86 06/21/2019     06/21/2019       CMP  Sodium   Date Value Ref Range Status   06/21/2019 138 136 - 145 mmol/L Final     Potassium   Date Value Ref Range Status   06/21/2019 3.0 (L) 3.5 - 5.1 mmol/L Final     Chloride   Date Value Ref Range Status   06/21/2019 105 95 - 110 mmol/L Final     CO2   Date Value Ref Range Status   06/21/2019 25 23 - 29 mmol/L Final     Glucose   Date Value Ref Range Status   06/21/2019 122 (H) 70 - 110 mg/dL Final     BUN, Bld "   Date Value Ref Range Status   06/21/2019 11 8 - 23 mg/dL Final     Creatinine   Date Value Ref Range Status   06/21/2019 0.7 0.5 - 1.4 mg/dL Final     Calcium   Date Value Ref Range Status   06/21/2019 8.6 (L) 8.7 - 10.5 mg/dL Final     Total Protein   Date Value Ref Range Status   06/21/2019 6.0 6.0 - 8.4 g/dL Final     Albumin   Date Value Ref Range Status   06/21/2019 3.0 (L) 3.5 - 5.2 g/dL Final     Total Bilirubin   Date Value Ref Range Status   06/21/2019 0.7 0.1 - 1.0 mg/dL Final     Comment:     For infants and newborns, interpretation of results should be based  on gestational age, weight and in agreement with clinical  observations.  Premature Infant recommended reference ranges:  Up to 24 hours.............<8.0 mg/dL  Up to 48 hours............<12.0 mg/dL  3-5 days..................<15.0 mg/dL  6-29 days.................<15.0 mg/dL       Alkaline Phosphatase   Date Value Ref Range Status   06/21/2019 77 55 - 135 U/L Final     AST   Date Value Ref Range Status   06/21/2019 84 (H) 10 - 40 U/L Final     ALT   Date Value Ref Range Status   06/21/2019 94 (H) 10 - 44 U/L Final     Anion Gap   Date Value Ref Range Status   06/21/2019 8 8 - 16 mmol/L Final     eGFR if    Date Value Ref Range Status   06/21/2019 >60 >60 mL/min/1.73 m^2 Final     eGFR if non    Date Value Ref Range Status   06/21/2019 >60 >60 mL/min/1.73 m^2 Final     Comment:     Calculation used to obtain the estimated glomerular filtration  rate (eGFR) is the CKD-EPI equation.            Imaging:  CT scan was independently visualized and reviewed by me and showed left sided colitis.    I have personally reviewed these images    Case discussed with Lanette Puri NP who relates that plan is for likely discharge today.    Case discussed with patient's  who states that she has had problems for a few days.    Assessment:   1.  Abdominal pain  2.  Diarrhea  3.  Blood in stool  4.  Colitis      Plan:  1.  PO  as tolerated  2.  Agree with antibiotics to complete 10-14 day course  3.  Outpatient follow up with GI in 4 weeks to discuss possible colonoscopy.  Would defer for now given acute inflammation.  4.  OK to discharge home from GI standpoint.    5.  Communication will be sent to the referring provider, Dr. Espitia regarding my assessment and plan on this patient via EPIC.      Elio Bridges MD  [facility]  [address], [address]  [place] [address]  Office: [phone]  Fax: [phone]

## 2019-06-21 NOTE — PLAN OF CARE
Hospital follow ups scheduled and added to the AVS.      06/21/19 1037   Discharge Reassessment   Assessment Type Discharge Planning Reassessment

## 2019-06-21 NOTE — PROGRESS NOTES
06/21/19 1205   PRE-TX-O2   O2 Device (Oxygen Therapy) room air   SpO2 95 %   Pulse Oximetry Type Intermittent   Pulse 90   Resp 16

## 2019-06-22 NOTE — DISCHARGE SUMMARY
[facility] Hospital Medicine  Discharge Summary      Patient Name: Maricel Abdul  MRN: [id number]  Admission Date: 6/20/2019  Hospital Length of Stay: 1 days  Discharge Date and Time: 6/21/2019  4:42 PM  Attending Physician: Cecile att. providers found   Discharging Provider: Nicolas Puri NP  Primary Care Provider: Adiel Boss MD      HPI:   78 year old female with history of CVA, complete heart block with pacemaker here with 12 hours of persistent, bloody, painless diarrhea associated with fatigue.  She states that yesterday she was not feeling well and overnight she had an episode of bright red, bloody diarrhea that has persisted.  This has all been small volume and not associated with syncope.  She presented to the ED for evaluation and was found to be hemodynamically stable but with a lactic acidosis and evidence of a distal colitis on CT.  She has no endorsement of abdominal distension, emesis, fevers, or any other related complaints.     She has had a previous colonoscopy around one year ago that was significant for diverticulosis.     * No surgery found *      Hospital Course:   Patient monitor closely during observation stay.  She was initiated on IV Zosyn upon admit for colitis.  GI consulted.  Hemoglobin and hematocrit stable.  Procalcitonin negative. The patient was recommended for outpatient colonoscopy once colitis resolved.  She is stable for discharge from GI standpoint on oral antibiotics.     Physical exam  Chest clear auscultation  Abdomen soft mild lower abdominal tenderness mild distension.  Patient tolerating diet without nausea vomiting.     Consults:   Consults (From admission, onward)        Status Ordering Provider     Inpatient consult to Gastroenterology  Once     Provider:  Elio Perez MD    Completed NICOLAS PURI          No new Assessment & Plan notes have been filed under this hospital service since the last note was generated.  Service: [facility]  Medicine    Final Active Diagnoses:    Diagnosis Date Noted POA    PRINCIPAL PROBLEM:  Colitis [K52.9] 06/20/2019 Yes    Hematochezia [K92.1] 06/20/2019 Yes    Lactic acidosis [E87.2] 06/20/2019 Yes    Essential hypertension [I10] 12/25/2017 Yes    Mixed hyperlipidemia [E78.2] 12/25/2017 Yes    Complete heart block [I44.2] 12/25/2017 Yes      Problems Resolved During this Admission:       Discharged Condition: stable    Disposition: Home or Self Care    Follow Up:  Follow-up Information     Adiel Boss MD On 7/1/2019.    Specialty:  Family Medicine  Why:  hospital follow up 7-1-19 @ 3:20 p.m.  Contact information:  [address]  [address]  [facility]  [place] [address]  [phone]             Elio Perez MD On 7/19/2019.    Specialty:  Gastroenterology  Why:  7-19-19 @ 11 a.m.  Contact information:  [address]  [address]  [place] [address]  [phone]                 Patient Instructions:      Diet Cardiac     Notify your health care provider if you experience any of the following:  severe persistent headache     Notify your health care provider if you experience any of the following:  difficulty breathing or increased cough     Notify your health care provider if you experience any of the following:  persistent nausea and vomiting or diarrhea     Notify your health care provider if you experience any of the following:  severe uncontrolled pain     Notify your health care provider if you experience any of the following:  redness, tenderness, or signs of infection (pain, swelling, redness, odor or green/yellow discharge around incision site)     Notify your health care provider if you experience any of the following:  temperature >100.4     Notify your health care provider if you experience any of the following:  worsening rash     Activity as tolerated       Significant Diagnostic Studies: Labs:   BMP:   Recent Labs   Lab 06/21/19  0635   *      K 3.0*       CO2 25   BUN 11   CREATININE 0.7   CALCIUM 8.6*    and CMP   Recent Labs   Lab 06/21/19  0635      K 3.0*      CO2 25   *   BUN 11   CREATININE 0.7   CALCIUM 8.6*   PROT 6.0   ALBUMIN 3.0*   BILITOT 0.7   ALKPHOS 77   AST 84*   ALT 94*   ANIONGAP 8   ESTGFRAFRICA >60   EGFRNONAA >60     Radiology: CT scan: CT ABDOMEN PELVIS WITH CONTRAST:   Results for orders placed or performed during the hospital encounter of 06/20/19   CT Abdomen Pelvis With Contrast    Narrative    EXAMINATION:  CT ABDOMEN PELVIS WITH CONTRAST    CLINICAL HISTORY:  GI bleeding;    TECHNIQUE:  Low dose axial images, sagittal and coronal reformations were obtained from the lung bases to the pubic symphysis following the IV administration of 75 mL of Omnipaque 350 .  Oral contrast was not given.    COMPARISON:  None.    FINDINGS:  Lung bases are clear.  Heart size normal with cardiac pacing wires.  Cholecystectomy.    Common bile duct is dilated measuring 13 mm just outside the liver.  There is an 11 mm hypodense lesion right renal midpole, Hounsfield units 35.  There is a 4 mm right nephrolith.  Pancreas atrophy.  Remaining solid abdominal organs unremarkable.    There is no enteric contrast which limits bowel assessment.  No dilated bowel loops.  There is long segment wall thickening of the colon.  There is some pericolonic fat stranding along the sigmoid segment and distal descending segment    Atherosclerosis.  Hysterectomy.  Urinary bladder unremarkable.    There is partial ankylosis of T10 and T11 vertebral bodies.  Moderate degenerative disc disease at several included lower thoracic levels and also at the L5-S1 level.  Facet degenerative change at several lower lumbar levels.  Bone anchors along the bilateral pubic bodies.      Impression    1. Suspect infectious or inflammatory colitis along the descending and sigmoid segments.  2. Cardiac pacer.  3. Right nephrolith.  4. Right renal lesion too high in density for a  simple cyst.  Consider ultrasound to exclude solid lesion.  5. Dilated common bile duct.  This could relate to senescent change and/or reservoir effect post cholecystectomy.  Correlate with bilirubin level as the need for further assessment with ERCP/MRCP.      Electronically signed by: Yousif Walters  Date:    06/20/2019  Time:    09:07    and CT ABDOMEN PELVIS WITHOUT CONTRAST: No results found for this visit on 06/20/19.    Pending Diagnostic Studies:     None         Medications:  Reconciled Home Medications:      Medication List      START taking these medications    ciprofloxacin HCl 500 MG tablet  Commonly known as:  CIPRO  Take 1 tablet (500 mg total) by mouth every 12 (twelve) hours. for 10 days     metroNIDAZOLE 500 MG tablet  Commonly known as:  FLAGYL  Take 1 tablet (500 mg total) by mouth every 8 (eight) hours. for 10 days        CONTINUE taking these medications    amitriptyline 50 MG tablet  Commonly known as:  ELAVIL  Take 50 mg by mouth every evening.     aspirin 325 MG tablet  Take 1 tablet (325 mg total) by mouth once daily.     atorvastatin 80 MG tablet  Commonly known as:  LIPITOR  Take 80 mg by mouth once daily.     clotrimazole 1 % cream  Commonly known as:  LOTRIMIN  AAA axilla BID PRN flare     LORazepam 0.5 MG tablet  Commonly known as:  ATIVAN     losartan-hydrochlorothiazide 100-12.5 mg 100-12.5 mg Tab  Commonly known as:  HYZAAR  Take 1 tablet by mouth once daily.     metoprolol succinate 25 MG 24 hr tablet  Commonly known as:  TOPROL-XL     omeprazole 20 MG capsule  Commonly known as:  PRILOSEC  Take 20 mg by mouth once daily.     ONE DAILY MULTIVITAMIN per tablet  Generic drug:  multivitamin  Take 1 tablet by mouth once daily.     PRESERVISION AREDS ORAL  Take by mouth.     sertraline 50 MG tablet  Commonly known as:  ZOLOFT     VITAMIN D3 5,000 unit Tab  Generic drug:  cholecalciferol (vitamin D3)  Take 5,000 Units by mouth once daily.            Indwelling Lines/Drains at time of  discharge:   Lines/Drains/Airways          None          Time spent on the discharge of patient: 40 minutes  Patient was seen and examined on the date of discharge and determined to be suitable for discharge.         Lanette Puri NP  Department of Hospital Medicine  [facility]

## 2019-06-24 ENCOUNTER — NURSE TRIAGE (OUTPATIENT)
Dept: ADMINISTRATIVE | Facility: CLINIC | Age: 79
End: 2019-06-24

## 2019-06-24 ENCOUNTER — TELEPHONE (OUTPATIENT)
Dept: MEDSURG UNIT | Facility: HOSPITAL | Age: 79
End: 2019-06-24

## 2019-06-24 ENCOUNTER — TELEPHONE (OUTPATIENT)
Dept: GASTROENTEROLOGY | Facility: CLINIC | Age: 79
End: 2019-06-24

## 2019-06-24 NOTE — TELEPHONE ENCOUNTER
Patient states she had diarrhea all day thinks its from the antibiotics she is on, everything eats and even water goes right through her, should she try a different antibiotic? Instructed needs to stay hydrated or go back to the ER.

## 2019-06-24 NOTE — TELEPHONE ENCOUNTER
Reason for Disposition   SEVERE diarrhea (e.g., 7 or more times / day more than normal) and age > 60 years    Protocols used: DIARRHEA-A-OH

## 2019-06-24 NOTE — TELEPHONE ENCOUNTER
----- Message from Katelin Phan sent at 6/24/2019  3:09 PM CDT -----  Contact: Patient  Type:  Sooner Apoointment Request    Caller is requesting a sooner appointment.  Caller declined first available appointment listed below.  Caller will not accept being placed on the waitlist and is requesting a message be sent to doctor.    Name of Caller:  Patient  When is the first available appointment?  Past July  Symptoms:  Diarrhea, since this morning  Best Call Back Number:    Additional Information:  Calling to schedule an appt this week, if possible

## 2019-06-25 LAB — BACTERIA BLD CULT: NORMAL

## 2019-06-25 NOTE — TELEPHONE ENCOUNTER
----- Message from Kayleigh Ibrahim sent at 6/25/2019  2:06 PM CDT -----  Type:  RX Refill Request    Who Called:  pt   New Rx:    RX Name and Strength:  ciprofloxach   500 mg/Twice a  day  /  metronidazole 500 mg   3  Times  daily  Preferred Pharmacy with phone number:    [facility] [address]  [address]  [facility] [address]  Phone: [phone] Fax: [phone]  Best Call Back Number:  [phone]  Additional Information:    Pt  Stated  The  Above meds are  Giving  Her  Diarrhea   And  Would  Like to switch  Out  Form  Something  In  Its  Place   Please call  For details

## 2019-06-26 ENCOUNTER — TELEPHONE (OUTPATIENT)
Dept: GASTROENTEROLOGY | Facility: CLINIC | Age: 79
End: 2019-06-26

## 2019-06-26 NOTE — TELEPHONE ENCOUNTER
Patient notified and understands will try taking the antibiotics again and let me know how she does.

## 2019-06-26 NOTE — TELEPHONE ENCOUNTER
----- Message from Avani Hogan sent at 6/26/2019 10:43 AM CDT -----  Type: Needs Medical Advice    Who Called:  Patient  Symptoms (please be specific):  Diarrhea  How long has patient had these symptoms:  Since 6/21/19  Pharmacy name and phone #:    [facility] - [place], [place] - [address]  [address]  [place] [address]  Phone: [phone] Fax: [phone]    Best Call Back Number: [phone] (home)     Additional Information: Please call to advise. She thinks that the antibiotics that the doctor gave her is giving her the diarrhea. Wants to talk to office as soon as possible.

## 2019-06-26 NOTE — TELEPHONE ENCOUNTER
Patient notified and understands I have sent a message to Dr. Perez and will let her know what he recommends when he gets in the office this afternoon.

## 2019-07-09 ENCOUNTER — CLINICAL SUPPORT (OUTPATIENT)
Dept: CARDIOLOGY | Facility: HOSPITAL | Age: 79
End: 2019-07-09
Attending: INTERNAL MEDICINE
Payer: MEDICARE

## 2019-07-09 DIAGNOSIS — I44.2 CHB (COMPLETE HEART BLOCK): ICD-10-CM

## 2019-07-09 DIAGNOSIS — Z95.0 CARDIAC PACEMAKER IN SITU: ICD-10-CM

## 2019-07-09 PROCEDURE — 93296 REM INTERROG EVL PM/IDS: CPT

## 2019-07-18 ENCOUNTER — OFFICE VISIT (OUTPATIENT)
Dept: GASTROENTEROLOGY | Facility: CLINIC | Age: 79
End: 2019-07-18
Payer: MEDICARE

## 2019-07-18 VITALS
HEART RATE: 87 BPM | DIASTOLIC BLOOD PRESSURE: 75 MMHG | SYSTOLIC BLOOD PRESSURE: 112 MMHG | HEIGHT: 59 IN | BODY MASS INDEX: 24.8 KG/M2 | WEIGHT: 123 LBS

## 2019-07-18 DIAGNOSIS — K92.1 HEMATOCHEZIA: ICD-10-CM

## 2019-07-18 DIAGNOSIS — R93.3 ABNORMAL CT SCAN, COLON: ICD-10-CM

## 2019-07-18 DIAGNOSIS — K52.9 COLITIS: Primary | ICD-10-CM

## 2019-07-18 PROCEDURE — 1101F PR PT FALLS ASSESS DOC 0-1 FALLS W/OUT INJ PAST YR: ICD-10-PCS | Mod: CPTII,S$GLB,, | Performed by: INTERNAL MEDICINE

## 2019-07-18 PROCEDURE — 99214 PR OFFICE/OUTPT VISIT, EST, LEVL IV, 30-39 MIN: ICD-10-PCS | Mod: S$GLB,,, | Performed by: INTERNAL MEDICINE

## 2019-07-18 PROCEDURE — 99999 PR PBB SHADOW E&M-EST. PATIENT-LVL III: CPT | Mod: PBBFAC,,, | Performed by: INTERNAL MEDICINE

## 2019-07-18 PROCEDURE — 3078F PR MOST RECENT DIASTOLIC BLOOD PRESSURE < 80 MM HG: ICD-10-PCS | Mod: CPTII,S$GLB,, | Performed by: INTERNAL MEDICINE

## 2019-07-18 PROCEDURE — 3074F SYST BP LT 130 MM HG: CPT | Mod: CPTII,S$GLB,, | Performed by: INTERNAL MEDICINE

## 2019-07-18 PROCEDURE — 3074F PR MOST RECENT SYSTOLIC BLOOD PRESSURE < 130 MM HG: ICD-10-PCS | Mod: CPTII,S$GLB,, | Performed by: INTERNAL MEDICINE

## 2019-07-18 PROCEDURE — 99999 PR PBB SHADOW E&M-EST. PATIENT-LVL III: ICD-10-PCS | Mod: PBBFAC,,, | Performed by: INTERNAL MEDICINE

## 2019-07-18 PROCEDURE — 1101F PT FALLS ASSESS-DOCD LE1/YR: CPT | Mod: CPTII,S$GLB,, | Performed by: INTERNAL MEDICINE

## 2019-07-18 PROCEDURE — 99214 OFFICE O/P EST MOD 30 MIN: CPT | Mod: S$GLB,,, | Performed by: INTERNAL MEDICINE

## 2019-07-18 PROCEDURE — 3078F DIAST BP <80 MM HG: CPT | Mod: CPTII,S$GLB,, | Performed by: INTERNAL MEDICINE

## 2019-07-18 RX ORDER — GABAPENTIN 100 MG/1
CAPSULE ORAL
Refills: 2 | COMMUNITY
Start: 2019-05-25 | End: 2020-02-10

## 2019-07-18 NOTE — H&P (VIEW-ONLY)
"Subjective:       Patient ID: Maricel Abdul is a 79 y.o. female.    This is an established patient.      Chief Complaint: Blood in stool    PAST HISTORY:    Patient with blood in stool, recent onset, associated with some abdominal discomfort and diarrhea, mild amount, with no alleviating/exacerbating factors.  She denies prior history of the same.  She states that she had colonoscopy with Dr. Joseph about a year ago and had diverticulosis and a few small polyps.  She states that her brother had colon cancer but he was over the age of 60 when diagnosed.  She denies weight loss or emesis but does state that she had nausea initially. She feels as though her BMs are slowly returning to normal in terms of form and frequency and that blood is resolving as well.  No other acute complaints.      INTERVAL HISTORY:  Since inpatient evaluation above, patient notes that symptoms have greatly improved.  No abdominal pain or blood in stool.  BMs have not quite returned to baseline but are indeed improved.  She is taking a probiotic with some improvement as well.      Review of Systems   Constitutional: Negative for chills, fatigue and fever.   HENT: Negative for sore throat and trouble swallowing.    Respiratory: Negative for cough, shortness of breath and wheezing.    Cardiovascular: Negative for chest pain and palpitations.   Gastrointestinal: Negative for abdominal pain, blood in stool, nausea and vomiting.   Genitourinary: Negative for dysuria and hematuria.   Musculoskeletal: Negative for arthralgias and myalgias.   Skin: Negative for color change and rash.   Neurological: Negative for dizziness and headaches.   Hematological: Negative for adenopathy.   Psychiatric/Behavioral: Negative for confusion. The patient is not nervous/anxious.    All other systems reviewed and are negative.      Objective:       Vitals:    07/18/19 1304   BP: 112/75   Pulse: 87   Weight: 55.8 kg (123 lb 0.3 oz)   Height: 4' 11" (1.499 m) "       Physical Exam   Constitutional: She appears well-developed and well-nourished.   HENT:   Head: Normocephalic and atraumatic.   Eyes: Pupils are equal, round, and reactive to light. No scleral icterus.   Neck: Normal range of motion.   Cardiovascular: Normal rate and regular rhythm.   No murmur heard.  Pulmonary/Chest: Effort normal and breath sounds normal. She has no wheezes.   Abdominal: Soft. Bowel sounds are normal. She exhibits no distension. There is no tenderness.   Musculoskeletal: She exhibits no edema or tenderness.   Lymphadenopathy:     She has no cervical adenopathy.   Neurological: She is alert.   Skin: Skin is warm and dry. No rash noted.   Vitals reviewed.        Lab Results   Component Value Date    WBC 14.70 (H) 06/21/2019    HGB 11.2 (L) 06/21/2019    HCT 33.2 (L) 06/21/2019    MCV 86 06/21/2019     06/21/2019       CMP  Sodium   Date Value Ref Range Status   06/21/2019 138 136 - 145 mmol/L Final     Potassium   Date Value Ref Range Status   06/21/2019 3.0 (L) 3.5 - 5.1 mmol/L Final     Chloride   Date Value Ref Range Status   06/21/2019 105 95 - 110 mmol/L Final     CO2   Date Value Ref Range Status   06/21/2019 25 23 - 29 mmol/L Final     Glucose   Date Value Ref Range Status   06/21/2019 122 (H) 70 - 110 mg/dL Final     BUN, Bld   Date Value Ref Range Status   06/21/2019 11 8 - 23 mg/dL Final     Creatinine   Date Value Ref Range Status   06/21/2019 0.7 0.5 - 1.4 mg/dL Final     Calcium   Date Value Ref Range Status   06/21/2019 8.6 (L) 8.7 - 10.5 mg/dL Final     Total Protein   Date Value Ref Range Status   06/21/2019 6.0 6.0 - 8.4 g/dL Final     Albumin   Date Value Ref Range Status   06/21/2019 3.0 (L) 3.5 - 5.2 g/dL Final     Total Bilirubin   Date Value Ref Range Status   06/21/2019 0.7 0.1 - 1.0 mg/dL Final     Comment:     For infants and newborns, interpretation of results should be based  on gestational age, weight and in agreement with clinical  observations.  Premature  Infant recommended reference ranges:  Up to 24 hours.............<8.0 mg/dL  Up to 48 hours............<12.0 mg/dL  3-5 days..................<15.0 mg/dL  6-29 days.................<15.0 mg/dL       Alkaline Phosphatase   Date Value Ref Range Status   06/21/2019 77 55 - 135 U/L Final     AST   Date Value Ref Range Status   06/21/2019 84 (H) 10 - 40 U/L Final     ALT   Date Value Ref Range Status   06/21/2019 94 (H) 10 - 44 U/L Final     Anion Gap   Date Value Ref Range Status   06/21/2019 8 8 - 16 mmol/L Final     eGFR if    Date Value Ref Range Status   06/21/2019 >60 >60 mL/min/1.73 m^2 Final     eGFR if non    Date Value Ref Range Status   06/21/2019 >60 >60 mL/min/1.73 m^2 Final     Comment:     Calculation used to obtain the estimated glomerular filtration  rate (eGFR) is the CKD-EPI equation.          Assessment:       1. Colitis    2. Hematochezia    3. Abnormal CT scan, colon        Plan:       1.  Continue current medications  2.  Colonoscopy for abnormal CT scan  3.  Agree with probiotic  4.  Further recommendations to follow after above.

## 2019-08-06 ENCOUNTER — DOCUMENTATION ONLY (OUTPATIENT)
Dept: GASTROENTEROLOGY | Facility: CLINIC | Age: 79
End: 2019-08-06

## 2019-08-14 ENCOUNTER — ANESTHESIA (OUTPATIENT)
Dept: ENDOSCOPY | Facility: HOSPITAL | Age: 79
End: 2019-08-14
Payer: MEDICARE

## 2019-08-14 ENCOUNTER — HOSPITAL ENCOUNTER (OUTPATIENT)
Facility: HOSPITAL | Age: 79
Discharge: HOME | End: 2019-08-14
Attending: INTERNAL MEDICINE | Admitting: INTERNAL MEDICINE
Payer: MEDICARE

## 2019-08-14 ENCOUNTER — ANESTHESIA EVENT (OUTPATIENT)
Dept: ENDOSCOPY | Facility: HOSPITAL | Age: 79
End: 2019-08-14
Payer: MEDICARE

## 2019-08-14 DIAGNOSIS — K57.30 DIVERTICULOSIS OF LARGE INTESTINE WITHOUT HEMORRHAGE: ICD-10-CM

## 2019-08-14 DIAGNOSIS — K64.8 INTERNAL HEMORRHOIDS: ICD-10-CM

## 2019-08-14 DIAGNOSIS — K63.5 POLYP OF COLON, UNSPECIFIED PART OF COLON, UNSPECIFIED TYPE: Primary | ICD-10-CM

## 2019-08-14 DIAGNOSIS — K52.9 COLITIS: ICD-10-CM

## 2019-08-14 PROCEDURE — 63600175 PHARM REV CODE 636 W HCPCS: Performed by: INTERNAL MEDICINE

## 2019-08-14 PROCEDURE — D9220A PRA ANESTHESIA: Mod: CRNA,,, | Performed by: NURSE ANESTHETIST, CERTIFIED REGISTERED

## 2019-08-14 PROCEDURE — 45380 PR COLONOSCOPY,BIOPSY: ICD-10-PCS | Mod: ,,, | Performed by: INTERNAL MEDICINE

## 2019-08-14 PROCEDURE — 37000009 HC ANESTHESIA EA ADD 15 MINS: Performed by: INTERNAL MEDICINE

## 2019-08-14 PROCEDURE — 45380 COLONOSCOPY AND BIOPSY: CPT | Performed by: INTERNAL MEDICINE

## 2019-08-14 PROCEDURE — 88305 TISSUE EXAM BY PATHOLOGIST: CPT | Performed by: PATHOLOGY

## 2019-08-14 PROCEDURE — 27201012 HC FORCEPS, HOT/COLD, DISP: Performed by: INTERNAL MEDICINE

## 2019-08-14 PROCEDURE — 37000008 HC ANESTHESIA 1ST 15 MINUTES: Performed by: INTERNAL MEDICINE

## 2019-08-14 PROCEDURE — D9220A PRA ANESTHESIA: ICD-10-PCS | Mod: CRNA,,, | Performed by: NURSE ANESTHETIST, CERTIFIED REGISTERED

## 2019-08-14 PROCEDURE — 88305 TISSUE SPECIMEN TO PATHOLOGY - SURGERY: ICD-10-PCS | Mod: 26,,, | Performed by: PATHOLOGY

## 2019-08-14 PROCEDURE — 88305 TISSUE EXAM BY PATHOLOGIST: CPT | Mod: 26,,, | Performed by: PATHOLOGY

## 2019-08-14 PROCEDURE — 63600175 PHARM REV CODE 636 W HCPCS: Performed by: NURSE ANESTHETIST, CERTIFIED REGISTERED

## 2019-08-14 PROCEDURE — D9220A PRA ANESTHESIA: Mod: ANES,,, | Performed by: ANESTHESIOLOGY

## 2019-08-14 PROCEDURE — 45380 COLONOSCOPY AND BIOPSY: CPT | Mod: ,,, | Performed by: INTERNAL MEDICINE

## 2019-08-14 PROCEDURE — 25000003 PHARM REV CODE 250: Performed by: NURSE ANESTHETIST, CERTIFIED REGISTERED

## 2019-08-14 PROCEDURE — D9220A PRA ANESTHESIA: ICD-10-PCS | Mod: ANES,,, | Performed by: ANESTHESIOLOGY

## 2019-08-14 RX ORDER — SODIUM CHLORIDE 9 MG/ML
INJECTION, SOLUTION INTRAVENOUS CONTINUOUS
Status: DISCONTINUED | OUTPATIENT
Start: 2019-08-14 | End: 2019-08-14 | Stop reason: HOSPADM

## 2019-08-14 RX ORDER — PROPOFOL 10 MG/ML
VIAL (ML) INTRAVENOUS
Status: DISCONTINUED | OUTPATIENT
Start: 2019-08-14 | End: 2019-08-14

## 2019-08-14 RX ORDER — ONDANSETRON 2 MG/ML
INJECTION INTRAMUSCULAR; INTRAVENOUS
Status: DISCONTINUED | OUTPATIENT
Start: 2019-08-14 | End: 2019-08-14

## 2019-08-14 RX ORDER — GLYCOPYRROLATE 0.2 MG/ML
INJECTION INTRAMUSCULAR; INTRAVENOUS
Status: DISCONTINUED | OUTPATIENT
Start: 2019-08-14 | End: 2019-08-14

## 2019-08-14 RX ORDER — LIDOCAINE HCL/PF 100 MG/5ML
SYRINGE (ML) INTRAVENOUS
Status: DISCONTINUED | OUTPATIENT
Start: 2019-08-14 | End: 2019-08-14

## 2019-08-14 RX ADMIN — PROPOFOL 20 MG: 10 INJECTION, EMULSION INTRAVENOUS at 09:08

## 2019-08-14 RX ADMIN — ONDANSETRON 4 MG: 2 INJECTION, SOLUTION INTRAMUSCULAR; INTRAVENOUS at 09:08

## 2019-08-14 RX ADMIN — SODIUM CHLORIDE: 0.9 INJECTION, SOLUTION INTRAVENOUS at 08:08

## 2019-08-14 RX ADMIN — LIDOCAINE HYDROCHLORIDE 75 MG: 20 INJECTION, SOLUTION INTRAVENOUS at 09:08

## 2019-08-14 RX ADMIN — GLYCOPYRROLATE 0.2 MG: 0.2 INJECTION, SOLUTION INTRAMUSCULAR; INTRAVENOUS at 09:08

## 2019-08-14 RX ADMIN — PROPOFOL 80 MG: 10 INJECTION, EMULSION INTRAVENOUS at 09:08

## 2019-08-14 NOTE — INTERVAL H&P NOTE
The patient has been examined and the H&P has been reviewed:    I concur with the findings and no changes have occurred since H&P was written.    Anesthesia/Surgery risks, benefits and alternative options discussed and understood by patient/family.          Active Hospital Problems    Diagnosis  POA    Colitis [K52.9]  Yes      Resolved Hospital Problems   No resolved problems to display.

## 2019-08-14 NOTE — PLAN OF CARE
"Patient is being brought to the car by Say from patient transport. Her  is driving her home. He did not want to come into the recovery room because he "has a bad cold". Patient was able to walk to the bathroom unassisted. Dr Perez spoke with patient post procedure.  "

## 2019-08-14 NOTE — ANESTHESIA POSTPROCEDURE EVALUATION
Anesthesia Post Evaluation    Patient: Maricel Abdul    Procedure(s) Performed: Procedure(s) (LRB):  COLONOSCOPY (N/A)    Final Anesthesia Type: general  Patient location during evaluation: PACU  Patient participation: Yes- Able to Participate  Level of consciousness: awake and alert and oriented  Post-procedure vital signs: reviewed and stable  Pain management: adequate  Airway patency: patent  PONV status at discharge: No PONV  Anesthetic complications: no      Cardiovascular status: blood pressure returned to baseline  Respiratory status: unassisted, spontaneous ventilation and room air  Hydration status: euvolemic  Follow-up not needed.          Vitals Value Taken Time   /60 8/14/2019 10:09 AM   Temp 36.8 °C (98.2 °F) 8/14/2019  8:36 AM   Pulse 78 8/14/2019 10:09 AM   Resp 19 8/14/2019 10:09 AM   SpO2 100 % 8/14/2019 10:09 AM         No case tracking events are documented in the log.      Pain/Ana Maria Score: No data recorded

## 2019-08-14 NOTE — ANESTHESIA PREPROCEDURE EVALUATION
08/14/2019  Maricel Abdul is a 79 y.o., female.    Anesthesia Evaluation    I have reviewed the Patient Summary Reports.    I have reviewed the Nursing Notes.   I have reviewed the Medications.     Review of Systems  Anesthesia Hx:  No problems with previous Anesthesia Denies Hx of Anesthetic complications    Social:  Non-Smoker    Cardiovascular:   Hypertension Denies MI.  Denies CAD.    Denies CABG/stent. Dysrhythmias   Denies Angina.    Pulmonary:   Denies COPD.  Denies Asthma.  Denies Recent URI.    Renal/:   Denies Chronic Renal Disease.     Hepatic/GI:   Denies GERD. Denies Liver Disease.    Neurological:   TIA, CVA Denies Seizures.    Endocrine:   Denies Diabetes. Denies Hypothyroidism.    Psych:   Denies Psychiatric History.          Physical Exam  General:  Well nourished    Airway/Jaw/Neck:  Airway Findings: Mouth Opening: Normal Tongue: Normal  General Airway Assessment: Adult, Good  Mallampati: II  Improves to II with phonation.  TM Distance: 4-6 cm      Dental:  Dental Findings: In tact   Chest/Lungs:  Chest/Lungs Findings: Clear to auscultation, Normal Respiratory Rate     Heart/Vascular:  Heart Findings: Rate: Normal  Rhythm: Regular Rhythm  Sounds: Normal  Heart murmur: negative       Mental Status:  Mental Status Findings:  Cooperative, Alert and Oriented         Anesthesia Plan  Type of Anesthesia, risks & benefits discussed:  Anesthesia Type:  general  Patient's Preference:   Intra-op Monitoring Plan: standard ASA monitors  Intra-op Monitoring Plan Comments:   Post Op Pain Control Plan:   Post Op Pain Control Plan Comments:   Induction:   IV  Beta Blocker:  Patient is on a Beta-Blocker and has received one dose within the past 24 hours (No further documentation required).       Informed Consent: Patient understands risks and agrees with Anesthesia plan.  Questions answered. Anesthesia  consent signed with patient.  ASA Score: 3     Day of Surgery Review of History & Physical: I have interviewed and examined the patient. I have reviewed the patient's H&P dated:  There are no significant changes.  H&P update referred to the surgeon.         Ready For Surgery From Anesthesia Perspective.

## 2019-08-14 NOTE — [employer] PATIENT INSTRUCTIONS
Discharge Summary/Instructions after an Endoscopic Procedure  Patient Name: Maricel Abdul  Patient MRN: [id number]  Patient YOB: 1940 Wednesday, August 14, 2019  Elio Bridges MD  RESTRICTIONS:  During your procedure today, you received medications for sedation.  These   medications may affect your judgment, balance and coordination.  Therefore,   for 24 hours, you have the following restrictions:   - DO NOT drive a car, operate machinery, make legal/financial decisions,   sign important papers or drink alcohol.    ACTIVITY:  Today: no heavy lifting, straining or running due to procedural   sedation/anesthesia.  The following day: return to full activity including work.  DIET:  Eat and drink normally unless instructed otherwise.     TREATMENT FOR COMMON SIDE EFFECTS:  - Mild abdominal pain, nausea, belching, bloating or excessive gas:  rest,   eat lightly and use a heating pad.  - Sore Throat: treat with throat lozenges and/or gargle with warm salt   water.  - Because air was used during the procedure, expelling large amounts of air   from your rectum or belching is normal.  - If a bowel prep was taken, you may not have a bowel movement for 1-3 days.    This is normal.  SYMPTOMS TO WATCH FOR AND REPORT TO YOUR PHYSICIAN:  1. Abdominal pain or bloating, other than gas cramps.  2. Chest pain.  3. Back pain.  4. Signs of infection such as: chills or fever occurring within 24 hours   after the procedure.  5. Rectal bleeding, which would show as bright red, maroon, or black stools.   (A tablespoon of blood from the rectum is not serious, especially if   hemorrhoids are present.)  6. Vomiting.  7. Weakness or dizziness.  GO DIRECTLY TO THE NEAREST EMERGENCY ROOM IF YOU HAVE ANY OF THE FOLLOWING:      Difficulty breathing              Chills and/or fever over 101 F   Persistent vomiting and/or vomiting blood   Severe abdominal pain   Severe chest pain   Black, tarry stools   Bleeding- more than one  tablespoon   Any other symptom or condition that you feel may need urgent attention  Your doctor recommends these additional instructions:  If any biopsies were taken, your doctors clinic will contact you in 1 to 2   weeks with any results.  - Patient has a contact number available for emergencies.  The signs and   symptoms of potential delayed complications were discussed with the   patient.  Return to normal activities tomorrow.  Written discharge   instructions were provided to the patient.   - High fiber diet.   - Continue present medications.   - Resume aspirin at prior dose today.   - Await pathology results.   - Repeat colonoscopy in 5 years for surveillance.   - Discharge patient to home (ambulatory).   - Return to my office PRN.  For questions, problems or results please call your physician - Elio Bridges MD at Work:  [phone].  [facility], EMERGENCY ROOM PHONE NUMBER: [phone]  IF A COMPLICATION OR EMERGENCY SITUATION ARISES AND YOU ARE UNABLE TO REACH   YOUR PHYSICIAN - GO DIRECTLY TO THE EMERGENCY ROOM.  Elio Bridges MD  8/14/2019 9:50:44 AM  This report has been verified and signed electronically.  [employer]

## 2019-08-14 NOTE — TRANSFER OF CARE
"Anesthesia Transfer of Care Note    Patient: Maricel Abdul    Procedure(s) Performed: Procedure(s) (LRB):  COLONOSCOPY (N/A)    Patient location: PACU    Anesthesia Type: general    Transport from OR: Transported from OR on room air with adequate spontaneous ventilation    Post pain: adequate analgesia    Post assessment: no apparent anesthetic complications and tolerated procedure well    Post vital signs: stable    Level of consciousness: sedated    Nausea/Vomiting: no nausea/vomiting    Complications: none    Transfer of care protocol was followed      Last vitals:   Visit Vitals  BP (!) 127/57 (BP Location: Left arm, Patient Position: Sitting)   Pulse 89   Temp 36.8 °C (98.2 °F) (Skin)   Resp 18   Ht 4' 11" (1.499 m)   Wt 55.8 kg (123 lb)   SpO2 97%   Breastfeeding? No   BMI 24.84 kg/m²     "

## 2019-08-14 NOTE — PLAN OF CARE
Have you had a colonoscopy LESS THAN 3 years ago?   * If YES, answer these questions*:yes    1. Did patient have a prior colonic polyp in a previous surveillance/diagnostic colonoscopy and is 18 years or older on date of encounter?no    2. Documentation of < 3 year interval since the patients last colonoscopy due to medical reasons (eg., last colonoscopy incomplete, last colonoscopy had inadequate prep, piecemeal removal of adenomas, or last colonoscopy found > 10 adenomas) ? Colitis/hematochezia

## 2019-08-15 VITALS
HEART RATE: 79 BPM | TEMPERATURE: 98 F | DIASTOLIC BLOOD PRESSURE: 60 MMHG | WEIGHT: 123 LBS | BODY MASS INDEX: 24.8 KG/M2 | OXYGEN SATURATION: 100 % | HEIGHT: 59 IN | RESPIRATION RATE: 18 BRPM | SYSTOLIC BLOOD PRESSURE: 132 MMHG

## 2019-08-19 ENCOUNTER — PATIENT MESSAGE (OUTPATIENT)
Dept: GASTROENTEROLOGY | Facility: CLINIC | Age: 79
End: 2019-08-19

## 2019-08-28 ENCOUNTER — CLINICAL SUPPORT (OUTPATIENT)
Dept: CARDIOLOGY | Facility: HOSPITAL | Age: 79
End: 2019-08-28
Payer: MEDICARE

## 2019-08-28 DIAGNOSIS — I44.2 ATRIOVENTRICULAR BLOCK, COMPLETE: ICD-10-CM

## 2019-08-28 DIAGNOSIS — Z95.0 PRESENCE OF CARDIAC PACEMAKER: ICD-10-CM

## 2019-08-28 PROCEDURE — 93296 REM INTERROG EVL PM/IDS: CPT | Performed by: INTERNAL MEDICINE

## 2019-09-21 ENCOUNTER — CLINICAL SUPPORT (OUTPATIENT)
Dept: URGENT CARE | Facility: CLINIC | Age: 79
End: 2019-09-21
Payer: MEDICARE

## 2019-09-21 VITALS
OXYGEN SATURATION: 96 % | RESPIRATION RATE: 16 BRPM | HEART RATE: 84 BPM | WEIGHT: 123 LBS | SYSTOLIC BLOOD PRESSURE: 96 MMHG | HEIGHT: 59 IN | BODY MASS INDEX: 24.8 KG/M2 | TEMPERATURE: 98 F | DIASTOLIC BLOOD PRESSURE: 64 MMHG

## 2019-09-21 DIAGNOSIS — J40 BRONCHITIS: ICD-10-CM

## 2019-09-21 DIAGNOSIS — J01.90 ACUTE NON-RECURRENT SINUSITIS, UNSPECIFIED LOCATION: Primary | ICD-10-CM

## 2019-09-21 PROCEDURE — 96372 THER/PROPH/DIAG INJ SC/IM: CPT | Mod: S$GLB,,, | Performed by: NURSE PRACTITIONER

## 2019-09-21 PROCEDURE — 99204 OFFICE O/P NEW MOD 45 MIN: CPT | Mod: 25,S$GLB,, | Performed by: NURSE PRACTITIONER

## 2019-09-21 PROCEDURE — 96372 PR INJECTION,THERAP/PROPH/DIAG2ST, IM OR SUBCUT: ICD-10-PCS | Mod: S$GLB,,, | Performed by: NURSE PRACTITIONER

## 2019-09-21 PROCEDURE — 99204 PR OFFICE/OUTPT VISIT, NEW, LEVL IV, 45-59 MIN: ICD-10-PCS | Mod: 25,S$GLB,, | Performed by: NURSE PRACTITIONER

## 2019-09-21 RX ORDER — CETIRIZINE HYDROCHLORIDE 10 MG/1
10 TABLET ORAL DAILY
Qty: 30 TABLET | Refills: 0 | Status: SHIPPED | OUTPATIENT
Start: 2019-09-21 | End: 2020-09-18

## 2019-09-21 RX ORDER — AMOXICILLIN 875 MG/1
875 TABLET, FILM COATED ORAL 2 TIMES DAILY
Qty: 20 TABLET | Refills: 0 | Status: SHIPPED | OUTPATIENT
Start: 2019-09-21 | End: 2019-09-28

## 2019-09-21 RX ORDER — BENZONATATE 100 MG/1
100 CAPSULE ORAL EVERY 6 HOURS PRN
Qty: 40 CAPSULE | Refills: 0 | Status: SHIPPED | OUTPATIENT
Start: 2019-09-21 | End: 2019-10-01

## 2019-09-21 RX ORDER — FLUTICASONE PROPIONATE 50 MCG
2 SPRAY, SUSPENSION (ML) NASAL DAILY
Qty: 15.8 ML | Refills: 0 | Status: SHIPPED | OUTPATIENT
Start: 2019-09-21 | End: 2020-02-10

## 2019-09-21 RX ORDER — DEXAMETHASONE SODIUM PHOSPHATE 4 MG/ML
8 INJECTION, SOLUTION INTRA-ARTICULAR; INTRALESIONAL; INTRAMUSCULAR; INTRAVENOUS; SOFT TISSUE
Status: COMPLETED | OUTPATIENT
Start: 2019-09-21 | End: 2019-09-21

## 2019-09-21 RX ADMIN — DEXAMETHASONE SODIUM PHOSPHATE 8 MG: 4 INJECTION, SOLUTION INTRA-ARTICULAR; INTRALESIONAL; INTRAMUSCULAR; INTRAVENOUS; SOFT TISSUE at 09:09

## 2019-09-21 NOTE — PATIENT INSTRUCTIONS
What Is Acute Bronchitis?  Acute bronchitis is when the airways in your lungs (bronchial tubes) become red and swollen (inflamed). It is usually caused by a viral infection. But it can also occur because of a bacteria or allergen. Symptoms include a cough that produces yellow or greenish mucus and can last for days or sometimes weeks.  Inside healthy lungs    Air travels in and out of the lungs through the airways. The linings of these airways produce sticky mucus. This mucus traps particles that enter the lungs. Tiny structures called cilia then sweep the particles out of the airways.     Healthy airway: Airways are normally open. Air moves in and out easily.      Healthy cilia: Tiny, hairlike cilia sweep mucus and particles up and out of the airways.   Lungs with bronchitis  Bronchitis often occurs with a cold or the flu virus. The airways become inflamed (red and swollen). There is a deep hacking cough from the extra mucus. Other symptoms may include:  · Wheezing  · Chest discomfort  · Shortness of breath  · Mild fever  A second infection, this time due to bacteria, may then occur. And airways irritated by allergens or smoke are more likely to get infected.        Inflamed airway: Inflammation and extra mucus narrow the airway, causing shortness of breath.      Impaired cilia: Extra mucus impairs cilia, causing congestion and wheezing. Smoking makes the problem worse.   Making a diagnosis  A physical exam, health history, and certain tests help your healthcare provider make the diagnosis.  Health history  Your healthcare provider will ask you about your symptoms.  The exam  Your provider listens to your chest for signs of congestion. He or she may also check your ears, nose, and throat.  Possible tests  · A sputum test for bacteria. This requires a sample of mucus from your lungs.  · A nasal or throat swab. This tests to see if you have a bacterial infection.  · A chest X-ray. This is done if your healthcare  provider thinks you have pneumonia.  · Tests to check for an underlying condition. Other tests may be done to check for things such as allergies, asthma, or COPD (chronic obstructive pulmonary disease). You may need to see a specialist for more lung function testing.  Treating a cough  The main treatment for bronchitis is easing symptoms. Avoiding smoke, allergens, and other things that trigger coughing can often help. If the infection is bacterial, you may be given antibiotics. During the illness, it's important to get plenty of sleep. To ease symptoms:  · Dont smoke. Also avoid secondhand smoke.  · Use a humidifier. Or try breathing in steam from a hot shower. This may help loosen mucus.  · Drink a lot of water and juice. They can soothe the throat and may help thin mucus.  · Sit up or use extra pillows when in bed. This helps to lessen coughing and congestion.  · Ask your provider about using medicine. Ask about using cough medicine, pain and fever medicine, or a decongestant.  Antibiotics  Most cases of bronchitis are caused by cold or flu viruses. They dont need antibiotics to treat them, even if your mucus is thick and green or yellow. Antibiotics dont treat viral illness and antibiotics have not been shown to have any benefit in cases of acute bronchitis. Taking antibiotics when they are not needed increases your risk of getting an infection later that is antibiotic-resistant. Antibiotics can also cause severe cases of diarrhea that require other antibiotics to treat.  It is important that you accept your healthcare provider's opinion to not use antibiotics. Your provider will prescribe antibiotics if the infection is caused by bacteria. If they are prescribed:  · Take all of the medicine. Take the medicine until it is used up, even if symptoms have improved. If you dont, the bronchitis may come back.  · Take the medicines as directed. For instance, some medicines should be taken with food.  · Ask about  side effects. Ask your provider or pharmacist what side effects are common, and what to do about them.  Follow-up care  You should see your provider again in 2 to 3 weeks. By this time, symptoms should have improved. An infection that lasts longer may mean you have a more serious problem.  Prevention  · Avoid tobacco smoke. If you smoke, quit. Stay away from smoky places. Ask friends and family not to smoke around you, or in your home or car.  · Get checked for allergies.  · Ask your provider about getting a yearly flu shot. Also ask about pneumococcal or pneumonia shots.  · Wash your hands often. This helps reduce the chance of picking up viruses that cause colds and flu.  Call your healthcare provider if:  · Symptoms worsen, or you have new symptoms  · Breathing problems worsen or  become severe  · Symptoms dont get better within a week, or within 3 days of taking antibiotics   Date Last Reviewed: 2/1/2017  © 3167-8949 [employer]. [address] [place] [address]. All rights reserved. This information is not intended as a substitute for professional medical care. Always follow your healthcare professional's instructions.        Sinusitis (Antibiotic Treatment)    The sinuses are air-filled spaces within the bones of the face. They connect to the inside of the nose. Sinusitis is an inflammation of the tissue lining the sinus cavity. Sinus inflammation can occur during a cold. It can also be due to allergies to pollens and other particles in the air. Sinusitis can cause symptoms of sinus congestion and fullness. A sinus infection causes fever, headache and facial pain. There is often green or yellow drainage from the nose or into the back of the throat (post-nasal drip). You have been given antibiotics to treat this condition.  Home care:  · Take the full course of antibiotics as instructed. Do not stop taking them, even if you feel better.  · Drink plenty of water, hot tea, and other  liquids. This may help thin mucus. It also may promote sinus drainage.  · Heat may help soothe painful areas of the face. Use a towel soaked in hot water. Or,  the shower and direct the hot spray onto your face. Using a vaporizer along with a menthol rub at night may also help.   · An expectorant containing guaifenesin may help thin the mucus and promote drainage from the sinuses.  · Over-the-counter decongestants may be used unless a similar medicine was prescribed. Nasal sprays work the fastest. Use one that contains phenylephrine or oxymetazoline. First blow the nose gently. Then use the spray. Do not use these medicines more often than directed on the label or symptoms may get worse. You may also use tablets containing pseudoephedrine. Avoid products that combine ingredients, because side effects may be increased. Read labels. You can also ask the pharmacist for help. (NOTE: Persons with high blood pressure should not use decongestants. They can raise blood pressure.)  · Over-the-counter antihistamines may help if allergies contributed to your sinusitis.    · Do not use nasal rinses or irrigation during an acute sinus infection, unless told to by your health care provider. Rinsing may spread the infection to other sinuses.  · Use acetaminophen or ibuprofen to control pain, unless another pain medicine was prescribed. (If you have chronic liver or kidney disease or ever had a stomach ulcer, talk with your doctor before using these medicines. Aspirin should never be used in anyone under 18 years of age who is ill with a fever. It may cause severe liver damage.)  · Don't smoke. This can worsen symptoms.  Follow-up care  Follow up with your healthcare provider or our staff if you are not improving within the next week.  When to seek medical advice  Call your healthcare provider if any of these occur:  · Facial pain or headache becoming more severe  · Stiff neck  · Unusual drowsiness or confusion  · Swelling  of the forehead or eyelids  · Vision problems, including blurred or double vision  · Fever of 100.4ºF (38ºC) or higher, or as directed by your healthcare provider  · Seizure  · Breathing problems  · Symptoms not resolving within 10 days  Date Last Reviewed: 4/13/2015  © 4215-0437 [employer]. [address] [place] [address], [place] [address]. All rights reserved. This information is not intended as a substitute for professional medical care. Always follow your healthcare professional's instructions.

## 2019-09-21 NOTE — PROGRESS NOTES
"Subjective:       Patient ID: Maricel Abdul is a 79 y.o. female.    Vitals:  height is 4' 11" (1.499 m) and weight is 55.8 kg (123 lb). Her temperature is 97.7 °F (36.5 °C). Her blood pressure is 96/64 and her pulse is 84. Her respiration is 16 and oxygen saturation is 96%.     Chief Complaint: Sinus Problem    Patient complains of productive cough, sinus congestion, sore throat and post nasal drip for 2 weeks. States she has been taking Mucinex over the counter. States that she was coughing all last night. Denies fever, shortness of breath, chest pain, nausea, vomiting, diarrhea.     Sinus Problem   This is a new problem. Episode onset: 2 weeks. The problem has been gradually worsening since onset. Associated symptoms include congestion, coughing, headaches, sinus pressure and a sore throat. Pertinent negatives include no chills or shortness of breath. Treatments tried: mucinex.       Constitution: Negative for chills, fatigue and fever.   HENT: Positive for congestion, postnasal drip, sinus pressure and sore throat.    Neck: Negative for painful lymph nodes.   Cardiovascular: Negative for chest pain and leg swelling.   Eyes: Negative for double vision and blurred vision.   Respiratory: Positive for cough and sputum production. Negative for shortness of breath.    Gastrointestinal: Negative for abdominal pain, nausea, vomiting and diarrhea.   Genitourinary: Negative for dysuria, frequency, urgency and history of kidney stones.   Musculoskeletal: Negative for joint pain, joint swelling, muscle cramps and muscle ache.   Skin: Negative for color change, pale, rash and bruising.   Allergic/Immunologic: Negative for seasonal allergies.   Neurological: Positive for headaches. Negative for dizziness, history of vertigo, light-headedness and passing out.   Hematologic/Lymphatic: Negative for swollen lymph nodes.   Psychiatric/Behavioral: Negative for nervous/anxious, sleep disturbance and depression. The patient is not " nervous/anxious.        Objective:      Physical Exam   Constitutional: She is oriented to person, place, and time. Vital signs are normal. She appears well-developed and well-nourished. She is cooperative.  Non-toxic appearance. She does not have a sickly appearance. She does not appear ill. No distress.   HENT:   Head: Normocephalic and atraumatic.   Right Ear: Hearing, tympanic membrane, external ear and ear canal normal.   Left Ear: Hearing, tympanic membrane, external ear and ear canal normal.   Nose: Mucosal edema and rhinorrhea present. No nasal deformity. No epistaxis. Right sinus exhibits maxillary sinus tenderness and frontal sinus tenderness. Left sinus exhibits maxillary sinus tenderness and frontal sinus tenderness.   Mouth/Throat: Uvula is midline and mucous membranes are normal. No trismus in the jaw. Normal dentition. No uvula swelling. Posterior oropharyngeal erythema present. No oropharyngeal exudate or posterior oropharyngeal edema.   Eyes: Conjunctivae and lids are normal. Right eye exhibits no discharge. Left eye exhibits no discharge. No scleral icterus.   Sclera clear bilat   Neck: Trachea normal, normal range of motion, full passive range of motion without pain and phonation normal. Neck supple.   Cardiovascular: Normal rate, regular rhythm, normal heart sounds, intact distal pulses and normal pulses.   Pulmonary/Chest: Effort normal and breath sounds normal. No respiratory distress.   Abdominal: Soft. Normal appearance and bowel sounds are normal. She exhibits no distension, no pulsatile midline mass and no mass. There is no tenderness.   Musculoskeletal: Normal range of motion. She exhibits no edema or deformity.   Neurological: She is alert and oriented to person, place, and time. She exhibits normal muscle tone. Coordination normal. GCS eye subscore is 4. GCS verbal subscore is 5. GCS motor subscore is 6.   Skin: Skin is warm, dry and intact. No rash noted. She is not diaphoretic. No  pallor.   Psychiatric: She has a normal mood and affect. Her speech is normal and behavior is normal. Judgment and thought content normal. Cognition and memory are normal.   Nursing note and vitals reviewed.      Assessment:       1. Acute non-recurrent sinusitis, unspecified location    2. Bronchitis        Plan:       Due to patient presentation as well as length of symptoms, will treat for bacterial sinusitis. Patient is being discharged home. Discussed reasons to return and importance of followup. All questions addressed and patient given discharge instructions and followup information.    Lungs clear throughout, sats 96% , and no respiratory distress.  I do not suspect pneumonia, pulmonary embolism or any other more serious condition requiring further treatment. Advised pt to return to clinic or go to ER for any worsening of symptoms. Based on my clinical evaluation, I do not appreciate any immediate, emergent, or life threatening condition or etiology that warrants additional workup today and feel that the patient can be discharged with close follow up care.       Acute non-recurrent sinusitis, unspecified location    Bronchitis    Other orders  -     dexamethasone injection 8 mg  -     amoxicillin (AMOXIL) 875 MG tablet; Take 1 tablet (875 mg total) by mouth 2 (two) times daily. for 7 days  Dispense: 20 tablet; Refill: 0  -     cetirizine (ZYRTEC) 10 MG tablet; Take 1 tablet (10 mg total) by mouth once daily.  Dispense: 30 tablet; Refill: 0  -     fluticasone propionate (FLONASE) 50 mcg/actuation nasal spray; 2 sprays (100 mcg total) by Each Nostril route once daily.  Dispense: 15.8 mL; Refill: 0  -     dexchlorphen-phenylephrine-DM (POLYTUSSIN DM) 1-5-10 mg/5 mL Syrp; Take 5 mLs by mouth every 4 (four) hours as needed.  Dispense: 120 mL; Refill: 0  -     benzonatate (TESSALON PERLES) 100 MG capsule; Take 1 capsule (100 mg total) by mouth every 6 (six) hours as needed for Cough.  Dispense: 40 capsule; Refill:  0

## 2019-11-05 ENCOUNTER — CLINICAL SUPPORT (OUTPATIENT)
Dept: CARDIOLOGY | Facility: HOSPITAL | Age: 79
End: 2019-11-05
Attending: INTERNAL MEDICINE
Payer: MEDICARE

## 2019-11-05 DIAGNOSIS — Z95.0 CARDIAC PACEMAKER IN SITU: ICD-10-CM

## 2019-11-05 DIAGNOSIS — I44.2 CHB (COMPLETE HEART BLOCK): ICD-10-CM

## 2019-11-05 PROCEDURE — 93296 REM INTERROG EVL PM/IDS: CPT

## 2019-11-06 ENCOUNTER — TELEPHONE (OUTPATIENT)
Dept: GASTROENTEROLOGY | Facility: CLINIC | Age: 79
End: 2019-11-06

## 2019-11-06 NOTE — TELEPHONE ENCOUNTER
----- Message from Dustin Badillo sent at 11/6/2019 10:32 AM CST -----  Contact: Patient  Type: Needs Medical Advice    Who Called:  Patient  Pharmacy name and phone #:    [facility] [id number]  [address]  [place] [address]  Phone: [phone] Fax: [phone]  Best Call Back Number: [phone]  Additional Information: Patient states they have been using the restroom frequently with some kind of mucus in stool. Patient would like to discuss medication. Please call to advise. Thanks!

## 2019-11-07 ENCOUNTER — OFFICE VISIT (OUTPATIENT)
Dept: GASTROENTEROLOGY | Facility: CLINIC | Age: 79
End: 2019-11-07
Payer: MEDICARE

## 2019-11-07 VITALS
WEIGHT: 121.5 LBS | HEIGHT: 59 IN | BODY MASS INDEX: 24.49 KG/M2 | DIASTOLIC BLOOD PRESSURE: 67 MMHG | HEART RATE: 102 BPM | SYSTOLIC BLOOD PRESSURE: 107 MMHG | RESPIRATION RATE: 18 BRPM

## 2019-11-07 DIAGNOSIS — R19.5 LOOSE STOOLS: ICD-10-CM

## 2019-11-07 DIAGNOSIS — Z90.49 S/P CHOLECYSTECTOMY: ICD-10-CM

## 2019-11-07 DIAGNOSIS — R19.5 MUCOUS IN STOOLS: Primary | ICD-10-CM

## 2019-11-07 DIAGNOSIS — Z87.19 HISTORY OF COLITIS: ICD-10-CM

## 2019-11-07 DIAGNOSIS — R79.89 ELEVATED LFTS: ICD-10-CM

## 2019-11-07 DIAGNOSIS — Z86.2 HISTORY OF ANEMIA: ICD-10-CM

## 2019-11-07 DIAGNOSIS — Z86.010 HISTORY OF COLON POLYPS: ICD-10-CM

## 2019-11-07 PROCEDURE — 99999 PR PBB SHADOW E&M-EST. PATIENT-LVL IV: CPT | Mod: PBBFAC,,, | Performed by: NURSE PRACTITIONER

## 2019-11-07 PROCEDURE — 3074F PR MOST RECENT SYSTOLIC BLOOD PRESSURE < 130 MM HG: ICD-10-PCS | Mod: CPTII,S$GLB,, | Performed by: NURSE PRACTITIONER

## 2019-11-07 PROCEDURE — 99214 OFFICE O/P EST MOD 30 MIN: CPT | Mod: S$GLB,,, | Performed by: NURSE PRACTITIONER

## 2019-11-07 PROCEDURE — 99214 PR OFFICE/OUTPT VISIT, EST, LEVL IV, 30-39 MIN: ICD-10-PCS | Mod: S$GLB,,, | Performed by: NURSE PRACTITIONER

## 2019-11-07 PROCEDURE — 3074F SYST BP LT 130 MM HG: CPT | Mod: CPTII,S$GLB,, | Performed by: NURSE PRACTITIONER

## 2019-11-07 PROCEDURE — 3078F DIAST BP <80 MM HG: CPT | Mod: CPTII,S$GLB,, | Performed by: NURSE PRACTITIONER

## 2019-11-07 PROCEDURE — 1101F PR PT FALLS ASSESS DOC 0-1 FALLS W/OUT INJ PAST YR: ICD-10-PCS | Mod: CPTII,S$GLB,, | Performed by: NURSE PRACTITIONER

## 2019-11-07 PROCEDURE — 99999 PR PBB SHADOW E&M-EST. PATIENT-LVL IV: ICD-10-PCS | Mod: PBBFAC,,, | Performed by: NURSE PRACTITIONER

## 2019-11-07 PROCEDURE — 3078F PR MOST RECENT DIASTOLIC BLOOD PRESSURE < 80 MM HG: ICD-10-PCS | Mod: CPTII,S$GLB,, | Performed by: NURSE PRACTITIONER

## 2019-11-07 PROCEDURE — 1101F PT FALLS ASSESS-DOCD LE1/YR: CPT | Mod: CPTII,S$GLB,, | Performed by: NURSE PRACTITIONER

## 2019-11-07 NOTE — PROGRESS NOTES
"Subjective:       Patient ID: Maricel Abdul is a 79 y.o. female, Body mass index is 24.53 kg/m².    Chief Complaint: Other (change in stool color and mucus in stool) and Diarrhea      Patient is new to me. Established patient of Dr. Perez.    Diarrhea    This is a new problem. The current episode started in the past 7 days (Started six days ago). The problem occurs 2 to 4 times per day. The problem has been unchanged. Diarrhea characteristics: describes as "soft"; type 5-6 on [place] scale; denies watery or bloody; stool color changed from brown to tan and mucous visible in stool. The patient states that diarrhea does not awaken her from sleep. Pertinent negatives include no abdominal pain, bloating, chills, coughing, fever, increased  flatus, vomiting or weight loss. Nothing aggravates the symptoms. Risk factors include recent antibiotic use and recent hospitalization (denies recent foreign travel). Treatments tried: Thinks she took a fiber supplement but not sure. The treatment provided moderate relief. Her past medical history is significant for inflammatory bowel disease (hx of colitis in 6/2019; c-scope done 8/14/19 unremarkable). There is no history of bowel resection or a recent abdominal surgery.     Review of Systems   Constitutional: Negative for appetite change, chills, fever, unexpected weight change and weight loss.   HENT: Negative for trouble swallowing.    Respiratory: Negative for cough and shortness of breath.    Cardiovascular: Negative for chest pain.   Gastrointestinal: Positive for diarrhea. Negative for abdominal pain, bloating, blood in stool, constipation, flatus, nausea and vomiting.   Genitourinary: Negative for difficulty urinating and dysuria.   Musculoskeletal: Negative for gait problem.   Skin: Negative for rash.   Neurological: Negative for speech difficulty.   Psychiatric/Behavioral: Negative for confusion.       Past Medical History:   Diagnosis Date    Arthritis     " Diverticulitis     Encounter for blood transfusion     Hypertension     Kidney stones     Shingles     TIA (transient ischemic attack) 12/2017      Past Surgical History:   Procedure Laterality Date    APPENDECTOMY      CARDIAC SURGERY      pacemaker    CERVICAL FUSION      CHOLECYSTECTOMY      COLONOSCOPY N/A 8/14/2019    Procedure: COLONOSCOPY;  Surgeon: Elio Perez MD;  Location: [facility];  Service: Endoscopy;  Laterality: N/A;    HYSTERECTOMY      SHOULDER SURGERY        Family History   Problem Relation Age of Onset    Cancer Father         lung    Cancer Brother         colon cancer    Colon cancer Brother     Pancreatitis Mother     Eczema Neg Hx     Psoriasis Neg Hx     Lupus Neg Hx     Melanoma Neg Hx     Stomach cancer Neg Hx     Esophageal cancer Neg Hx       Wt Readings from Last 10 Encounters:   11/07/19 55.1 kg (121 lb 7.6 oz)   09/21/19 55.8 kg (123 lb)   08/14/19 55.8 kg (123 lb)   07/18/19 55.8 kg (123 lb 0.3 oz)   06/20/19 59.3 kg (130 lb 11.7 oz)   04/24/19 54.7 kg (120 lb 9.5 oz)   03/27/19 58.6 kg (129 lb 3 oz)   02/26/19 56.6 kg (124 lb 12.5 oz)   06/11/18 56.7 kg (125 lb)   05/01/18 59 kg (130 lb)     Lab Results   Component Value Date    WBC 14.70 (H) 06/21/2019    HGB 11.2 (L) 06/21/2019    HCT 33.2 (L) 06/21/2019    MCV 86 06/21/2019     06/21/2019     CMP  Sodium   Date Value Ref Range Status   06/21/2019 138 136 - 145 mmol/L Final     Potassium   Date Value Ref Range Status   06/21/2019 3.0 (L) 3.5 - 5.1 mmol/L Final     Chloride   Date Value Ref Range Status   06/21/2019 105 95 - 110 mmol/L Final     CO2   Date Value Ref Range Status   06/21/2019 25 23 - 29 mmol/L Final     Glucose   Date Value Ref Range Status   06/21/2019 122 (H) 70 - 110 mg/dL Final     BUN, Bld   Date Value Ref Range Status   06/21/2019 11 8 - 23 mg/dL Final     Creatinine   Date Value Ref Range Status   06/21/2019 0.7 0.5 - 1.4 mg/dL Final     Calcium   Date Value Ref Range Status    06/21/2019 8.6 (L) 8.7 - 10.5 mg/dL Final     Total Protein   Date Value Ref Range Status   06/21/2019 6.0 6.0 - 8.4 g/dL Final     Albumin   Date Value Ref Range Status   06/21/2019 3.0 (L) 3.5 - 5.2 g/dL Final     Total Bilirubin   Date Value Ref Range Status   06/21/2019 0.7 0.1 - 1.0 mg/dL Final     Comment:     For infants and newborns, interpretation of results should be based  on gestational age, weight and in agreement with clinical  observations.  Premature Infant recommended reference ranges:  Up to 24 hours.............<8.0 mg/dL  Up to 48 hours............<12.0 mg/dL  3-5 days..................<15.0 mg/dL  6-29 days.................<15.0 mg/dL       Alkaline Phosphatase   Date Value Ref Range Status   06/21/2019 77 55 - 135 U/L Final     AST   Date Value Ref Range Status   06/21/2019 84 (H) 10 - 40 U/L Final     ALT   Date Value Ref Range Status   06/21/2019 94 (H) 10 - 44 U/L Final     Anion Gap   Date Value Ref Range Status   06/21/2019 8 8 - 16 mmol/L Final     eGFR if    Date Value Ref Range Status   06/21/2019 >60 >60 mL/min/1.73 m^2 Final     eGFR if non    Date Value Ref Range Status   06/21/2019 >60 >60 mL/min/1.73 m^2 Final     Comment:     Calculation used to obtain the estimated glomerular filtration  rate (eGFR) is the CKD-EPI equation.                Reviewed prior medical records including radiology report of CT of abdomen 6/20/19 & endoscopy history (see surgical history).     Objective:      Physical Exam   Constitutional: She is oriented to person, place, and time. She appears well-developed and well-nourished.   HENT:   Head: Normocephalic.   Eyes: Pupils are equal, round, and reactive to light.   Neck: Normal range of motion.   Cardiovascular: Normal rate, regular rhythm and normal heart sounds.   No murmur heard.  Pulmonary/Chest: Breath sounds normal. No respiratory distress. She has no wheezes.   Abdominal: Soft. Bowel sounds are normal. She  exhibits no distension and no mass. There is no tenderness. There is no guarding.   Musculoskeletal: Normal range of motion.   Neurological: She is alert and oriented to person, place, and time.   Skin: Skin is warm and dry. No rash noted.   Non jaundiced   Psychiatric: She has a normal mood and affect. Her speech is normal.         Assessment:       1. Mucous in stools    2. Loose stools    3. History of colitis    4. History of anemia    5. History of colon polyps    6. Elevated LFTs    7. S/P cholecystectomy           Plan:   All diagnoses and orders for this visit:    Mucous in stools & Loose stools  - Clostridium difficile EIA; Future; Expected date: 11/07/2019  - Stool Exam-Ova,Cysts,Parasites; Future; Expected date: 11/07/2019  - Stool culture; Future; Expected date: 11/07/2019  - Giardia / Cryptosporidum, EIA; Future; Expected date: 11/07/2019  - WBC, Stool; Future; Expected date: 11/07/2019  - Calprotectin; Future; Expected date: 11/07/2019  - pH, stool; Future; Expected date: 11/07/2019  - Fecal fat, quantitative; Future; Expected date: 11/07/2019  - Start Probiotic once daily  - Recommended increase fiber in diet, especially soluble fiber since this can help bulk up the stool consistency and may help to slow down how fast the stool goes through the colon and can prevent diarrhea    History of colitis    History of anemia   - Follow-up with PCP and/or hematology for continued evaluation and management    History of colon polyps   - Next surveillance colonoscopy due 8/2024    Elevated LFTs   - Will monitor; most recent from outside facility are normal    S/P cholecystectomy    If no improvement in symptoms or symptoms worsen, call/follow-up at clinic or go to ER

## 2019-11-07 NOTE — PATIENT INSTRUCTIONS

## 2019-11-09 PROCEDURE — 87329 GIARDIA AG IA: CPT

## 2019-11-09 PROCEDURE — 89125 SPECIMEN FAT STAIN: CPT

## 2019-11-09 PROCEDURE — 87209 SMEAR COMPLEX STAIN: CPT

## 2019-11-09 PROCEDURE — 87328 CRYPTOSPORIDIUM AG IA: CPT

## 2019-11-09 PROCEDURE — 83986 ASSAY PH BODY FLUID NOS: CPT

## 2019-11-09 PROCEDURE — 83993 ASSAY FOR CALPROTECTIN FECAL: CPT

## 2019-11-09 PROCEDURE — 89055 LEUKOCYTE ASSESSMENT FECAL: CPT

## 2019-11-11 ENCOUNTER — LAB VISIT (OUTPATIENT)
Dept: LAB | Facility: HOSPITAL | Age: 79
End: 2019-11-11
Attending: NURSE PRACTITIONER
Payer: MEDICARE

## 2019-11-11 DIAGNOSIS — R19.5 MUCOUS IN STOOLS: ICD-10-CM

## 2019-11-11 DIAGNOSIS — R19.5 LOOSE STOOLS: ICD-10-CM

## 2019-11-12 LAB
CRYPTOSP AG STL QL IA: NEGATIVE
G LAMBLIA AG STL QL IA: NEGATIVE

## 2019-11-13 ENCOUNTER — TELEPHONE (OUTPATIENT)
Dept: GASTROENTEROLOGY | Facility: CLINIC | Age: 79
End: 2019-11-13

## 2019-11-13 LAB — WBC #/AREA STL HPF: NORMAL /[HPF]

## 2019-11-13 NOTE — TELEPHONE ENCOUNTER
----- Message from Naila Castaneda NP sent at 11/12/2019 10:28 AM CST -----  Please let patient know one of her stool studies is negative. I have several other stool study orders in so I'm not sure why lab only did one. Can you call and ask them if they can add the rest to the sample she brought in or does she need to collect another sample? Thanks.

## 2019-11-14 LAB
FAT STL SUDAN IV STN: NORMAL
O+P STL TRI STN: NORMAL
PH STL: NORMAL [PH]

## 2019-11-21 ENCOUNTER — TELEPHONE (OUTPATIENT)
Dept: GASTROENTEROLOGY | Facility: CLINIC | Age: 79
End: 2019-11-21

## 2019-11-22 ENCOUNTER — TELEPHONE (OUTPATIENT)
Dept: GASTROENTEROLOGY | Facility: CLINIC | Age: 79
End: 2019-11-22

## 2019-11-22 LAB — CALPROTECTIN STL-MCNT: >2000 MCG/G

## 2019-11-22 NOTE — TELEPHONE ENCOUNTER
----- Message from Naila Castaneda NP sent at 11/22/2019 11:00 AM CST -----  Please let patient know one of her stool studies is elevated suggesting inflammation in the colon. Please have her turn in another stool sample so we can test for c.diff, the order is already in.

## 2019-11-26 ENCOUNTER — TELEPHONE (OUTPATIENT)
Dept: GASTROENTEROLOGY | Facility: CLINIC | Age: 79
End: 2019-11-26

## 2019-11-26 ENCOUNTER — CLINICAL SUPPORT (OUTPATIENT)
Dept: CARDIOLOGY | Facility: HOSPITAL | Age: 79
End: 2019-11-26
Payer: MEDICARE

## 2019-11-26 PROCEDURE — 93296 REM INTERROG EVL PM/IDS: CPT | Performed by: INTERNAL MEDICINE

## 2019-11-26 PROCEDURE — 93294 REM INTERROG EVL PM/LDLS PM: CPT | Mod: ,,, | Performed by: INTERNAL MEDICINE

## 2019-11-26 PROCEDURE — 93294 CARDIAC DEVICE CHECK - REMOTE: ICD-10-PCS | Mod: ,,, | Performed by: INTERNAL MEDICINE

## 2019-11-26 NOTE — TELEPHONE ENCOUNTER
Left message on machine with callback #. Recommend testing for Cdiff and repeating the colonoscopy if symptoms still present.

## 2019-12-02 ENCOUNTER — LAB VISIT (OUTPATIENT)
Dept: LAB | Facility: HOSPITAL | Age: 79
End: 2019-12-02
Attending: NURSE PRACTITIONER
Payer: MEDICARE

## 2019-12-02 ENCOUNTER — TELEPHONE (OUTPATIENT)
Dept: GASTROENTEROLOGY | Facility: CLINIC | Age: 79
End: 2019-12-02

## 2019-12-02 DIAGNOSIS — R19.5 MUCOUS IN STOOLS: ICD-10-CM

## 2019-12-02 DIAGNOSIS — R19.5 LOOSE STOOLS: ICD-10-CM

## 2019-12-02 PROCEDURE — 87209 SMEAR COMPLEX STAIN: CPT

## 2019-12-02 PROCEDURE — 87046 STOOL CULTR AEROBIC BACT EA: CPT

## 2019-12-02 PROCEDURE — 87045 FECES CULTURE AEROBIC BACT: CPT

## 2019-12-02 PROCEDURE — 87427 SHIGA-LIKE TOXIN AG IA: CPT | Mod: 59

## 2019-12-02 NOTE — TELEPHONE ENCOUNTER
----- Message from Avani Hogan sent at 12/2/2019  2:34 PM CST -----  Type: Needs Medical Advice    Who Called:  Tomas in Lab at [facility]  Best Call Back Number: [phone]  Additional Information: States he needs to cancel a test but needs to talk to the office first. Please call for details.

## 2019-12-03 LAB — O+P STL TRI STN: NORMAL

## 2019-12-04 LAB
E COLI SXT1 STL QL IA: NEGATIVE
E COLI SXT2 STL QL IA: NEGATIVE

## 2019-12-06 ENCOUNTER — TELEPHONE (OUTPATIENT)
Dept: GASTROENTEROLOGY | Facility: CLINIC | Age: 79
End: 2019-12-06

## 2019-12-06 LAB — BACTERIA STL CULT: NORMAL

## 2019-12-06 NOTE — TELEPHONE ENCOUNTER
----- Message from Naila Castaneda NP sent at 12/6/2019  2:11 PM CST -----  Please let patient know her stool is negative for infection. The sample turned in was formed so we weren't able to check for C.Diff. Let her know if she is still having symptoms then Dr. Perez and I recommend a colonoscopy. She does not need to see me in the office prior to the colonoscopy; she can schedule over the phone.

## 2019-12-06 NOTE — TELEPHONE ENCOUNTER
Spoke w/ pt in regards to results and recommendations from provider. Pt verbalized understanding. Pt states she is no longer having symptoms.

## 2020-01-27 ENCOUNTER — TELEPHONE (OUTPATIENT)
Dept: FAMILY MEDICINE | Facility: CLINIC | Age: 80
End: 2020-01-27

## 2020-01-27 DIAGNOSIS — Z00.00 ROUTINE GENERAL MEDICAL EXAMINATION AT A HEALTH CARE FACILITY: Primary | ICD-10-CM

## 2020-01-27 DIAGNOSIS — I10 ESSENTIAL HYPERTENSION: ICD-10-CM

## 2020-01-27 DIAGNOSIS — E78.5 DYSLIPIDEMIA: ICD-10-CM

## 2020-01-27 DIAGNOSIS — Z79.899 ENCOUNTER FOR LONG-TERM (CURRENT) USE OF OTHER MEDICATIONS: ICD-10-CM

## 2020-02-03 ENCOUNTER — TELEPHONE (OUTPATIENT)
Dept: CARDIOLOGY | Facility: HOSPITAL | Age: 80
End: 2020-02-03

## 2020-02-03 NOTE — TELEPHONE ENCOUNTER
Ms. Abdul is requesting to discontinue remote monitoring at [facility] and to be followed in [place] by Dr. Ralph Conti located [address] [facility] [address] [place] [id number] #[phone]

## 2020-02-04 ENCOUNTER — DOCUMENTATION ONLY (OUTPATIENT)
Dept: CARDIOLOGY | Facility: HOSPITAL | Age: 80
End: 2020-02-04

## 2020-02-04 NOTE — PROGRESS NOTES
Released pt in [email or web address] for remote pacemaker home monitoring per pt request.  Pt stated she is actively followed by Dr. Ralph Conti in [place].  (please see telephone note from 2/3/2020).

## 2020-02-08 LAB
ALBUMIN SERPL-MCNC: 4.5 G/DL (ref 3.6–5.1)
ALBUMIN/CREAT UR: 7 MCG/MG CREAT
ALBUMIN/GLOB SERPL: 1.8 (CALC) (ref 1–2.5)
ALP SERPL-CCNC: 83 U/L (ref 37–153)
ALT SERPL-CCNC: 28 U/L (ref 6–29)
APPEARANCE UR: CLEAR
AST SERPL-CCNC: 29 U/L (ref 10–35)
BACTERIA #/AREA URNS HPF: NORMAL /HPF
BACTERIA UR CULT: NORMAL
BASOPHILS # BLD AUTO: 82 CELLS/UL (ref 0–200)
BASOPHILS NFR BLD AUTO: 1 %
BILIRUB SERPL-MCNC: 0.5 MG/DL (ref 0.2–1.2)
BILIRUB UR QL STRIP: NEGATIVE
BUN SERPL-MCNC: 18 MG/DL (ref 7–25)
BUN/CREAT SERPL: ABNORMAL (CALC) (ref 6–22)
CALCIUM SERPL-MCNC: 9.8 MG/DL (ref 8.6–10.4)
CHLORIDE SERPL-SCNC: 103 MMOL/L (ref 98–110)
CHOLEST SERPL-MCNC: 213 MG/DL
CHOLEST/HDLC SERPL: 2.6 (CALC)
CO2 SERPL-SCNC: 30 MMOL/L (ref 20–32)
COLOR UR: YELLOW
CREAT SERPL-MCNC: 0.64 MG/DL (ref 0.6–0.88)
CREAT UR-MCNC: 41 MG/DL (ref 20–275)
EOSINOPHIL # BLD AUTO: 402 CELLS/UL (ref 15–500)
EOSINOPHIL NFR BLD AUTO: 4.9 %
ERYTHROCYTE [DISTWIDTH] IN BLOOD BY AUTOMATED COUNT: 13.6 % (ref 11–15)
GFRSERPLBLD MDRD-ARVRAT: 84 ML/MIN/1.73M2
GLOBULIN SER CALC-MCNC: 2.5 G/DL (CALC) (ref 1.9–3.7)
GLUCOSE SERPL-MCNC: 105 MG/DL (ref 65–99)
GLUCOSE UR QL STRIP: NEGATIVE
HCT VFR BLD AUTO: 39 % (ref 35–45)
HDLC SERPL-MCNC: 82 MG/DL
HGB BLD-MCNC: 12.7 G/DL (ref 11.7–15.5)
HGB UR QL STRIP: NEGATIVE
HYALINE CASTS #/AREA URNS LPF: NORMAL /LPF
KETONES UR QL STRIP: NEGATIVE
LDLC SERPL CALC-MCNC: 108 MG/DL (CALC)
LEUKOCYTE ESTERASE UR QL STRIP: NEGATIVE
LYMPHOCYTES # BLD AUTO: 1517 CELLS/UL (ref 850–3900)
LYMPHOCYTES NFR BLD AUTO: 18.5 %
MCH RBC QN AUTO: 28.3 PG (ref 27–33)
MCHC RBC AUTO-ENTMCNC: 32.6 G/DL (ref 32–36)
MCV RBC AUTO: 86.9 FL (ref 80–100)
MICROALBUMIN UR-MCNC: 0.3 MG/DL
MONOCYTES # BLD AUTO: 820 CELLS/UL (ref 200–950)
MONOCYTES NFR BLD AUTO: 10 %
NEUTROPHILS # BLD AUTO: 5379 CELLS/UL (ref 1500–7800)
NEUTROPHILS NFR BLD AUTO: 65.6 %
NITRITE UR QL STRIP: NEGATIVE
NONHDLC SERPL-MCNC: 131 MG/DL (CALC)
PH UR STRIP: 7.5 [PH] (ref 5–8)
PLATELET # BLD AUTO: 309 THOUSAND/UL (ref 140–400)
PMV BLD REES-ECKER: 10.5 FL (ref 7.5–12.5)
POTASSIUM SERPL-SCNC: 4.5 MMOL/L (ref 3.5–5.3)
PROT SERPL-MCNC: 7 G/DL (ref 6.1–8.1)
PROT UR QL STRIP: NEGATIVE
RBC # BLD AUTO: 4.49 MILLION/UL (ref 3.8–5.1)
RBC #/AREA URNS HPF: NORMAL /HPF
SODIUM SERPL-SCNC: 143 MMOL/L (ref 135–146)
SP GR UR STRIP: 1.01 (ref 1–1.03)
SQUAMOUS #/AREA URNS HPF: NORMAL /HPF
TRIGL SERPL-MCNC: 121 MG/DL
TSH SERPL-ACNC: 2.43 MIU/L (ref 0.4–4.5)
WBC # BLD AUTO: 8.2 THOUSAND/UL (ref 3.8–10.8)
WBC #/AREA URNS HPF: NORMAL /HPF

## 2020-02-10 ENCOUNTER — OFFICE VISIT (OUTPATIENT)
Dept: FAMILY MEDICINE | Facility: CLINIC | Age: 80
End: 2020-02-10
Payer: MEDICARE

## 2020-02-10 VITALS
SYSTOLIC BLOOD PRESSURE: 124 MMHG | DIASTOLIC BLOOD PRESSURE: 78 MMHG | BODY MASS INDEX: 26.75 KG/M2 | HEIGHT: 57 IN | HEART RATE: 84 BPM | WEIGHT: 124 LBS

## 2020-02-10 DIAGNOSIS — I69.30 SEQUELA OF CARDIOEMBOLIC STROKE: Primary | ICD-10-CM

## 2020-02-10 DIAGNOSIS — I10 ESSENTIAL HYPERTENSION: ICD-10-CM

## 2020-02-10 DIAGNOSIS — E78.5 DYSLIPIDEMIA: ICD-10-CM

## 2020-02-10 DIAGNOSIS — Z78.0 MENOPAUSE: ICD-10-CM

## 2020-02-10 DIAGNOSIS — E78.2 MIXED HYPERLIPIDEMIA: ICD-10-CM

## 2020-02-10 DIAGNOSIS — K57.90 DIVERTICULOSIS: ICD-10-CM

## 2020-02-10 DIAGNOSIS — Z95.0 CARDIAC PACEMAKER IN SITU: ICD-10-CM

## 2020-02-10 DIAGNOSIS — F41.9 ANXIETY: ICD-10-CM

## 2020-02-10 DIAGNOSIS — L20.82 FLEXURAL ECZEMA: ICD-10-CM

## 2020-02-10 DIAGNOSIS — Z78.0 POST-MENOPAUSAL: ICD-10-CM

## 2020-02-10 PROCEDURE — 1159F PR MEDICATION LIST DOCUMENTED IN MEDICAL RECORD: ICD-10-PCS | Mod: S$GLB,,, | Performed by: FAMILY MEDICINE

## 2020-02-10 PROCEDURE — 1159F MED LIST DOCD IN RCRD: CPT | Mod: S$GLB,,, | Performed by: FAMILY MEDICINE

## 2020-02-10 PROCEDURE — 3078F PR MOST RECENT DIASTOLIC BLOOD PRESSURE < 80 MM HG: ICD-10-PCS | Mod: S$GLB,,, | Performed by: FAMILY MEDICINE

## 2020-02-10 PROCEDURE — 1101F PT FALLS ASSESS-DOCD LE1/YR: CPT | Mod: S$GLB,,, | Performed by: FAMILY MEDICINE

## 2020-02-10 PROCEDURE — 3074F PR MOST RECENT SYSTOLIC BLOOD PRESSURE < 130 MM HG: ICD-10-PCS | Mod: S$GLB,,, | Performed by: FAMILY MEDICINE

## 2020-02-10 PROCEDURE — 99214 OFFICE O/P EST MOD 30 MIN: CPT | Mod: S$GLB,,, | Performed by: FAMILY MEDICINE

## 2020-02-10 PROCEDURE — 3078F DIAST BP <80 MM HG: CPT | Mod: S$GLB,,, | Performed by: FAMILY MEDICINE

## 2020-02-10 PROCEDURE — 3074F SYST BP LT 130 MM HG: CPT | Mod: S$GLB,,, | Performed by: FAMILY MEDICINE

## 2020-02-10 PROCEDURE — 1101F PR PT FALLS ASSESS DOC 0-1 FALLS W/OUT INJ PAST YR: ICD-10-PCS | Mod: S$GLB,,, | Performed by: FAMILY MEDICINE

## 2020-02-10 PROCEDURE — 99214 PR OFFICE/OUTPT VISIT, EST, LEVL IV, 30-39 MIN: ICD-10-PCS | Mod: S$GLB,,, | Performed by: FAMILY MEDICINE

## 2020-02-10 RX ORDER — TRIAMCINOLONE ACETONIDE 1 MG/G
CREAM TOPICAL 2 TIMES DAILY
COMMUNITY
End: 2020-02-10 | Stop reason: SDUPTHER

## 2020-02-10 RX ORDER — AMITRIPTYLINE HYDROCHLORIDE 50 MG/1
50 TABLET, FILM COATED ORAL NIGHTLY
Qty: 90 TABLET | Refills: 1 | Status: SHIPPED | OUTPATIENT
Start: 2020-02-10 | End: 2020-09-18 | Stop reason: SDUPTHER

## 2020-02-10 RX ORDER — LOSARTAN POTASSIUM AND HYDROCHLOROTHIAZIDE 12.5; 1 MG/1; MG/1
1 TABLET ORAL DAILY
Qty: 90 TABLET | Refills: 1 | Status: SHIPPED | OUTPATIENT
Start: 2020-02-10 | End: 2020-09-23 | Stop reason: SDUPTHER

## 2020-02-10 RX ORDER — TRIAMCINOLONE ACETONIDE 1 MG/G
CREAM TOPICAL 2 TIMES DAILY
Qty: 30 G | Refills: 3 | Status: SHIPPED | OUTPATIENT
Start: 2020-02-10 | End: 2021-06-18

## 2020-02-10 RX ORDER — ATORVASTATIN CALCIUM 80 MG/1
80 TABLET, FILM COATED ORAL DAILY
Qty: 90 TABLET | Refills: 1 | Status: SHIPPED | OUTPATIENT
Start: 2020-02-10 | End: 2020-09-18 | Stop reason: SDUPTHER

## 2020-02-10 RX ORDER — METOPROLOL SUCCINATE 25 MG/1
25 TABLET, EXTENDED RELEASE ORAL DAILY
Qty: 90 TABLET | Refills: 1 | Status: SHIPPED | OUTPATIENT
Start: 2020-02-10 | End: 2020-09-08 | Stop reason: SDUPTHER

## 2020-02-10 RX ORDER — SERTRALINE HYDROCHLORIDE 50 MG/1
50 TABLET, FILM COATED ORAL DAILY
Qty: 90 TABLET | Refills: 1 | Status: SHIPPED | OUTPATIENT
Start: 2020-02-10 | End: 2020-03-23 | Stop reason: SDUPTHER

## 2020-02-11 NOTE — PROGRESS NOTES
SUBJECTIVE:    Patient ID: Maricel Abdul is a 79 y.o. female.    Chief Complaint: Follow-up (bottles brought, dexa order in -ac )    This 79-year-old female comes in for his six-month checkup.  She sees Dr. Gallito Rosas for her pacemaker and has had a recent pacer check.  For her diverticulosis she sees Dr. Jason Perez and he has placed her on Benefiber supplements.  She is quite active and still drives her car goes grocery shopping and does errands.  She has had no recent falls.  She does not use a cane or a Rollator for ambulation.  In 2017 she had a CVA causing some a face gym left-sided weakness but this is all resolved.  She uses triamcinolone cream for her eczema on her oral forearms.      Telephone on 01/27/2020   Component Date Value Ref Range Status    WBC 02/07/2020 8.2  3.8 - 10.8 Thousand/uL Final    RBC 02/07/2020 4.49  3.80 - 5.10 Million/uL Final    Hemoglobin 02/07/2020 12.7  11.7 - 15.5 g/dL Final    Hematocrit 02/07/2020 39.0  35.0 - 45.0 % Final    Mean Corpuscular Volume 02/07/2020 86.9  80.0 - 100.0 fL Final    Mean Corpuscular Hemoglobin 02/07/2020 28.3  27.0 - 33.0 pg Final    Mean Corpuscular Hemoglobin Conc 02/07/2020 32.6  32.0 - 36.0 g/dL Final    RDW 02/07/2020 13.6  11.0 - 15.0 % Final    Platelets 02/07/2020 309  140 - 400 Thousand/uL Final    MPV 02/07/2020 10.5  7.5 - 12.5 fL Final    Neutrophils Absolute 02/07/2020 5,379  1,500 - 7,800 cells/uL Final    Lymph # 02/07/2020 1,517  850 - 3,900 cells/uL Final    Mono # 02/07/2020 820  200 - 950 cells/uL Final    Eos # 02/07/2020 402  15 - 500 cells/uL Final    Baso # 02/07/2020 82  0 - 200 cells/uL Final    Neutrophils Relative 02/07/2020 65.6  % Final    Lymph% 02/07/2020 18.5  % Final    Mono% 02/07/2020 10.0  % Final    Eosinophil% 02/07/2020 4.9  % Final    Basophil% 02/07/2020 1.0  % Final    Glucose 02/07/2020 105* 65 - 99 mg/dL Final    BUN, Bld 02/07/2020 18  7 - 25 mg/dL Final    Creatinine  02/07/2020 0.64  0.60 - 0.88 mg/dL Final    eGFR if non African American 02/07/2020 84  > OR = 60 mL/min/1.73m2 Final    eGFR if African American 02/07/2020 97  > OR = 60 mL/min/1.73m2 Final    BUN/Creatinine Ratio 02/07/2020 NOT APPLICABLE  6 - 22 (calc) Final    Sodium 02/07/2020 143  135 - 146 mmol/L Final    Potassium 02/07/2020 4.5  3.5 - 5.3 mmol/L Final    Chloride 02/07/2020 103  98 - 110 mmol/L Final    CO2 02/07/2020 30  20 - 32 mmol/L Final    Calcium 02/07/2020 9.8  8.6 - 10.4 mg/dL Final    Total Protein 02/07/2020 7.0  6.1 - 8.1 g/dL Final    Albumin 02/07/2020 4.5  3.6 - 5.1 g/dL Final    Globulin, Total 02/07/2020 2.5  1.9 - 3.7 g/dL (calc) Final    Albumin/Globulin Ratio 02/07/2020 1.8  1.0 - 2.5 (calc) Final    Total Bilirubin 02/07/2020 0.5  0.2 - 1.2 mg/dL Final    Alkaline Phosphatase 02/07/2020 83  37 - 153 U/L Final    AST 02/07/2020 29  10 - 35 U/L Final    ALT 02/07/2020 28  6 - 29 U/L Final    Cholesterol 02/07/2020 213* <200 mg/dL Final    HDL 02/07/2020 82  > OR = 50 mg/dL Final    Triglycerides 02/07/2020 121  <150 mg/dL Final    LDL Cholesterol 02/07/2020 108* mg/dL (calc) Final    Hdl/Cholesterol Ratio 02/07/2020 2.6  <5.0 (calc) Final    Non HDL Chol. (LDL+VLDL) 02/07/2020 131* <130 mg/dL (calc) Final    TSH 02/07/2020 2.43  0.40 - 4.50 mIU/L Final    Color, UA 02/07/2020 YELLOW  YELLOW Final    Appearance, UA 02/07/2020 CLEAR  CLEAR Final    Specific [place], UA 02/07/2020 1.012  1.001 - 1.035 Final    pH, UA 02/07/2020 7.5  5.0 - 8.0 Final    Glucose, UA 02/07/2020 NEGATIVE  NEGATIVE Final    Bilirubin, UA 02/07/2020 NEGATIVE  NEGATIVE Final    Ketones, UA 02/07/2020 NEGATIVE  NEGATIVE Final    Occult Blood UA 02/07/2020 NEGATIVE  NEGATIVE Final    Protein, UA 02/07/2020 NEGATIVE  NEGATIVE Final    Nitrite, UA 02/07/2020 NEGATIVE  NEGATIVE Final    Leukocytes, UA 02/07/2020 NEGATIVE  NEGATIVE Final    WBC Casts, UA 02/07/2020 NONE SEEN  < OR = 5  /HPF Final    RBC Casts, UA 02/07/2020 NONE SEEN  < OR = 2 /HPF Final    Squam Epithel, UA 02/07/2020 NONE SEEN  < OR = 5 /HPF Final    Bacteria, UA 02/07/2020 NONE SEEN  NONE SEEN /HPF Final    Hyaline Casts, UA 02/07/2020 NONE SEEN  NONE SEEN /LPF Final    Reflexive Urine Culture 02/07/2020 NO CULTURE INDICATED   Final    Creatinine, Random Ur 02/07/2020 41  20 - 275 mg/dL Final    Microalb, Ur 02/07/2020 0.3  See Note: mg/dL Final    Microalb Creat Ratio 02/07/2020 7  <30 mcg/mg creat Final   Lab Visit on 12/02/2019   Component Date Value Ref Range Status    Stool Exam-Ova,Cysts,Parasites 12/02/2019 No ova or parasites seen   Final    Stool Culture 12/02/2019 No Salmonella,Shigella,Vibrio,Campylobacter,Yersinia isolated.   Final    Shiga Toxin 1 E.coli 12/02/2019 Negative   Final    Shiga Toxin 2 E.coli 12/02/2019 Negative   Final   Lab Visit on 11/11/2019   Component Date Value Ref Range Status    Giardia Antigen - EIA 11/09/2019 Negative  Negative Final    Cryptosporidium Antigen 11/09/2019 Negative  Negative Final    pH, Stool 11/09/2019 Neutral   Final    Stool WBC 11/09/2019 No neutrophils seen  No neutrophils seen Final    Stool Exam-Ova,Cysts,Parasites 11/09/2019 No ova or parasites seen   Final    Fat Stain, Stool 11/09/2019 Normal Fats   Final    Calprotectin 11/09/2019 >2000.0* mcg/g Final       Past Medical History:   Diagnosis Date    Arthritis     Diverticulitis     Encounter for blood transfusion     Hypertension     Kidney stones     Shingles     TIA (transient ischemic attack) 12/2017     Past Surgical History:   Procedure Laterality Date    APPENDECTOMY      CARDIAC SURGERY      pacemaker    CERVICAL FUSION      CHOLECYSTECTOMY      COLONOSCOPY N/A 8/14/2019    Procedure: COLONOSCOPY;  Surgeon: Elio Perez MD;  Location: [facility];  Service: Endoscopy;  Laterality: N/A;    HYSTERECTOMY      SHOULDER SURGERY       Family History   Problem Relation Age of  Onset    Cancer Father         lung    Cancer Brother         colon cancer    Colon cancer Brother     Pancreatitis Mother     Eczema Neg Hx     Psoriasis Neg Hx     Lupus Neg Hx     Melanoma Neg Hx     Stomach cancer Neg Hx     Esophageal cancer Neg Hx        Marital Status:   Alcohol History:  reports that she drinks about 7.0 standard drinks of alcohol per week.  Tobacco History:  reports that she quit smoking about 45 years ago. She quit after 20.00 years of use. She has never used smokeless tobacco.  Drug History:  reports that she does not use drugs.    Review of patient's allergies indicates:   Allergen Reactions    Codeine Nausea And Vomiting       Current Outpatient Medications:     amitriptyline (ELAVIL) 50 MG tablet, Take 1 tablet (50 mg total) by mouth every evening., Disp: 90 tablet, Rfl: 1    aspirin 325 MG tablet, Take 1 tablet (325 mg total) by mouth once daily., Disp: , Rfl: 0    atorvastatin (LIPITOR) 80 MG tablet, Take 1 tablet (80 mg total) by mouth once daily., Disp: 90 tablet, Rfl: 1    clotrimazole (LOTRIMIN) 1 % cream, AAA axilla BID PRN flare, Disp: 30 g, Rfl: 0    Lactobac no.41/Bifidobact no.7 (PROBIOTIC-10 ORAL), Take by mouth., Disp: , Rfl:     metoprolol succinate (TOPROL-XL) 25 MG 24 hr tablet, Take 1 tablet (25 mg total) by mouth once daily., Disp: 90 tablet, Rfl: 1    sertraline (ZOLOFT) 50 MG tablet, Take 1 tablet (50 mg total) by mouth once daily., Disp: 90 tablet, Rfl: 1    triamcinolone acetonide 0.1% (KENALOG) 0.1 % cream, Apply topically 2 (two) times daily., Disp: 30 g, Rfl: 3    TURMERIC ORAL, Take by mouth., Disp: , Rfl:     vit A/vit C/vit E/zinc/copper (PRESERVISION AREDS ORAL), Take by mouth., Disp: , Rfl:     wheat dextrin/calcium no.15 (BENEFIBER PLUS CALCIUM ORAL), Take by mouth., Disp: , Rfl:     cetirizine (ZYRTEC) 10 MG tablet, Take 1 tablet (10 mg total) by mouth once daily., Disp: 30 tablet, Rfl: 0    cholecalciferol, vitamin D3,  "(VITAMIN D3) 5,000 unit Tab, Take 5,000 Units by mouth once daily., Disp: , Rfl:     losartan-hydrochlorothiazide 100-12.5 mg (HYZAAR) 100-12.5 mg Tab, Take 1 tablet by mouth once daily., Disp: 90 tablet, Rfl: 1    multivitamin (ONE DAILY MULTIVITAMIN) per tablet, Take 1 tablet by mouth once daily., Disp: , Rfl:     Review of Systems   Constitutional: Negative for appetite change, chills, fatigue, fever and unexpected weight change.   HENT: Negative for congestion, ear pain, sinus pain, sore throat and trouble swallowing.    Eyes: Negative for pain, discharge and visual disturbance.   Respiratory: Negative for apnea, cough, shortness of breath and wheezing.    Cardiovascular: Negative for chest pain, palpitations and leg swelling.        Has a recent pacemaker   Gastrointestinal: Negative for abdominal pain, blood in stool, constipation, diarrhea, nausea and vomiting.   Endocrine: Negative for heat intolerance, polydipsia and polyuria.   Genitourinary: Negative for difficulty urinating, dyspareunia, dysuria, frequency, hematuria and menstrual problem.   Musculoskeletal: Negative for arthralgias (Hands have osteoarthritic changes), back pain, gait problem, joint swelling and myalgias.   Skin: Positive for rash (Erythematous scaly patches of skin near her antecubital fossas bilaterally.).   Allergic/Immunologic: Negative for environmental allergies, food allergies and immunocompromised state.   Neurological: Negative for dizziness, tremors, seizures, numbness and headaches.   Psychiatric/Behavioral: Negative for behavioral problems, confusion, hallucinations and suicidal ideas. The patient is not nervous/anxious.           Objective:      Vitals:    02/10/20 1116   BP: 124/78   Pulse: 84   Weight: 56.2 kg (124 lb)   Height: 4' 9" (1.448 m)     Body mass index is 26.83 kg/m².  Physical Exam   Constitutional: She is oriented to person, place, and time. She appears well-developed and well-nourished.   Elderly white " female in no apparent distress   HENT:   Head: Normocephalic and atraumatic.   Right Ear: External ear normal.   Left Ear: External ear normal.   Nose: Nose normal.   Mouth/Throat: Oropharynx is clear and moist.   Eyes: Pupils are equal, round, and reactive to light. EOM are normal.   Neck: Normal range of motion. Neck supple. Carotid bruit is not present. No thyromegaly present.   Cardiovascular: Normal rate, regular rhythm, normal heart sounds and intact distal pulses.   No murmur heard.  Pacemaker present in her left chest.   Pulmonary/Chest: Effort normal and breath sounds normal. She has no wheezes. She has no rales.   Abdominal: Soft. Bowel sounds are normal. She exhibits no distension. There is no hepatosplenomegaly. There is no tenderness.   Musculoskeletal: Normal range of motion. She exhibits no tenderness or deformity.        Lumbar back: Normal. She exhibits no pain and no spasm.   Bends 90 degrees at  waist shoulders have good range of motion, knees are not swollen have good range of motion.  Hands have osteoarthritic nodes on her knuckles.   Lymphadenopathy:     She has no cervical adenopathy.   Neurological: She is alert and oriented to person, place, and time. No cranial nerve deficit. Coordination normal.   Skin: Skin is warm and dry. No rash noted.   Mild eczema seen on her antecubital fossas bilaterally   Psychiatric: She has a normal mood and affect. Her behavior is normal. Judgment and thought content normal.   Nursing note and vitals reviewed.        Assessment:       1. Sequela of cardioembolic stroke    2. Post-menopausal    3. Essential hypertension    4. Dyslipidemia    5. Mixed hyperlipidemia    6. Cardiac pacemaker in situ    7. Diverticulosis    8. Flexural eczema    9. Menopause    10. Anxiety         Plan:       Sequela of cardioembolic stroke  Patient has done remarkably well in her recovery from the stroke in 2017.  Post-menopausal  -     DXA Bone Density Spine And Hip; Future;  Expected date: 02/10/2020  DEXA scan ordered for the future  Essential hypertension  -     metoprolol succinate (TOPROL-XL) 25 MG 24 hr tablet; Take 1 tablet (25 mg total) by mouth once daily.  Dispense: 90 tablet; Refill: 1  -     losartan-hydrochlorothiazide 100-12.5 mg (HYZAAR) 100-12.5 mg Tab; Take 1 tablet by mouth once daily.  Dispense: 90 tablet; Refill: 1  Blood pressure well controlled on current regimen  Dyslipidemia  -     atorvastatin (LIPITOR) 80 MG tablet; Take 1 tablet (80 mg total) by mouth once daily.  Dispense: 90 tablet; Refill: 1  Continue atorvastatin lipids currently at goal  Mixed hyperlipidemia  Doing well on current atorvastatin  Cardiac pacemaker in situ  Follow-up with Dr. Gallito Roass  Diverticulosis  Been a fiber seems to be helping  Flexural eczema  -     triamcinolone acetonide 0.1% (KENALOG) 0.1 % cream; Apply topically 2 (two) times daily.  Dispense: 30 g; Refill: 3  Continue triamcinolone cream as needed  Menopause    Anxiety  -     sertraline (ZOLOFT) 50 MG tablet; Take 1 tablet (50 mg total) by mouth once daily.  Dispense: 90 tablet; Refill: 1  -     amitriptyline (ELAVIL) 50 MG tablet; Take 1 tablet (50 mg total) by mouth every evening.  Dispense: 90 tablet; Refill: 1      Follow up in about 6 months (around 8/10/2020) for Nurse practitioner-CVA.

## 2020-02-17 ENCOUNTER — TELEPHONE (OUTPATIENT)
Dept: ELECTROPHYSIOLOGY | Facility: CLINIC | Age: 80
End: 2020-02-17

## 2020-02-17 NOTE — TELEPHONE ENCOUNTER
----- Message from Forest May MA sent at 2/17/2020  9:40 AM CST -----  Contact: Patient  I sent this message just so you were aware this pt is seeing a new EP   ----- Message -----  From: Giulia Link  Sent: 2/17/2020   9:32 AM CST  To: [facility] Arrhythmia Clinical Support Staff    The pt is calling to cancel her appointment because she has Dr. Gallito Rosas take care of her device now. Please call her back @ [phone]. Thanks, Giulia

## 2020-02-19 ENCOUNTER — HOSPITAL ENCOUNTER (OUTPATIENT)
Dept: RADIOLOGY | Facility: HOSPITAL | Age: 80
Discharge: HOME | End: 2020-02-19
Attending: FAMILY MEDICINE
Payer: MEDICARE

## 2020-02-19 DIAGNOSIS — Z78.0 POST-MENOPAUSAL: ICD-10-CM

## 2020-02-19 PROCEDURE — 77080 DXA BONE DENSITY AXIAL: CPT | Mod: TC,PO

## 2020-02-24 ENCOUNTER — CLINICAL SUPPORT (OUTPATIENT)
Dept: CARDIOLOGY | Facility: HOSPITAL | Age: 80
End: 2020-02-24
Payer: MEDICARE

## 2020-02-24 ENCOUNTER — TELEPHONE (OUTPATIENT)
Dept: FAMILY MEDICINE | Facility: CLINIC | Age: 80
End: 2020-02-24

## 2020-02-24 DIAGNOSIS — Z12.31 VISIT FOR SCREENING MAMMOGRAM: Primary | ICD-10-CM

## 2020-02-24 PROCEDURE — 93294 CARDIAC DEVICE CHECK - REMOTE: ICD-10-PCS | Mod: ,,, | Performed by: INTERNAL MEDICINE

## 2020-02-24 PROCEDURE — 93294 REM INTERROG EVL PM/LDLS PM: CPT | Mod: ,,, | Performed by: INTERNAL MEDICINE

## 2020-02-24 PROCEDURE — 93296 REM INTERROG EVL PM/IDS: CPT | Performed by: INTERNAL MEDICINE

## 2020-02-24 NOTE — TELEPHONE ENCOUNTER
----- Message from Shayla Caruso sent at 2/24/2020  3:10 PM CST -----  Pt needs a mammogram order sent to [facility]. Pt #[phone]

## 2020-02-24 NOTE — TELEPHONE ENCOUNTER
----- Message from Adiel Boss MD sent at 2/22/2020  8:24 PM CST -----  Call patient.  Her DEXA scan shows normal density of spine and hips.  Continue current medications.  Recheck DEXA scan in 2 years

## 2020-03-04 ENCOUNTER — HOSPITAL ENCOUNTER (OUTPATIENT)
Dept: RADIOLOGY | Facility: HOSPITAL | Age: 80
Discharge: HOME | End: 2020-03-04
Attending: NURSE PRACTITIONER
Payer: MEDICARE

## 2020-03-04 VITALS — HEIGHT: 57 IN | BODY MASS INDEX: 26.73 KG/M2 | WEIGHT: 123.88 LBS

## 2020-03-04 DIAGNOSIS — Z12.31 VISIT FOR SCREENING MAMMOGRAM: ICD-10-CM

## 2020-03-04 PROCEDURE — 77067 SCR MAMMO BI INCL CAD: CPT | Mod: TC,PO

## 2020-03-23 DIAGNOSIS — K21.9 GASTROESOPHAGEAL REFLUX DISEASE, ESOPHAGITIS PRESENCE NOT SPECIFIED: Primary | ICD-10-CM

## 2020-03-23 DIAGNOSIS — F41.9 ANXIETY: ICD-10-CM

## 2020-03-23 RX ORDER — OMEPRAZOLE 20 MG/1
20 CAPSULE, DELAYED RELEASE ORAL DAILY
Qty: 90 CAPSULE | Refills: 1 | Status: SHIPPED | OUTPATIENT
Start: 2020-03-23 | End: 2020-09-18 | Stop reason: SDUPTHER

## 2020-03-23 RX ORDER — OMEPRAZOLE 20 MG/1
20 CAPSULE, DELAYED RELEASE ORAL DAILY
COMMUNITY
End: 2020-03-23 | Stop reason: SDUPTHER

## 2020-03-23 RX ORDER — SERTRALINE HYDROCHLORIDE 50 MG/1
50 TABLET, FILM COATED ORAL DAILY
Qty: 90 TABLET | Refills: 1 | Status: SHIPPED | OUTPATIENT
Start: 2020-03-23 | End: 2020-09-18 | Stop reason: SDUPTHER

## 2020-03-23 NOTE — TELEPHONE ENCOUNTER
Called pt to see which meds she needed rx on . Pt stated she need rx on  zoloft and omeprazole 20 mg. Omeprazole is not on pt med list. She stated she was once taking it, but stopped and just started back taking some of her husbands. Pt wants meds sent in to [facility] on [address]

## 2020-03-31 ENCOUNTER — TELEPHONE (OUTPATIENT)
Dept: CARDIOLOGY | Facility: HOSPITAL | Age: 80
End: 2020-03-31

## 2020-03-31 NOTE — TELEPHONE ENCOUNTER
Contacted the pt on this am in relation to her remote pacemaker home monitor.  (please see telephone note on 2/3 & 2/4/20).  Informed the pt her remote monitor is still registered to this clinic.  Pt did confirm that she has see Dr. Ralph Conti in [place] and has had her pacemaker checked @ her last clinic visit with him.  Pt instructed to contact that office to inform that they are now able to register her into their remote web site.  Pt verbalized understanding and stated she will attempt to reach their office.  Pt appreciated the call.

## 2020-08-19 ENCOUNTER — TELEPHONE (OUTPATIENT)
Dept: FAMILY MEDICINE | Facility: CLINIC | Age: 80
End: 2020-08-19

## 2020-08-21 ENCOUNTER — TELEPHONE (OUTPATIENT)
Dept: FAMILY MEDICINE | Facility: CLINIC | Age: 80
End: 2020-08-21

## 2020-08-21 DIAGNOSIS — Z79.899 ENCOUNTER FOR LONG-TERM (CURRENT) USE OF OTHER MEDICATIONS: ICD-10-CM

## 2020-08-21 DIAGNOSIS — I10 ESSENTIAL HYPERTENSION: ICD-10-CM

## 2020-08-21 DIAGNOSIS — E78.2 MIXED HYPERLIPIDEMIA: Primary | ICD-10-CM

## 2020-08-21 NOTE — TELEPHONE ENCOUNTER
----- Message from Gualberto Fuentes sent at 8/21/2020  9:18 AM CDT -----  Regarding: Due labs  Contact: Margaret Abdul  Pt would like to know if she's due for labs before her appt in October ?Please give her a call back # [phone]

## 2020-09-08 ENCOUNTER — TELEPHONE (OUTPATIENT)
Dept: FAMILY MEDICINE | Facility: CLINIC | Age: 80
End: 2020-09-08

## 2020-09-08 DIAGNOSIS — I10 ESSENTIAL HYPERTENSION: ICD-10-CM

## 2020-09-08 DIAGNOSIS — R39.9 UTI SYMPTOMS: Primary | ICD-10-CM

## 2020-09-08 RX ORDER — METOPROLOL SUCCINATE 25 MG/1
25 TABLET, EXTENDED RELEASE ORAL DAILY
Qty: 90 TABLET | Refills: 1 | Status: SHIPPED | OUTPATIENT
Start: 2020-09-08 | End: 2020-09-18 | Stop reason: SDUPTHER

## 2020-09-08 NOTE — TELEPHONE ENCOUNTER
She can come by for UA/urine culture.  I do not think she needs blood test if she is having simple UTI symptoms without fever/chills/nausea/vomiting

## 2020-09-08 NOTE — TELEPHONE ENCOUNTER
----- Message from Sera Lemon sent at 9/8/2020  9:10 AM CDT -----  Past 5 days she feels like she has a bladder infection she would like a urine and blood test she does not want to schedule.  She wants to keep her appt for the 15th.   # [phone]

## 2020-09-08 NOTE — TELEPHONE ENCOUNTER
----- Message from Gualberto Fuentes sent at 9/8/2020 11:59 AM CDT -----  Regarding: Refill  Contact: Althea Abdul  Pt needs refill on her Metoprolol .  Send to [facility] on [address]   Pt# [phone]

## 2020-09-09 ENCOUNTER — TELEPHONE (OUTPATIENT)
Dept: FAMILY MEDICINE | Facility: CLINIC | Age: 80
End: 2020-09-09

## 2020-09-09 NOTE — TELEPHONE ENCOUNTER
Spoke to pt regarding her appt on 9/15. Offered virtual visit pt stated she will come into the office. c-19 reviewed

## 2020-09-10 LAB
ALBUMIN SERPL-MCNC: 4.1 G/DL (ref 3.6–5.1)
ALBUMIN/GLOB SERPL: 1.6 (CALC) (ref 1–2.5)
ALP SERPL-CCNC: 96 U/L (ref 37–153)
ALT SERPL-CCNC: 17 U/L (ref 6–29)
AST SERPL-CCNC: 21 U/L (ref 10–35)
BILIRUB SERPL-MCNC: 0.5 MG/DL (ref 0.2–1.2)
BUN SERPL-MCNC: 21 MG/DL (ref 7–25)
BUN/CREAT SERPL: ABNORMAL (CALC) (ref 6–22)
CALCIUM SERPL-MCNC: 9.1 MG/DL (ref 8.6–10.4)
CHLORIDE SERPL-SCNC: 103 MMOL/L (ref 98–110)
CHOLEST SERPL-MCNC: 181 MG/DL
CHOLEST/HDLC SERPL: 2.6 (CALC)
CO2 SERPL-SCNC: 30 MMOL/L (ref 20–32)
CREAT SERPL-MCNC: 0.62 MG/DL (ref 0.6–0.88)
GFRSERPLBLD MDRD-ARVRAT: 84 ML/MIN/1.73M2
GLOBULIN SER CALC-MCNC: 2.6 G/DL (CALC) (ref 1.9–3.7)
GLUCOSE SERPL-MCNC: 103 MG/DL (ref 65–99)
HDLC SERPL-MCNC: 70 MG/DL
LDLC SERPL CALC-MCNC: 89 MG/DL (CALC)
NONHDLC SERPL-MCNC: 111 MG/DL (CALC)
POTASSIUM SERPL-SCNC: 4.3 MMOL/L (ref 3.5–5.3)
PROT SERPL-MCNC: 6.7 G/DL (ref 6.1–8.1)
SODIUM SERPL-SCNC: 141 MMOL/L (ref 135–146)
TRIGL SERPL-MCNC: 128 MG/DL

## 2020-09-11 LAB
APPEARANCE UR: ABNORMAL
BACTERIA #/AREA URNS HPF: ABNORMAL /HPF
BACTERIA UR CULT: ABNORMAL
BACTERIA UR CULT: ABNORMAL
BILIRUB UR QL STRIP: NEGATIVE
COLOR UR: YELLOW
GLUCOSE UR QL STRIP: NEGATIVE
HGB UR QL STRIP: ABNORMAL
HYALINE CASTS #/AREA URNS LPF: ABNORMAL /LPF
KETONES UR QL STRIP: NEGATIVE
LEUKOCYTE ESTERASE UR QL STRIP: ABNORMAL
NITRITE UR QL STRIP: POSITIVE
PH UR STRIP: 6.5 [PH] (ref 5–8)
PROT UR QL STRIP: NEGATIVE
RBC #/AREA URNS HPF: ABNORMAL /HPF
SP GR UR STRIP: 1.02 (ref 1–1.03)
SQUAMOUS #/AREA URNS HPF: ABNORMAL /HPF
WBC #/AREA URNS HPF: ABNORMAL /HPF

## 2020-09-12 ENCOUNTER — TELEPHONE (OUTPATIENT)
Dept: FAMILY MEDICINE | Facility: CLINIC | Age: 80
End: 2020-09-12

## 2020-09-12 NOTE — TELEPHONE ENCOUNTER
After Hours Call: Patient notified of positive urine culture result.  Per Shayla, start macrobid 100mg bid x 7 days.  Rx sent to [facility].  Patient aware that copay may be higher but Macrobid is necessary per C&S.

## 2020-09-14 ENCOUNTER — TELEPHONE (OUTPATIENT)
Dept: CARDIOLOGY | Facility: HOSPITAL | Age: 80
End: 2020-09-14

## 2020-09-18 ENCOUNTER — OFFICE VISIT (OUTPATIENT)
Dept: FAMILY MEDICINE | Facility: CLINIC | Age: 80
End: 2020-09-18
Payer: MEDICARE

## 2020-09-18 VITALS
BODY MASS INDEX: 27.4 KG/M2 | WEIGHT: 127 LBS | HEIGHT: 57 IN | TEMPERATURE: 98 F | DIASTOLIC BLOOD PRESSURE: 78 MMHG | HEART RATE: 92 BPM | SYSTOLIC BLOOD PRESSURE: 124 MMHG

## 2020-09-18 DIAGNOSIS — M15.9 PRIMARY OSTEOARTHRITIS INVOLVING MULTIPLE JOINTS: ICD-10-CM

## 2020-09-18 DIAGNOSIS — Z86.73 HISTORY OF STROKE: ICD-10-CM

## 2020-09-18 DIAGNOSIS — I10 ESSENTIAL HYPERTENSION: Primary | ICD-10-CM

## 2020-09-18 DIAGNOSIS — E78.5 DYSLIPIDEMIA: ICD-10-CM

## 2020-09-18 DIAGNOSIS — F51.01 PRIMARY INSOMNIA: ICD-10-CM

## 2020-09-18 DIAGNOSIS — K21.9 GASTROESOPHAGEAL REFLUX DISEASE, ESOPHAGITIS PRESENCE NOT SPECIFIED: ICD-10-CM

## 2020-09-18 PROCEDURE — 1101F PT FALLS ASSESS-DOCD LE1/YR: CPT | Mod: S$GLB,,, | Performed by: NURSE PRACTITIONER

## 2020-09-18 PROCEDURE — 99214 PR OFFICE/OUTPT VISIT, EST, LEVL IV, 30-39 MIN: ICD-10-PCS | Mod: S$GLB,,, | Performed by: NURSE PRACTITIONER

## 2020-09-18 PROCEDURE — 3078F PR MOST RECENT DIASTOLIC BLOOD PRESSURE < 80 MM HG: ICD-10-PCS | Mod: S$GLB,,, | Performed by: NURSE PRACTITIONER

## 2020-09-18 PROCEDURE — 3074F PR MOST RECENT SYSTOLIC BLOOD PRESSURE < 130 MM HG: ICD-10-PCS | Mod: S$GLB,,, | Performed by: NURSE PRACTITIONER

## 2020-09-18 PROCEDURE — 1159F PR MEDICATION LIST DOCUMENTED IN MEDICAL RECORD: ICD-10-PCS | Mod: S$GLB,,, | Performed by: NURSE PRACTITIONER

## 2020-09-18 PROCEDURE — 3074F SYST BP LT 130 MM HG: CPT | Mod: S$GLB,,, | Performed by: NURSE PRACTITIONER

## 2020-09-18 PROCEDURE — 99214 OFFICE O/P EST MOD 30 MIN: CPT | Mod: S$GLB,,, | Performed by: NURSE PRACTITIONER

## 2020-09-18 PROCEDURE — 1159F MED LIST DOCD IN RCRD: CPT | Mod: S$GLB,,, | Performed by: NURSE PRACTITIONER

## 2020-09-18 PROCEDURE — 3078F DIAST BP <80 MM HG: CPT | Mod: S$GLB,,, | Performed by: NURSE PRACTITIONER

## 2020-09-18 PROCEDURE — 1101F PR PT FALLS ASSESS DOC 0-1 FALLS W/OUT INJ PAST YR: ICD-10-PCS | Mod: S$GLB,,, | Performed by: NURSE PRACTITIONER

## 2020-09-18 RX ORDER — INFLUENZA A VIRUS A/MICHIGAN/45/2015 X-275 (H1N1) ANTIGEN (FORMALDEHYDE INACTIVATED), INFLUENZA A VIRUS A/SINGAPORE/INFIMH-16-0019/2016 IVR-186 (H3N2) ANTIGEN (FORMALDEHYDE INACTIVATED), INFLUENZA B VIRUS B/PHUKET/3073/2013 ANTIGEN (FORMALDEHYDE INACTIVATED), AND INFLUENZA B VIRUS B/MARYLAND/15/2016 BX-69A ANTIGEN (FORMALDEHYDE INACTIVATED) 60; 60; 60; 60 UG/.7ML; UG/.7ML; UG/.7ML; UG/.7ML
INJECTION, SUSPENSION INTRAMUSCULAR
COMMUNITY
Start: 2020-09-09 | End: 2021-03-18 | Stop reason: ALTCHOICE

## 2020-09-18 RX ORDER — OMEPRAZOLE 20 MG/1
20 CAPSULE, DELAYED RELEASE ORAL DAILY
Qty: 90 CAPSULE | Refills: 1 | Status: SHIPPED | OUTPATIENT
Start: 2020-09-18 | End: 2021-03-18

## 2020-09-18 RX ORDER — AMITRIPTYLINE HYDROCHLORIDE 50 MG/1
50 TABLET, FILM COATED ORAL NIGHTLY
Qty: 90 TABLET | Refills: 1 | Status: SHIPPED | OUTPATIENT
Start: 2020-09-18 | End: 2021-03-18 | Stop reason: SDUPTHER

## 2020-09-18 RX ORDER — ATORVASTATIN CALCIUM 80 MG/1
80 TABLET, FILM COATED ORAL DAILY
Qty: 90 TABLET | Refills: 1 | Status: SHIPPED | OUTPATIENT
Start: 2020-09-18 | End: 2021-03-18 | Stop reason: SDUPTHER

## 2020-09-18 RX ORDER — METOPROLOL SUCCINATE 25 MG/1
25 TABLET, EXTENDED RELEASE ORAL DAILY
Qty: 90 TABLET | Refills: 1 | Status: SHIPPED | OUTPATIENT
Start: 2020-09-18 | End: 2021-03-18 | Stop reason: SDUPTHER

## 2020-09-18 RX ORDER — NITROFURANTOIN 25; 75 MG/1; MG/1
CAPSULE ORAL
COMMUNITY
Start: 2020-09-12 | End: 2021-03-18 | Stop reason: ALTCHOICE

## 2020-09-18 RX ORDER — SERTRALINE HYDROCHLORIDE 50 MG/1
50 TABLET, FILM COATED ORAL DAILY
Qty: 90 TABLET | Refills: 1 | Status: SHIPPED | OUTPATIENT
Start: 2020-09-18 | End: 2021-03-18 | Stop reason: SDUPTHER

## 2020-09-18 NOTE — PATIENT INSTRUCTIONS
Back Exercise to Keep Fit for Low Back Pain  To help in your recovery and to prevent further back problems, keep your back, abdominal muscles and legs strong. Walk daily as soon as you can. Gradually add other physical activities such as swimming and biking, which can help improve lower back strength. Begin as soon as you can do them comfortably. Do not do any exercises that make your pain a lot worse. The following are some back exercises that can help relieve low back pain.     Pelvic tilt   Repeat 5-10 times, twice per day.  Lie flat on your back (or stand with your back to a wall), knees bent, feet flat on the floor, body relaxed. Tighten your abdominal and buttock muscles, and tilt your pelvis. The curve of the small of your back should flatten towards the floor (or wall). Hold 10 seconds and then relax.       Knee raise     Repeat 5-10 times, twice per day.    Lie flat on your back, knees bent. Bring one knee slowly to your chest. Hug your knee gently. Then lower your leg toward the floor, keeping your knee bent. Do not straighten your legs. Repeat exercise with your other leg.              Partial press-up     Lie face down on a soft, firm surface. Do not turn your head to either side. Rest your arms bent at the elbows alongside your body. Relax for a few minutes. Then raise your upper body enough to lean on your elbows. Relax your lower back and legs as much as possible. Hold this position for 30 seconds at first. Gradually work up to five minutes. Or try slow press-ups. Hold each for five seconds, and repeat five to six times.              Copyright © 2012 by [employer]   Sumit BOB, Arnold DUFF, Gaurav SEALS, Morelia SIMONS, Jeramy R, Tyson B, Sushil K, Elgin C, Shubham B, Ganesh S, Jessika LLAMAS, Bre R. [employer]. Adult Acute and Subacute Low Back Pain. Updated November 2012.

## 2020-09-18 NOTE — PROGRESS NOTES
SUBJECTIVE:    Patient ID: Maricel Abdul is a 80 y.o. female.    Chief Complaint: Medication Refill (lab results, brought bottles// SW)    Pt here for regular f/u, patient here with her daughter today.  Patient's  passed away in May though she reports overall she is doing well.  Now living alone though does have good family support.  Complaining of some hand arthritis pain as well as pain across lower back especially she 1st gets up in the morning.  Rarely will take something for pain, seems to improve as the day goes on and she is more active.  Admits due to COVID has not been going to the gym and exercising and feels that has something to do with her pain worsening.  Follows with Dr. Rosas for pacemaker checks      Telephone on 09/08/2020   Component Date Value Ref Range Status    Color, UA 09/09/2020 YELLOW  YELLOW Final    Appearance, UA 09/09/2020 CLOUDY* CLEAR Final    Specific [place], UA 09/09/2020 1.016  1.001 - 1.035 Final    pH, UA 09/09/2020 6.5  5.0 - 8.0 Final    Glucose, UA 09/09/2020 NEGATIVE  NEGATIVE Final    Bilirubin, UA 09/09/2020 NEGATIVE  NEGATIVE Final    Ketones, UA 09/09/2020 NEGATIVE  NEGATIVE Final    Occult Blood UA 09/09/2020 1+* NEGATIVE Final    Protein, UA 09/09/2020 NEGATIVE  NEGATIVE Final    Nitrite, UA 09/09/2020 POSITIVE* NEGATIVE Final    Leukocytes, UA 09/09/2020 3+* NEGATIVE Final    WBC Casts, UA 09/09/2020 > OR = 60* < OR = 5 /HPF Final    RBC Casts, UA 09/09/2020 0-2  < OR = 2 /HPF Final    Squam Epithel, UA 09/09/2020 NONE SEEN  < OR = 5 /HPF Final    Bacteria, UA 09/09/2020 MANY* NONE SEEN /HPF Final    Hyaline Casts, UA 09/09/2020 NONE SEEN  NONE SEEN /LPF Final    Reflexive Urine Culture 09/09/2020 CULTURE INDICATED - RESULTS TO FOLLOW   Final    Urine Culture, Routine 09/09/2020 *  Final   Telephone on 08/21/2020   Component Date Value Ref Range Status    Cholesterol 09/09/2020 181  <200 mg/dL Final    HDL 09/09/2020 70  > OR = 50 mg/dL  Final    Triglycerides 09/09/2020 128  <150 mg/dL Final    LDL Cholesterol 09/09/2020 89  mg/dL (calc) Final    Hdl/Cholesterol Ratio 09/09/2020 2.6  <5.0 (calc) Final    Non HDL Chol. (LDL+VLDL) 09/09/2020 111  <130 mg/dL (calc) Final    Glucose 09/09/2020 103* 65 - 99 mg/dL Final    BUN, Bld 09/09/2020 21  7 - 25 mg/dL Final    Creatinine 09/09/2020 0.62  0.60 - 0.88 mg/dL Final    eGFR if non African American 09/09/2020 84  > OR = 60 mL/min/1.73m2 Final    eGFR if African American 09/09/2020 97  > OR = 60 mL/min/1.73m2 Final    BUN/Creatinine Ratio 09/09/2020 NOT APPLICABLE  6 - 22 (calc) Final    Sodium 09/09/2020 141  135 - 146 mmol/L Final    Potassium 09/09/2020 4.3  3.5 - 5.3 mmol/L Final    Chloride 09/09/2020 103  98 - 110 mmol/L Final    CO2 09/09/2020 30  20 - 32 mmol/L Final    Calcium 09/09/2020 9.1  8.6 - 10.4 mg/dL Final    Total Protein 09/09/2020 6.7  6.1 - 8.1 g/dL Final    Albumin 09/09/2020 4.1  3.6 - 5.1 g/dL Final    Globulin, Total 09/09/2020 2.6  1.9 - 3.7 g/dL (calc) Final    Albumin/Globulin Ratio 09/09/2020 1.6  1.0 - 2.5 (calc) Final    Total Bilirubin 09/09/2020 0.5  0.2 - 1.2 mg/dL Final    Alkaline Phosphatase 09/09/2020 96  37 - 153 U/L Final    AST 09/09/2020 21  10 - 35 U/L Final    ALT 09/09/2020 17  6 - 29 U/L Final       Past Medical History:   Diagnosis Date    Arthritis     Diverticulitis     Encounter for blood transfusion     Hypertension     Kidney stones     Shingles     TIA (transient ischemic attack) 12/2017     Past Surgical History:   Procedure Laterality Date    APPENDECTOMY      CARDIAC SURGERY      pacemaker    CERVICAL FUSION      CHOLECYSTECTOMY      COLONOSCOPY N/A 8/14/2019    Procedure: COLONOSCOPY;  Surgeon: Elio Perez MD;  Location: [facility];  Service: Endoscopy;  Laterality: N/A;    HYSTERECTOMY      SHOULDER SURGERY       Family History   Problem Relation Age of Onset    Cancer Father         lung    Cancer  Brother         colon cancer    Colon cancer Brother     Pancreatitis Mother     Eczema Neg Hx     Psoriasis Neg Hx     Lupus Neg Hx     Melanoma Neg Hx     Stomach cancer Neg Hx     Esophageal cancer Neg Hx        Marital Status:   Alcohol History:  reports current alcohol use of about 7.0 standard drinks of alcohol per week.  Tobacco History:  reports that she quit smoking about 46 years ago. She quit after 20.00 years of use. She has never used smokeless tobacco.  Drug History:  reports no history of drug use.    Health Maintenance Topics with due status: Not Due       Topic Last Completion Date    TETANUS VACCINE 02/26/2019    Colonoscopy 08/14/2019    DEXA SCAN 02/19/2020    Lipid Panel 09/09/2020    Aspirin/Antiplatelet Therapy 09/18/2020     Immunization History   Administered Date(s) Administered    Influenza - High Dose - PF (65 years and older) 08/30/2013, 08/31/2015, 09/19/2016, 09/19/2017, 09/12/2018, 10/15/2019    Influenza - Quadrivalent - High Dose - PF (65 years and older) 09/09/2020    Influenza - Quadrivalent - PF *Preferred* (6 months and older) 11/18/2002, 01/19/2006    Influenza - Trivalent (ADULT) 11/18/2002, 01/19/2006    Influenza - Trivalent - PF (ADULT) 08/31/2015    Influenza A (H5N1) 2004 Monovalent 01/01/2016    Pneumococcal 01/01/2016    Pneumococcal Conjugate - 13 Valent 08/30/2016    Pneumococcal Polysaccharide - 23 Valent 11/18/2002, 09/12/2018    Td (ADULT) 02/26/2019    Varicella 01/01/2014       Review of patient's allergies indicates:   Allergen Reactions    Codeine Nausea And Vomiting       Current Outpatient Medications:     amitriptyline (ELAVIL) 50 MG tablet, Take 1 tablet (50 mg total) by mouth every evening., Disp: 90 tablet, Rfl: 1    aspirin 325 MG tablet, Take 1 tablet (325 mg total) by mouth once daily., Disp: , Rfl: 0    atorvastatin (LIPITOR) 80 MG tablet, Take 1 tablet (80 mg total) by mouth once daily., Disp: 90 tablet, Rfl: 1     cetirizine (ZYRTEC) 10 MG tablet, Take 1 tablet (10 mg total) by mouth once daily., Disp: 30 tablet, Rfl: 0    cholecalciferol, vitamin D3, (VITAMIN D3) 5,000 unit Tab, Take 5,000 Units by mouth once daily., Disp: , Rfl:     FLUZONE HIGHDOSE QUAD 20-21  mcg/0.7 mL Syrg, ADM 0.7ML IM UTD, Disp: , Rfl:     Lactobac no.41/Bifidobact no.7 (PROBIOTIC-10 ORAL), Take by mouth., Disp: , Rfl:     metoprolol succinate (TOPROL-XL) 25 MG 24 hr tablet, Take 1 tablet (25 mg total) by mouth once daily., Disp: 90 tablet, Rfl: 1    multivitamin (ONE DAILY MULTIVITAMIN) per tablet, Take 1 tablet by mouth once daily., Disp: , Rfl:     nitrofurantoin, macrocrystal-monohydrate, (MACROBID) 100 MG capsule, , Disp: , Rfl:     omeprazole (PRILOSEC) 20 MG capsule, Take 1 capsule (20 mg total) by mouth once daily., Disp: 90 capsule, Rfl: 1    sertraline (ZOLOFT) 50 MG tablet, Take 1 tablet (50 mg total) by mouth once daily., Disp: 90 tablet, Rfl: 1    TURMERIC ORAL, Take by mouth., Disp: , Rfl:     vit A/vit C/vit E/zinc/copper (PRESERVISION AREDS ORAL), Take by mouth., Disp: , Rfl:     wheat dextrin/calcium no.15 (BENEFIBER PLUS CALCIUM ORAL), Take by mouth., Disp: , Rfl:     clotrimazole (LOTRIMIN) 1 % cream, AAA axilla BID PRN flare, Disp: 30 g, Rfl: 0    losartan-hydrochlorothiazide 100-12.5 mg (HYZAAR) 100-12.5 mg Tab, Take 1 tablet by mouth once daily., Disp: 90 tablet, Rfl: 1    triamcinolone acetonide 0.1% (KENALOG) 0.1 % cream, Apply topically 2 (two) times daily., Disp: 30 g, Rfl: 3    Review of Systems   Constitutional: Negative for chills and fever.   Respiratory: Negative for cough, shortness of breath and wheezing.    Cardiovascular: Negative for chest pain, palpitations and leg swelling.   Gastrointestinal: Negative for abdominal pain, constipation and diarrhea.   Genitourinary: Positive for frequency. Negative for dysuria and hematuria.        Some urge incontinence first thing in am   Musculoskeletal:  "Positive for arthralgias (bilat hand arthritis pain) and back pain (mid to lower back pain, worse first thing in AM).   Skin: Negative for rash.   Neurological: Negative for dizziness and headaches.   Psychiatric/Behavioral: Negative for dysphoric mood and suicidal ideas. The patient is not nervous/anxious.           Objective:      Vitals:    09/18/20 0958   BP: 124/78   Pulse: 92   Temp: 98.3 °F (36.8 °C)   Weight: 57.6 kg (127 lb)   Height: 4' 9" (1.448 m)     Physical Exam  Vitals signs and nursing note reviewed.   Constitutional:       General: She is not in acute distress.     Appearance: Normal appearance. She is well-developed.   HENT:      Head: Normocephalic and atraumatic.      Right Ear: Tympanic membrane and ear canal normal.      Left Ear: Tympanic membrane and ear canal normal.      Mouth/Throat:      Pharynx: No posterior oropharyngeal erythema.   Neck:      Musculoskeletal: Neck supple.      Vascular: No carotid bruit.   Cardiovascular:      Rate and Rhythm: Normal rate and regular rhythm.      Heart sounds: Murmur present. Systolic murmur present with a grade of 2/6. No friction rub. No gallop.    Pulmonary:      Effort: Pulmonary effort is normal. No respiratory distress.      Breath sounds: Normal breath sounds. No wheezing or rales.   Abdominal:      General: There is no distension.      Palpations: Abdomen is soft.      Tenderness: There is no abdominal tenderness.   Musculoskeletal:      Right lower leg: No edema.      Left lower leg: No edema.   Lymphadenopathy:      Cervical: No cervical adenopathy.   Skin:     General: Skin is warm and dry.      Findings: No rash.   Neurological:      General: No focal deficit present.      Mental Status: She is alert and oriented to person, place, and time.      Gait: Gait normal.   Psychiatric:         Mood and Affect: Mood normal.           Assessment:       1. Essential hypertension    2. Dyslipidemia    3. Gastroesophageal reflux disease, esophagitis " presence not specified    4. Primary osteoarthritis involving multiple joints    5. Primary insomnia    6. History of stroke           Plan:       Essential hypertension  Comments:  BP well controlled  Orders:  -     metoprolol succinate (TOPROL-XL) 25 MG 24 hr tablet; Take 1 tablet (25 mg total) by mouth once daily.  Dispense: 90 tablet; Refill: 1    Dyslipidemia  Comments:  Reviewed recent labs with patient/daughter, well controlled  Orders:  -     atorvastatin (LIPITOR) 80 MG tablet; Take 1 tablet (80 mg total) by mouth once daily.  Dispense: 90 tablet; Refill: 1    Gastroesophageal reflux disease, esophagitis presence not specified  Comments:  Stable on med  Orders:  -     omeprazole (PRILOSEC) 20 MG capsule; Take 1 capsule (20 mg total) by mouth once daily.  Dispense: 90 capsule; Refill: 1    Primary osteoarthritis involving multiple joints  Comments:  Stable, advised can take Tylenol or Aleve occasionally for more severe pain though cautioned on overuse    Primary insomnia  Comments:  Stable on med  Orders:  -     amitriptyline (ELAVIL) 50 MG tablet; Take 1 tablet (50 mg total) by mouth every evening.  Dispense: 90 tablet; Refill: 1  -     sertraline (ZOLOFT) 50 MG tablet; Take 1 tablet (50 mg total) by mouth once daily.  Dispense: 90 tablet; Refill: 1    History of stroke  Comments:  Stable without residual      Follow up in about 6 months (around 3/18/2021) for HTN.        9/18/2020 Shayla Tello NP

## 2020-09-23 DIAGNOSIS — I10 ESSENTIAL HYPERTENSION: ICD-10-CM

## 2020-09-23 RX ORDER — LOSARTAN POTASSIUM AND HYDROCHLOROTHIAZIDE 12.5; 1 MG/1; MG/1
1 TABLET ORAL DAILY
Qty: 90 TABLET | Refills: 1 | Status: SHIPPED | OUTPATIENT
Start: 2020-09-23 | End: 2021-03-18 | Stop reason: SDUPTHER

## 2020-09-23 NOTE — TELEPHONE ENCOUNTER
----- Message from Gualberto Fuentes sent at 9/23/2020  2:49 PM CDT -----  Regarding: Refill  Contact: Althea Abdul  Needs refill on Losartan- hydrochlorothiazide.   Send to [facility] [address]   Pt# [phone]

## 2020-09-26 RX ORDER — NITROFURANTOIN (MACROCRYSTALS) 100 MG/1
100 CAPSULE ORAL EVERY 12 HOURS
Qty: 14 CAPSULE | Refills: 0
Start: 2020-09-26 | End: 2020-10-03

## 2020-11-17 ENCOUNTER — PATIENT MESSAGE (OUTPATIENT)
Dept: FAMILY MEDICINE | Facility: CLINIC | Age: 80
End: 2020-11-17

## 2020-11-17 DIAGNOSIS — R41.3 MEMORY LOSS: Primary | ICD-10-CM

## 2020-11-17 NOTE — TELEPHONE ENCOUNTER
Please let family know I can put in a referral to Dr. Ace, neuro for memory issues and give them his phone number to call/schedule

## 2020-11-18 ENCOUNTER — PATIENT MESSAGE (OUTPATIENT)
Dept: FAMILY MEDICINE | Facility: CLINIC | Age: 80
End: 2020-11-18

## 2020-11-18 DIAGNOSIS — R41.3 MEMORY LOSS: Primary | ICD-10-CM

## 2020-12-11 ENCOUNTER — OFFICE VISIT (OUTPATIENT)
Dept: DERMATOLOGY | Facility: CLINIC | Age: 80
End: 2020-12-11
Payer: MEDICARE

## 2020-12-11 DIAGNOSIS — Z85.828 HISTORY OF NONMELANOMA SKIN CANCER: ICD-10-CM

## 2020-12-11 DIAGNOSIS — L81.4 SOLAR LENTIGO: ICD-10-CM

## 2020-12-11 DIAGNOSIS — D22.9 MULTIPLE BENIGN NEVI: ICD-10-CM

## 2020-12-11 DIAGNOSIS — L29.9 SCALP PRURITUS: ICD-10-CM

## 2020-12-11 DIAGNOSIS — L82.1 SEBORRHEIC KERATOSES: ICD-10-CM

## 2020-12-11 DIAGNOSIS — D48.5 NEOPLASM OF UNCERTAIN BEHAVIOR OF SKIN: Primary | ICD-10-CM

## 2020-12-11 PROCEDURE — 99999 PR PBB SHADOW E&M-EST. PATIENT-LVL III: CPT | Mod: PBBFAC,,, | Performed by: DERMATOLOGY

## 2020-12-11 PROCEDURE — 1101F PT FALLS ASSESS-DOCD LE1/YR: CPT | Mod: CPTII,S$GLB,, | Performed by: DERMATOLOGY

## 2020-12-11 PROCEDURE — 99999 PR PBB SHADOW E&M-EST. PATIENT-LVL III: ICD-10-PCS | Mod: PBBFAC,,, | Performed by: DERMATOLOGY

## 2020-12-11 PROCEDURE — 1159F MED LIST DOCD IN RCRD: CPT | Mod: S$GLB,,, | Performed by: DERMATOLOGY

## 2020-12-11 PROCEDURE — 99213 PR OFFICE/OUTPT VISIT, EST, LEVL III, 20-29 MIN: ICD-10-PCS | Mod: 25,S$GLB,, | Performed by: DERMATOLOGY

## 2020-12-11 PROCEDURE — 88305 TISSUE EXAM BY PATHOLOGIST: CPT | Performed by: PATHOLOGY

## 2020-12-11 PROCEDURE — 99213 OFFICE O/P EST LOW 20 MIN: CPT | Mod: 25,S$GLB,, | Performed by: DERMATOLOGY

## 2020-12-11 PROCEDURE — 11102 PR TANGENTIAL BIOPSY, SKIN, SINGLE LESION: ICD-10-PCS | Mod: S$GLB,,, | Performed by: DERMATOLOGY

## 2020-12-11 PROCEDURE — 11102 TANGNTL BX SKIN SINGLE LES: CPT | Mod: S$GLB,,, | Performed by: DERMATOLOGY

## 2020-12-11 PROCEDURE — 3288F PR FALLS RISK ASSESSMENT DOCUMENTED: ICD-10-PCS | Mod: CPTII,S$GLB,, | Performed by: DERMATOLOGY

## 2020-12-11 PROCEDURE — 1126F AMNT PAIN NOTED NONE PRSNT: CPT | Mod: S$GLB,,, | Performed by: DERMATOLOGY

## 2020-12-11 PROCEDURE — 88305 TISSUE EXAM BY PATHOLOGIST: CPT | Mod: 26,,, | Performed by: PATHOLOGY

## 2020-12-11 PROCEDURE — 1101F PR PT FALLS ASSESS DOC 0-1 FALLS W/OUT INJ PAST YR: ICD-10-PCS | Mod: CPTII,S$GLB,, | Performed by: DERMATOLOGY

## 2020-12-11 PROCEDURE — 3288F FALL RISK ASSESSMENT DOCD: CPT | Mod: CPTII,S$GLB,, | Performed by: DERMATOLOGY

## 2020-12-11 PROCEDURE — 88305 TISSUE EXAM BY PATHOLOGIST: ICD-10-PCS | Mod: 26,,, | Performed by: PATHOLOGY

## 2020-12-11 PROCEDURE — 1126F PR PAIN SEVERITY QUANTIFIED, NO PAIN PRESENT: ICD-10-PCS | Mod: S$GLB,,, | Performed by: DERMATOLOGY

## 2020-12-11 PROCEDURE — 1159F PR MEDICATION LIST DOCUMENTED IN MEDICAL RECORD: ICD-10-PCS | Mod: S$GLB,,, | Performed by: DERMATOLOGY

## 2020-12-11 RX ORDER — KETOCONAZOLE 20 MG/ML
SHAMPOO, SUSPENSION TOPICAL
Qty: 120 ML | Refills: 5 | Status: SHIPPED | OUTPATIENT
Start: 2020-12-11 | End: 2021-06-18

## 2020-12-11 RX ORDER — BETAMETHASONE DIPROPIONATE 0.5 MG/G
LOTION TOPICAL
Qty: 60 ML | Refills: 2 | Status: SHIPPED | OUTPATIENT
Start: 2020-12-11 | End: 2021-06-18

## 2020-12-11 NOTE — PATIENT INSTRUCTIONS
Shave Biopsy Wound Care    Your doctor has performed a shave biopsy today.  A band aid and vaseline ointment has been placed over the site.  This should remain in place for 24 hours.  It is recommended that you keep the area dry for the first 24 hours.  After 24 hours, you may remove the band aid and wash the area with warm soap and water and apply Vaseline jelly.  Many patients prefer to use Neosporin or Bacitracin ointment.  This is acceptable; however, know that you can develop an allergy to this medication even if you have used it safely for years.  It is important to keep the area moist.  Letting it dry out and get air slows healing time, and will worsen the scar.  Band aid is optional after first 24 hours.      If you notice increasing redness, tenderness, pain, or yellow drainage at the biopsy site, please notify your doctor.  These are signs of an infection.    If your biopsy site is bleeding, apply firm pressure for 15 minutes straight.  Repeat for another 15 minutes, if it is still bleeding.   If the surgical site continues to bleed, then please contact your doctor.       [facility]  [facility]  [address] [place] [address], [address] [address]  Dept: [phone]

## 2020-12-11 NOTE — PROGRESS NOTES
Subjective:       Patient ID:  Maricel Abdul is a 80 y.o. female who presents for   Chief Complaint   Patient presents with    Skin Check     UBSE     LOV 5-1-18    Patient here today for skin check, UBSE.   C/o itchy scalp x 4 months mostly at night. OTC shampoo.  Also C/o spot on back x 1 year. Rough,scaly, and itchy. No treatment    Phx [facility]-Dr. Saldana left cheek 2018    CVA 2018  ++++ pacemaker++++    Current Outpatient Medications:     amitriptyline (ELAVIL) 50 MG tablet, Take 1 tablet (50 mg total) by mouth every evening., Disp: 90 tablet, Rfl: 1    atorvastatin (LIPITOR) 80 MG tablet, Take 1 tablet (80 mg total) by mouth once daily., Disp: 90 tablet, Rfl: 1    cholecalciferol, vitamin D3, (VITAMIN D3) 5,000 unit Tab, Take 5,000 Units by mouth once daily., Disp: , Rfl:     clotrimazole (LOTRIMIN) 1 % cream, AAA axilla BID PRN flare, Disp: 30 g, Rfl: 0    FLUZONE HIGHDOSE QUAD 20-21  mcg/0.7 mL Syrg, ADM 0.7ML IM UTD, Disp: , Rfl:     Lactobac no.41/Bifidobact no.7 (PROBIOTIC-10 ORAL), Take by mouth., Disp: , Rfl:     losartan-hydrochlorothiazide 100-12.5 mg (HYZAAR) 100-12.5 mg Tab, Take 1 tablet by mouth once daily., Disp: 90 tablet, Rfl: 1    metoprolol succinate (TOPROL-XL) 25 MG 24 hr tablet, Take 1 tablet (25 mg total) by mouth once daily., Disp: 90 tablet, Rfl: 1    multivitamin (ONE DAILY MULTIVITAMIN) per tablet, Take 1 tablet by mouth once daily., Disp: , Rfl:     omeprazole (PRILOSEC) 20 MG capsule, Take 1 capsule (20 mg total) by mouth once daily., Disp: 90 capsule, Rfl: 1    sertraline (ZOLOFT) 50 MG tablet, Take 1 tablet (50 mg total) by mouth once daily., Disp: 90 tablet, Rfl: 1    triamcinolone acetonide 0.1% (KENALOG) 0.1 % cream, Apply topically 2 (two) times daily., Disp: 30 g, Rfl: 3    TURMERIC ORAL, Take by mouth., Disp: , Rfl:     vit A/vit C/vit E/zinc/copper (PRESERVISION AREDS ORAL), Take by mouth., Disp: , Rfl:     wheat dextrin/calcium no.15 (BENEFIBER  PLUS CALCIUM ORAL), Take by mouth., Disp: , Rfl:     aspirin 325 MG tablet, Take 1 tablet (325 mg total) by mouth once daily., Disp: , Rfl: 0    cetirizine (ZYRTEC) 10 MG tablet, Take 1 tablet (10 mg total) by mouth once daily., Disp: 30 tablet, Rfl: 0    nitrofurantoin, macrocrystal-monohydrate, (MACROBID) 100 MG capsule, , Disp: , Rfl:         Review of Systems   Constitutional: Negative for fever and chills.   Respiratory: Negative for cough and shortness of breath.    Gastrointestinal: Negative for nausea and vomiting.   Skin: Positive for itching and rash.   Hematologic/Lymphatic: Bruises/bleeds easily.        Objective:    Physical Exam   Constitutional: She appears well-developed and well-nourished. No distress.   Neurological: She is alert and oriented to person, place, and time. She is not disoriented.   Psychiatric: She has a normal mood and affect.   Skin:   Areas Examined (abnormalities noted in diagram):   Scalp / Hair Palpated and Inspected  Head / Face Inspection Performed  Neck Inspection Performed  Chest / Axilla Inspection Performed  Abdomen Inspection Performed  Back Inspection Performed  RUE Inspected  LUE Inspection Performed  Nails and Digits Inspection Performed                   Diagram Legend     Erythematous scaling macule/papule c/w actinic keratosis       Vascular papule c/w angioma      Pigmented verrucoid papule/plaque c/w seborrheic keratosis      Yellow umbilicated papule c/w sebaceous hyperplasia      Irregularly shaped tan macule c/w lentigo     1-2 mm smooth white papules consistent with Milia      Movable subcutaneous cyst with punctum c/w epidermal inclusion cyst      Subcutaneous movable cyst c/w pilar cyst      Firm pink to brown papule c/w dermatofibroma      Pedunculated fleshy papule(s) c/w skin tag(s)      Evenly pigmented macule c/w junctional nevus     Mildly variegated pigmented, slightly irregular-bordered macule c/w mildly atypical nevus      Flesh colored to evenly  pigmented papule c/w intradermal nevus       Pink pearly papule/plaque c/w basal cell carcinoma      Erythematous hyperkeratotic cursted plaque c/w SCC      Surgical scar with no sign of skin cancer recurrence      Open and closed comedones      Inflammatory papules and pustules      Verrucoid papule consistent consistent with wart     Erythematous eczematous patches and plaques     Dystrophic onycholytic nail with subungual debris c/w onychomycosis     Umbilicated papule    Erythematous-base heme-crusted tan verrucoid plaque consistent with inflamed seborrheic keratosis     Erythematous Silvery Scaling Plaque c/w Psoriasis     See annotation              Assessment / Plan:      Pathology Orders:     Normal Orders This Visit    Specimen to Pathology, Dermatology     Comments:    Number of Specimens:->1  ------------------------->-------------------------  Spec 1 Procedure:->Biopsy  Spec 1 Clinical Impression:->pigmented scaly pink plaque, r/o  pigm BCC  Spec 1 Source:->midline mid back    Questions:    Procedure Type: Dermatology and skin neoplasms    Number of Specimens: 1    ------------------------: -------------------------    Spec 1 Procedure: Biopsy    Spec 1 Clinical Impression: pigmented scaly pink plaque, r/o pigm BCC    Spec 1 Source: midline mid back        Neoplasm of uncertain behavior of skin  -     Specimen to Pathology, Dermatology  Shave biopsy procedure note:    Shave biopsy performed after verbal consent including risk of infection, scar, recurrence, need for additional treatment of site. Area prepped with alcohol, anesthetized with approximately 1.0cc of 1% lidocaine with epinephrine. Lesional tissue shaved with razor blade. Hemostasis achieved with application of aluminum chloride NO hyfrecation (pacmekaer). No complications. Dressing applied. Wound care explained.    History of nonmelanoma skin cancer  Area of previous NMSC (left cheek Mohs) examined. Site well healed with no signs of  recurrence.  Upper body skin examination performed today including at least 9 points as noted in physical examination. One lesion suspicious for malignancy noted.    Solar lentigo  This is a benign hyperpigmented sun induced lesion. Daily sun protection will reduce the number of new lesions. Treatment of these benign lesions are considered cosmetic.    Seborrheic keratoses  These are benign inherited growths without a malignant potential. Reassurance given to patient. No treatment is necessary.     Multiple benign nevi  Monitor for new mole or moles that are becoming bigger, darker, irritated, or developing irregular borders.     Scalp pruritus  -     ketoconazole (NIZORAL) 2 % shampoo; Wash hair with medicated shampoo at least 2x/week - let sit on scalp at least 5 minutes prior to rinsing  Dispense: 120 mL; Refill: 5  -     betamethasone dipropionate (DIPROLENE) 0.05 % lotion; Apply thin film to scalp QHS PRN itch  Dispense: 60 mL; Refill: 2  Suspect element of neuropathic itch, h/o CVA, hold neck to r side, which is the pruritic area, scalp healthy appearing  Trial of skin directed therapy           Follow up in about 6 months (around 6/11/2021).

## 2020-12-14 LAB
FINAL PATHOLOGIC DIAGNOSIS: NORMAL
GROSS: NORMAL
MICROSCOPIC EXAM: NORMAL

## 2020-12-15 ENCOUNTER — TELEPHONE (OUTPATIENT)
Dept: DERMATOLOGY | Facility: CLINIC | Age: 80
End: 2020-12-15

## 2020-12-15 NOTE — TELEPHONE ENCOUNTER
Returned pt's call. Answered pt's questions pertaining biopsy result. Pt states she will call back to schedule procedure.

## 2020-12-15 NOTE — TELEPHONE ENCOUNTER
----- Message from Rosio Barboza sent at 12/15/2020  3:44 PM CST -----  Regarding: advice  Contact: CINTHIA SOLOMON [[id number]]  Patient is requesting a call back from the nurse in regards to skin test biopsy.  Patient stated she have concerns.    Please call the patient upon request at phone number [phone].

## 2020-12-17 ENCOUNTER — TELEPHONE (OUTPATIENT)
Dept: DERMATOLOGY | Facility: CLINIC | Age: 80
End: 2020-12-17

## 2020-12-17 NOTE — TELEPHONE ENCOUNTER
----- Message from Laureen Alvarez sent at 12/17/2020 11:01 AM CST -----  Contact: Pt  Needs Medical Advice  Who Called: Pt  Symptoms:   How Long Has the Patient had these Symptoms:   Pharmacy Name and Phone #:   Best Contact #: [phone]    Additional Information: Pt has surgery scheduled 1/22 and wants to know if it is done in the office or at the hospital. Please call back and advise. Thank you

## 2021-01-01 NOTE — PLAN OF CARE
06/20/19 2040   Patient Assessment/Suction   Level of Consciousness (AVPU) alert   PRE-TX-O2   O2 Device (Oxygen Therapy) room air   SpO2 96 %   Pulse Oximetry Type Intermittent   Pulse 95   Resp 18      Attending Physician Attestation Note:    Resident Physician:  Kate Romero DO    Patient is a 6 month old female here for the following concerns:    Health Maintenance Due   Topic Date Due   • Pneumococcal Vaccine 0-64 (2 of 4) 2021   • IPV Vaccine (3 of 4 - 4-dose series) 2021   • Hepatitis B Vaccine (3 of 3 - 3-dose primary series) 2021   • HIB Vaccine (3 of 4 - Standard series) 2021   • Rotavirus Vaccine (3 of 3 - 3-dose series) 2021   • DTaP/Tdap/Td Vaccine (3 - DTaP) 2021   • Influenza Vaccine (1 of 2) 2021     Here for well child exam  Formula feeding, solids   Meeting developmental milestones       Impression and Plan:    1). Anticipatory guidance  2). Immunizations  3). Polyvisol with iron     Patient seen by me. I agree with the history, exam and plan as noted by the resident physician.    Please see resident note for details.        Zarina Gavin MD  2021

## 2021-01-10 ENCOUNTER — IMMUNIZATION (OUTPATIENT)
Dept: OBSTETRICS AND GYNECOLOGY | Facility: CLINIC | Age: 81
End: 2021-01-10
Payer: MEDICARE

## 2021-01-10 DIAGNOSIS — Z23 NEED FOR VACCINATION: ICD-10-CM

## 2021-01-10 PROCEDURE — 91300 COVID-19, MRNA, LNP-S, PF, 30 MCG/0.3 ML DOSE VACCINE: CPT | Mod: PBBFAC | Performed by: FAMILY MEDICINE

## 2021-01-22 ENCOUNTER — PROCEDURE VISIT (OUTPATIENT)
Dept: DERMATOLOGY | Facility: CLINIC | Age: 81
End: 2021-01-22
Payer: MEDICARE

## 2021-01-22 DIAGNOSIS — C44.91 FIBROEPITHELIOMA OF PINKUS: Primary | ICD-10-CM

## 2021-01-22 PROCEDURE — 11603 EXC TR-EXT MAL+MARG 2.1-3 CM: CPT | Mod: S$GLB,,, | Performed by: DERMATOLOGY

## 2021-01-22 PROCEDURE — 88305 TISSUE EXAM BY PATHOLOGIST: ICD-10-PCS | Mod: 26,,, | Performed by: PATHOLOGY

## 2021-01-22 PROCEDURE — 88305 TISSUE EXAM BY PATHOLOGIST: CPT | Performed by: PATHOLOGY

## 2021-01-22 PROCEDURE — 99499 NO LOS: ICD-10-PCS | Mod: S$GLB,,, | Performed by: DERMATOLOGY

## 2021-01-22 PROCEDURE — 99499 UNLISTED E&M SERVICE: CPT | Mod: S$GLB,,, | Performed by: DERMATOLOGY

## 2021-01-22 PROCEDURE — 12032 PR LAYR CLOS WND TRUNK,ARM,LEG 2.6-7.5 CM: ICD-10-PCS | Mod: 51,S$GLB,, | Performed by: DERMATOLOGY

## 2021-01-22 PROCEDURE — 88305 TISSUE EXAM BY PATHOLOGIST: CPT | Mod: 26,,, | Performed by: PATHOLOGY

## 2021-01-22 PROCEDURE — 12032 INTMD RPR S/A/T/EXT 2.6-7.5: CPT | Mod: 51,S$GLB,, | Performed by: DERMATOLOGY

## 2021-01-22 PROCEDURE — 11603 PR EXC SKIN MALIG 2.1-3 CM TRUNK,ARM,LEG: ICD-10-PCS | Mod: S$GLB,,, | Performed by: DERMATOLOGY

## 2021-01-27 LAB
FINAL PATHOLOGIC DIAGNOSIS: NORMAL
GROSS: NORMAL

## 2021-01-29 ENCOUNTER — CLINICAL SUPPORT (OUTPATIENT)
Dept: DERMATOLOGY | Facility: CLINIC | Age: 81
End: 2021-01-29
Payer: MEDICARE

## 2021-01-29 DIAGNOSIS — Z51.89 ENCOUNTER FOR WOUND CARE: Primary | ICD-10-CM

## 2021-01-29 PROCEDURE — 99024 POSTOP FOLLOW-UP VISIT: CPT | Mod: S$GLB,,, | Performed by: DERMATOLOGY

## 2021-01-29 PROCEDURE — 99024 PR POST-OP FOLLOW-UP VISIT: ICD-10-PCS | Mod: S$GLB,,, | Performed by: DERMATOLOGY

## 2021-01-29 PROCEDURE — 99999 PR PBB SHADOW E&M-EST. PATIENT-LVL III: CPT | Mod: PBBFAC,,,

## 2021-01-29 PROCEDURE — 99999 PR PBB SHADOW E&M-EST. PATIENT-LVL III: ICD-10-PCS | Mod: PBBFAC,,,

## 2021-01-31 ENCOUNTER — IMMUNIZATION (OUTPATIENT)
Dept: OBSTETRICS AND GYNECOLOGY | Facility: CLINIC | Age: 81
End: 2021-01-31
Payer: MEDICARE

## 2021-01-31 DIAGNOSIS — Z23 NEED FOR VACCINATION: Primary | ICD-10-CM

## 2021-01-31 PROCEDURE — 0002A PR IMMUNIZ ADMIN, SARS-COV-2 COVID-19 VACC, 30MCG/0.3ML, 2ND DOSE: CPT | Mod: PBBFAC | Performed by: FAMILY MEDICINE

## 2021-01-31 PROCEDURE — 91300 PR SARS-COV- 2 COVID-19 VACCINE, NO PRSV, 30MCG/0.3ML, IM: CPT | Mod: PBBFAC | Performed by: FAMILY MEDICINE

## 2021-01-31 RX ADMIN — RNA INGREDIENT BNT-162B2 0.3 ML: 0.23 INJECTION, SUSPENSION INTRAMUSCULAR at 09:01

## 2021-02-03 ENCOUNTER — CLINICAL SUPPORT (OUTPATIENT)
Dept: DERMATOLOGY | Facility: CLINIC | Age: 81
End: 2021-02-03
Payer: MEDICARE

## 2021-02-03 DIAGNOSIS — Z48.02 VISIT FOR SUTURE REMOVAL: Primary | ICD-10-CM

## 2021-02-03 PROCEDURE — 99024 POSTOP FOLLOW-UP VISIT: CPT | Mod: S$GLB,,, | Performed by: DERMATOLOGY

## 2021-02-03 PROCEDURE — 99999 PR PBB SHADOW E&M-EST. PATIENT-LVL I: ICD-10-PCS | Mod: PBBFAC,,,

## 2021-02-03 PROCEDURE — 99024 PR POST-OP FOLLOW-UP VISIT: ICD-10-PCS | Mod: S$GLB,,, | Performed by: DERMATOLOGY

## 2021-02-03 PROCEDURE — 99999 PR PBB SHADOW E&M-EST. PATIENT-LVL I: CPT | Mod: PBBFAC,,,

## 2021-03-02 ENCOUNTER — DOCUMENTATION ONLY (OUTPATIENT)
Dept: CARDIOLOGY | Facility: HOSPITAL | Age: 81
End: 2021-03-02

## 2021-03-18 ENCOUNTER — OFFICE VISIT (OUTPATIENT)
Dept: FAMILY MEDICINE | Facility: CLINIC | Age: 81
End: 2021-03-18
Payer: MEDICARE

## 2021-03-18 VITALS
HEIGHT: 57 IN | BODY MASS INDEX: 28.26 KG/M2 | DIASTOLIC BLOOD PRESSURE: 78 MMHG | HEART RATE: 97 BPM | SYSTOLIC BLOOD PRESSURE: 126 MMHG | OXYGEN SATURATION: 94 % | WEIGHT: 131 LBS

## 2021-03-18 DIAGNOSIS — Z95.0 CARDIAC PACEMAKER IN SITU: ICD-10-CM

## 2021-03-18 DIAGNOSIS — E78.5 DYSLIPIDEMIA: ICD-10-CM

## 2021-03-18 DIAGNOSIS — K21.9 GASTROESOPHAGEAL REFLUX DISEASE, UNSPECIFIED WHETHER ESOPHAGITIS PRESENT: Primary | ICD-10-CM

## 2021-03-18 DIAGNOSIS — Z86.73 HISTORY OF STROKE: ICD-10-CM

## 2021-03-18 DIAGNOSIS — F51.01 PRIMARY INSOMNIA: ICD-10-CM

## 2021-03-18 DIAGNOSIS — I10 ESSENTIAL HYPERTENSION: ICD-10-CM

## 2021-03-18 DIAGNOSIS — F41.9 ANXIETY: ICD-10-CM

## 2021-03-18 DIAGNOSIS — M15.9 PRIMARY OSTEOARTHRITIS INVOLVING MULTIPLE JOINTS: ICD-10-CM

## 2021-03-18 PROCEDURE — 3074F SYST BP LT 130 MM HG: CPT | Mod: S$GLB,,, | Performed by: NURSE PRACTITIONER

## 2021-03-18 PROCEDURE — 99214 OFFICE O/P EST MOD 30 MIN: CPT | Mod: S$GLB,,, | Performed by: NURSE PRACTITIONER

## 2021-03-18 PROCEDURE — 3074F PR MOST RECENT SYSTOLIC BLOOD PRESSURE < 130 MM HG: ICD-10-PCS | Mod: S$GLB,,, | Performed by: NURSE PRACTITIONER

## 2021-03-18 PROCEDURE — 3078F PR MOST RECENT DIASTOLIC BLOOD PRESSURE < 80 MM HG: ICD-10-PCS | Mod: S$GLB,,, | Performed by: NURSE PRACTITIONER

## 2021-03-18 PROCEDURE — 1101F PT FALLS ASSESS-DOCD LE1/YR: CPT | Mod: S$GLB,,, | Performed by: NURSE PRACTITIONER

## 2021-03-18 PROCEDURE — 3288F PR FALLS RISK ASSESSMENT DOCUMENTED: ICD-10-PCS | Mod: S$GLB,,, | Performed by: NURSE PRACTITIONER

## 2021-03-18 PROCEDURE — 1101F PR PT FALLS ASSESS DOC 0-1 FALLS W/OUT INJ PAST YR: ICD-10-PCS | Mod: S$GLB,,, | Performed by: NURSE PRACTITIONER

## 2021-03-18 PROCEDURE — 3078F DIAST BP <80 MM HG: CPT | Mod: S$GLB,,, | Performed by: NURSE PRACTITIONER

## 2021-03-18 PROCEDURE — 3288F FALL RISK ASSESSMENT DOCD: CPT | Mod: S$GLB,,, | Performed by: NURSE PRACTITIONER

## 2021-03-18 PROCEDURE — 99214 PR OFFICE/OUTPT VISIT, EST, LEVL IV, 30-39 MIN: ICD-10-PCS | Mod: S$GLB,,, | Performed by: NURSE PRACTITIONER

## 2021-03-18 PROCEDURE — 1159F MED LIST DOCD IN RCRD: CPT | Mod: S$GLB,,, | Performed by: NURSE PRACTITIONER

## 2021-03-18 PROCEDURE — 1159F PR MEDICATION LIST DOCUMENTED IN MEDICAL RECORD: ICD-10-PCS | Mod: S$GLB,,, | Performed by: NURSE PRACTITIONER

## 2021-03-18 RX ORDER — AMITRIPTYLINE HYDROCHLORIDE 50 MG/1
50 TABLET, FILM COATED ORAL NIGHTLY
Qty: 90 TABLET | Refills: 1 | Status: SHIPPED | OUTPATIENT
Start: 2021-03-18 | End: 2021-06-18 | Stop reason: SDUPTHER

## 2021-03-18 RX ORDER — OMEPRAZOLE 20 MG/1
20 CAPSULE, DELAYED RELEASE ORAL DAILY
Qty: 90 CAPSULE | Refills: 1 | Status: CANCELLED | OUTPATIENT
Start: 2021-03-18

## 2021-03-18 RX ORDER — SERTRALINE HYDROCHLORIDE 100 MG/1
100 TABLET, FILM COATED ORAL DAILY
Qty: 90 TABLET | Refills: 1 | Status: SHIPPED | OUTPATIENT
Start: 2021-03-18 | End: 2021-10-13 | Stop reason: SDUPTHER

## 2021-03-18 RX ORDER — POTASSIUM &MAGNESIUM ASPARTATE 250-250 MG
1 CAPSULE ORAL DAILY
COMMUNITY

## 2021-03-18 RX ORDER — ATORVASTATIN CALCIUM 80 MG/1
80 TABLET, FILM COATED ORAL DAILY
Qty: 90 TABLET | Refills: 1 | Status: SHIPPED | OUTPATIENT
Start: 2021-03-18 | End: 2021-10-13 | Stop reason: SDUPTHER

## 2021-03-18 RX ORDER — PANTOPRAZOLE SODIUM 40 MG/1
TABLET, DELAYED RELEASE ORAL
Qty: 30 TABLET | Refills: 5 | Status: SHIPPED | OUTPATIENT
Start: 2021-03-18 | End: 2021-05-14 | Stop reason: SDUPTHER

## 2021-03-18 RX ORDER — METOPROLOL SUCCINATE 25 MG/1
25 TABLET, EXTENDED RELEASE ORAL 2 TIMES DAILY
Qty: 180 TABLET | Refills: 1 | Status: SHIPPED | OUTPATIENT
Start: 2021-03-18 | End: 2021-08-02 | Stop reason: SDUPTHER

## 2021-03-18 RX ORDER — ACETAMINOPHEN AND PHENYLEPHRINE HCL 325; 5 MG/1; MG/1
1 TABLET ORAL DAILY
COMMUNITY

## 2021-03-18 RX ORDER — LOSARTAN POTASSIUM AND HYDROCHLOROTHIAZIDE 12.5; 1 MG/1; MG/1
1 TABLET ORAL DAILY
Qty: 90 TABLET | Refills: 1 | Status: SHIPPED | OUTPATIENT
Start: 2021-03-18 | End: 2021-09-27 | Stop reason: SDUPTHER

## 2021-03-20 LAB
ALBUMIN SERPL-MCNC: 4.6 G/DL (ref 3.6–5.1)
ALBUMIN/CREAT UR: ABNORMAL MCG/MG CREAT
ALBUMIN/GLOB SERPL: 1.5 (CALC) (ref 1–2.5)
ALP SERPL-CCNC: 91 U/L (ref 37–153)
ALT SERPL-CCNC: 23 U/L (ref 6–29)
APPEARANCE UR: CLEAR
AST SERPL-CCNC: 26 U/L (ref 10–35)
BACTERIA #/AREA URNS HPF: ABNORMAL /HPF
BACTERIA UR CULT: NORMAL
BASOPHILS # BLD AUTO: 37 CELLS/UL (ref 0–200)
BASOPHILS NFR BLD AUTO: 0.5 %
BILIRUB SERPL-MCNC: 0.7 MG/DL (ref 0.2–1.2)
BILIRUB UR QL STRIP: NEGATIVE
BUN SERPL-MCNC: 21 MG/DL (ref 7–25)
BUN/CREAT SERPL: ABNORMAL (CALC) (ref 6–22)
CALCIUM SERPL-MCNC: 10.1 MG/DL (ref 8.6–10.4)
CHLORIDE SERPL-SCNC: 100 MMOL/L (ref 98–110)
CHOLEST SERPL-MCNC: 213 MG/DL
CHOLEST/HDLC SERPL: 2.5 (CALC)
CO2 SERPL-SCNC: 29 MMOL/L (ref 20–32)
COLOR UR: YELLOW
CREAT SERPL-MCNC: 0.64 MG/DL (ref 0.6–0.88)
CREAT UR-MCNC: 15 MG/DL (ref 20–275)
EOSINOPHIL # BLD AUTO: 170 CELLS/UL (ref 15–500)
EOSINOPHIL NFR BLD AUTO: 2.3 %
ERYTHROCYTE [DISTWIDTH] IN BLOOD BY AUTOMATED COUNT: 13.2 % (ref 11–15)
GFRSERPLBLD MDRD-ARVRAT: 83 ML/MIN/1.73M2
GLOBULIN SER CALC-MCNC: 3 G/DL (CALC) (ref 1.9–3.7)
GLUCOSE SERPL-MCNC: 117 MG/DL (ref 65–99)
GLUCOSE UR QL STRIP: NEGATIVE
HCT VFR BLD AUTO: 40.4 % (ref 35–45)
HDLC SERPL-MCNC: 85 MG/DL
HGB BLD-MCNC: 13.1 G/DL (ref 11.7–15.5)
HGB UR QL STRIP: NEGATIVE
HYALINE CASTS #/AREA URNS LPF: ABNORMAL /LPF
KETONES UR QL STRIP: NEGATIVE
LDLC SERPL CALC-MCNC: 101 MG/DL (CALC)
LEUKOCYTE ESTERASE UR QL STRIP: ABNORMAL
LYMPHOCYTES # BLD AUTO: 1265 CELLS/UL (ref 850–3900)
LYMPHOCYTES NFR BLD AUTO: 17.1 %
MCH RBC QN AUTO: 28.9 PG (ref 27–33)
MCHC RBC AUTO-ENTMCNC: 32.4 G/DL (ref 32–36)
MCV RBC AUTO: 89.2 FL (ref 80–100)
MICROALBUMIN UR-MCNC: <0.2 MG/DL
MONOCYTES # BLD AUTO: 747 CELLS/UL (ref 200–950)
MONOCYTES NFR BLD AUTO: 10.1 %
NEUTROPHILS # BLD AUTO: 5180 CELLS/UL (ref 1500–7800)
NEUTROPHILS NFR BLD AUTO: 70 %
NITRITE UR QL STRIP: NEGATIVE
NONHDLC SERPL-MCNC: 128 MG/DL (CALC)
PH UR STRIP: ABNORMAL [PH] (ref 5–8)
PLATELET # BLD AUTO: 306 THOUSAND/UL (ref 140–400)
PMV BLD REES-ECKER: 10.4 FL (ref 7.5–12.5)
POTASSIUM SERPL-SCNC: 4.2 MMOL/L (ref 3.5–5.3)
PROT SERPL-MCNC: 7.6 G/DL (ref 6.1–8.1)
PROT UR QL STRIP: NEGATIVE
RBC # BLD AUTO: 4.53 MILLION/UL (ref 3.8–5.1)
RBC #/AREA URNS HPF: ABNORMAL /HPF
SODIUM SERPL-SCNC: 138 MMOL/L (ref 135–146)
SP GR UR STRIP: 1 (ref 1–1.03)
SQUAMOUS #/AREA URNS HPF: ABNORMAL /HPF
TRIGL SERPL-MCNC: 177 MG/DL
TSH SERPL-ACNC: 1.82 MIU/L (ref 0.4–4.5)
UREA BREATH TEST QL: NOT DETECTED
WBC # BLD AUTO: 7.4 THOUSAND/UL (ref 3.8–10.8)
WBC #/AREA URNS HPF: ABNORMAL /HPF

## 2021-05-14 DIAGNOSIS — K21.9 GASTROESOPHAGEAL REFLUX DISEASE, UNSPECIFIED WHETHER ESOPHAGITIS PRESENT: ICD-10-CM

## 2021-05-14 RX ORDER — PANTOPRAZOLE SODIUM 40 MG/1
40 TABLET, DELAYED RELEASE ORAL DAILY
Qty: 90 TABLET | Refills: 1 | Status: SHIPPED | OUTPATIENT
Start: 2021-05-14 | End: 2021-11-10 | Stop reason: SDUPTHER

## 2021-06-15 ENCOUNTER — TELEPHONE (OUTPATIENT)
Dept: FAMILY MEDICINE | Facility: CLINIC | Age: 81
End: 2021-06-15

## 2021-06-15 DIAGNOSIS — I10 ESSENTIAL HYPERTENSION: Primary | ICD-10-CM

## 2021-06-18 ENCOUNTER — OFFICE VISIT (OUTPATIENT)
Dept: FAMILY MEDICINE | Facility: CLINIC | Age: 81
End: 2021-06-18
Payer: MEDICARE

## 2021-06-18 VITALS
HEART RATE: 94 BPM | SYSTOLIC BLOOD PRESSURE: 130 MMHG | DIASTOLIC BLOOD PRESSURE: 82 MMHG | OXYGEN SATURATION: 98 % | WEIGHT: 130 LBS | HEIGHT: 57 IN | BODY MASS INDEX: 28.05 KG/M2

## 2021-06-18 DIAGNOSIS — E78.5 DYSLIPIDEMIA: ICD-10-CM

## 2021-06-18 DIAGNOSIS — K21.9 GASTROESOPHAGEAL REFLUX DISEASE, UNSPECIFIED WHETHER ESOPHAGITIS PRESENT: ICD-10-CM

## 2021-06-18 DIAGNOSIS — F41.9 ANXIETY: ICD-10-CM

## 2021-06-18 DIAGNOSIS — M15.9 PRIMARY OSTEOARTHRITIS INVOLVING MULTIPLE JOINTS: ICD-10-CM

## 2021-06-18 DIAGNOSIS — F51.01 PRIMARY INSOMNIA: ICD-10-CM

## 2021-06-18 DIAGNOSIS — I10 ESSENTIAL HYPERTENSION: Primary | ICD-10-CM

## 2021-06-18 PROCEDURE — 1101F PT FALLS ASSESS-DOCD LE1/YR: CPT | Mod: S$GLB,,, | Performed by: NURSE PRACTITIONER

## 2021-06-18 PROCEDURE — 3075F PR MOST RECENT SYSTOLIC BLOOD PRESS GE 130-139MM HG: ICD-10-PCS | Mod: S$GLB,,, | Performed by: NURSE PRACTITIONER

## 2021-06-18 PROCEDURE — 99214 PR OFFICE/OUTPT VISIT, EST, LEVL IV, 30-39 MIN: ICD-10-PCS | Mod: S$GLB,,, | Performed by: NURSE PRACTITIONER

## 2021-06-18 PROCEDURE — 3075F SYST BP GE 130 - 139MM HG: CPT | Mod: S$GLB,,, | Performed by: NURSE PRACTITIONER

## 2021-06-18 PROCEDURE — 3288F FALL RISK ASSESSMENT DOCD: CPT | Mod: S$GLB,,, | Performed by: NURSE PRACTITIONER

## 2021-06-18 PROCEDURE — 1159F PR MEDICATION LIST DOCUMENTED IN MEDICAL RECORD: ICD-10-PCS | Mod: S$GLB,,, | Performed by: NURSE PRACTITIONER

## 2021-06-18 PROCEDURE — 3079F PR MOST RECENT DIASTOLIC BLOOD PRESSURE 80-89 MM HG: ICD-10-PCS | Mod: S$GLB,,, | Performed by: NURSE PRACTITIONER

## 2021-06-18 PROCEDURE — 1101F PR PT FALLS ASSESS DOC 0-1 FALLS W/OUT INJ PAST YR: ICD-10-PCS | Mod: S$GLB,,, | Performed by: NURSE PRACTITIONER

## 2021-06-18 PROCEDURE — 99214 OFFICE O/P EST MOD 30 MIN: CPT | Mod: S$GLB,,, | Performed by: NURSE PRACTITIONER

## 2021-06-18 PROCEDURE — 1159F MED LIST DOCD IN RCRD: CPT | Mod: S$GLB,,, | Performed by: NURSE PRACTITIONER

## 2021-06-18 PROCEDURE — 3288F PR FALLS RISK ASSESSMENT DOCUMENTED: ICD-10-PCS | Mod: S$GLB,,, | Performed by: NURSE PRACTITIONER

## 2021-06-18 PROCEDURE — 3079F DIAST BP 80-89 MM HG: CPT | Mod: S$GLB,,, | Performed by: NURSE PRACTITIONER

## 2021-06-18 RX ORDER — AMITRIPTYLINE HYDROCHLORIDE 50 MG/1
50 TABLET, FILM COATED ORAL NIGHTLY
Qty: 90 TABLET | Refills: 1 | Status: SHIPPED | OUTPATIENT
Start: 2021-06-18 | End: 2022-03-07 | Stop reason: SDUPTHER

## 2021-08-02 DIAGNOSIS — I10 ESSENTIAL HYPERTENSION: ICD-10-CM

## 2021-08-02 RX ORDER — METOPROLOL SUCCINATE 25 MG/1
25 TABLET, EXTENDED RELEASE ORAL 2 TIMES DAILY
Qty: 180 TABLET | Refills: 1 | Status: SHIPPED | OUTPATIENT
Start: 2021-08-02 | End: 2022-03-21 | Stop reason: SDUPTHER

## 2021-09-27 DIAGNOSIS — I10 ESSENTIAL HYPERTENSION: ICD-10-CM

## 2021-09-27 RX ORDER — LOSARTAN POTASSIUM AND HYDROCHLOROTHIAZIDE 12.5; 1 MG/1; MG/1
1 TABLET ORAL DAILY
Qty: 90 TABLET | Refills: 1 | Status: SHIPPED | OUTPATIENT
Start: 2021-09-27 | End: 2021-12-29 | Stop reason: SDUPTHER

## 2021-10-08 ENCOUNTER — TELEPHONE (OUTPATIENT)
Dept: FAMILY MEDICINE | Facility: CLINIC | Age: 81
End: 2021-10-08

## 2021-10-13 DIAGNOSIS — F51.01 PRIMARY INSOMNIA: ICD-10-CM

## 2021-10-13 DIAGNOSIS — E78.5 DYSLIPIDEMIA: ICD-10-CM

## 2021-10-13 RX ORDER — ATORVASTATIN CALCIUM 80 MG/1
80 TABLET, FILM COATED ORAL DAILY
Qty: 90 TABLET | Refills: 1 | Status: SHIPPED | OUTPATIENT
Start: 2021-10-13 | End: 2022-02-06 | Stop reason: SDUPTHER

## 2021-10-13 RX ORDER — SERTRALINE HYDROCHLORIDE 100 MG/1
100 TABLET, FILM COATED ORAL DAILY
Qty: 90 TABLET | Refills: 1 | Status: SHIPPED | OUTPATIENT
Start: 2021-10-13 | End: 2022-02-06 | Stop reason: SDUPTHER

## 2021-10-22 LAB
ALBUMIN SERPL-MCNC: 4.4 G/DL (ref 3.6–5.1)
ALBUMIN/GLOB SERPL: 1.6 (CALC) (ref 1–2.5)
ALP SERPL-CCNC: 103 U/L (ref 37–153)
ALT SERPL-CCNC: 24 U/L (ref 6–29)
AST SERPL-CCNC: 24 U/L (ref 10–35)
BILIRUB SERPL-MCNC: 0.7 MG/DL (ref 0.2–1.2)
BUN SERPL-MCNC: 13 MG/DL (ref 7–25)
BUN/CREAT SERPL: ABNORMAL (CALC) (ref 6–22)
CALCIUM SERPL-MCNC: 9.8 MG/DL (ref 8.6–10.4)
CHLORIDE SERPL-SCNC: 100 MMOL/L (ref 98–110)
CHOLEST SERPL-MCNC: 202 MG/DL
CHOLEST/HDLC SERPL: 2.4 (CALC)
CO2 SERPL-SCNC: 31 MMOL/L (ref 20–32)
CREAT SERPL-MCNC: 0.67 MG/DL (ref 0.6–0.88)
GLOBULIN SER CALC-MCNC: 2.7 G/DL (CALC) (ref 1.9–3.7)
GLUCOSE SERPL-MCNC: 100 MG/DL (ref 65–99)
HDLC SERPL-MCNC: 83 MG/DL
LDLC SERPL CALC-MCNC: 97 MG/DL (CALC)
NONHDLC SERPL-MCNC: 119 MG/DL (CALC)
POTASSIUM SERPL-SCNC: 4.1 MMOL/L (ref 3.5–5.3)
PROT SERPL-MCNC: 7.1 G/DL (ref 6.1–8.1)
SODIUM SERPL-SCNC: 141 MMOL/L (ref 135–146)
TRIGL SERPL-MCNC: 128 MG/DL
TSH SERPL-ACNC: 2.07 MIU/L (ref 0.4–4.5)

## 2021-10-25 ENCOUNTER — OFFICE VISIT (OUTPATIENT)
Dept: FAMILY MEDICINE | Facility: CLINIC | Age: 81
End: 2021-10-25
Payer: MEDICARE

## 2021-10-25 VITALS
HEIGHT: 57 IN | DIASTOLIC BLOOD PRESSURE: 78 MMHG | BODY MASS INDEX: 27.22 KG/M2 | WEIGHT: 126.19 LBS | HEART RATE: 72 BPM | SYSTOLIC BLOOD PRESSURE: 124 MMHG

## 2021-10-25 DIAGNOSIS — Z95.0 CARDIAC PACEMAKER IN SITU: ICD-10-CM

## 2021-10-25 DIAGNOSIS — E78.5 DYSLIPIDEMIA: ICD-10-CM

## 2021-10-25 DIAGNOSIS — M15.9 PRIMARY OSTEOARTHRITIS INVOLVING MULTIPLE JOINTS: ICD-10-CM

## 2021-10-25 DIAGNOSIS — I10 ESSENTIAL HYPERTENSION: Primary | ICD-10-CM

## 2021-10-25 PROCEDURE — 3078F DIAST BP <80 MM HG: CPT | Mod: S$GLB,,, | Performed by: NURSE PRACTITIONER

## 2021-10-25 PROCEDURE — 3074F SYST BP LT 130 MM HG: CPT | Mod: S$GLB,,, | Performed by: NURSE PRACTITIONER

## 2021-10-25 PROCEDURE — 3074F PR MOST RECENT SYSTOLIC BLOOD PRESSURE < 130 MM HG: ICD-10-PCS | Mod: S$GLB,,, | Performed by: NURSE PRACTITIONER

## 2021-10-25 PROCEDURE — 3078F PR MOST RECENT DIASTOLIC BLOOD PRESSURE < 80 MM HG: ICD-10-PCS | Mod: S$GLB,,, | Performed by: NURSE PRACTITIONER

## 2021-10-25 PROCEDURE — 1159F PR MEDICATION LIST DOCUMENTED IN MEDICAL RECORD: ICD-10-PCS | Mod: S$GLB,,, | Performed by: NURSE PRACTITIONER

## 2021-10-25 PROCEDURE — 1160F RVW MEDS BY RX/DR IN RCRD: CPT | Mod: S$GLB,,, | Performed by: NURSE PRACTITIONER

## 2021-10-25 PROCEDURE — 1160F PR REVIEW ALL MEDS BY PRESCRIBER/CLIN PHARMACIST DOCUMENTED: ICD-10-PCS | Mod: S$GLB,,, | Performed by: NURSE PRACTITIONER

## 2021-10-25 PROCEDURE — 1159F MED LIST DOCD IN RCRD: CPT | Mod: S$GLB,,, | Performed by: NURSE PRACTITIONER

## 2021-10-25 PROCEDURE — 99214 PR OFFICE/OUTPT VISIT, EST, LEVL IV, 30-39 MIN: ICD-10-PCS | Mod: S$GLB,,, | Performed by: NURSE PRACTITIONER

## 2021-10-25 PROCEDURE — 99214 OFFICE O/P EST MOD 30 MIN: CPT | Mod: S$GLB,,, | Performed by: NURSE PRACTITIONER

## 2021-10-25 RX ORDER — MELOXICAM 7.5 MG/1
7.5 TABLET ORAL DAILY
Qty: 30 TABLET | Refills: 2 | Status: SHIPPED | OUTPATIENT
Start: 2021-10-25 | End: 2022-02-01 | Stop reason: SDUPTHER

## 2021-11-10 DIAGNOSIS — K21.9 GASTROESOPHAGEAL REFLUX DISEASE, UNSPECIFIED WHETHER ESOPHAGITIS PRESENT: ICD-10-CM

## 2021-11-10 RX ORDER — PANTOPRAZOLE SODIUM 40 MG/1
40 TABLET, DELAYED RELEASE ORAL DAILY
Qty: 90 TABLET | Refills: 1 | Status: SHIPPED | OUTPATIENT
Start: 2021-11-10 | End: 2022-03-21 | Stop reason: SDUPTHER

## 2021-12-29 DIAGNOSIS — I10 ESSENTIAL HYPERTENSION: ICD-10-CM

## 2021-12-29 RX ORDER — LOSARTAN POTASSIUM AND HYDROCHLOROTHIAZIDE 12.5; 1 MG/1; MG/1
1 TABLET ORAL DAILY
Qty: 90 TABLET | Refills: 1 | Status: SHIPPED | OUTPATIENT
Start: 2021-12-29 | End: 2022-03-21 | Stop reason: SDUPTHER

## 2022-01-03 ENCOUNTER — TELEPHONE (OUTPATIENT)
Dept: FAMILY MEDICINE | Facility: CLINIC | Age: 82
End: 2022-01-03
Payer: MEDICARE

## 2022-01-17 ENCOUNTER — HOSPITAL ENCOUNTER (OUTPATIENT)
Dept: RADIOLOGY | Facility: HOSPITAL | Age: 82
Discharge: HOME | End: 2022-01-17
Attending: NURSE PRACTITIONER
Payer: MEDICARE

## 2022-01-17 ENCOUNTER — OFFICE VISIT (OUTPATIENT)
Dept: FAMILY MEDICINE | Facility: CLINIC | Age: 82
End: 2022-01-17
Payer: MEDICARE

## 2022-01-17 VITALS
HEIGHT: 57 IN | BODY MASS INDEX: 27.36 KG/M2 | DIASTOLIC BLOOD PRESSURE: 70 MMHG | HEART RATE: 70 BPM | SYSTOLIC BLOOD PRESSURE: 124 MMHG | WEIGHT: 126.81 LBS

## 2022-01-17 DIAGNOSIS — M54.16 LUMBAR RADICULOPATHY: ICD-10-CM

## 2022-01-17 DIAGNOSIS — F33.0 MILD EPISODE OF RECURRENT MAJOR DEPRESSIVE DISORDER: ICD-10-CM

## 2022-01-17 DIAGNOSIS — R41.3 MEMORY LOSS: Primary | ICD-10-CM

## 2022-01-17 PROCEDURE — 99214 OFFICE O/P EST MOD 30 MIN: CPT | Mod: S$GLB,,, | Performed by: NURSE PRACTITIONER

## 2022-01-17 PROCEDURE — 3078F PR MOST RECENT DIASTOLIC BLOOD PRESSURE < 80 MM HG: ICD-10-PCS | Mod: S$GLB,,, | Performed by: NURSE PRACTITIONER

## 2022-01-17 PROCEDURE — 3074F PR MOST RECENT SYSTOLIC BLOOD PRESSURE < 130 MM HG: ICD-10-PCS | Mod: S$GLB,,, | Performed by: NURSE PRACTITIONER

## 2022-01-17 PROCEDURE — 72110 X-RAY EXAM L-2 SPINE 4/>VWS: CPT | Mod: TC,PO

## 2022-01-17 PROCEDURE — 3078F DIAST BP <80 MM HG: CPT | Mod: S$GLB,,, | Performed by: NURSE PRACTITIONER

## 2022-01-17 PROCEDURE — 1159F MED LIST DOCD IN RCRD: CPT | Mod: S$GLB,,, | Performed by: NURSE PRACTITIONER

## 2022-01-17 PROCEDURE — 1101F PT FALLS ASSESS-DOCD LE1/YR: CPT | Mod: S$GLB,,, | Performed by: NURSE PRACTITIONER

## 2022-01-17 PROCEDURE — 3074F SYST BP LT 130 MM HG: CPT | Mod: S$GLB,,, | Performed by: NURSE PRACTITIONER

## 2022-01-17 PROCEDURE — 99214 PR OFFICE/OUTPT VISIT, EST, LEVL IV, 30-39 MIN: ICD-10-PCS | Mod: S$GLB,,, | Performed by: NURSE PRACTITIONER

## 2022-01-17 PROCEDURE — 1160F PR REVIEW ALL MEDS BY PRESCRIBER/CLIN PHARMACIST DOCUMENTED: ICD-10-PCS | Mod: S$GLB,,, | Performed by: NURSE PRACTITIONER

## 2022-01-17 PROCEDURE — 3288F PR FALLS RISK ASSESSMENT DOCUMENTED: ICD-10-PCS | Mod: S$GLB,,, | Performed by: NURSE PRACTITIONER

## 2022-01-17 PROCEDURE — 1160F RVW MEDS BY RX/DR IN RCRD: CPT | Mod: S$GLB,,, | Performed by: NURSE PRACTITIONER

## 2022-01-17 PROCEDURE — 1159F PR MEDICATION LIST DOCUMENTED IN MEDICAL RECORD: ICD-10-PCS | Mod: S$GLB,,, | Performed by: NURSE PRACTITIONER

## 2022-01-17 PROCEDURE — 3288F FALL RISK ASSESSMENT DOCD: CPT | Mod: S$GLB,,, | Performed by: NURSE PRACTITIONER

## 2022-01-17 PROCEDURE — 1101F PR PT FALLS ASSESS DOC 0-1 FALLS W/OUT INJ PAST YR: ICD-10-PCS | Mod: S$GLB,,, | Performed by: NURSE PRACTITIONER

## 2022-01-17 RX ORDER — DONEPEZIL HYDROCHLORIDE 5 MG/1
5 TABLET, FILM COATED ORAL NIGHTLY
Qty: 30 TABLET | Refills: 11 | Status: SHIPPED | OUTPATIENT
Start: 2022-01-17 | End: 2022-03-21 | Stop reason: SDUPTHER

## 2022-01-17 NOTE — PROGRESS NOTES
SUBJECTIVE:    Patient ID: Maricel Abdul is a 81 y.o. female.    Chief Complaint: Memory Loss (Brought bottles, memory issue concerns//dp)    Pt here for f/u depression- here with daughter today  C/oing of pain down posterior left leg- worse when she stays in one position. Reports pain/achiness to lower back and down leg. Has trouble lifting left leg and crossing it over. Denies any falls or leg weakness. meloxicam was added at last visit and pt reports this does help some with pain  C/oing of worsening memory- more forgetful, having trouble with word finding difficulties at times. Daughter has noticed some mild short term memory issues, no behavior changes. Daughter manages pt's finances and helps with medications. Pt is still driving short distances around town, no interstate driving. Denies any MVAs and no getting lost though daughter states they are monitoring  admtis to some ongoing depression- her sertraline dose was increased last year- pt/daughter report may have helped a little. Still grieving the death of her  1.5 years ago. Lives alone and daughter reports she does decline invitations to get out of the house at times. Denies any thoughts of self harm/suicide      No visits with results within 6 Month(s) from this visit.   Latest known visit with results is:   Telephone on 06/15/2021   Component Date Value Ref Range Status    TSH w/reflex to FT4 10/21/2021 2.07  0.40 - 4.50 mIU/L Final    Glucose 10/21/2021 100* 65 - 99 mg/dL Final    BUN 10/21/2021 13  7 - 25 mg/dL Final    Creatinine 10/21/2021 0.67  0.60 - 0.88 mg/dL Final    eGFR if non African American 10/21/2021 81  > OR = 60 mL/min/1.73m2 Final    eGFR if  10/21/2021 94  > OR = 60 mL/min/1.73m2 Final    BUN/Creatinine Ratio 10/21/2021 NOT APPLICABLE  6 - 22 (calc) Final    Sodium 10/21/2021 141  135 - 146 mmol/L Final    Potassium 10/21/2021 4.1  3.5 - 5.3 mmol/L Final    Chloride 10/21/2021 100  98 - 110 mmol/L  Final    CO2 10/21/2021 31  20 - 32 mmol/L Final    Calcium 10/21/2021 9.8  8.6 - 10.4 mg/dL Final    Total Protein 10/21/2021 7.1  6.1 - 8.1 g/dL Final    Albumin 10/21/2021 4.4  3.6 - 5.1 g/dL Final    Globulin, Total 10/21/2021 2.7  1.9 - 3.7 g/dL (calc) Final    Albumin/Globulin Ratio 10/21/2021 1.6  1.0 - 2.5 (calc) Final    Total Bilirubin 10/21/2021 0.7  0.2 - 1.2 mg/dL Final    Alkaline Phosphatase 10/21/2021 103  37 - 153 U/L Final    AST 10/21/2021 24  10 - 35 U/L Final    ALT 10/21/2021 24  6 - 29 U/L Final    Cholesterol 10/21/2021 202* <200 mg/dL Final    HDL 10/21/2021 83  > OR = 50 mg/dL Final    Triglycerides 10/21/2021 128  <150 mg/dL Final    LDL Cholesterol 10/21/2021 97  mg/dL (calc) Final    HDL/Cholesterol Ratio 10/21/2021 2.4  <5.0 (calc) Final    Non HDL Chol. (LDL+VLDL) 10/21/2021 119  <130 mg/dL (calc) Final       Past Medical History:   Diagnosis Date    Arthritis     Diverticulitis     Encounter for blood transfusion     Hypertension     Kidney stones     Pacemaker     Shingles     Squamous cell carcinoma of skin     TIA (transient ischemic attack) 12/2017     Past Surgical History:   Procedure Laterality Date    APPENDECTOMY      CARDIAC SURGERY      pacemaker    CERVICAL FUSION      CHOLECYSTECTOMY      COLONOSCOPY N/A 8/14/2019    Procedure: COLONOSCOPY;  Surgeon: Elio Perez MD;  [facility];  Service: Endoscopy;  Laterality: N/A;    HYSTERECTOMY      SHOULDER SURGERY       Family History   Problem Relation Age of Onset    Cancer Father         lung    Cancer Brother         colon cancer    Colon cancer Brother     Pancreatitis Mother     Eczema Neg Hx     Psoriasis Neg Hx     Lupus Neg Hx     Melanoma Neg Hx     Stomach cancer Neg Hx     Esophageal cancer Neg Hx        The CVD Risk score (OMEGA'Pamela, et al., 2008) failed to calculate for the following reasons:    The 2008 CVD risk score is only valid for ages 30 to 74    The  patient has a prior MI, stroke, CHF, or peripheral vascular disease diagnosis     Marital Status:   Alcohol History:  reports current alcohol use of about 7.0 standard drinks of alcohol per week.  Tobacco History:  reports that she quit smoking about 47 years ago. She quit after 20.00 years of use. She has never used smokeless tobacco.  Drug History:  reports no history of drug use.    Health Maintenance Topics with due status: Not Due       Topic Last Completion Date    TETANUS VACCINE 02/26/2019    Colonoscopy 08/14/2019    DEXA SCAN 02/19/2020    Lipid Panel 10/21/2021     Immunization History   Administered Date(s) Administered    COVID-19, MRNA, LN-S, PF (Pfizer) 01/10/2021, 01/31/2021, 10/22/2021    Influenza - High Dose - PF (65 years and older) 08/30/2013, 08/31/2015, 09/19/2016, 09/19/2017, 09/12/2018, 10/15/2019    Influenza - Quadrivalent - High Dose - PF (65 years and older) 09/09/2020, 09/28/2021    Influenza - Quadrivalent - PF *Preferred* (6 months and older) 11/18/2002, 01/19/2006    Influenza - Trivalent (ADULT) 11/18/2002, 01/19/2006    Influenza - Trivalent - PF (ADULT) 08/31/2015    Influenza A (H5N1) 2004 Monovalent 01/01/2016    Pneumococcal 01/01/2016    Pneumococcal Conjugate - 13 Valent 08/30/2016    Pneumococcal Polysaccharide - 23 Valent 11/18/2002, 09/12/2018    Td (ADULT) 02/26/2019    Varicella 01/01/2014       Review of patient's allergies indicates:   Allergen Reactions    Codeine Nausea And Vomiting       Current Outpatient Medications:     amitriptyline (ELAVIL) 50 MG tablet, Take 1 tablet (50 mg total) by mouth every evening., Disp: 90 tablet, Rfl: 1    aspirin 325 MG tablet, Take 1 tablet (325 mg total) by mouth once daily., Disp: , Rfl: 0    atorvastatin (LIPITOR) 80 MG tablet, Take 1 tablet (80 mg total) by mouth once daily., Disp: 90 tablet, Rfl: 1    biotin 10,000 mcg Cap, Take 1 capsule by mouth once daily., Disp: , Rfl:     cetirizine (ZYRTEC) 10 MG  tablet, Take 1 tablet (10 mg total) by mouth once daily., Disp: 30 tablet, Rfl: 0    cholecalciferol, vitamin D3, 125 mcg (5,000 unit) Tab, Take 5,000 Units by mouth once daily., Disp: , Rfl:     cranberry 500 mg Cap, Take 1 capsule by mouth once daily., Disp: , Rfl:     losartan-hydrochlorothiazide 100-12.5 mg (HYZAAR) 100-12.5 mg Tab, Take 1 tablet by mouth once daily., Disp: 90 tablet, Rfl: 1    meloxicam (MOBIC) 7.5 MG tablet, Take 1 tablet (7.5 mg total) by mouth once daily. Anti-inflammatory for arthritis pain, Disp: 30 tablet, Rfl: 2    metoprolol succinate (TOPROL-XL) 25 MG 24 hr tablet, Take 1 tablet (25 mg total) by mouth 2 (two) times daily., Disp: 180 tablet, Rfl: 1    multivitamin (ONE DAILY MULTIVITAMIN) per tablet, Take 1 tablet by mouth once daily., Disp: , Rfl:     pantoprazole (PROTONIX) 40 MG tablet, Take 1 tablet (40 mg total) by mouth once daily., Disp: 90 tablet, Rfl: 1    sertraline (ZOLOFT) 100 MG tablet, Take 1 tablet (100 mg total) by mouth once daily., Disp: 90 tablet, Rfl: 1    vit A/vit C/vit E/zinc/copper (PRESERVISION AREDS ORAL), Take by mouth., Disp: , Rfl:     donepeziL (ARICEPT) 5 MG tablet, Take 1 tablet (5 mg total) by mouth every evening., Disp: 30 tablet, Rfl: 11    Review of Systems   Constitutional: Negative for appetite change, chills, fever and unexpected weight change.   HENT: Negative for sore throat and trouble swallowing.    Eyes: Negative for visual disturbance.   Respiratory: Negative for cough, shortness of breath and wheezing.    Cardiovascular: Negative for chest pain, palpitations and leg swelling.   Gastrointestinal: Negative for abdominal pain, constipation, diarrhea, nausea and vomiting.   Genitourinary: Positive for frequency. Negative for dysuria and hematuria.   Musculoskeletal: Positive for arthralgias (c/oing of bilat leg pain, worse first thing in AM and improves after she moving around a while) and back pain (mid to lower back pain).   Skin:  "Negative for rash.   Neurological: Negative for dizziness, speech difficulty, numbness and headaches.        Daughters reports memory seems to be better after increase in sertraline dose   Psychiatric/Behavioral: Positive for confusion (short term memory issues, word finding difficulties) and dysphoric mood. Negative for agitation and suicidal ideas. The patient is not nervous/anxious.           Objective:      Vitals:    01/17/22 1026   BP: 124/70   Pulse: 70   Weight: 57.5 kg (126 lb 12.8 oz)   Height: 4' 9" (1.448 m)     Physical Exam  Vitals and nursing note reviewed.   Constitutional:       General: She is not in acute distress.     Appearance: Normal appearance. She is well-developed.   HENT:      Head: Normocephalic and atraumatic.      Right Ear: Tympanic membrane and ear canal normal.      Left Ear: Tympanic membrane and ear canal normal.   Neck:      Vascular: No carotid bruit.   Cardiovascular:      Rate and Rhythm: Normal rate and regular rhythm.      Heart sounds: Murmur heard.    Systolic murmur is present with a grade of 2/6.  No friction rub. No gallop.    Pulmonary:      Effort: Pulmonary effort is normal. No respiratory distress.      Breath sounds: Normal breath sounds. No wheezing or rales.   Abdominal:      General: There is no distension.      Palpations: Abdomen is soft.      Tenderness: There is no abdominal tenderness.   Musculoskeletal:      Cervical back: Neck supple.      Lumbar back: Tenderness (mild paraspinous muscle tenderness) present. No edema, signs of trauma or bony tenderness.      Right lower leg: No edema.      Left lower leg: No edema.   Lymphadenopathy:      Cervical: No cervical adenopathy.   Skin:     General: Skin is warm and dry.      Findings: No rash.   Neurological:      General: No focal deficit present.      Mental Status: She is alert and oriented to person, place, and time.      Comments: Gait slightly stooped   Psychiatric:         Mood and Affect: Mood normal. " "        MINI-MENTAL STATE EXAM    What is the (year), (season), (date), (day), (month)?4 points  Where are we (state), (parish), (town), (hospital), (floor)? 5 points  Name 3 objects: 1 second to say each, then ask the patient to repeat all three after you have said them.  Repeat initially until he/she learns all three. 3 points  Serial 7's. 1 point for each answer, stop after 5.  Alternatively, spell "world" backwards.  Must be in the correct sequence.  5 points  Show 2 common objects (pencil and watch).  Ask patient to name. 2 points  Ask the patient to repeat No ifs, ands or buts. 1 point  Follow a 3 stage command: "Take this paper in your right hand, fold it in half, and put it on the floor". 3 points  Read and obey: "Close your eyes". 1 poinr  Ask the patient to recall the three words you previously asked. 1 points  Give pt. paper and ask to write a sentence. 1 point  Show pt. drawing of intersecting pentagons. Ask pt. to draw. 1 point  What was the mini mental exam score? 27 /30  Level of consciousness: alert  Describe if abnormal:   Fund of knowledge: Normal  General appearance: Normal    Assessment:       1. Memory loss    2. Lumbar radiculopathy    3. Mild episode of recurrent major depressive disorder           Plan:       Memory loss  Comments:  MMSE 27/30- discussed with pt causes for memory decline including age, depression, inadequate sleep, etc. discussed use of donepezil and pt would like to start   Orders:  -     donepeziL (ARICEPT) 5 MG tablet; Take 1 tablet (5 mg total) by mouth every evening.  Dispense: 30 tablet; Refill: 11    Lumbar radiculopathy  Comments:  pt c/o's back/leg pain which sounds c/w lumbar radiculopathy- no imaging of lumbar spine available. will send for xray and offered referral to PT or pain mgmt but she declines at this time  Orders:  -     X-Ray Lumbar Spine 5 View; Future; Expected date: 01/17/2022    Mild episode of recurrent major depressive disorder  Comments:  reports " some improvement with increase in sertraline- encouraged to try to stay physically and socially engaged with others      Follow up for as scheduled. in April          Counseled on age and gender appropriate medical preventative services, including cancer screenings, immunizations, overall nutritional health, need for a consistent exercise regimen and an overall push towards maintaining a vigorous and active lifestyle.      1/17/2022 Shayla Tello, NP

## 2022-01-18 ENCOUNTER — TELEPHONE (OUTPATIENT)
Dept: FAMILY MEDICINE | Facility: CLINIC | Age: 82
End: 2022-01-18
Payer: MEDICARE

## 2022-01-18 NOTE — TELEPHONE ENCOUNTER
----- Message from Shayla Tello NP sent at 1/17/2022  7:39 PM CST -----  Please call pt and let her know lumbar xrays shows degenerative/arthritis changes throughout lower thoracic and lumbar spine. There is disc space narrowing at multiple levels. No compression fractures or bony lesions. If back/leg pain persists recommend physical therapy or pain mgmt referral as we discussed

## 2022-01-18 NOTE — TELEPHONE ENCOUNTER
Spoke to daughter, Alysha, with results verbatim per Shayla. Verbalized understanding on all. Said she will call if they want to proceed with physical therapy or pain management referral.

## 2022-02-01 ENCOUNTER — OFFICE VISIT (OUTPATIENT)
Dept: URGENT CARE | Facility: CLINIC | Age: 82
End: 2022-02-01
Payer: MEDICARE

## 2022-02-01 VITALS
HEART RATE: 97 BPM | BODY MASS INDEX: 27.1 KG/M2 | DIASTOLIC BLOOD PRESSURE: 94 MMHG | WEIGHT: 125.63 LBS | TEMPERATURE: 97 F | SYSTOLIC BLOOD PRESSURE: 148 MMHG | OXYGEN SATURATION: 97 % | RESPIRATION RATE: 16 BRPM | HEIGHT: 57 IN

## 2022-02-01 DIAGNOSIS — M79.644 FINGER PAIN, RIGHT: Primary | ICD-10-CM

## 2022-02-01 DIAGNOSIS — M15.9 PRIMARY OSTEOARTHRITIS INVOLVING MULTIPLE JOINTS: ICD-10-CM

## 2022-02-01 PROCEDURE — 1160F RVW MEDS BY RX/DR IN RCRD: CPT | Mod: CPTII,S$GLB,, | Performed by: NURSE PRACTITIONER

## 2022-02-01 PROCEDURE — 99213 PR OFFICE/OUTPT VISIT, EST, LEVL III, 20-29 MIN: ICD-10-PCS | Mod: S$GLB,,, | Performed by: NURSE PRACTITIONER

## 2022-02-01 PROCEDURE — 3077F PR MOST RECENT SYSTOLIC BLOOD PRESSURE >= 140 MM HG: ICD-10-PCS | Mod: CPTII,S$GLB,, | Performed by: NURSE PRACTITIONER

## 2022-02-01 PROCEDURE — 3080F DIAST BP >= 90 MM HG: CPT | Mod: CPTII,S$GLB,, | Performed by: NURSE PRACTITIONER

## 2022-02-01 PROCEDURE — 73130 XR HAND COMPLETE 3 VIEW RIGHT: ICD-10-PCS | Mod: RT,S$GLB,, | Performed by: RADIOLOGY

## 2022-02-01 PROCEDURE — 1159F MED LIST DOCD IN RCRD: CPT | Mod: CPTII,S$GLB,, | Performed by: NURSE PRACTITIONER

## 2022-02-01 PROCEDURE — 1159F PR MEDICATION LIST DOCUMENTED IN MEDICAL RECORD: ICD-10-PCS | Mod: CPTII,S$GLB,, | Performed by: NURSE PRACTITIONER

## 2022-02-01 PROCEDURE — 1160F PR REVIEW ALL MEDS BY PRESCRIBER/CLIN PHARMACIST DOCUMENTED: ICD-10-PCS | Mod: CPTII,S$GLB,, | Performed by: NURSE PRACTITIONER

## 2022-02-01 PROCEDURE — 99213 OFFICE O/P EST LOW 20 MIN: CPT | Mod: S$GLB,,, | Performed by: NURSE PRACTITIONER

## 2022-02-01 PROCEDURE — 3080F PR MOST RECENT DIASTOLIC BLOOD PRESSURE >= 90 MM HG: ICD-10-PCS | Mod: CPTII,S$GLB,, | Performed by: NURSE PRACTITIONER

## 2022-02-01 PROCEDURE — 73130 X-RAY EXAM OF HAND: CPT | Mod: RT,S$GLB,, | Performed by: RADIOLOGY

## 2022-02-01 PROCEDURE — 3077F SYST BP >= 140 MM HG: CPT | Mod: CPTII,S$GLB,, | Performed by: NURSE PRACTITIONER

## 2022-02-01 RX ORDER — MELOXICAM 7.5 MG/1
7.5 TABLET ORAL DAILY
Qty: 30 TABLET | Refills: 5 | Status: SHIPPED | OUTPATIENT
Start: 2022-02-01 | End: 2022-03-21 | Stop reason: SDUPTHER

## 2022-02-01 NOTE — PROGRESS NOTES
"Subjective:       Patient ID: Maricel Abdul is a 81 y.o. female.    Vitals:  height is 4' 9" (1.448 m) and weight is 57 kg (125 lb 9.6 oz). Her oral temperature is 97 °F (36.1 °C). Her blood pressure is 148/94 (abnormal) and her pulse is 97. Her respiration is 16 and oxygen saturation is 97%.     Chief Complaint: Fall    Patient left prior to exam.  Xray ordered pending results.  Informed patient and daughter from doorway awaiting xray results read by radiologist however review by me no obvious fracture.  Informed patient of concern of head trauma from fall hitting face and need for CT scan.  Once xray report results published, patient and daughter witnessed leaving clinic.     Fall  The accident occurred 12 to 24 hours ago. The fall occurred while walking. She landed on hard floor. The point of impact was the face. The pain is present in the right hand (Pointer finger). The pain is moderate. The symptoms are aggravated by movement. Pertinent negatives include no loss of consciousness. She has tried nothing for the symptoms.       Musculoskeletal: Positive for pain and joint pain (right index finger).   Neurological: Negative for loss of consciousness.       Objective:      Physical Exam      Assessment:       1. Finger pain, right        Right hand xray: no fracture of dislocation  Plan:         Finger pain, right  Comments:  index s/p fall  Orders:  -     X-Ray Hand 3 View Right; Future; Expected date: 02/01/2022                   "

## 2022-02-06 DIAGNOSIS — E78.5 DYSLIPIDEMIA: ICD-10-CM

## 2022-02-06 DIAGNOSIS — F51.01 PRIMARY INSOMNIA: ICD-10-CM

## 2022-02-07 RX ORDER — SERTRALINE HYDROCHLORIDE 100 MG/1
100 TABLET, FILM COATED ORAL DAILY
Qty: 90 TABLET | Refills: 1 | Status: SHIPPED | OUTPATIENT
Start: 2022-02-07 | End: 2022-03-21 | Stop reason: SDUPTHER

## 2022-02-07 RX ORDER — ATORVASTATIN CALCIUM 80 MG/1
80 TABLET, FILM COATED ORAL DAILY
Qty: 90 TABLET | Refills: 1 | Status: SHIPPED | OUTPATIENT
Start: 2022-02-07 | End: 2022-03-21 | Stop reason: SDUPTHER

## 2022-02-08 ENCOUNTER — OFFICE VISIT (OUTPATIENT)
Dept: FAMILY MEDICINE | Facility: CLINIC | Age: 82
End: 2022-02-08
Payer: MEDICARE

## 2022-02-08 VITALS
SYSTOLIC BLOOD PRESSURE: 120 MMHG | HEART RATE: 72 BPM | WEIGHT: 127.38 LBS | BODY MASS INDEX: 27.48 KG/M2 | DIASTOLIC BLOOD PRESSURE: 68 MMHG | HEIGHT: 57 IN

## 2022-02-08 DIAGNOSIS — H65.01 NON-RECURRENT ACUTE SEROUS OTITIS MEDIA OF RIGHT EAR: Primary | ICD-10-CM

## 2022-02-08 PROCEDURE — 99213 OFFICE O/P EST LOW 20 MIN: CPT | Mod: 25,S$GLB,, | Performed by: NURSE PRACTITIONER

## 2022-02-08 PROCEDURE — 1160F PR REVIEW ALL MEDS BY PRESCRIBER/CLIN PHARMACIST DOCUMENTED: ICD-10-PCS | Mod: S$GLB,,, | Performed by: NURSE PRACTITIONER

## 2022-02-08 PROCEDURE — 3288F FALL RISK ASSESSMENT DOCD: CPT | Mod: S$GLB,,, | Performed by: NURSE PRACTITIONER

## 2022-02-08 PROCEDURE — 1159F PR MEDICATION LIST DOCUMENTED IN MEDICAL RECORD: ICD-10-PCS | Mod: S$GLB,,, | Performed by: NURSE PRACTITIONER

## 2022-02-08 PROCEDURE — 96372 PR INJECTION,THERAP/PROPH/DIAG2ST, IM OR SUBCUT: ICD-10-PCS | Mod: S$GLB,,, | Performed by: NURSE PRACTITIONER

## 2022-02-08 PROCEDURE — 3288F PR FALLS RISK ASSESSMENT DOCUMENTED: ICD-10-PCS | Mod: S$GLB,,, | Performed by: NURSE PRACTITIONER

## 2022-02-08 PROCEDURE — 1101F PT FALLS ASSESS-DOCD LE1/YR: CPT | Mod: S$GLB,,, | Performed by: NURSE PRACTITIONER

## 2022-02-08 PROCEDURE — 1160F RVW MEDS BY RX/DR IN RCRD: CPT | Mod: S$GLB,,, | Performed by: NURSE PRACTITIONER

## 2022-02-08 PROCEDURE — 1101F PR PT FALLS ASSESS DOC 0-1 FALLS W/OUT INJ PAST YR: ICD-10-PCS | Mod: S$GLB,,, | Performed by: NURSE PRACTITIONER

## 2022-02-08 PROCEDURE — 1159F MED LIST DOCD IN RCRD: CPT | Mod: S$GLB,,, | Performed by: NURSE PRACTITIONER

## 2022-02-08 PROCEDURE — 99213 PR OFFICE/OUTPT VISIT, EST, LEVL III, 20-29 MIN: ICD-10-PCS | Mod: 25,S$GLB,, | Performed by: NURSE PRACTITIONER

## 2022-02-08 PROCEDURE — 96372 THER/PROPH/DIAG INJ SC/IM: CPT | Mod: S$GLB,,, | Performed by: NURSE PRACTITIONER

## 2022-02-08 RX ORDER — FLUTICASONE PROPIONATE 50 MCG
1 SPRAY, SUSPENSION (ML) NASAL DAILY
Qty: 16 G | Refills: 2 | Status: SHIPPED | OUTPATIENT
Start: 2022-02-08 | End: 2022-04-25 | Stop reason: SDUPTHER

## 2022-02-08 RX ORDER — DEXAMETHASONE SODIUM PHOSPHATE 4 MG/ML
4 INJECTION, SOLUTION INTRA-ARTICULAR; INTRALESIONAL; INTRAMUSCULAR; INTRAVENOUS; SOFT TISSUE ONCE
Status: COMPLETED | OUTPATIENT
Start: 2022-02-08 | End: 2022-02-08

## 2022-02-08 RX ORDER — CEFUROXIME AXETIL 500 MG/1
500 TABLET ORAL 2 TIMES DAILY
Qty: 14 TABLET | Refills: 0 | Status: SHIPPED | OUTPATIENT
Start: 2022-02-08 | End: 2022-02-15

## 2022-02-08 RX ADMIN — DEXAMETHASONE SODIUM PHOSPHATE 4 MG: 4 INJECTION, SOLUTION INTRA-ARTICULAR; INTRALESIONAL; INTRAMUSCULAR; INTRAVENOUS; SOFT TISSUE at 10:02

## 2022-02-08 NOTE — PROGRESS NOTES
Patient ID: Maricel Abdul is a 81 y.o. female.    Chief Complaint: Otalgia (No bottles, reviewed from list, right ear pain x 4 days//dp)    Patient here for sick visit.  Patient reports has been having pain right ear off/on for the for the past few days.  States she started with a head cold about 1.5-2 weeks ago.  Sinus/nasal congestion, denies fever, chills or sore throat.  No myalgias.  Most of those symptoms have improved however the right ear pain is new.  Denies any drainage from the ear          Past Medical History:   Diagnosis Date    Arthritis     Diverticulitis     Encounter for blood transfusion     Hypertension     Kidney stones     Pacemaker     Shingles     Squamous cell carcinoma of skin     TIA (transient ischemic attack) 12/2017     Past Surgical History:   Procedure Laterality Date    APPENDECTOMY      CARDIAC SURGERY      pacemaker    CERVICAL FUSION      CHOLECYSTECTOMY      COLONOSCOPY N/A 8/14/2019    Procedure: COLONOSCOPY;  Surgeon: Elio Perez MD;  Location: [facility];  Service: Endoscopy;  Laterality: N/A;    HYSTERECTOMY      SHOULDER SURGERY           Tobacco History:  reports that she quit smoking about 47 years ago. She quit after 20.00 years of use. She has never used smokeless tobacco.      Review of patient's allergies indicates:   Allergen Reactions    Codeine Nausea And Vomiting       Current Outpatient Medications:     amitriptyline (ELAVIL) 50 MG tablet, Take 1 tablet (50 mg total) by mouth every evening., Disp: 90 tablet, Rfl: 1    atorvastatin (LIPITOR) 80 MG tablet, Take 1 tablet (80 mg total) by mouth once daily., Disp: 90 tablet, Rfl: 1    biotin 10,000 mcg Cap, Take 1 capsule by mouth once daily., Disp: , Rfl:     cholecalciferol, vitamin D3, 125 mcg (5,000 unit) Tab, Take 5,000 Units by mouth once daily., Disp: , Rfl:     cranberry 500 mg Cap, Take 1 capsule by mouth once daily., Disp: , Rfl:     donepeziL (ARICEPT) 5 MG tablet, Take 1  tablet (5 mg total) by mouth every evening., Disp: 30 tablet, Rfl: 11    losartan-hydrochlorothiazide 100-12.5 mg (HYZAAR) 100-12.5 mg Tab, Take 1 tablet by mouth once daily., Disp: 90 tablet, Rfl: 1    meloxicam (MOBIC) 7.5 MG tablet, Take 1 tablet (7.5 mg total) by mouth once daily. Anti-inflammatory for arthritis pain, Disp: 30 tablet, Rfl: 5    metoprolol succinate (TOPROL-XL) 25 MG 24 hr tablet, Take 1 tablet (25 mg total) by mouth 2 (two) times daily., Disp: 180 tablet, Rfl: 1    multivitamin (ONE DAILY MULTIVITAMIN) per tablet, Take 1 tablet by mouth once daily., Disp: , Rfl:     pantoprazole (PROTONIX) 40 MG tablet, Take 1 tablet (40 mg total) by mouth once daily., Disp: 90 tablet, Rfl: 1    sertraline (ZOLOFT) 100 MG tablet, Take 1 tablet (100 mg total) by mouth once daily., Disp: 90 tablet, Rfl: 1    vit A/vit C/vit E/zinc/copper (PRESERVISION AREDS ORAL), Take by mouth., Disp: , Rfl:     aspirin 325 MG tablet, Take 1 tablet (325 mg total) by mouth once daily., Disp: , Rfl: 0    cefUROXime (CEFTIN) 500 MG tablet, Take 1 tablet (500 mg total) by mouth 2 (two) times daily. for 7 days, Disp: 14 tablet, Rfl: 0    cetirizine (ZYRTEC) 10 MG tablet, Take 1 tablet (10 mg total) by mouth once daily., Disp: 30 tablet, Rfl: 0    fluticasone propionate (FLONASE) 50 mcg/actuation nasal spray, 1 spray (50 mcg total) by Each Nostril route once daily., Disp: 16 g, Rfl: 2  No current facility-administered medications for this visit.    Review of Systems   Constitutional: Negative for chills and fever.   HENT: Positive for congestion, ear pain and rhinorrhea (clear, improved). Negative for ear discharge, postnasal drip, sinus pain and sore throat.    Respiratory: Positive for cough (minimal). Negative for shortness of breath and wheezing.    Cardiovascular: Negative for chest pain.   Gastrointestinal: Negative for abdominal pain, nausea and vomiting.   Genitourinary: Negative for dysuria and hematuria.   Skin:  "Negative for rash.   Neurological: Negative for dizziness, syncope and headaches.          Objective:      Vitals:    02/08/22 0949   BP: 120/68   Pulse: 72   Weight: 57.8 kg (127 lb 6.4 oz)   Height: 4' 9" (1.448 m)     Physical Exam  Vitals and nursing note reviewed.   Constitutional:       General: She is not in acute distress.     Appearance: Normal appearance. She is well-developed.   HENT:      Head: Normocephalic and atraumatic.      Right Ear: Ear canal normal. Tympanic membrane is erythematous and bulging. Tympanic membrane is not perforated.      Left Ear: Tympanic membrane and ear canal normal.      Nose: No mucosal edema or rhinorrhea.      Right Sinus: No maxillary sinus tenderness or frontal sinus tenderness.      Left Sinus: No maxillary sinus tenderness or frontal sinus tenderness.      Mouth/Throat:      Pharynx: No oropharyngeal exudate or posterior oropharyngeal erythema.      Tonsils: No tonsillar exudate.   Cardiovascular:      Rate and Rhythm: Normal rate and regular rhythm.   Pulmonary:      Effort: Pulmonary effort is normal. No respiratory distress.      Breath sounds: Normal breath sounds. No wheezing or rales.   Musculoskeletal:      Cervical back: Neck supple.   Skin:     General: Skin is warm and dry.      Findings: No rash.   Neurological:      General: No focal deficit present.      Mental Status: She is alert and oriented to person, place, and time.           Assessment:       1. Non-recurrent acute serous otitis media of right ear           Plan:       Non-recurrent acute serous otitis media of right ear  Comments:  acute OM on exam- will add abx/flonase and IM steroid given- cautioned to call for any worsening or no improvement  Orders:  -     cefUROXime (CEFTIN) 500 MG tablet; Take 1 tablet (500 mg total) by mouth 2 (two) times daily. for 7 days  Dispense: 14 tablet; Refill: 0  -     fluticasone propionate (FLONASE) 50 mcg/actuation nasal spray; 1 spray (50 mcg total) by Each " Nostril route once daily.  Dispense: 16 g; Refill: 2  -     dexamethasone injection 4 mg      Follow up if symptoms worsen or fail to improve, for as scheduled.        2/8/2022 Shayla Tello NP

## 2022-03-07 ENCOUNTER — TELEPHONE (OUTPATIENT)
Dept: FAMILY MEDICINE | Facility: CLINIC | Age: 82
End: 2022-03-07
Payer: MEDICARE

## 2022-03-07 DIAGNOSIS — F51.01 PRIMARY INSOMNIA: ICD-10-CM

## 2022-03-07 RX ORDER — AMITRIPTYLINE HYDROCHLORIDE 50 MG/1
50 TABLET, FILM COATED ORAL NIGHTLY
Qty: 90 TABLET | Refills: 2 | Status: SHIPPED | OUTPATIENT
Start: 2022-03-07 | End: 2022-03-21 | Stop reason: SDUPTHER

## 2022-03-07 NOTE — TELEPHONE ENCOUNTER
----- Message from Sera Lemon sent at 3/7/2022  3:41 PM CST -----  [facility] pharm calling to report a possible drug interaction with her Fluoxetine and  the Amitriptyline they will need verification to fill [phone]

## 2022-03-07 NOTE — TELEPHONE ENCOUNTER
"----- Message from Sera Ion sent at 3/7/2022 11:18 AM CST -----  Pt calling said she picked up scripts from the pharm and does not know "which one is which" and needs to speak to a nurse. Cb # [phone]    "

## 2022-03-07 NOTE — TELEPHONE ENCOUNTER
----- Message from Sera Lemon sent at 3/7/2022 12:45 PM CST -----  Pt calling for refills on Meloxicam  7.5 mg to [facility] on [address] # [phone]

## 2022-03-08 ENCOUNTER — TELEPHONE (OUTPATIENT)
Dept: FAMILY MEDICINE | Facility: CLINIC | Age: 82
End: 2022-03-08
Payer: MEDICARE

## 2022-03-08 NOTE — TELEPHONE ENCOUNTER
----- Message from Sera Lemon sent at 3/8/2022  8:35 AM CST -----  [facility] pharm calling for the 2nd time to speak to a nurse about the possible drug interaction between Zoloft  and Amitriptyline  [phone]

## 2022-03-18 ENCOUNTER — PATIENT MESSAGE (OUTPATIENT)
Dept: FAMILY MEDICINE | Facility: CLINIC | Age: 82
End: 2022-03-18
Payer: MEDICARE

## 2022-03-18 DIAGNOSIS — M15.9 PRIMARY OSTEOARTHRITIS INVOLVING MULTIPLE JOINTS: ICD-10-CM

## 2022-03-18 DIAGNOSIS — I10 ESSENTIAL HYPERTENSION: ICD-10-CM

## 2022-03-18 DIAGNOSIS — F51.01 PRIMARY INSOMNIA: ICD-10-CM

## 2022-03-18 DIAGNOSIS — E78.5 DYSLIPIDEMIA: ICD-10-CM

## 2022-03-18 DIAGNOSIS — R41.3 MEMORY LOSS: ICD-10-CM

## 2022-03-18 DIAGNOSIS — K21.9 GASTROESOPHAGEAL REFLUX DISEASE, UNSPECIFIED WHETHER ESOPHAGITIS PRESENT: ICD-10-CM

## 2022-03-21 ENCOUNTER — TELEPHONE (OUTPATIENT)
Dept: FAMILY MEDICINE | Facility: CLINIC | Age: 82
End: 2022-03-21
Payer: MEDICARE

## 2022-03-21 ENCOUNTER — PATIENT MESSAGE (OUTPATIENT)
Dept: FAMILY MEDICINE | Facility: CLINIC | Age: 82
End: 2022-03-21
Payer: MEDICARE

## 2022-03-21 RX ORDER — ATORVASTATIN CALCIUM 80 MG/1
80 TABLET, FILM COATED ORAL DAILY
Qty: 90 TABLET | Refills: 1 | Status: SHIPPED | OUTPATIENT
Start: 2022-03-21 | End: 2022-08-05 | Stop reason: SDUPTHER

## 2022-03-21 RX ORDER — METOPROLOL SUCCINATE 25 MG/1
25 TABLET, EXTENDED RELEASE ORAL 2 TIMES DAILY
Qty: 180 TABLET | Refills: 1 | Status: SHIPPED | OUTPATIENT
Start: 2022-03-21 | End: 2022-08-05 | Stop reason: SDUPTHER

## 2022-03-21 RX ORDER — AMITRIPTYLINE HYDROCHLORIDE 50 MG/1
50 TABLET, FILM COATED ORAL NIGHTLY
Qty: 90 TABLET | Refills: 2 | Status: SHIPPED | OUTPATIENT
Start: 2022-03-21 | End: 2023-03-03 | Stop reason: SDUPTHER

## 2022-03-21 RX ORDER — PANTOPRAZOLE SODIUM 40 MG/1
40 TABLET, DELAYED RELEASE ORAL DAILY
Qty: 90 TABLET | Refills: 1 | Status: SHIPPED | OUTPATIENT
Start: 2022-03-21 | End: 2022-08-05 | Stop reason: SDUPTHER

## 2022-03-21 RX ORDER — SERTRALINE HYDROCHLORIDE 100 MG/1
100 TABLET, FILM COATED ORAL DAILY
Qty: 90 TABLET | Refills: 1 | Status: SHIPPED | OUTPATIENT
Start: 2022-03-21 | End: 2022-08-05 | Stop reason: SDUPTHER

## 2022-03-21 RX ORDER — LOSARTAN POTASSIUM AND HYDROCHLOROTHIAZIDE 12.5; 1 MG/1; MG/1
1 TABLET ORAL DAILY
Qty: 90 TABLET | Refills: 1 | Status: SHIPPED | OUTPATIENT
Start: 2022-03-21 | End: 2022-08-05 | Stop reason: SDUPTHER

## 2022-03-21 RX ORDER — MELOXICAM 7.5 MG/1
7.5 TABLET ORAL DAILY
Qty: 90 TABLET | Refills: 1 | Status: SHIPPED | OUTPATIENT
Start: 2022-03-21 | End: 2022-08-05 | Stop reason: SDUPTHER

## 2022-03-21 RX ORDER — DONEPEZIL HYDROCHLORIDE 5 MG/1
5 TABLET, FILM COATED ORAL NIGHTLY
Qty: 90 TABLET | Refills: 1 | Status: SHIPPED | OUTPATIENT
Start: 2022-03-21 | End: 2022-08-05 | Stop reason: SDUPTHER

## 2022-04-25 ENCOUNTER — OFFICE VISIT (OUTPATIENT)
Dept: FAMILY MEDICINE | Facility: CLINIC | Age: 82
End: 2022-04-25
Payer: MEDICARE

## 2022-04-25 VITALS
BODY MASS INDEX: 26.15 KG/M2 | SYSTOLIC BLOOD PRESSURE: 124 MMHG | HEART RATE: 88 BPM | OXYGEN SATURATION: 99 % | DIASTOLIC BLOOD PRESSURE: 76 MMHG | WEIGHT: 121.19 LBS | HEIGHT: 57 IN

## 2022-04-25 DIAGNOSIS — H91.90 HEARING LOSS, UNSPECIFIED HEARING LOSS TYPE, UNSPECIFIED LATERALITY: ICD-10-CM

## 2022-04-25 DIAGNOSIS — Z86.73 HISTORY OF STROKE: ICD-10-CM

## 2022-04-25 DIAGNOSIS — M15.9 PRIMARY OSTEOARTHRITIS INVOLVING MULTIPLE JOINTS: ICD-10-CM

## 2022-04-25 DIAGNOSIS — Z95.0 CARDIAC PACEMAKER IN SITU: ICD-10-CM

## 2022-04-25 DIAGNOSIS — H65.23 BILATERAL CHRONIC SEROUS OTITIS MEDIA: ICD-10-CM

## 2022-04-25 DIAGNOSIS — E78.5 DYSLIPIDEMIA: ICD-10-CM

## 2022-04-25 DIAGNOSIS — I10 ESSENTIAL HYPERTENSION: Primary | ICD-10-CM

## 2022-04-25 PROCEDURE — 1159F MED LIST DOCD IN RCRD: CPT | Mod: S$GLB,,, | Performed by: NURSE PRACTITIONER

## 2022-04-25 PROCEDURE — 1160F PR REVIEW ALL MEDS BY PRESCRIBER/CLIN PHARMACIST DOCUMENTED: ICD-10-PCS | Mod: S$GLB,,, | Performed by: NURSE PRACTITIONER

## 2022-04-25 PROCEDURE — 99214 PR OFFICE/OUTPT VISIT, EST, LEVL IV, 30-39 MIN: ICD-10-PCS | Mod: S$GLB,,, | Performed by: NURSE PRACTITIONER

## 2022-04-25 PROCEDURE — 3078F DIAST BP <80 MM HG: CPT | Mod: S$GLB,,, | Performed by: NURSE PRACTITIONER

## 2022-04-25 PROCEDURE — 3078F PR MOST RECENT DIASTOLIC BLOOD PRESSURE < 80 MM HG: ICD-10-PCS | Mod: S$GLB,,, | Performed by: NURSE PRACTITIONER

## 2022-04-25 PROCEDURE — 3288F FALL RISK ASSESSMENT DOCD: CPT | Mod: S$GLB,,, | Performed by: NURSE PRACTITIONER

## 2022-04-25 PROCEDURE — 1101F PT FALLS ASSESS-DOCD LE1/YR: CPT | Mod: S$GLB,,, | Performed by: NURSE PRACTITIONER

## 2022-04-25 PROCEDURE — 3288F PR FALLS RISK ASSESSMENT DOCUMENTED: ICD-10-PCS | Mod: S$GLB,,, | Performed by: NURSE PRACTITIONER

## 2022-04-25 PROCEDURE — 3074F SYST BP LT 130 MM HG: CPT | Mod: S$GLB,,, | Performed by: NURSE PRACTITIONER

## 2022-04-25 PROCEDURE — 3074F PR MOST RECENT SYSTOLIC BLOOD PRESSURE < 130 MM HG: ICD-10-PCS | Mod: S$GLB,,, | Performed by: NURSE PRACTITIONER

## 2022-04-25 PROCEDURE — 99214 OFFICE O/P EST MOD 30 MIN: CPT | Mod: S$GLB,,, | Performed by: NURSE PRACTITIONER

## 2022-04-25 PROCEDURE — 1101F PR PT FALLS ASSESS DOC 0-1 FALLS W/OUT INJ PAST YR: ICD-10-PCS | Mod: S$GLB,,, | Performed by: NURSE PRACTITIONER

## 2022-04-25 PROCEDURE — 1160F RVW MEDS BY RX/DR IN RCRD: CPT | Mod: S$GLB,,, | Performed by: NURSE PRACTITIONER

## 2022-04-25 PROCEDURE — 1159F PR MEDICATION LIST DOCUMENTED IN MEDICAL RECORD: ICD-10-PCS | Mod: S$GLB,,, | Performed by: NURSE PRACTITIONER

## 2022-04-25 RX ORDER — FLUTICASONE PROPIONATE 50 MCG
1 SPRAY, SUSPENSION (ML) NASAL DAILY
Qty: 54 G | Refills: 2 | Status: SHIPPED | OUTPATIENT
Start: 2022-04-25 | End: 2023-10-10 | Stop reason: SDUPTHER

## 2022-04-25 NOTE — PROGRESS NOTES
SUBJECTIVE:    Patient ID: Maricel Abdul is a 81 y.o. female.    Chief Complaint: Follow-up (No bottles/ vaccine denied/ vaccines denied//dp)    Pt here for regular f/u- here with her daughter  Reports overall has been doing fairly well. Was having some loose stools but admits has been eating raw broccoli and hummus as a snack which she thinks caused it.  Daughter/pt report memory issues seem to be stable. Was started on donepezil a few months ago- daughter reports she thinks pt's tremors have improved incidentally since starting medication.  Follows with Dr. Rosas, cards for PPM checks- has carotid US scheduled for Dr. Rosas      No visits with results within 6 Month(s) from this visit.   Latest known visit with results is:   Telephone on 06/15/2021   Component Date Value Ref Range Status    TSH w/reflex to FT4 10/21/2021 2.07  0.40 - 4.50 mIU/L Final    Glucose 10/21/2021 100 (A) 65 - 99 mg/dL Final    BUN 10/21/2021 13  7 - 25 mg/dL Final    Creatinine 10/21/2021 0.67  0.60 - 0.88 mg/dL Final    eGFR if non African American 10/21/2021 81  > OR = 60 mL/min/1.73m2 Final    eGFR if  10/21/2021 94  > OR = 60 mL/min/1.73m2 Final    BUN/Creatinine Ratio 10/21/2021 NOT APPLICABLE  6 - 22 (calc) Final    Sodium 10/21/2021 141  135 - 146 mmol/L Final    Potassium 10/21/2021 4.1  3.5 - 5.3 mmol/L Final    Chloride 10/21/2021 100  98 - 110 mmol/L Final    CO2 10/21/2021 31  20 - 32 mmol/L Final    Calcium 10/21/2021 9.8  8.6 - 10.4 mg/dL Final    Total Protein 10/21/2021 7.1  6.1 - 8.1 g/dL Final    Albumin 10/21/2021 4.4  3.6 - 5.1 g/dL Final    Globulin, Total 10/21/2021 2.7  1.9 - 3.7 g/dL (calc) Final    Albumin/Globulin Ratio 10/21/2021 1.6  1.0 - 2.5 (calc) Final    Total Bilirubin 10/21/2021 0.7  0.2 - 1.2 mg/dL Final    Alkaline Phosphatase 10/21/2021 103  37 - 153 U/L Final    AST 10/21/2021 24  10 - 35 U/L Final    ALT 10/21/2021 24  6 - 29 U/L Final    Cholesterol  10/21/2021 202 (A) <200 mg/dL Final    HDL 10/21/2021 83  > OR = 50 mg/dL Final    Triglycerides 10/21/2021 128  <150 mg/dL Final    LDL Cholesterol 10/21/2021 97  mg/dL (calc) Final    HDL/Cholesterol Ratio 10/21/2021 2.4  <5.0 (calc) Final    Non HDL Chol. (LDL+VLDL) 10/21/2021 119  <130 mg/dL (calc) Final       Past Medical History:   Diagnosis Date    Arthritis     Diverticulitis     Encounter for blood transfusion     Hypertension     Kidney stones     Pacemaker     Shingles     Squamous cell carcinoma of skin     TIA (transient ischemic attack) 12/2017     Past Surgical History:   Procedure Laterality Date    APPENDECTOMY      CARDIAC SURGERY      pacemaker    CERVICAL FUSION      CHOLECYSTECTOMY      COLONOSCOPY N/A 8/14/2019    Procedure: COLONOSCOPY;  Surgeon: Elio Perez MD;  Location[facility];  Service: Endoscopy;  Laterality: N/A;    HYSTERECTOMY      SHOULDER SURGERY       Family History   Problem Relation Age of Onset    Cancer Father         lung    Cancer Brother         colon cancer    Colon cancer Brother     Pancreatitis Mother     Eczema Neg Hx     Psoriasis Neg Hx     Lupus Neg Hx     Melanoma Neg Hx     Stomach cancer Neg Hx     Esophageal cancer Neg Hx        The CVD Risk score (D'Agostino, et al., 2008) failed to calculate for the following reasons:    The 2008 CVD risk score is only valid for ages 30 to 74    The patient has a prior MI, stroke, CHF, or peripheral vascular disease diagnosis     Marital Status:   Alcohol History:  reports current alcohol use of about 7.0 standard drinks of alcohol per week.  Tobacco History:  reports that she quit smoking about 47 years ago. She quit after 20.00 years of use. She has never used smokeless tobacco.  Drug History:  reports no history of drug use.    Health Maintenance Topics with due status: Not Due       Topic Last Completion Date    TETANUS VACCINE 02/26/2019    Colonoscopy 08/14/2019    Lipid Panel  10/21/2021     Immunization History   Administered Date(s) Administered    COVID-19, MRNA, LN-S, PF (Pfizer) (Purple Cap) 01/10/2021, 01/31/2021, 10/22/2021    Influenza - High Dose - PF (65 years and older) 08/30/2013, 08/31/2015, 09/19/2016, 09/19/2017, 09/12/2018, 10/15/2019    Influenza - Quadrivalent - High Dose - PF (65 years and older) 09/09/2020, 09/28/2021    Influenza - Quadrivalent - PF *Preferred* (6 months and older) 11/18/2002, 01/19/2006    Influenza - Trivalent (ADULT) 11/18/2002, 01/19/2006    Influenza - Trivalent - PF (ADULT) 08/31/2015    Influenza A (H5N1) 2004 Monovalent 01/01/2016    Pneumococcal 01/01/2016    Pneumococcal Conjugate - 13 Valent 08/30/2016    Pneumococcal Polysaccharide - 23 Valent 11/18/2002, 09/12/2018    Td (ADULT) 02/26/2019    Varicella 01/01/2014       Review of patient's allergies indicates:   Allergen Reactions    Codeine Nausea And Vomiting       Current Outpatient Medications:     amitriptyline (ELAVIL) 50 MG tablet, Take 1 tablet (50 mg total) by mouth every evening., Disp: 90 tablet, Rfl: 2    aspirin 325 MG tablet, Take 1 tablet (325 mg total) by mouth once daily., Disp: , Rfl: 0    atorvastatin (LIPITOR) 80 MG tablet, Take 1 tablet (80 mg total) by mouth once daily., Disp: 90 tablet, Rfl: 1    biotin 10,000 mcg Cap, Take 1 capsule by mouth once daily., Disp: , Rfl:     cholecalciferol, vitamin D3, 125 mcg (5,000 unit) Tab, Take 5,000 Units by mouth once daily., Disp: , Rfl:     cranberry 500 mg Cap, Take 1 capsule by mouth once daily., Disp: , Rfl:     donepeziL (ARICEPT) 5 MG tablet, Take 1 tablet (5 mg total) by mouth every evening., Disp: 90 tablet, Rfl: 1    losartan-hydrochlorothiazide 100-12.5 mg (HYZAAR) 100-12.5 mg Tab, Take 1 tablet by mouth once daily., Disp: 90 tablet, Rfl: 1    meloxicam (MOBIC) 7.5 MG tablet, Take 1 tablet (7.5 mg total) by mouth once daily. Anti-inflammatory for arthritis pain, Disp: 90 tablet, Rfl: 1     "metoprolol succinate (TOPROL-XL) 25 MG 24 hr tablet, Take 1 tablet (25 mg total) by mouth 2 (two) times daily., Disp: 180 tablet, Rfl: 1    multivitamin (ONE DAILY MULTIVITAMIN) per tablet, Take 1 tablet by mouth once daily., Disp: , Rfl:     pantoprazole (PROTONIX) 40 MG tablet, Take 1 tablet (40 mg total) by mouth once daily., Disp: 90 tablet, Rfl: 1    sertraline (ZOLOFT) 100 MG tablet, Take 1 tablet (100 mg total) by mouth once daily., Disp: 90 tablet, Rfl: 1    vit A/vit C/vit E/zinc/copper (PRESERVISION AREDS ORAL), Take by mouth., Disp: , Rfl:     cetirizine (ZYRTEC) 10 MG tablet, Take 1 tablet (10 mg total) by mouth once daily., Disp: 30 tablet, Rfl: 0    fluticasone propionate (FLONASE) 50 mcg/actuation nasal spray, 1 spray (50 mcg total) by Each Nostril route once daily., Disp: 54 g, Rfl: 2    Review of Systems   Constitutional: Negative for appetite change, chills, fever and unexpected weight change.   HENT: Positive for hearing loss. Negative for sore throat and trouble swallowing.    Eyes: Negative for visual disturbance.   Respiratory: Negative for cough, shortness of breath and wheezing.    Cardiovascular: Negative for chest pain, palpitations and leg swelling.   Gastrointestinal: Negative for abdominal pain, constipation, diarrhea, nausea and vomiting.   Genitourinary: Positive for frequency. Negative for dysuria and hematuria.   Musculoskeletal: Positive for arthralgias (arthritis pain stable) and back pain (mid to lower back pain).   Skin: Negative for rash.   Neurological: Negative for dizziness, syncope, speech difficulty, numbness and headaches.   Psychiatric/Behavioral: Positive for confusion (short term memory issues stable). Negative for agitation, dysphoric mood (stable on med) and suicidal ideas. The patient is not nervous/anxious.           Objective:      Vitals:    04/25/22 0936   BP: 124/76   Pulse: 88   SpO2: 99%   Weight: 55 kg (121 lb 3.2 oz)   Height: 4' 9" (1.448 m) "     Physical Exam  Vitals and nursing note reviewed.   Constitutional:       General: She is not in acute distress.     Appearance: Normal appearance. She is well-developed.   HENT:      Head: Normocephalic and atraumatic.      Right Ear: Tympanic membrane and ear canal normal.      Left Ear: Tympanic membrane and ear canal normal.   Neck:      Vascular: No carotid bruit.   Cardiovascular:      Rate and Rhythm: Normal rate and regular rhythm.      Heart sounds: Murmur heard.    Systolic murmur is present with a grade of 2/6.    No friction rub. No gallop.   Pulmonary:      Effort: Pulmonary effort is normal. No respiratory distress.      Breath sounds: Normal breath sounds. No wheezing or rales.   Abdominal:      General: There is no distension.      Palpations: Abdomen is soft.      Tenderness: There is no abdominal tenderness.   Musculoskeletal:      Cervical back: Neck supple.      Right lower leg: No edema.      Left lower leg: No edema.   Lymphadenopathy:      Cervical: No cervical adenopathy.   Skin:     General: Skin is warm and dry.      Findings: No rash.   Neurological:      General: No focal deficit present.      Mental Status: She is alert and oriented to person, place, and time.      Comments: Gait slightly stooped   Psychiatric:         Mood and Affect: Mood normal.           Assessment:       1. Essential hypertension    2. Dyslipidemia    3. Primary osteoarthritis involving multiple joints    4. Bilateral chronic serous otitis media    5. Cardiac pacemaker in situ    6. Hearing loss, unspecified hearing loss type, unspecified laterality    7. History of stroke           Plan:       Essential hypertension  Comments:   BP well controlled  Orders:  -     CBC Auto Differential; Future; Expected date: 04/25/2022  -     Comprehensive Metabolic Panel; Future; Expected date: 04/25/2022  -     Urinalysis, Reflex to Urine Culture Urine, Clean Catch; Future; Expected date: 04/25/2022  -     TSH w/reflex to FT4;  Future; Expected date: 04/25/2022  -     Microalbumin/Creatinine Ratio, Urine; Future; Expected date: 04/25/2022    Dyslipidemia  Comments:   follow-up labs to be drawn today  Orders:  -     Lipid Panel; Future; Expected date: 04/25/2022    Primary osteoarthritis involving multiple joints  Comments:  stable at present    Bilateral chronic serous otitis media  Comments:   complain of feeling of fluid in her ears as well as hearing loss, will refer to ENT and recommend she restart Flonase  Orders:  -     fluticasone propionate (FLONASE) 50 mcg/actuation nasal spray; 1 spray (50 mcg total) by Each Nostril route once daily.  Dispense: 54 g; Refill: 2  -     Ambulatory referral/consult to ENT; Future; Expected date: 05/02/2022    Cardiac pacemaker in situ  Comments:   stable, follows with Dr. Rosas    Hearing loss, unspecified hearing loss type, unspecified laterality  -     Ambulatory referral/consult to ENT; Future; Expected date: 05/02/2022    History of stroke      Follow up in about 6 months (around 10/25/2022) for HTN, lipids, labs to be drawn today.          Counseled on age and gender appropriate medical preventative services, including cancer screenings, immunizations, overall nutritional health, need for a consistent exercise regimen and an overall push towards maintaining a vigorous and active lifestyle.      4/25/2022 Shayla Tello NP

## 2022-04-27 LAB
ALBUMIN SERPL-MCNC: 4.6 G/DL (ref 3.6–5.1)
ALBUMIN/CREAT UR: 24 MCG/MG CREAT
ALBUMIN/GLOB SERPL: 1.7 (CALC) (ref 1–2.5)
ALP SERPL-CCNC: 75 U/L (ref 37–153)
ALT SERPL-CCNC: 21 U/L (ref 6–29)
APPEARANCE UR: CLEAR
AST SERPL-CCNC: 27 U/L (ref 10–35)
BACTERIA #/AREA URNS HPF: ABNORMAL /HPF
BACTERIA UR CULT: ABNORMAL
BACTERIA UR CULT: ABNORMAL
BASOPHILS # BLD AUTO: 68 CELLS/UL (ref 0–200)
BASOPHILS NFR BLD AUTO: 0.8 %
BILIRUB SERPL-MCNC: 0.8 MG/DL (ref 0.2–1.2)
BILIRUB UR QL STRIP: NEGATIVE
BUN SERPL-MCNC: 17 MG/DL (ref 7–25)
BUN/CREAT SERPL: ABNORMAL (CALC) (ref 6–22)
CALCIUM SERPL-MCNC: 9.9 MG/DL (ref 8.6–10.4)
CHLORIDE SERPL-SCNC: 101 MMOL/L (ref 98–110)
CHOLEST SERPL-MCNC: 216 MG/DL
CHOLEST/HDLC SERPL: 2.5 (CALC)
CO2 SERPL-SCNC: 32 MMOL/L (ref 20–32)
COLOR UR: YELLOW
CREAT SERPL-MCNC: 0.71 MG/DL (ref 0.6–0.88)
CREAT UR-MCNC: 75 MG/DL (ref 20–275)
EOSINOPHIL # BLD AUTO: 179 CELLS/UL (ref 15–500)
EOSINOPHIL NFR BLD AUTO: 2.1 %
ERYTHROCYTE [DISTWIDTH] IN BLOOD BY AUTOMATED COUNT: 13 % (ref 11–15)
GLOBULIN SER CALC-MCNC: 2.7 G/DL (CALC) (ref 1.9–3.7)
GLUCOSE SERPL-MCNC: 106 MG/DL (ref 65–99)
GLUCOSE UR QL STRIP: NEGATIVE
HCT VFR BLD AUTO: 40.8 % (ref 35–45)
HDLC SERPL-MCNC: 86 MG/DL
HGB BLD-MCNC: 13.2 G/DL (ref 11.7–15.5)
HGB UR QL STRIP: NEGATIVE
HYALINE CASTS #/AREA URNS LPF: ABNORMAL /LPF
KETONES UR QL STRIP: NEGATIVE
LDLC SERPL CALC-MCNC: 105 MG/DL (CALC)
LEUKOCYTE ESTERASE UR QL STRIP: ABNORMAL
LYMPHOCYTES # BLD AUTO: 1573 CELLS/UL (ref 850–3900)
LYMPHOCYTES NFR BLD AUTO: 18.5 %
MCH RBC QN AUTO: 29.2 PG (ref 27–33)
MCHC RBC AUTO-ENTMCNC: 32.4 G/DL (ref 32–36)
MCV RBC AUTO: 90.3 FL (ref 80–100)
MICROALBUMIN UR-MCNC: 1.8 MG/DL
MONOCYTES # BLD AUTO: 850 CELLS/UL (ref 200–950)
MONOCYTES NFR BLD AUTO: 10 %
NEUTROPHILS # BLD AUTO: 5831 CELLS/UL (ref 1500–7800)
NEUTROPHILS NFR BLD AUTO: 68.6 %
NITRITE UR QL STRIP: NEGATIVE
NONHDLC SERPL-MCNC: 130 MG/DL (CALC)
PH UR STRIP: 7.5 [PH] (ref 5–8)
PLATELET # BLD AUTO: 269 THOUSAND/UL (ref 140–400)
PMV BLD REES-ECKER: 10.6 FL (ref 7.5–12.5)
POTASSIUM SERPL-SCNC: 4.1 MMOL/L (ref 3.5–5.3)
PROT SERPL-MCNC: 7.3 G/DL (ref 6.1–8.1)
PROT UR QL STRIP: NEGATIVE
RBC # BLD AUTO: 4.52 MILLION/UL (ref 3.8–5.1)
RBC #/AREA URNS HPF: ABNORMAL /HPF
SODIUM SERPL-SCNC: 142 MMOL/L (ref 135–146)
SP GR UR STRIP: 1.01 (ref 1–1.03)
SQUAMOUS #/AREA URNS HPF: ABNORMAL /HPF
TRIGL SERPL-MCNC: 139 MG/DL
TSH SERPL-ACNC: 4.07 MIU/L (ref 0.4–4.5)
WBC # BLD AUTO: 8.5 THOUSAND/UL (ref 3.8–10.8)
WBC #/AREA URNS HPF: ABNORMAL /HPF

## 2022-05-17 DIAGNOSIS — R01.1 UNDIAGNOSED CARDIAC MURMURS: ICD-10-CM

## 2022-05-17 DIAGNOSIS — E78.5 HYPERLIPEMIA: ICD-10-CM

## 2022-05-17 DIAGNOSIS — I10 ESSENTIAL HYPERTENSION, MALIGNANT: ICD-10-CM

## 2022-05-17 DIAGNOSIS — Z95.0 CARDIAC PACEMAKER IN SITU: Primary | ICD-10-CM

## 2022-06-10 ENCOUNTER — CLINICAL SUPPORT (OUTPATIENT)
Dept: CARDIOLOGY | Facility: HOSPITAL | Age: 82
End: 2022-06-10
Attending: SPECIALIST
Payer: MEDICARE

## 2022-06-10 VITALS — WEIGHT: 121 LBS | BODY MASS INDEX: 26.18 KG/M2

## 2022-06-10 DIAGNOSIS — Z95.0 CARDIAC PACEMAKER IN SITU: ICD-10-CM

## 2022-06-10 DIAGNOSIS — R01.1 UNDIAGNOSED CARDIAC MURMURS: ICD-10-CM

## 2022-06-10 DIAGNOSIS — I10 ESSENTIAL HYPERTENSION, MALIGNANT: ICD-10-CM

## 2022-06-10 DIAGNOSIS — E78.5 HYPERLIPEMIA: ICD-10-CM

## 2022-06-10 LAB
AV INDEX (PROSTH): 0.36
AV MEAN GRADIENT: 15 MMHG
AV VALVE AREA: 1.14 CM2
DOP CALC AO VTI: 50.4 CM
DOP CALC LVOT AREA: 3.1 CM2
DOP CALC LVOT DIAMETER: 2 CM
DOP CALC LVOT PEAK VEL: 88.49 M/S
DOP CALC LVOT STROKE VOLUME: 57.65 CM3
DOP CALCLVOT PEAK VEL VTI: 18.36 CM
E WAVE DECELERATION TIME: 182.33 MSEC
E/A RATIO: 0.52
E/E' RATIO: 15 M/S
EJECTION FRACTION: 70 %
FRACTIONAL SHORTENING: 46 % (ref 28–44)
LEFT ATRIUM SIZE: 3.62 CM
LEFT INTERNAL DIMENSION IN SYSTOLE: 1.78 CM (ref 2.1–4)
LEFT VENTRICLE DIASTOLIC VOLUME: 35.66 ML
LEFT VENTRICLE SYSTOLIC VOLUME: 5.74 ML
LEFT VENTRICULAR INTERNAL DIMENSION IN DIASTOLE: 3.31 CM (ref 3.5–6)
LV LATERAL E/E' RATIO: 15 M/S
LV SEPTAL E/E' RATIO: 15 M/S
MV PEAK A VEL: 1.43 M/S
MV PEAK E VEL: 0.75 M/S
PISA TR MAX VEL: 1.98 M/S
RA PRESSURE: 3 MMHG
RIGHT VENTRICULAR END-DIASTOLIC DIMENSION: 280 CM
TDI LATERAL: 0.05 M/S
TDI SEPTAL: 0.05 M/S
TDI: 0.05 M/S
TR MAX PG: 16 MMHG
TRICUSPID ANNULAR PLANE SYSTOLIC EXCURSION: 150 CM
TV REST PULMONARY ARTERY PRESSURE: 19 MMHG

## 2022-06-10 PROCEDURE — 93306 ECHO (CUPID ONLY): ICD-10-PCS | Mod: 26,,, | Performed by: INTERNAL MEDICINE

## 2022-06-10 PROCEDURE — 93306 TTE W/DOPPLER COMPLETE: CPT | Mod: 26,,, | Performed by: INTERNAL MEDICINE

## 2022-06-10 PROCEDURE — 93306 TTE W/DOPPLER COMPLETE: CPT

## 2022-08-03 DIAGNOSIS — F51.01 PRIMARY INSOMNIA: ICD-10-CM

## 2022-08-03 DIAGNOSIS — R41.3 MEMORY LOSS: ICD-10-CM

## 2022-08-03 DIAGNOSIS — M15.9 PRIMARY OSTEOARTHRITIS INVOLVING MULTIPLE JOINTS: ICD-10-CM

## 2022-08-03 DIAGNOSIS — I10 ESSENTIAL HYPERTENSION: ICD-10-CM

## 2022-08-03 DIAGNOSIS — K21.9 GASTROESOPHAGEAL REFLUX DISEASE, UNSPECIFIED WHETHER ESOPHAGITIS PRESENT: ICD-10-CM

## 2022-08-03 RX ORDER — LOSARTAN POTASSIUM AND HYDROCHLOROTHIAZIDE 12.5; 1 MG/1; MG/1
1 TABLET ORAL DAILY
Qty: 90 TABLET | Refills: 1 | Status: CANCELLED | OUTPATIENT
Start: 2022-08-03

## 2022-08-03 RX ORDER — MELOXICAM 7.5 MG/1
7.5 TABLET ORAL DAILY
Qty: 90 TABLET | Refills: 1 | Status: CANCELLED | OUTPATIENT
Start: 2022-08-03

## 2022-08-03 RX ORDER — PANTOPRAZOLE SODIUM 40 MG/1
40 TABLET, DELAYED RELEASE ORAL DAILY
Qty: 90 TABLET | Refills: 1 | Status: CANCELLED | OUTPATIENT
Start: 2022-08-03

## 2022-08-03 RX ORDER — DONEPEZIL HYDROCHLORIDE 5 MG/1
5 TABLET, FILM COATED ORAL NIGHTLY
Qty: 90 TABLET | Refills: 1 | Status: CANCELLED | OUTPATIENT
Start: 2022-08-03 | End: 2023-08-03

## 2022-08-03 RX ORDER — METOPROLOL SUCCINATE 25 MG/1
25 TABLET, EXTENDED RELEASE ORAL 2 TIMES DAILY
Qty: 180 TABLET | Refills: 1 | Status: CANCELLED | OUTPATIENT
Start: 2022-08-03

## 2022-08-03 RX ORDER — SERTRALINE HYDROCHLORIDE 100 MG/1
100 TABLET, FILM COATED ORAL DAILY
Qty: 90 TABLET | Refills: 1 | Status: CANCELLED | OUTPATIENT
Start: 2022-08-03

## 2022-08-03 NOTE — TELEPHONE ENCOUNTER
Spoke with Alysha, pt's daughter in regards to message about medication refills. Verbalized to Alysha that we would like for the pt to bring in her medication bottles into the clinic, for medication refills. Appointment made for 08/05/2022 as a N/V. Alysha acknowledge understanding.

## 2022-08-05 ENCOUNTER — CLINICAL SUPPORT (OUTPATIENT)
Dept: FAMILY MEDICINE | Facility: CLINIC | Age: 82
End: 2022-08-05
Payer: MEDICARE

## 2022-08-05 DIAGNOSIS — I10 ESSENTIAL HYPERTENSION: ICD-10-CM

## 2022-08-05 DIAGNOSIS — K21.9 GASTROESOPHAGEAL REFLUX DISEASE, UNSPECIFIED WHETHER ESOPHAGITIS PRESENT: ICD-10-CM

## 2022-08-05 DIAGNOSIS — E78.5 DYSLIPIDEMIA: ICD-10-CM

## 2022-08-05 DIAGNOSIS — F51.01 PRIMARY INSOMNIA: ICD-10-CM

## 2022-08-05 DIAGNOSIS — M15.9 PRIMARY OSTEOARTHRITIS INVOLVING MULTIPLE JOINTS: ICD-10-CM

## 2022-08-05 DIAGNOSIS — R41.3 MEMORY LOSS: ICD-10-CM

## 2022-08-05 RX ORDER — METOPROLOL SUCCINATE 25 MG/1
25 TABLET, EXTENDED RELEASE ORAL 2 TIMES DAILY
Qty: 180 TABLET | Refills: 1 | Status: SHIPPED | OUTPATIENT
Start: 2022-08-05 | End: 2023-03-03 | Stop reason: SDUPTHER

## 2022-08-05 RX ORDER — MELOXICAM 7.5 MG/1
7.5 TABLET ORAL DAILY
Qty: 90 TABLET | Refills: 1 | Status: SHIPPED | OUTPATIENT
Start: 2022-08-05 | End: 2023-01-03 | Stop reason: SDUPTHER

## 2022-08-05 RX ORDER — LOSARTAN POTASSIUM AND HYDROCHLOROTHIAZIDE 12.5; 1 MG/1; MG/1
1 TABLET ORAL DAILY
Qty: 90 TABLET | Refills: 1 | Status: SHIPPED | OUTPATIENT
Start: 2022-08-05 | End: 2023-01-03 | Stop reason: SDUPTHER

## 2022-08-05 RX ORDER — ATORVASTATIN CALCIUM 80 MG/1
80 TABLET, FILM COATED ORAL DAILY
Qty: 90 TABLET | Refills: 1 | Status: SHIPPED | OUTPATIENT
Start: 2022-08-05 | End: 2023-01-03 | Stop reason: SDUPTHER

## 2022-08-05 RX ORDER — SERTRALINE HYDROCHLORIDE 100 MG/1
100 TABLET, FILM COATED ORAL DAILY
Qty: 90 TABLET | Refills: 1 | Status: SHIPPED | OUTPATIENT
Start: 2022-08-05 | End: 2023-01-03 | Stop reason: SDUPTHER

## 2022-08-05 RX ORDER — DONEPEZIL HYDROCHLORIDE 5 MG/1
5 TABLET, FILM COATED ORAL NIGHTLY
Qty: 90 TABLET | Refills: 1 | Status: SHIPPED | OUTPATIENT
Start: 2022-08-05 | End: 2023-01-03 | Stop reason: SDUPTHER

## 2022-08-05 RX ORDER — PANTOPRAZOLE SODIUM 40 MG/1
40 TABLET, DELAYED RELEASE ORAL DAILY
Qty: 90 TABLET | Refills: 1 | Status: SHIPPED | OUTPATIENT
Start: 2022-08-05 | End: 2023-01-03 | Stop reason: SDUPTHER

## 2022-08-13 ENCOUNTER — HOSPITAL ENCOUNTER (EMERGENCY)
Facility: HOSPITAL | Age: 82
Discharge: HOME | End: 2022-08-13
Attending: EMERGENCY MEDICINE
Payer: MEDICARE

## 2022-08-13 VITALS
RESPIRATION RATE: 15 BRPM | SYSTOLIC BLOOD PRESSURE: 156 MMHG | DIASTOLIC BLOOD PRESSURE: 100 MMHG | TEMPERATURE: 98 F | HEART RATE: 83 BPM | BODY MASS INDEX: 25.2 KG/M2 | OXYGEN SATURATION: 100 % | HEIGHT: 59 IN | WEIGHT: 125 LBS

## 2022-08-13 DIAGNOSIS — R19.7 DIARRHEA: Primary | ICD-10-CM

## 2022-08-13 DIAGNOSIS — E86.0 DEHYDRATION: ICD-10-CM

## 2022-08-13 LAB
ALBUMIN SERPL BCP-MCNC: 4.1 G/DL (ref 3.5–5.2)
ALP SERPL-CCNC: 64 U/L (ref 55–135)
ALT SERPL W/O P-5'-P-CCNC: 19 U/L (ref 10–44)
ANION GAP SERPL CALC-SCNC: 11 MMOL/L (ref 8–16)
AST SERPL-CCNC: 25 U/L (ref 10–40)
BASOPHILS # BLD AUTO: 0.06 K/UL (ref 0–0.2)
BASOPHILS NFR BLD: 1 % (ref 0–1.9)
BILIRUB SERPL-MCNC: 0.8 MG/DL (ref 0.1–1)
BILIRUB UR QL STRIP: NEGATIVE
BUN SERPL-MCNC: 11 MG/DL (ref 8–23)
CALCIUM SERPL-MCNC: 9.1 MG/DL (ref 8.7–10.5)
CHLORIDE SERPL-SCNC: 102 MMOL/L (ref 95–110)
CLARITY UR: CLEAR
CO2 SERPL-SCNC: 29 MMOL/L (ref 23–29)
COLOR UR: COLORLESS
CREAT SERPL-MCNC: 0.7 MG/DL (ref 0.5–1.4)
DIFFERENTIAL METHOD: ABNORMAL
EOSINOPHIL # BLD AUTO: 0.2 K/UL (ref 0–0.5)
EOSINOPHIL NFR BLD: 3.3 % (ref 0–8)
ERYTHROCYTE [DISTWIDTH] IN BLOOD BY AUTOMATED COUNT: 14.3 % (ref 11.5–14.5)
EST. GFR  (NO RACE VARIABLE): >60 ML/MIN/1.73 M^2
GLUCOSE SERPL-MCNC: 102 MG/DL (ref 70–110)
GLUCOSE UR QL STRIP: NEGATIVE
HCT VFR BLD AUTO: 35.7 % (ref 37–48.5)
HGB BLD-MCNC: 11.5 G/DL (ref 12–16)
HGB UR QL STRIP: NEGATIVE
IMM GRANULOCYTES # BLD AUTO: 0.03 K/UL (ref 0–0.04)
IMM GRANULOCYTES NFR BLD AUTO: 0.5 % (ref 0–0.5)
KETONES UR QL STRIP: NEGATIVE
LEUKOCYTE ESTERASE UR QL STRIP: NEGATIVE
LYMPHOCYTES # BLD AUTO: 1 K/UL (ref 1–4.8)
LYMPHOCYTES NFR BLD: 15.9 % (ref 18–48)
MAGNESIUM SERPL-MCNC: 1.9 MG/DL (ref 1.6–2.6)
MCH RBC QN AUTO: 29.6 PG (ref 27–31)
MCHC RBC AUTO-ENTMCNC: 32.2 G/DL (ref 32–36)
MCV RBC AUTO: 92 FL (ref 82–98)
MONOCYTES # BLD AUTO: 0.7 K/UL (ref 0.3–1)
MONOCYTES NFR BLD: 11.3 % (ref 4–15)
NEUTROPHILS # BLD AUTO: 4.1 K/UL (ref 1.8–7.7)
NEUTROPHILS NFR BLD: 68 % (ref 38–73)
NITRITE UR QL STRIP: NEGATIVE
NRBC BLD-RTO: 0 /100 WBC
PH UR STRIP: 8 [PH] (ref 5–8)
PLATELET # BLD AUTO: 224 K/UL (ref 150–450)
PMV BLD AUTO: 10.2 FL (ref 9.2–12.9)
POTASSIUM SERPL-SCNC: 3.5 MMOL/L (ref 3.5–5.1)
PROT SERPL-MCNC: 6.8 G/DL (ref 6–8.4)
PROT UR QL STRIP: NEGATIVE
RBC # BLD AUTO: 3.89 M/UL (ref 4–5.4)
SODIUM SERPL-SCNC: 142 MMOL/L (ref 136–145)
SP GR UR STRIP: 1 (ref 1–1.03)
URN SPEC COLLECT METH UR: ABNORMAL
UROBILINOGEN UR STRIP-ACNC: NEGATIVE EU/DL
WBC # BLD AUTO: 6.04 K/UL (ref 3.9–12.7)

## 2022-08-13 PROCEDURE — 99284 EMERGENCY DEPT VISIT MOD MDM: CPT | Mod: 25

## 2022-08-13 PROCEDURE — 81003 URINALYSIS AUTO W/O SCOPE: CPT | Performed by: EMERGENCY MEDICINE

## 2022-08-13 PROCEDURE — 96365 THER/PROPH/DIAG IV INF INIT: CPT

## 2022-08-13 PROCEDURE — 80053 COMPREHEN METABOLIC PANEL: CPT | Performed by: EMERGENCY MEDICINE

## 2022-08-13 PROCEDURE — 93005 ELECTROCARDIOGRAM TRACING: CPT | Performed by: INTERNAL MEDICINE

## 2022-08-13 PROCEDURE — 93010 EKG 12-LEAD: ICD-10-PCS | Mod: ,,, | Performed by: INTERNAL MEDICINE

## 2022-08-13 PROCEDURE — 63600175 PHARM REV CODE 636 W HCPCS: Performed by: EMERGENCY MEDICINE

## 2022-08-13 PROCEDURE — 93010 ELECTROCARDIOGRAM REPORT: CPT | Mod: ,,, | Performed by: INTERNAL MEDICINE

## 2022-08-13 PROCEDURE — 25000003 PHARM REV CODE 250: Performed by: EMERGENCY MEDICINE

## 2022-08-13 PROCEDURE — 83735 ASSAY OF MAGNESIUM: CPT | Performed by: EMERGENCY MEDICINE

## 2022-08-13 PROCEDURE — 85025 COMPLETE CBC W/AUTO DIFF WBC: CPT | Performed by: EMERGENCY MEDICINE

## 2022-08-13 RX ORDER — DICYCLOMINE HYDROCHLORIDE 10 MG/ML
20 INJECTION INTRAMUSCULAR
Status: DISCONTINUED | OUTPATIENT
Start: 2022-08-13 | End: 2022-08-13

## 2022-08-13 RX ORDER — DICYCLOMINE HYDROCHLORIDE 10 MG/ML
20 INJECTION INTRAMUSCULAR
Status: DISCONTINUED | OUTPATIENT
Start: 2022-08-13 | End: 2022-08-13 | Stop reason: HOSPADM

## 2022-08-13 RX ADMIN — POTASSIUM BICARBONATE 25 MEQ: 977.5 TABLET, EFFERVESCENT ORAL at 12:08

## 2022-08-13 RX ADMIN — SODIUM CHLORIDE, SODIUM LACTATE, POTASSIUM CHLORIDE, AND CALCIUM CHLORIDE 1000 ML: .6; .31; .03; .02 INJECTION, SOLUTION INTRAVENOUS at 10:08

## 2022-08-13 NOTE — DISCHARGE INSTRUCTIONS
Drink plenty of fluids as discussed.  If you develop any blood or mucus in your stool you may need stool cultures.  Follow-up with her primary care provider.    RETURN TO EMERGENCY DEPARTMENT WITHOUT FAIL, IF YOUR SYMPTOMS WORSEN, IF YOU GET NEW OR DIFFERENT SYMPTOMS, IF YOU ARE UNABLE TO FOLLOW UP AS DIRECTED, OR IF YOU HAVE ANY CONCERNS OR WORRIES.

## 2022-08-13 NOTE — ED PROVIDER NOTES
Encounter Date: 8/13/2022       History     Chief Complaint   Patient presents with    Diarrhea     X 2 days, weakness, denies fever     82-year-old female presents emergency department with diarrhea for the last 3 days.  Patient has had several episodes of loose watery diarrhea, nonbloody non mucousy.  No bad foods reported.  No recent travel.  No recent antibiotics.  No one else sick at home with similar symptoms.  She has some cramping before the diarrhea happens and then it improves.  No pain in between.  Denies any abdominal pain.  Denies any urinary symptoms such as frequency urgency or burning.  She denies any chest pain or any shortness of breath.  She feels generally weak and fatigued, no focal weakness.  She thinks that she might need some IV fluids so presented to the emergency department.  She has been able to drink Pedialyte and keep it down.  She is not vomiting.        Review of patient's allergies indicates:   Allergen Reactions    Codeine Nausea And Vomiting     Past Medical History:   Diagnosis Date    Arthritis     Diverticulitis     Encounter for blood transfusion     Hypertension     Kidney stones     Pacemaker     Shingles     Squamous cell carcinoma of skin     TIA (transient ischemic attack) 12/2017     Past Surgical History:   Procedure Laterality Date    APPENDECTOMY      CARDIAC SURGERY      pacemaker    CERVICAL FUSION      CHOLECYSTECTOMY      COLONOSCOPY N/A 8/14/2019    Procedure: COLONOSCOPY;  Surgeon: Elio Perez MD;  Location: [facility];  Service: Endoscopy;  Laterality: N/A;    HYSTERECTOMY      SHOULDER SURGERY       Family History   Problem Relation Age of Onset    Cancer Father         lung    Cancer Brother         colon cancer    Colon cancer Brother     Pancreatitis Mother     Eczema Neg Hx     Psoriasis Neg Hx     Lupus Neg Hx     Melanoma Neg Hx     Stomach cancer Neg Hx     Esophageal cancer Neg Hx      Social History     Tobacco Use     Smoking status: Former Smoker     Years: 20.00     Quit date: 1974     Years since quittin.0    Smokeless tobacco: Never Used   Substance Use Topics    Alcohol use: Yes     Alcohol/week: 7.0 standard drinks     Types: 7 Glasses of wine per week     Comment: Glass of wine each night    Drug use: No     Review of Systems   Constitutional: Negative for chills and fever.   HENT: Negative for sore throat.    Respiratory: Negative for shortness of breath.    Cardiovascular: Negative for chest pain and palpitations.   Gastrointestinal: Positive for diarrhea. Negative for nausea and vomiting.   Genitourinary: Negative for dysuria.   Musculoskeletal: Negative for back pain.   Skin: Negative for rash.   Neurological: Negative for weakness.   Hematological: Does not bruise/bleed easily.   All other systems reviewed and are negative.      Physical Exam     Initial Vitals   BP Pulse Resp Temp SpO2   22 0830 22 0830 22 0830 22 0830 22 1158   101/84 95 14 98.1 °F (36.7 °C) 100 %      MAP       --                Physical Exam    Nursing note and vitals reviewed.  Constitutional: She appears well-developed and well-nourished. No distress.   HENT:   Head: Normocephalic and atraumatic.   Mouth/Throat: No oropharyngeal exudate.   Eyes: Conjunctivae and EOM are normal. Pupils are equal, round, and reactive to light.   Neck: Neck supple. No tracheal deviation present.   Normal range of motion.  Cardiovascular: Normal rate, regular rhythm and intact distal pulses. Friction rub: Three to 4/6 systolic murmur.    Murmur heard.  Pulmonary/Chest: Breath sounds normal. No stridor. No respiratory distress. She has no wheezes. She has no rhonchi. She has no rales.   Abdominal: Abdomen is soft. Bowel sounds are normal. She exhibits no distension. There is no abdominal tenderness.   No tenderness to palpation. There is no rebound and no guarding.   Musculoskeletal:         General: No tenderness or edema.  Normal range of motion.      Cervical back: Normal range of motion and neck supple.     Neurological: She is alert and oriented to person, place, and time. She has normal strength. No cranial nerve deficit or sensory deficit.   Skin: Skin is warm and dry. Capillary refill takes less than 2 seconds. No rash noted. No erythema. No pallor.   Psychiatric: She has a normal mood and affect. Thought content normal.         ED Course   Procedures  Labs Reviewed   CBC W/ AUTO DIFFERENTIAL - Abnormal; Notable for the following components:       Result Value    RBC 3.89 (*)     Hemoglobin 11.5 (*)     Hematocrit 35.7 (*)     Lymph % 15.9 (*)     All other components within normal limits   URINALYSIS, REFLEX TO URINE CULTURE - Abnormal; Notable for the following components:    Color, UA Colorless (*)     Specific [place], UA 1.000 (*)     All other components within normal limits    Narrative:     Specimen Source->Urine   COMPREHENSIVE METABOLIC PANEL   MAGNESIUM        ECG Results          EKG 12-lead (Final result)  Result time 08/13/22 13:43:12    Final result by Interface, Lab In [facility] (08/13/22 13:43:12)                 Narrative:    Test Reason : R19.7,    Vent. Rate : 086 BPM     Atrial Rate : 086 BPM     P-R Int : 172 ms          QRS Dur : 154 ms      QT Int : 432 ms       P-R-T Axes : 054 -82 098 degrees     QTc Int : 516 ms    Atrial-sensed ventricular-paced rhythm  Abnormal ECG  When compared with ECG of 24-APR-2019 12:45,  Electronic ventricular pacemaker has replaced Sinus rhythm  Confirmed by Luis A Rodriguez MD ([id number]) on 8/13/2022 1:43:01 PM    Referred By: [email or web address]   SELF           Confirmed By:Luis A Rodriguez MD                            Imaging Results    None          Medications   lactated ringers bolus 1,000 mL (0 mLs Intravenous Stopped 8/13/22 1120)   potassium bicarbonate disintegrating tablet 25 mEq (25 mEq Oral Given 8/13/22 1203)     Medical Decision Making:   ED Management:  82-year-old female  presents emergency department diarrhea as described above.  She is very well-appearing, nontoxic in no distress.  She did receive some IV fluids emergency department and felt better.  She was also given Bentyl.  She did not have any abdominal tenderness to palpation.  Do not suspect that she has any acute life-threatening surgical abdominal emergency such as abdominal aortic aneurysm, bowel obstruction, volvulus, appendicitis.  She is feeling much better after Bentyl and fluids.  She is tolerating p.o..  She has not had any further diarrhea.  She will be discharged home.    I had a detailed discussion with the patient and/or guardian regarding: The historical points, exam findings, and diagnostic results supporting the discharge diagnosis, lab results, pertinent radiology results, and the need for outpatient follow-up, for definitive care with a family practitioner and to return to the emergency department if symptoms worsen or persist or if there are any questions or concerns that arise at home. All questions have been answered in detail. Strict return to Emergency Department precautions have been provided.    A dictation software program was used for this note.  Please expect some simple typographical  errors in this note.              ED Course as of 08/13/22 1859   Sat Aug 13, 2022   1050 EKG 9:52 a.m. atrial sensed, ventricular paced, rate of 86. No ST elevation or depression, typical pacemaker pattern.  No STEMI.  EKG interpreted independently by me.   [JR]      ED Course User Index  [JR] Rickey Landers DO             Clinical Impression:   Final diagnoses:  [R19.7] Diarrhea (Primary)  [E86.0] Dehydration          ED Disposition Condition    Discharge Stable        ED Prescriptions     None        Follow-up Information     Follow up With Specialties Details Why Contact Info Additional Information    Shayla Tello NP Family Medicine In 2 days  [address]  [address]  [facility] [address]  [phone]        [facility] - Emergency Dept Emergency Medicine  If symptoms worsen [address]  [facility] [address]  [phone] 1st floor           Rickey Landers DO  08/13/22 1853

## 2022-08-17 ENCOUNTER — OFFICE VISIT (OUTPATIENT)
Dept: FAMILY MEDICINE | Facility: CLINIC | Age: 82
End: 2022-08-17
Payer: MEDICARE

## 2022-08-17 VITALS
DIASTOLIC BLOOD PRESSURE: 72 MMHG | BODY MASS INDEX: 25.12 KG/M2 | HEART RATE: 73 BPM | OXYGEN SATURATION: 99 % | SYSTOLIC BLOOD PRESSURE: 134 MMHG | WEIGHT: 124.63 LBS | HEIGHT: 59 IN

## 2022-08-17 DIAGNOSIS — M15.9 PRIMARY OSTEOARTHRITIS INVOLVING MULTIPLE JOINTS: ICD-10-CM

## 2022-08-17 DIAGNOSIS — R19.7 DIARRHEA, UNSPECIFIED TYPE: Primary | ICD-10-CM

## 2022-08-17 DIAGNOSIS — Z13.820 SCREENING FOR OSTEOPOROSIS: ICD-10-CM

## 2022-08-17 DIAGNOSIS — E78.5 DYSLIPIDEMIA: ICD-10-CM

## 2022-08-17 DIAGNOSIS — Z78.0 MENOPAUSE: ICD-10-CM

## 2022-08-17 DIAGNOSIS — I10 ESSENTIAL HYPERTENSION: ICD-10-CM

## 2022-08-17 PROCEDURE — 3078F PR MOST RECENT DIASTOLIC BLOOD PRESSURE < 80 MM HG: ICD-10-PCS | Mod: CPTII,S$GLB,, | Performed by: NURSE PRACTITIONER

## 2022-08-17 PROCEDURE — 1159F MED LIST DOCD IN RCRD: CPT | Mod: CPTII,S$GLB,, | Performed by: NURSE PRACTITIONER

## 2022-08-17 PROCEDURE — 99214 OFFICE O/P EST MOD 30 MIN: CPT | Mod: S$GLB,,, | Performed by: NURSE PRACTITIONER

## 2022-08-17 PROCEDURE — 1101F PR PT FALLS ASSESS DOC 0-1 FALLS W/OUT INJ PAST YR: ICD-10-PCS | Mod: CPTII,S$GLB,, | Performed by: NURSE PRACTITIONER

## 2022-08-17 PROCEDURE — 1160F RVW MEDS BY RX/DR IN RCRD: CPT | Mod: CPTII,S$GLB,, | Performed by: NURSE PRACTITIONER

## 2022-08-17 PROCEDURE — 3288F PR FALLS RISK ASSESSMENT DOCUMENTED: ICD-10-PCS | Mod: CPTII,S$GLB,, | Performed by: NURSE PRACTITIONER

## 2022-08-17 PROCEDURE — 3075F SYST BP GE 130 - 139MM HG: CPT | Mod: CPTII,S$GLB,, | Performed by: NURSE PRACTITIONER

## 2022-08-17 PROCEDURE — 3288F FALL RISK ASSESSMENT DOCD: CPT | Mod: CPTII,S$GLB,, | Performed by: NURSE PRACTITIONER

## 2022-08-17 PROCEDURE — 3075F PR MOST RECENT SYSTOLIC BLOOD PRESS GE 130-139MM HG: ICD-10-PCS | Mod: CPTII,S$GLB,, | Performed by: NURSE PRACTITIONER

## 2022-08-17 PROCEDURE — 1159F PR MEDICATION LIST DOCUMENTED IN MEDICAL RECORD: ICD-10-PCS | Mod: CPTII,S$GLB,, | Performed by: NURSE PRACTITIONER

## 2022-08-17 PROCEDURE — 3078F DIAST BP <80 MM HG: CPT | Mod: CPTII,S$GLB,, | Performed by: NURSE PRACTITIONER

## 2022-08-17 PROCEDURE — 1160F PR REVIEW ALL MEDS BY PRESCRIBER/CLIN PHARMACIST DOCUMENTED: ICD-10-PCS | Mod: CPTII,S$GLB,, | Performed by: NURSE PRACTITIONER

## 2022-08-17 PROCEDURE — 99214 PR OFFICE/OUTPT VISIT, EST, LEVL IV, 30-39 MIN: ICD-10-PCS | Mod: S$GLB,,, | Performed by: NURSE PRACTITIONER

## 2022-08-17 PROCEDURE — 1101F PT FALLS ASSESS-DOCD LE1/YR: CPT | Mod: CPTII,S$GLB,, | Performed by: NURSE PRACTITIONER

## 2022-08-17 RX ORDER — DIPHENOXYLATE HYDROCHLORIDE AND ATROPINE SULFATE 2.5; .025 MG/1; MG/1
1 TABLET ORAL 3 TIMES DAILY PRN
Qty: 20 TABLET | Refills: 0 | Status: SHIPPED | OUTPATIENT
Start: 2022-08-17 | End: 2022-08-27

## 2022-08-17 NOTE — PROGRESS NOTES
SUBJECTIVE:    Patient ID: Maricel Abdul is a 82 y.o. female.    Chief Complaint: Follow-up (No bottles//Pt is here for a f/u appointment from a ER visit with diarrhea.//Pt states that she has felt better the last 2 days, but the diarrhea is very intermittent.//ref for dexa scan//MUNDO )    Pt here for ER f/u- here with her daughter  Pt reports went to ER on 8/13 after several days of diarrhea- felt very weak and thought she was dehydrated. Labs WNL in ER- Treated with IVFs and discharged. Reports diarrhea has improved. Reports has had a pattern of intermittent diarrhea for quite a while, seems to be at least in part related to what she eats. Denies abd pain, fever/chills or black/bloody stool. Will take immodium prn. Reports now actually hasn't had a BM in the past 3 days.   Reports overall feeling back to herself since ER      Admission on 08/13/2022, Discharged on 08/13/2022   Component Date Value Ref Range Status    WBC 08/13/2022 6.04  3.90 - 12.70 K/uL Final    RBC 08/13/2022 3.89 (A) 4.00 - 5.40 M/uL Final    Hemoglobin 08/13/2022 11.5 (A) 12.0 - 16.0 g/dL Final    Hematocrit 08/13/2022 35.7 (A) 37.0 - 48.5 % Final    MCV 08/13/2022 92  82 - 98 fL Final    MCH 08/13/2022 29.6  27.0 - 31.0 pg Final    MCHC 08/13/2022 32.2  32.0 - 36.0 g/dL Final    RDW 08/13/2022 14.3  11.5 - 14.5 % Final    Platelets 08/13/2022 224  150 - 450 K/uL Final    MPV 08/13/2022 10.2  9.2 - 12.9 fL Final    Immature Granulocytes 08/13/2022 0.5  0.0 - 0.5 % Final    Gran # (ANC) 08/13/2022 4.1  1.8 - 7.7 K/uL Final    Immature Grans (Abs) 08/13/2022 0.03  0.00 - 0.04 K/uL Final    Lymph # 08/13/2022 1.0  1.0 - 4.8 K/uL Final    Mono # 08/13/2022 0.7  0.3 - 1.0 K/uL Final    Eos # 08/13/2022 0.2  0.0 - 0.5 K/uL Final    Baso # 08/13/2022 0.06  0.00 - 0.20 K/uL Final    nRBC 08/13/2022 0  0 /100 WBC Final    Gran % 08/13/2022 68.0  38.0 - 73.0 % Final    Lymph % 08/13/2022 15.9 (A) 18.0 - 48.0 % Final    Mono %  08/13/2022 11.3  4.0 - 15.0 % Final    Eosinophil % 08/13/2022 3.3  0.0 - 8.0 % Final    Basophil % 08/13/2022 1.0  0.0 - 1.9 % Final    Differential Method 08/13/2022 Automated   Final    Sodium 08/13/2022 142  136 - 145 mmol/L Final    Potassium 08/13/2022 3.5  3.5 - 5.1 mmol/L Final    Chloride 08/13/2022 102  95 - 110 mmol/L Final    CO2 08/13/2022 29  23 - 29 mmol/L Final    Glucose 08/13/2022 102  70 - 110 mg/dL Final    BUN 08/13/2022 11  8 - 23 mg/dL Final    Creatinine 08/13/2022 0.7  0.5 - 1.4 mg/dL Final    Calcium 08/13/2022 9.1  8.7 - 10.5 mg/dL Final    Total Protein 08/13/2022 6.8  6.0 - 8.4 g/dL Final    Albumin 08/13/2022 4.1  3.5 - 5.2 g/dL Final    Total Bilirubin 08/13/2022 0.8  0.1 - 1.0 mg/dL Final    Alkaline Phosphatase 08/13/2022 64  55 - 135 U/L Final    AST 08/13/2022 25  10 - 40 U/L Final    ALT 08/13/2022 19  10 - 44 U/L Final    Anion Gap 08/13/2022 11  8 - 16 mmol/L Final    eGFR 08/13/2022 >60.0  >60 mL/min/1.73 m^2 Final    Magnesium 08/13/2022 1.9  1.6 - 2.6 mg/dL Final    Specimen UA 08/13/2022 Urine, Clean Catch   Final    Color, UA 08/13/2022 Colorless (A) Yellow, Straw, Netta Final    Appearance, UA 08/13/2022 Clear  Clear Final    pH, UA 08/13/2022 8.0  5.0 - 8.0 Final    Specific [place], UA 08/13/2022 1.000 (A) 1.005 - 1.030 Final    Protein, UA 08/13/2022 Negative  Negative Final    Glucose, UA 08/13/2022 Negative  Negative Final    Ketones, UA 08/13/2022 Negative  Negative Final    Bilirubin (UA) 08/13/2022 Negative  Negative Final    Occult Blood UA 08/13/2022 Negative  Negative Final    Nitrite, UA 08/13/2022 Negative  Negative Final    Urobilinogen, UA 08/13/2022 Negative  Negative EU/dL Final    Leukocytes, UA 08/13/2022 Negative  Negative Final   Clinical Support on 06/10/2022   Component Date Value Ref Range Status    TDI SEPTAL 06/10/2022 0.05  m/s Final    LV LATERAL E/E' RATIO 06/10/2022 15.00  m/s Final    LV SEPTAL E/E' RATIO  06/10/2022 15.00  m/s Final    TDI LATERAL 06/10/2022 0.05  m/s Final    LVIDd 06/10/2022 3.31 (A) 3.5 - 6.0 cm Final    LVIDs 06/10/2022 1.78 (A) 2.1 - 4.0 cm Final    FS 06/10/2022 46  28 - 44 % Final    LA size 06/10/2022 3.62  cm Final    RVDD 06/10/2022 280.00  cm Final    TAPSE 06/10/2022 150.00  cm Final    AV mean gradient 06/10/2022 15  mmHg Final    AV valve area 06/10/2022 1.14  cm2 Final    AV index (prosthetic) 06/10/2022 0.36   Final    E/A ratio 06/10/2022 0.52   Final    Mean e' 06/10/2022 0.05  m/s Final    E wave deceleration time 06/10/2022 182.33  msec Final    LVOT diameter 06/10/2022 2.00  cm Final    LVOT area 06/10/2022 3.1  cm2 Final    LVOT peak shon 06/10/2022 88.49  m/s Final    LVOT peak VTI 06/10/2022 18.36  cm Final    Ao VTI 06/10/2022 50.40  cm Final    LVOT stroke volume 06/10/2022 57.65  cm3 Final    E/E' ratio 06/10/2022 15.00  m/s Final    MV Peak E Shon 06/10/2022 0.75  m/s Final    TR Max Shon 06/10/2022 1.98  m/s Final    MV Peak A Shon 06/10/2022 1.43  m/s Final    LV Systolic Volume 06/10/2022 5.74  mL Final    LV Diastolic Volume 06/10/2022 35.66  mL Final    Triscuspid Valve Regurgitation Pea* 06/10/2022 16  mmHg Final    Right Atrial Pressure (from IVC) 06/10/2022 3  mmHg Final    EF 06/10/2022 70  % Final    TV rest pulmonary artery pressure 06/10/2022 19  mmHg Final   Office Visit on 04/25/2022   Component Date Value Ref Range Status    WBC 04/25/2022 8.5  3.8 - 10.8 Thousand/uL Final    RBC 04/25/2022 4.52  3.80 - 5.10 Million/uL Final    Hemoglobin 04/25/2022 13.2  11.7 - 15.5 g/dL Final    Hematocrit 04/25/2022 40.8  35.0 - 45.0 % Final    MCV 04/25/2022 90.3  80.0 - 100.0 fL Final    MCH 04/25/2022 29.2  27.0 - 33.0 pg Final    MCHC 04/25/2022 32.4  32.0 - 36.0 g/dL Final    RDW 04/25/2022 13.0  11.0 - 15.0 % Final    Platelets 04/25/2022 269  140 - 400 Thousand/uL Final    MPV 04/25/2022 10.6  7.5 - 12.5 fL Final    Neutrophils,  Abs 04/25/2022 5,831  1,500 - 7,800 cells/uL Final    Lymph # 04/25/2022 1,573  850 - 3,900 cells/uL Final    Mono # 04/25/2022 850  200 - 950 cells/uL Final    Eos # 04/25/2022 179  15 - 500 cells/uL Final    Baso # 04/25/2022 68  0 - 200 cells/uL Final    Neutrophils Relative 04/25/2022 68.6  % Final    Lymph % 04/25/2022 18.5  % Final    Mono % 04/25/2022 10.0  % Final    Eosinophil % 04/25/2022 2.1  % Final    Basophil % 04/25/2022 0.8  % Final    Glucose 04/25/2022 106 (A) 65 - 99 mg/dL Final    BUN 04/25/2022 17  7 - 25 mg/dL Final    Creatinine 04/25/2022 0.71  0.60 - 0.88 mg/dL Final    eGFR if non African American 04/25/2022 79  > OR = 60 mL/min/1.73m2 Final    eGFR if  04/25/2022 91  > OR = 60 mL/min/1.73m2 Final    BUN/Creatinine Ratio 04/25/2022 NOT APPLICABLE  6 - 22 (calc) Final    Sodium 04/25/2022 142  135 - 146 mmol/L Final    Potassium 04/25/2022 4.1  3.5 - 5.3 mmol/L Final    Chloride 04/25/2022 101  98 - 110 mmol/L Final    CO2 04/25/2022 32  20 - 32 mmol/L Final    Calcium 04/25/2022 9.9  8.6 - 10.4 mg/dL Final    Total Protein 04/25/2022 7.3  6.1 - 8.1 g/dL Final    Albumin 04/25/2022 4.6  3.6 - 5.1 g/dL Final    Globulin, Total 04/25/2022 2.7  1.9 - 3.7 g/dL (calc) Final    Albumin/Globulin Ratio 04/25/2022 1.7  1.0 - 2.5 (calc) Final    Total Bilirubin 04/25/2022 0.8  0.2 - 1.2 mg/dL Final    Alkaline Phosphatase 04/25/2022 75  37 - 153 U/L Final    AST 04/25/2022 27  10 - 35 U/L Final    ALT 04/25/2022 21  6 - 29 U/L Final    Cholesterol 04/25/2022 216 (A) <200 mg/dL Final    HDL 04/25/2022 86  > OR = 50 mg/dL Final    Triglycerides 04/25/2022 139  <150 mg/dL Final    LDL Cholesterol 04/25/2022 105 (A) mg/dL (calc) Final    HDL/Cholesterol Ratio 04/25/2022 2.5  <5.0 (calc) Final    Non HDL Chol. (LDL+VLDL) 04/25/2022 130 (A) <130 mg/dL (calc) Final    Color, UA 04/25/2022 YELLOW  YELLOW Final    Appearance, UA 04/25/2022 CLEAR  CLEAR Final     Specific Gravity, UA 04/25/2022 1.011  1.001 - 1.035 Final    pH, UA 04/25/2022 7.5  5.0 - 8.0 Final    Glucose, UA 04/25/2022 NEGATIVE  NEGATIVE Final    Bilirubin, UA 04/25/2022 NEGATIVE  NEGATIVE Final    Ketones, UA 04/25/2022 NEGATIVE  NEGATIVE Final    Occult Blood UA 04/25/2022 NEGATIVE  NEGATIVE Final    Protein, UA 04/25/2022 NEGATIVE  NEGATIVE Final    Nitrite, UA 04/25/2022 NEGATIVE  NEGATIVE Final    Leukocytes, UA 04/25/2022 TRACE (A) NEGATIVE Final    WBC Casts, UA 04/25/2022 NONE SEEN  < OR = 5 /HPF Final    RBC Casts, UA 04/25/2022 NONE SEEN  < OR = 2 /HPF Final    Squam Epithel, UA 04/25/2022 0-5  < OR = 5 /HPF Final    Bacteria, UA 04/25/2022 NONE SEEN  NONE SEEN /HPF Final    Hyaline Casts, UA 04/25/2022 NONE SEEN  NONE SEEN /LPF Final    Reflexive Urine Culture 04/25/2022    Final    Urine Culture, Routine 04/25/2022    Final    TSH w/reflex to FT4 04/25/2022 4.07  0.40 - 4.50 mIU/L Final    Creatinine, Urine 04/25/2022 75  20 - 275 mg/dL Final    Microalb, Ur 04/25/2022 1.8  See Note: mg/dL Final    Microalb/Creat Ratio 04/25/2022 24  <30 mcg/mg creat Final       Past Medical History:   Diagnosis Date    Arthritis     Diverticulitis     Encounter for blood transfusion     Hypertension     Kidney stones     Pacemaker     Shingles     Squamous cell carcinoma of skin     TIA (transient ischemic attack) 12/2017     Past Surgical History:   Procedure Laterality Date    APPENDECTOMY      CARDIAC SURGERY      pacemaker    CERVICAL FUSION      CHOLECYSTECTOMY      COLONOSCOPY N/A 8/14/2019    Procedure: COLONOSCOPY;  Surgeon: Elio Perez MD;  [facility];  Service: Endoscopy;  Laterality: N/A;    HYSTERECTOMY      SHOULDER SURGERY       Family History   Problem Relation Age of Onset    Cancer Father         lung    Cancer Brother         colon cancer    Colon cancer Brother     Pancreatitis Mother     Eczema Neg Hx     Psoriasis Neg Hx      Lupus Neg Hx     Melanoma Neg Hx     Stomach cancer Neg Hx     Esophageal cancer Neg Hx        The CVD Risk score (OMEGA'Timothyino, et al., 2008) failed to calculate for the following reasons:    The 2008 CVD risk score is only valid for ages 30 to 74    The patient has a prior MI, stroke, CHF, or peripheral vascular disease diagnosis     Marital Status:   Alcohol History:  reports current alcohol use of about 7.0 standard drinks of alcohol per week.  Tobacco History:  reports that she quit smoking about 48 years ago. She quit after 20.00 years of use. She has never used smokeless tobacco.  Drug History:  reports no history of drug use.    Health Maintenance Topics with due status: Not Due       Topic Last Completion Date    TETANUS VACCINE 02/26/2019    Colonoscopy 08/14/2019    Influenza Vaccine 09/28/2021    Lipid Panel 04/25/2022     Immunization History   Administered Date(s) Administered    COVID-19, MRNA, LN-S, PF (Pfizer) (Purple Cap) 01/10/2021, 01/31/2021, 10/22/2021    Influenza - High Dose - PF (65 years and older) 08/30/2013, 08/31/2015, 09/19/2016, 09/19/2017, 09/12/2018, 10/15/2019    Influenza - Quadrivalent - High Dose - PF (65 years and older) 09/09/2020, 09/28/2021    Influenza - Quadrivalent - PF *Preferred* (6 months and older) 11/18/2002, 01/19/2006    Influenza - Trivalent (ADULT) 11/18/2002, 01/19/2006    Influenza - Trivalent - PF (ADULT) 08/31/2015    Influenza A (H5N1) 2004 Monovalent 01/01/2016    Pneumococcal 01/01/2016    Pneumococcal Conjugate - 13 Valent 08/30/2016    Pneumococcal Polysaccharide - 23 Valent 11/18/2002, 09/12/2018    Td (ADULT) 02/26/2019    Varicella 01/01/2014       Review of patient's allergies indicates:   Allergen Reactions    Codeine Nausea And Vomiting       Current Outpatient Medications:     amitriptyline (ELAVIL) 50 MG tablet, Take 1 tablet (50 mg total) by mouth every evening., Disp: 90 tablet, Rfl: 2    aspirin 325 MG tablet, Take 1  tablet (325 mg total) by mouth once daily., Disp: , Rfl: 0    atorvastatin (LIPITOR) 80 MG tablet, Take 1 tablet (80 mg total) by mouth once daily., Disp: 90 tablet, Rfl: 1    biotin 10,000 mcg Cap, Take 1 capsule by mouth once daily., Disp: , Rfl:     cetirizine (ZYRTEC) 10 MG tablet, Take 1 tablet (10 mg total) by mouth once daily., Disp: 30 tablet, Rfl: 0    cholecalciferol, vitamin D3, 125 mcg (5,000 unit) Tab, Take 5,000 Units by mouth once daily., Disp: , Rfl:     cranberry 500 mg Cap, Take 1 capsule by mouth once daily., Disp: , Rfl:     diphenoxylate-atropine 2.5-0.025 mg (LOMOTIL) 2.5-0.025 mg per tablet, Take 1 tablet by mouth 3 (three) times daily as needed for Diarrhea., Disp: 20 tablet, Rfl: 0    donepeziL (ARICEPT) 5 MG tablet, Take 1 tablet (5 mg total) by mouth every evening., Disp: 90 tablet, Rfl: 1    fluticasone propionate (FLONASE) 50 mcg/actuation nasal spray, 1 spray (50 mcg total) by Each Nostril route once daily., Disp: 54 g, Rfl: 2    losartan-hydrochlorothiazide 100-12.5 mg (HYZAAR) 100-12.5 mg Tab, Take 1 tablet by mouth once daily., Disp: 90 tablet, Rfl: 1    meloxicam (MOBIC) 7.5 MG tablet, Take 1 tablet (7.5 mg total) by mouth once daily. Anti-inflammatory for arthritis pain, Disp: 90 tablet, Rfl: 1    metoprolol succinate (TOPROL-XL) 25 MG 24 hr tablet, Take 1 tablet (25 mg total) by mouth 2 (two) times daily., Disp: 180 tablet, Rfl: 1    multivitamin (ONE DAILY MULTIVITAMIN) per tablet, Take 1 tablet by mouth once daily., Disp: , Rfl:     pantoprazole (PROTONIX) 40 MG tablet, Take 1 tablet (40 mg total) by mouth once daily., Disp: 90 tablet, Rfl: 1    sertraline (ZOLOFT) 100 MG tablet, Take 1 tablet (100 mg total) by mouth once daily., Disp: 90 tablet, Rfl: 1    vit A/vit C/vit E/zinc/copper (PRESERVISION AREDS ORAL), Take by mouth., Disp: , Rfl:     Review of Systems   Constitutional: Negative for appetite change, chills, fever and unexpected weight change.   HENT:  "Negative for sore throat and trouble swallowing.    Respiratory: Negative for cough, shortness of breath and wheezing.    Cardiovascular: Negative for chest pain, palpitations and leg swelling.   Gastrointestinal: Positive for diarrhea. Negative for abdominal pain, constipation, nausea and vomiting.   Genitourinary: Positive for frequency. Negative for dysuria and hematuria.   Musculoskeletal: Positive for back pain (mid to lower back pain, chronic). Negative for gait problem (no falls).   Skin: Negative for rash.   Neurological: Negative for dizziness, syncope, speech difficulty, numbness and headaches.   Psychiatric/Behavioral: Negative for confusion (short term memory issues stable) and dysphoric mood (stable on med).          Objective:      Vitals:    08/17/22 1034   BP: 134/72   Pulse: 73   SpO2: 99%   Weight: 56.5 kg (124 lb 9.6 oz)   Height: 4' 11" (1.499 m)     Physical Exam  Vitals and nursing note reviewed.   Constitutional:       General: She is not in acute distress.     Appearance: Normal appearance. She is well-developed.   HENT:      Head: Normocephalic and atraumatic.      Mouth/Throat:      Mouth: Mucous membranes are moist.   Neck:      Vascular: No carotid bruit.   Cardiovascular:      Rate and Rhythm: Normal rate and regular rhythm.      Heart sounds: Murmur heard.    Systolic murmur is present with a grade of 2/6.    No friction rub. No gallop.   Pulmonary:      Effort: Pulmonary effort is normal. No respiratory distress.      Breath sounds: Normal breath sounds. No wheezing or rales.   Abdominal:      General: There is no distension.      Palpations: Abdomen is soft.      Tenderness: There is no abdominal tenderness.   Musculoskeletal:      Cervical back: Neck supple.      Right lower leg: No edema.      Left lower leg: No edema.   Lymphadenopathy:      Cervical: No cervical adenopathy.   Skin:     General: Skin is warm and dry.      Findings: No rash.   Neurological:      General: No focal " deficit present.      Mental Status: She is alert and oriented to person, place, and time.      Comments: Gait slightly stooped   Psychiatric:         Mood and Affect: Mood normal.           Assessment:       1. Diarrhea, unspecified type    2. Essential hypertension    3. Dyslipidemia    4. Primary osteoarthritis involving multiple joints    5. Screening for osteoporosis    6. Menopause           Plan:       Diarrhea, unspecified type  Comments:  Discussed with patient/daughter chronic intermittent diarrhea DDX could include medication side effect, IBS D, food intolerance or pancreatic insufficincy. For now will add lomotil to use prn though cautioned on possible constipation. stressed importance of adequate hydration when diarrhea occurs. Recommend adding probiotic and consider trying IBGard OTC. Call if she develops abd pain, fever, dizziness, etc  Orders:  -     diphenoxylate-atropine 2.5-0.025 mg (LOMOTIL) 2.5-0.025 mg per tablet; Take 1 tablet by mouth 3 (three) times daily as needed for Diarrhea.  Dispense: 20 tablet; Refill: 0    Essential hypertension  Comments:  BP well controlled    Dyslipidemia  Comments:  Stable on last labs    Primary osteoarthritis involving multiple joints  Comments:  Stable    Screening for osteoporosis  -     DXA Bone Density Appendicular Skeleton; Future; Expected date: 08/17/2022    Menopause  -     DXA Bone Density Appendicular Skeleton; Future; Expected date: 08/17/2022      Follow up in about 4 months (around 12/17/2022) for cancel Oct appt, HTN.          Counseled on age and gender appropriate medical preventative services, including cancer screenings, immunizations, overall nutritional health, need for a consistent exercise regimen and an overall push towards maintaining a vigorous and active lifestyle.      8/17/2022 Shayla Tello NP

## 2022-09-16 ENCOUNTER — HOSPITAL ENCOUNTER (OUTPATIENT)
Dept: RADIOLOGY | Facility: HOSPITAL | Age: 82
Discharge: HOME | End: 2022-09-16
Attending: NURSE PRACTITIONER
Payer: MEDICARE

## 2022-09-16 DIAGNOSIS — Z78.0 MENOPAUSE: ICD-10-CM

## 2022-09-16 DIAGNOSIS — Z13.820 SCREENING FOR OSTEOPOROSIS: ICD-10-CM

## 2022-09-16 PROCEDURE — 77081 DXA BONE DENSITY APPENDICULR: CPT | Mod: TC,PO

## 2022-09-28 ENCOUNTER — PATIENT MESSAGE (OUTPATIENT)
Dept: FAMILY MEDICINE | Facility: CLINIC | Age: 82
End: 2022-09-28

## 2022-10-05 ENCOUNTER — TELEPHONE (OUTPATIENT)
Dept: FAMILY MEDICINE | Facility: CLINIC | Age: 82
End: 2022-10-05

## 2022-10-05 NOTE — TELEPHONE ENCOUNTER
----- Message from Lanette Tinsley sent at 10/5/2022  2:06 PM CDT -----  Vm- daughter rachel missed a message to bring her in October 3rd. Would like her to come in tomorrow afternoon.   [phone]

## 2022-10-06 ENCOUNTER — OFFICE VISIT (OUTPATIENT)
Dept: FAMILY MEDICINE | Facility: CLINIC | Age: 82
End: 2022-10-06
Payer: MEDICARE

## 2022-10-06 VITALS
SYSTOLIC BLOOD PRESSURE: 130 MMHG | HEIGHT: 59 IN | BODY MASS INDEX: 24.27 KG/M2 | OXYGEN SATURATION: 95 % | DIASTOLIC BLOOD PRESSURE: 80 MMHG | HEART RATE: 86 BPM | WEIGHT: 120.38 LBS

## 2022-10-06 DIAGNOSIS — K52.9 CHRONIC DIARRHEA: Primary | ICD-10-CM

## 2022-10-06 DIAGNOSIS — G31.84 MCI (MILD COGNITIVE IMPAIRMENT): ICD-10-CM

## 2022-10-06 PROCEDURE — 1160F RVW MEDS BY RX/DR IN RCRD: CPT | Mod: CPTII,S$GLB,, | Performed by: NURSE PRACTITIONER

## 2022-10-06 PROCEDURE — 1160F PR REVIEW ALL MEDS BY PRESCRIBER/CLIN PHARMACIST DOCUMENTED: ICD-10-PCS | Mod: CPTII,S$GLB,, | Performed by: NURSE PRACTITIONER

## 2022-10-06 PROCEDURE — 3288F PR FALLS RISK ASSESSMENT DOCUMENTED: ICD-10-PCS | Mod: CPTII,S$GLB,, | Performed by: NURSE PRACTITIONER

## 2022-10-06 PROCEDURE — 1159F MED LIST DOCD IN RCRD: CPT | Mod: CPTII,S$GLB,, | Performed by: NURSE PRACTITIONER

## 2022-10-06 PROCEDURE — 99214 OFFICE O/P EST MOD 30 MIN: CPT | Mod: S$GLB,,, | Performed by: NURSE PRACTITIONER

## 2022-10-06 PROCEDURE — 3079F PR MOST RECENT DIASTOLIC BLOOD PRESSURE 80-89 MM HG: ICD-10-PCS | Mod: CPTII,S$GLB,, | Performed by: NURSE PRACTITIONER

## 2022-10-06 PROCEDURE — 1101F PR PT FALLS ASSESS DOC 0-1 FALLS W/OUT INJ PAST YR: ICD-10-PCS | Mod: CPTII,S$GLB,, | Performed by: NURSE PRACTITIONER

## 2022-10-06 PROCEDURE — 3288F FALL RISK ASSESSMENT DOCD: CPT | Mod: CPTII,S$GLB,, | Performed by: NURSE PRACTITIONER

## 2022-10-06 PROCEDURE — 1101F PT FALLS ASSESS-DOCD LE1/YR: CPT | Mod: CPTII,S$GLB,, | Performed by: NURSE PRACTITIONER

## 2022-10-06 PROCEDURE — 3079F DIAST BP 80-89 MM HG: CPT | Mod: CPTII,S$GLB,, | Performed by: NURSE PRACTITIONER

## 2022-10-06 PROCEDURE — 99214 PR OFFICE/OUTPT VISIT, EST, LEVL IV, 30-39 MIN: ICD-10-PCS | Mod: S$GLB,,, | Performed by: NURSE PRACTITIONER

## 2022-10-06 PROCEDURE — 3075F SYST BP GE 130 - 139MM HG: CPT | Mod: CPTII,S$GLB,, | Performed by: NURSE PRACTITIONER

## 2022-10-06 PROCEDURE — 1159F PR MEDICATION LIST DOCUMENTED IN MEDICAL RECORD: ICD-10-PCS | Mod: CPTII,S$GLB,, | Performed by: NURSE PRACTITIONER

## 2022-10-06 PROCEDURE — 3075F PR MOST RECENT SYSTOLIC BLOOD PRESS GE 130-139MM HG: ICD-10-PCS | Mod: CPTII,S$GLB,, | Performed by: NURSE PRACTITIONER

## 2022-10-06 RX ORDER — DIPHENOXYLATE HYDROCHLORIDE AND ATROPINE SULFATE 2.5; .025 MG/1; MG/1
1 TABLET ORAL 4 TIMES DAILY PRN
COMMUNITY
End: 2022-10-06 | Stop reason: SDUPTHER

## 2022-10-06 RX ORDER — DIPHENOXYLATE HYDROCHLORIDE AND ATROPINE SULFATE 2.5; .025 MG/1; MG/1
1 TABLET ORAL 3 TIMES DAILY PRN
Qty: 30 TABLET | Refills: 1 | Status: SHIPPED | OUTPATIENT
Start: 2022-10-06 | End: 2022-10-27 | Stop reason: SDUPTHER

## 2022-10-06 NOTE — PROGRESS NOTES
SUBJECTIVE:    Patient ID: Maricel Abdul is a 82 y.o. female.    Chief Complaint: Diarrhea (Bottles brought/ pt c/o of diarrhea/ weakness for months off and on/BG)    Pt here for sick visit- here with her daughter.  Pt c/o'ing of chronic intermittent diarhea for the past year or more. At last visit in August was seen for ER f/u for diarrhea. Pt reports diarrhea has been intermittent but seems to be occurring more frequently- will have 5-6 explosive diarrheal stools for a few days. Will take lomotil 2-3 times/day and stool will firm up for a few days. Denies any black/bloody stools. No fever/chills. C/o's of cramping pain which resolves after BM. Pt reports she doesn't want to eat a lot because she usually will have diarhea about 1 hour after eating. Reports after a couple days of diarrhea she begins to feel weak. Denies dizziness or syncope. Last cscope 2019 by Dr. Perez during hospital stay for rectal bleeding. Had cscope in 2018 with Dr. Joseph  Daughter reporting pt more forgetful- has been on low dose donepezil for a while now d/t memory concerns. Denies any behavior issues or safety concerns. Lives alone but family checks on her daily        Admission on 08/13/2022, Discharged on 08/13/2022   Component Date Value Ref Range Status    WBC 08/13/2022 6.04  3.90 - 12.70 K/uL Final    RBC 08/13/2022 3.89 (L)  4.00 - 5.40 M/uL Final    Hemoglobin 08/13/2022 11.5 (L)  12.0 - 16.0 g/dL Final    Hematocrit 08/13/2022 35.7 (L)  37.0 - 48.5 % Final    MCV 08/13/2022 92  82 - 98 fL Final    MCH 08/13/2022 29.6  27.0 - 31.0 pg Final    MCHC 08/13/2022 32.2  32.0 - 36.0 g/dL Final    RDW 08/13/2022 14.3  11.5 - 14.5 % Final    Platelets 08/13/2022 224  150 - 450 K/uL Final    MPV 08/13/2022 10.2  9.2 - 12.9 fL Final    Immature Granulocytes 08/13/2022 0.5  0.0 - 0.5 % Final    Gran # (ANC) 08/13/2022 4.1  1.8 - 7.7 K/uL Final    Immature Grans (Abs) 08/13/2022 0.03  0.00 - 0.04 K/uL Final    Lymph # 08/13/2022 1.0  1.0  - 4.8 K/uL Final    Mono # 08/13/2022 0.7  0.3 - 1.0 K/uL Final    Eos # 08/13/2022 0.2  0.0 - 0.5 K/uL Final    Baso # 08/13/2022 0.06  0.00 - 0.20 K/uL Final    nRBC 08/13/2022 0  0 /100 WBC Final    Gran % 08/13/2022 68.0  38.0 - 73.0 % Final    Lymph % 08/13/2022 15.9 (L)  18.0 - 48.0 % Final    Mono % 08/13/2022 11.3  4.0 - 15.0 % Final    Eosinophil % 08/13/2022 3.3  0.0 - 8.0 % Final    Basophil % 08/13/2022 1.0  0.0 - 1.9 % Final    Differential Method 08/13/2022 Automated   Final    Sodium 08/13/2022 142  136 - 145 mmol/L Final    Potassium 08/13/2022 3.5  3.5 - 5.1 mmol/L Final    Chloride 08/13/2022 102  95 - 110 mmol/L Final    CO2 08/13/2022 29  23 - 29 mmol/L Final    Glucose 08/13/2022 102  70 - 110 mg/dL Final    BUN 08/13/2022 11  8 - 23 mg/dL Final    Creatinine 08/13/2022 0.7  0.5 - 1.4 mg/dL Final    Calcium 08/13/2022 9.1  8.7 - 10.5 mg/dL Final    Total Protein 08/13/2022 6.8  6.0 - 8.4 g/dL Final    Albumin 08/13/2022 4.1  3.5 - 5.2 g/dL Final    Total Bilirubin 08/13/2022 0.8  0.1 - 1.0 mg/dL Final    Alkaline Phosphatase 08/13/2022 64  55 - 135 U/L Final    AST 08/13/2022 25  10 - 40 U/L Final    ALT 08/13/2022 19  10 - 44 U/L Final    Anion Gap 08/13/2022 11  8 - 16 mmol/L Final    eGFR 08/13/2022 >60.0  >60 mL/min/1.73 m^2 Final    Magnesium 08/13/2022 1.9  1.6 - 2.6 mg/dL Final    Specimen UA 08/13/2022 Urine, Clean Catch   Final    Color, UA 08/13/2022 Colorless (A)  Yellow, Straw, Netta Final    Appearance, UA 08/13/2022 Clear  Clear Final    pH, UA 08/13/2022 8.0  5.0 - 8.0 Final    Specific [place], UA 08/13/2022 1.000 (A)  1.005 - 1.030 Final    Protein, UA 08/13/2022 Negative  Negative Final    Glucose, UA 08/13/2022 Negative  Negative Final    Ketones, UA 08/13/2022 Negative  Negative Final    Bilirubin (UA) 08/13/2022 Negative  Negative Final    Occult Blood UA 08/13/2022 Negative  Negative Final    Nitrite, UA 08/13/2022 Negative  Negative Final    Urobilinogen, UA 08/13/2022  Negative  Negative EU/dL Final    Leukocytes, UA 08/13/2022 Negative  Negative Final   Clinical Support on 06/10/2022   Component Date Value Ref Range Status    TDI SEPTAL 06/10/2022 0.05  m/s Final    LV LATERAL E/E' RATIO 06/10/2022 15.00  m/s Final    LV SEPTAL E/E' RATIO 06/10/2022 15.00  m/s Final    TDI LATERAL 06/10/2022 0.05  m/s Final    LVIDd 06/10/2022 3.31 (A)  3.5 - 6.0 cm Final    LVIDs 06/10/2022 1.78 (A)  2.1 - 4.0 cm Final    FS 06/10/2022 46  28 - 44 % Final    LA size 06/10/2022 3.62  cm Final    RVDD 06/10/2022 280.00  cm Final    TAPSE 06/10/2022 150.00  cm Final    AV mean gradient 06/10/2022 15  mmHg Final    AV valve area 06/10/2022 1.14  cm2 Final    AV index (prosthetic) 06/10/2022 0.36   Final    E/A ratio 06/10/2022 0.52   Final    Mean e' 06/10/2022 0.05  m/s Final    E wave deceleration time 06/10/2022 182.33  msec Final    LVOT diameter 06/10/2022 2.00  cm Final    LVOT area 06/10/2022 3.1  cm2 Final    LVOT peak shon 06/10/2022 88.49  m/s Final    LVOT peak VTI 06/10/2022 18.36  cm Final    Ao VTI 06/10/2022 50.40  cm Final    LVOT stroke volume 06/10/2022 57.65  cm3 Final    E/E' ratio 06/10/2022 15.00  m/s Final    MV Peak E Shon 06/10/2022 0.75  m/s Final    TR Max Shon 06/10/2022 1.98  m/s Final    MV Peak A Shon 06/10/2022 1.43  m/s Final    LV Systolic Volume 06/10/2022 5.74  mL Final    LV Diastolic Volume 06/10/2022 35.66  mL Final    Triscuspid Valve Regurgitation Pea* 06/10/2022 16  mmHg Final    Right Atrial Pressure (from IVC) 06/10/2022 3  mmHg Final    EF 06/10/2022 70  % Final    TV rest pulmonary artery pressure 06/10/2022 19  mmHg Final   Office Visit on 04/25/2022   Component Date Value Ref Range Status    WBC 04/25/2022 8.5  3.8 - 10.8 Thousand/uL Final    RBC 04/25/2022 4.52  3.80 - 5.10 Million/uL Final    Hemoglobin 04/25/2022 13.2  11.7 - 15.5 g/dL Final    Hematocrit 04/25/2022 40.8  35.0 - 45.0 % Final    MCV 04/25/2022 90.3  80.0 - 100.0 fL Final    MCH  04/25/2022 29.2  27.0 - 33.0 pg Final    MCHC 04/25/2022 32.4  32.0 - 36.0 g/dL Final    RDW 04/25/2022 13.0  11.0 - 15.0 % Final    Platelets 04/25/2022 269  140 - 400 Thousand/uL Final    MPV 04/25/2022 10.6  7.5 - 12.5 fL Final    Neutrophils, Abs 04/25/2022 5,831  1,500 - 7,800 cells/uL Final    Lymph # 04/25/2022 1,573  850 - 3,900 cells/uL Final    Mono # 04/25/2022 850  200 - 950 cells/uL Final    Eos # 04/25/2022 179  15 - 500 cells/uL Final    Baso # 04/25/2022 68  0 - 200 cells/uL Final    Neutrophils Relative 04/25/2022 68.6  % Final    Lymph % 04/25/2022 18.5  % Final    Mono % 04/25/2022 10.0  % Final    Eosinophil % 04/25/2022 2.1  % Final    Basophil % 04/25/2022 0.8  % Final    Glucose 04/25/2022 106 (H)  65 - 99 mg/dL Final    BUN 04/25/2022 17  7 - 25 mg/dL Final    Creatinine 04/25/2022 0.71  0.60 - 0.88 mg/dL Final    eGFR if non African American 04/25/2022 79  > OR = 60 mL/min/1.73m2 Final    eGFR if African American 04/25/2022 91  > OR = 60 mL/min/1.73m2 Final    BUN/Creatinine Ratio 04/25/2022 NOT APPLICABLE  6 - 22 (calc) Final    Sodium 04/25/2022 142  135 - 146 mmol/L Final    Potassium 04/25/2022 4.1  3.5 - 5.3 mmol/L Final    Chloride 04/25/2022 101  98 - 110 mmol/L Final    CO2 04/25/2022 32  20 - 32 mmol/L Final    Calcium 04/25/2022 9.9  8.6 - 10.4 mg/dL Final    Total Protein 04/25/2022 7.3  6.1 - 8.1 g/dL Final    Albumin 04/25/2022 4.6  3.6 - 5.1 g/dL Final    Globulin, Total 04/25/2022 2.7  1.9 - 3.7 g/dL (calc) Final    Albumin/Globulin Ratio 04/25/2022 1.7  1.0 - 2.5 (calc) Final    Total Bilirubin 04/25/2022 0.8  0.2 - 1.2 mg/dL Final    Alkaline Phosphatase 04/25/2022 75  37 - 153 U/L Final    AST 04/25/2022 27  10 - 35 U/L Final    ALT 04/25/2022 21  6 - 29 U/L Final    Cholesterol 04/25/2022 216 (H)  <200 mg/dL Final    HDL 04/25/2022 86  > OR = 50 mg/dL Final    Triglycerides 04/25/2022 139  <150 mg/dL Final    LDL Cholesterol 04/25/2022 105 (H)  mg/dL (calc) Final     HDL/Cholesterol Ratio 04/25/2022 2.5  <5.0 (calc) Final    Non HDL Chol. (LDL+VLDL) 04/25/2022 130 (H)  <130 mg/dL (calc) Final    Color, UA 04/25/2022 YELLOW  YELLOW Final    Appearance, UA 04/25/2022 CLEAR  CLEAR Final    Specific Gravity, UA 04/25/2022 1.011  1.001 - 1.035 Final    pH, UA 04/25/2022 7.5  5.0 - 8.0 Final    Glucose, UA 04/25/2022 NEGATIVE  NEGATIVE Final    Bilirubin, UA 04/25/2022 NEGATIVE  NEGATIVE Final    Ketones, UA 04/25/2022 NEGATIVE  NEGATIVE Final    Occult Blood UA 04/25/2022 NEGATIVE  NEGATIVE Final    Protein, UA 04/25/2022 NEGATIVE  NEGATIVE Final    Nitrite, UA 04/25/2022 NEGATIVE  NEGATIVE Final    Leukocytes, UA 04/25/2022 TRACE (A)  NEGATIVE Final    WBC Casts, UA 04/25/2022 NONE SEEN  < OR = 5 /HPF Final    RBC Casts, UA 04/25/2022 NONE SEEN  < OR = 2 /HPF Final    Squam Epithel, UA 04/25/2022 0-5  < OR = 5 /HPF Final    Bacteria, UA 04/25/2022 NONE SEEN  NONE SEEN /HPF Final    Hyaline Casts, UA 04/25/2022 NONE SEEN  NONE SEEN /LPF Final    Reflexive Urine Culture 04/25/2022    Final    Urine Culture, Routine 04/25/2022    Final    TSH w/reflex to FT4 04/25/2022 4.07  0.40 - 4.50 mIU/L Final    Creatinine, Urine 04/25/2022 75  20 - 275 mg/dL Final    Microalb, Ur 04/25/2022 1.8  See Note: mg/dL Final    Microalb/Creat Ratio 04/25/2022 24  <30 mcg/mg creat Final       Past Medical History:   Diagnosis Date    Arthritis     Diverticulitis     Encounter for blood transfusion     Hypertension     Kidney stones     Pacemaker     Shingles     Squamous cell carcinoma of skin     TIA (transient ischemic attack) 12/2017     Past Surgical History:   Procedure Laterality Date    APPENDECTOMY      CARDIAC SURGERY      pacemaker    CERVICAL FUSION      CHOLECYSTECTOMY      COLONOSCOPY N/A 8/14/2019    Procedure: COLONOSCOPY;  Surgeon: Elio Perez MD;  Location: [facility];  Service: Endoscopy;  Laterality: N/A;    HYSTERECTOMY      SHOULDER SURGERY       Family History   Problem  Relation Age of Onset    Cancer Father         lung    Cancer Brother         colon cancer    Colon cancer Brother     Pancreatitis Mother     Eczema Neg Hx     Psoriasis Neg Hx     Lupus Neg Hx     Melanoma Neg Hx     Stomach cancer Neg Hx     Esophageal cancer Neg Hx        The CVD Risk score (OMEGA'Timothyino, et al., 2008) failed to calculate for the following reasons:    The 2008 CVD risk score is only valid for ages 30 to 74    The patient has a prior MI, stroke, CHF, or peripheral vascular disease diagnosis     Marital Status:   Alcohol History:  reports current alcohol use of about 7.0 standard drinks per week.  Tobacco History:  reports that she quit smoking about 48 years ago. She has never used smokeless tobacco.  Drug History:  reports no history of drug use.    Health Maintenance Topics with due status: Not Due       Topic Last Completion Date    TETANUS VACCINE 02/26/2019    Colonoscopy 08/14/2019    Lipid Panel 04/25/2022    DEXA Scan 09/16/2022     Immunization History   Administered Date(s) Administered    COVID-19, MRNA, LN-S, PF (Pfizer) (Purple Cap) 01/10/2021, 01/31/2021, 10/22/2021    Influenza - High Dose - PF (65 years and older) 08/30/2013, 08/31/2015, 09/19/2016, 09/19/2017, 09/12/2018, 10/15/2019    Influenza - Quadrivalent - High Dose - PF (65 years and older) 09/09/2020, 09/28/2021    Influenza - Quadrivalent - PF *Preferred* (6 months and older) 11/18/2002, 01/19/2006    Influenza - Trivalent (ADULT) 11/18/2002, 01/19/2006    Influenza - Trivalent - PF (ADULT) 08/31/2015    Influenza A (H5N1) 2004 Monovalent 01/01/2016    Pneumococcal 01/01/2016    Pneumococcal Conjugate - 13 Valent 08/30/2016    Pneumococcal Polysaccharide - 23 Valent 11/18/2002, 09/12/2018    Td (ADULT) 02/26/2019    Varicella 01/01/2014       Review of patient's allergies indicates:   Allergen Reactions    Codeine Nausea And Vomiting       Current Outpatient Medications:     amitriptyline (ELAVIL) 50 MG tablet, Take  1 tablet (50 mg total) by mouth every evening., Disp: 90 tablet, Rfl: 2    aspirin 325 MG tablet, Take 1 tablet (325 mg total) by mouth once daily., Disp: , Rfl: 0    atorvastatin (LIPITOR) 80 MG tablet, Take 1 tablet (80 mg total) by mouth once daily., Disp: 90 tablet, Rfl: 1    cholecalciferol, vitamin D3, 125 mcg (5,000 unit) Tab, Take 5,000 Units by mouth once daily., Disp: , Rfl:     cranberry 500 mg Cap, Take 1 capsule by mouth once daily., Disp: , Rfl:     donepeziL (ARICEPT) 5 MG tablet, Take 1 tablet (5 mg total) by mouth every evening., Disp: 90 tablet, Rfl: 1    fluticasone propionate (FLONASE) 50 mcg/actuation nasal spray, 1 spray (50 mcg total) by Each Nostril route once daily., Disp: 54 g, Rfl: 2    losartan-hydrochlorothiazide 100-12.5 mg (HYZAAR) 100-12.5 mg Tab, Take 1 tablet by mouth once daily., Disp: 90 tablet, Rfl: 1    meloxicam (MOBIC) 7.5 MG tablet, Take 1 tablet (7.5 mg total) by mouth once daily. Anti-inflammatory for arthritis pain, Disp: 90 tablet, Rfl: 1    metoprolol succinate (TOPROL-XL) 25 MG 24 hr tablet, Take 1 tablet (25 mg total) by mouth 2 (two) times daily., Disp: 180 tablet, Rfl: 1    multivitamin (ONE DAILY MULTIVITAMIN) per tablet, Take 1 tablet by mouth once daily., Disp: , Rfl:     pantoprazole (PROTONIX) 40 MG tablet, Take 1 tablet (40 mg total) by mouth once daily., Disp: 90 tablet, Rfl: 1    sertraline (ZOLOFT) 100 MG tablet, Take 1 tablet (100 mg total) by mouth once daily., Disp: 90 tablet, Rfl: 1    biotin 10,000 mcg Cap, Take 1 capsule by mouth once daily., Disp: , Rfl:     cetirizine (ZYRTEC) 10 MG tablet, Take 1 tablet (10 mg total) by mouth once daily., Disp: 30 tablet, Rfl: 0    diphenoxylate-atropine 2.5-0.025 mg (LOMOTIL) 2.5-0.025 mg per tablet, Take 1 tablet by mouth 3 (three) times daily as needed for Diarrhea. TAKE 1 TABLET BY MOUTH 3 TIMES DAILY AS NEEDED FOR DIARRHEA, Disp: 30 tablet, Rfl: 1    vit A/vit C/vit E/zinc/copper (PRESERVISION AREDS ORAL), Take  "by mouth., Disp: , Rfl:     Review of Systems   Constitutional:  Positive for appetite change (fear of eating d/t diarrhea) and unexpected weight change (wt down 6lbs since January). Negative for chills and fever.   HENT:  Negative for sore throat and trouble swallowing.    Respiratory:  Negative for cough, shortness of breath and wheezing.    Cardiovascular:  Negative for chest pain, palpitations and leg swelling.   Gastrointestinal:  Positive for diarrhea. Negative for abdominal pain, constipation, nausea and vomiting.   Genitourinary:  Positive for frequency. Negative for dysuria and hematuria.   Musculoskeletal:  Negative for gait problem (no falls).   Skin:  Negative for rash.   Neurological:  Negative for dizziness, syncope, speech difficulty, numbness and headaches.   Psychiatric/Behavioral:  Negative for confusion (short term memory issues) and dysphoric mood (stable on med).         Objective:      Vitals:    10/06/22 0913   BP: 130/80   Pulse: 86   SpO2: 95%   Weight: 54.6 kg (120 lb 6.4 oz)   Height: 4' 11" (1.499 m)     Physical Exam  Vitals and nursing note reviewed.   Constitutional:       General: She is not in acute distress.     Appearance: Normal appearance. She is well-developed.   HENT:      Head: Normocephalic and atraumatic.   Neck:      Vascular: No carotid bruit.   Cardiovascular:      Rate and Rhythm: Normal rate and regular rhythm.      Heart sounds: Murmur heard.   Systolic murmur is present with a grade of 2/6.     No friction rub. No gallop.   Pulmonary:      Effort: Pulmonary effort is normal. No respiratory distress.      Breath sounds: Normal breath sounds. No wheezing or rales.   Abdominal:      General: There is no distension.      Palpations: Abdomen is soft.      Tenderness: There is no abdominal tenderness.   Musculoskeletal:      Cervical back: Neck supple.      Right lower leg: No edema.      Left lower leg: No edema.   Lymphadenopathy:      Cervical: No cervical adenopathy. " "  Skin:     General: Skin is warm and dry.      Findings: No rash.   Neurological:      General: No focal deficit present.      Mental Status: She is alert.      Comments: Gait slightly stooped, mild head tremor   Psychiatric:         Mood and Affect: Mood normal.       MINI-MENTAL STATE EXAM    What is the (year), (season), (date), (day), (month)?4 points  Where are we (state), (parish), (town), (hospital), (floor)? 5 points  Name 3 objects: 1 second to say each, then ask the patient to repeat all three after you have said them.  Repeat initially until he/she learns all three. 3 points  Serial 7's. 1 point for each answer, stop after 5.  Alternatively, spell "world" backwards.  Must be in the correct sequence.  2 points  Show 2 common objects (pencil and watch).  Ask patient to name. 2 points  Ask the patient to repeat No ifs, ands or buts. 1 point  Follow a 3 stage command: "Take this paper in your right hand, fold it in half, and put it on the floor". 3 points  Read and obey: "Close your eyes". 1 poinr  Ask the patient to recall the three words you previously asked. 2 points  Give pt. paper and ask to write a sentence. 1 point  Show pt. drawing of intersecting pentagons. Ask pt. to draw. 1 point  What was the mini mental exam score? 25 /30  Level of consciousness: alert  Describe if abnormal:   Fund of knowledge: Normal  General appearance: Normal    Assessment:       1. Chronic diarrhea    2. MCI (mild cognitive impairment)           Plan:       Chronic diarrhea  Comments:  stool studies ordered and rec referral to GI- discussed possible SE of donepezil and/or SSRI though pt not sure if diarrhea started before/after meds were added  Orders:  -     diphenoxylate-atropine 2.5-0.025 mg (LOMOTIL) 2.5-0.025 mg per tablet; Take 1 tablet by mouth 3 (three) times daily as needed for Diarrhea. TAKE 1 TABLET BY MOUTH 3 TIMES DAILY AS NEEDED FOR DIARRHEA  Dispense: 30 tablet; Refill: 1  -     Ambulatory referral/consult to " Gastroenterology; Future; Expected date: 10/13/2022  -     Pancreatic elastase, fecal; Future; Expected date: 10/06/2022  -     Stool Exam-Ova,Cysts,Parasites; Future; Expected date: 10/06/2022  -     Stool culture; Future; Expected date: 10/06/2022  -     Giardia / Cryptosporidum, EIA; Future; Expected date: 10/06/2022  -     Clostridium diff Toxin/GDH w/ reflex PCR; Future; Expected date: 10/06/2022    MCI (mild cognitive impairment)  Comments:  discussed with pt/daughter MCI, score of 25/30 on MMSE today. currently on donepzeil however d/t diarrhea do not recommend increase at this time    Follow up for as scheduled. In December          Counseled on age and gender appropriate medical preventative services, including cancer screenings, immunizations, overall nutritional health, need for a consistent exercise regimen and an overall push towards maintaining a vigorous and active lifestyle.      10/9/2022 Shayla Tello NP

## 2022-10-16 DIAGNOSIS — A04.72 C. DIFFICILE DIARRHEA: Primary | ICD-10-CM

## 2022-10-16 LAB
C DIFF GDH STL QL: NORMAL
C DIFF TOX GENS STL QL NAA+PROBE: DETECTED
C JEJUNI+C COLI AG STL QL: NORMAL
E COLI SXT STL QL IA: NORMAL
ELASTASE PANC STL-MCNT: 113 MCG/G
G LAMBLIA AG STL QL IA: NORMAL
O+P STL TRI STN: NORMAL
SALM + SHIG STL CULT: NORMAL

## 2022-10-16 RX ORDER — VANCOMYCIN HYDROCHLORIDE 125 MG/1
125 CAPSULE ORAL 4 TIMES DAILY
Qty: 40 CAPSULE | Refills: 0 | Status: SHIPPED | OUTPATIENT
Start: 2022-10-16 | End: 2022-10-26

## 2022-10-17 ENCOUNTER — TELEPHONE (OUTPATIENT)
Dept: FAMILY MEDICINE | Facility: CLINIC | Age: 82
End: 2022-10-17

## 2022-10-17 NOTE — TELEPHONE ENCOUNTER
Spoke with pt's daughter Alysha in regards to stool test. Verbalized per Shayla that stool studies is positive for C diff toxin which is an overgrowth of bad bacteria in the bowels which can cause diarrhea. There is one stool test still pending but I would recommend starting oral vancomycin 125mg every 6 hours for 10 days- also recommend she schedule appt with Dr. Joseph. Alysha acknowledge understanding.

## 2022-10-17 NOTE — TELEPHONE ENCOUNTER
----- Message from Shayla Tello NP sent at 10/16/2022  6:13 PM CDT -----  Please call patient's daughter and let them know stool studies is positive for C diff toxin which is an overgrowth of bad bacteria in the bowels which can cause diarrhea. There is one stool test still pending but I would recommend starting oral vancomycin 125mg every 6 hours for 10 days- also recommend she schedule appt with Dr. Joseph

## 2022-10-17 NOTE — TELEPHONE ENCOUNTER
Spoke with pt's daughter Alysha in regards to recent stool test. Verbalized per Shayla that stool test resulted and her pancreatic elastase level is low indicating exocrine pancreatic insufficiency and the most common symptom is diarrhea. Given her Cdiff was positive would recommend treating that first and then see if she needs to start medication for EPI (please fax all stool results to DR. Joseph also). Alysha acknowledge understanding.    Faxed over to Dr. Joseph office.

## 2022-10-17 NOTE — TELEPHONE ENCOUNTER
----- Message from Shayla Tello NP sent at 10/16/2022  8:19 PM CDT -----  (2nd lab result note for pt)- please let pt/family know final stool test resulted and her pancreatic elastase level is low indicating exocrine pancreatic insufficiency and the most common symptom is diarrhea. Given her Cdiff was positive would recommend treating that first and then see if she needs to start medication for EPI (please fax all stool results to DR. Joseph also)

## 2022-10-27 DIAGNOSIS — K52.9 CHRONIC DIARRHEA: ICD-10-CM

## 2022-10-27 RX ORDER — CHOLESTYRAMINE 4 G/4.8G
1 POWDER, FOR SUSPENSION ORAL 2 TIMES DAILY
Qty: 60 PACKET | Refills: 2 | Status: SHIPPED | OUTPATIENT
Start: 2022-10-27 | End: 2023-01-03 | Stop reason: SDUPTHER

## 2022-10-27 RX ORDER — DIPHENOXYLATE HYDROCHLORIDE AND ATROPINE SULFATE 2.5; .025 MG/1; MG/1
1 TABLET ORAL 3 TIMES DAILY PRN
Qty: 30 TABLET | Refills: 1 | Status: SHIPPED | OUTPATIENT
Start: 2022-10-27 | End: 2023-10-10 | Stop reason: SDUPTHER

## 2022-10-27 NOTE — TELEPHONE ENCOUNTER
----- Message from Lanette Tinsley sent at 10/27/2022 11:45 AM CDT -----  - pt would like to talk to carrie about some unexpected problems   [phone]

## 2022-10-27 NOTE — TELEPHONE ENCOUNTER
Please call pt and let her know I will send in cholestyramine and see if that helps with her diarrhea- ask is she taking the lomotil? She needs to make sure she's drinking plenty of water if she's having that much diarrhea. I would recommend she have the cscope with Dr. Joseph and if diarhea continues recommend she call Dr. Joseph because she may need different treatment for the Cdiff.

## 2022-10-27 NOTE — TELEPHONE ENCOUNTER
Pt states she cant take the antibiotics. States she is still having diarrhea 24/7. Pt states she is having accidents throughout the night. Pt states she has taken the antibiotic for 9 days. States she has an appt with Dr. Joseph on 11/9/22 for a colonoscopy. States she is not sure if she should do it. Please advise.

## 2022-10-27 NOTE — TELEPHONE ENCOUNTER
Spoke to pt verbatim per carrie. Pt voiced  understanding.   Pt states she has been taking the lomotil and needs a refill

## 2022-11-09 DIAGNOSIS — N28.9 LESION OF RIGHT NATIVE KIDNEY: ICD-10-CM

## 2022-11-09 DIAGNOSIS — K83.8 DILATED BILE DUCT: Primary | ICD-10-CM

## 2022-11-28 ENCOUNTER — HOSPITAL ENCOUNTER (OUTPATIENT)
Dept: RADIOLOGY | Facility: HOSPITAL | Age: 82
Discharge: HOME | End: 2022-11-28
Attending: INTERNAL MEDICINE
Payer: MEDICARE

## 2022-11-28 DIAGNOSIS — N28.9 LESION OF RIGHT NATIVE KIDNEY: ICD-10-CM

## 2022-11-28 DIAGNOSIS — K83.8 DILATED BILE DUCT: ICD-10-CM

## 2022-11-28 PROCEDURE — 76700 US EXAM ABDOM COMPLETE: CPT | Mod: TC,PO

## 2022-12-09 ENCOUNTER — TELEPHONE (OUTPATIENT)
Dept: FAMILY MEDICINE | Facility: CLINIC | Age: 82
End: 2022-12-09

## 2022-12-09 DIAGNOSIS — Z79.899 ENCOUNTER FOR LONG-TERM (CURRENT) USE OF OTHER MEDICATIONS: ICD-10-CM

## 2022-12-09 DIAGNOSIS — E78.5 DYSLIPIDEMIA: Primary | ICD-10-CM

## 2022-12-13 ENCOUNTER — OFFICE VISIT (OUTPATIENT)
Dept: DERMATOLOGY | Facility: CLINIC | Age: 82
End: 2022-12-13
Payer: MEDICARE

## 2022-12-13 VITALS — WEIGHT: 120 LBS | BODY MASS INDEX: 24.19 KG/M2 | HEIGHT: 59 IN

## 2022-12-13 DIAGNOSIS — D22.9 MULTIPLE BENIGN NEVI: ICD-10-CM

## 2022-12-13 DIAGNOSIS — L82.1 SEBORRHEIC KERATOSES: ICD-10-CM

## 2022-12-13 DIAGNOSIS — Z85.828 HISTORY OF NONMELANOMA SKIN CANCER: ICD-10-CM

## 2022-12-13 DIAGNOSIS — L82.0 INFLAMED SEBORRHEIC KERATOSIS: Primary | ICD-10-CM

## 2022-12-13 DIAGNOSIS — L29.9 PRURITUS: ICD-10-CM

## 2022-12-13 DIAGNOSIS — D18.01 CHERRY ANGIOMA: ICD-10-CM

## 2022-12-13 DIAGNOSIS — L90.5 SCAR: ICD-10-CM

## 2022-12-13 PROCEDURE — 99213 OFFICE O/P EST LOW 20 MIN: CPT | Mod: 25,S$GLB,, | Performed by: DERMATOLOGY

## 2022-12-13 PROCEDURE — 99999 PR PBB SHADOW E&M-EST. PATIENT-LVL III: CPT | Mod: PBBFAC,,, | Performed by: DERMATOLOGY

## 2022-12-13 PROCEDURE — 1160F PR REVIEW ALL MEDS BY PRESCRIBER/CLIN PHARMACIST DOCUMENTED: ICD-10-PCS | Mod: CPTII,S$GLB,, | Performed by: DERMATOLOGY

## 2022-12-13 PROCEDURE — 3288F PR FALLS RISK ASSESSMENT DOCUMENTED: ICD-10-PCS | Mod: CPTII,S$GLB,, | Performed by: DERMATOLOGY

## 2022-12-13 PROCEDURE — 17110 DESTRUCTION B9 LES UP TO 14: CPT | Mod: S$GLB,,, | Performed by: DERMATOLOGY

## 2022-12-13 PROCEDURE — 99213 PR OFFICE/OUTPT VISIT, EST, LEVL III, 20-29 MIN: ICD-10-PCS | Mod: 25,S$GLB,, | Performed by: DERMATOLOGY

## 2022-12-13 PROCEDURE — 99999 PR PBB SHADOW E&M-EST. PATIENT-LVL III: ICD-10-PCS | Mod: PBBFAC,,, | Performed by: DERMATOLOGY

## 2022-12-13 PROCEDURE — 1101F PT FALLS ASSESS-DOCD LE1/YR: CPT | Mod: CPTII,S$GLB,, | Performed by: DERMATOLOGY

## 2022-12-13 PROCEDURE — 1126F PR PAIN SEVERITY QUANTIFIED, NO PAIN PRESENT: ICD-10-PCS | Mod: CPTII,S$GLB,, | Performed by: DERMATOLOGY

## 2022-12-13 PROCEDURE — 1160F RVW MEDS BY RX/DR IN RCRD: CPT | Mod: CPTII,S$GLB,, | Performed by: DERMATOLOGY

## 2022-12-13 PROCEDURE — 3288F FALL RISK ASSESSMENT DOCD: CPT | Mod: CPTII,S$GLB,, | Performed by: DERMATOLOGY

## 2022-12-13 PROCEDURE — 1126F AMNT PAIN NOTED NONE PRSNT: CPT | Mod: CPTII,S$GLB,, | Performed by: DERMATOLOGY

## 2022-12-13 PROCEDURE — 1159F MED LIST DOCD IN RCRD: CPT | Mod: CPTII,S$GLB,, | Performed by: DERMATOLOGY

## 2022-12-13 PROCEDURE — 17110 PR DESTRUCTION BENIGN LESIONS UP TO 14: ICD-10-PCS | Mod: S$GLB,,, | Performed by: DERMATOLOGY

## 2022-12-13 PROCEDURE — 1159F PR MEDICATION LIST DOCUMENTED IN MEDICAL RECORD: ICD-10-PCS | Mod: CPTII,S$GLB,, | Performed by: DERMATOLOGY

## 2022-12-13 PROCEDURE — 1101F PR PT FALLS ASSESS DOC 0-1 FALLS W/OUT INJ PAST YR: ICD-10-PCS | Mod: CPTII,S$GLB,, | Performed by: DERMATOLOGY

## 2022-12-13 NOTE — PATIENT INSTRUCTIONS

## 2022-12-13 NOTE — PROGRESS NOTES
"  Subjective:       Patient ID:  Maricel Abdul is a 82 y.o. 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  BCC (Pinkus) midline midback E&S 2021  Denies Fhx of MM    Current Outpatient Medications:   ·  amitriptyline (ELAVIL) 50 MG tablet, Take 1 tablet (50 mg total) by mouth every evening., Disp: 90 tablet, Rfl: 2  ·  atorvastatin (LIPITOR) 80 MG tablet, Take 1 tablet (80 mg total) by mouth once daily., Disp: 90 tablet, Rfl: 1  ·  biotin 10,000 mcg Cap, Take 1 capsule by mouth once daily., Disp: , Rfl:   ·  cholecalciferol, vitamin D3, 125 mcg (5,000 unit) Tab, Take 5,000 Units by mouth once daily., Disp: , Rfl:   ·  cholestyramine-aspartame (CHOLESTYRAMINE LIGHT) 4 gram PwPk, Take 1 packet (4 g total) by mouth 2 (two) times daily., Disp: 60 packet, Rfl: 2  ·  cranberry 500 mg Cap, Take 1 capsule by mouth once daily., Disp: , Rfl:   ·  diphenoxylate-atropine 2.5-0.025 mg (LOMOTIL) 2.5-0.025 mg per tablet, Take 1 tablet by mouth 3 (three) times daily as needed for Diarrhea. TAKE 1 TABLET BY MOUTH 3 TIMES DAILY AS NEEDED FOR DIARRHEA, Disp: 30 tablet, Rfl: 1  ·  donepeziL (ARICEPT) 5 MG tablet, Take 1 tablet (5 mg total) by mouth every evening., Disp: 90 tablet, Rfl: 1  ·  fluticasone propionate (FLONASE) 50 mcg/actuation nasal spray, 1 spray (50 mcg total) by Each Nostril route once daily., Disp: 54 g, Rfl: 2  ·  losartan-hydrochlorothiazide 100-12.5 mg (HYZAAR) 100-12.5 mg Tab, Take 1 tablet by mouth once daily., Disp: 90 tablet, Rfl: 1  ·  meloxicam (MOBIC) 7.5 MG tablet, Take 1 tablet (7.5 mg total) by mouth once daily. Anti-inflammatory for arthritis pain, Disp: 90 tablet, Rfl: 1  ·  metoprolol succinate (TOPROL-XL) 25 MG 24 " hr tablet, Take 1 tablet (25 mg total) by mouth 2 (two) times daily., Disp: 180 tablet, Rfl: 1  ·  multivitamin (ONE DAILY MULTIVITAMIN) per tablet, Take 1 tablet by mouth once daily., Disp: , Rfl:   ·  pantoprazole (PROTONIX) 40 MG tablet, Take 1 tablet (40 mg total) by mouth once daily., Disp: 90 tablet, Rfl: 1  ·  sertraline (ZOLOFT) 100 MG tablet, Take 1 tablet (100 mg total) by mouth once daily., Disp: 90 tablet, Rfl: 1  ·  vit A/vit C/vit E/zinc/copper (PRESERVISION AREDS ORAL), Take by mouth., Disp: , Rfl:   ·  aspirin 325 MG tablet, Take 1 tablet (325 mg total) by mouth once daily., Disp: , Rfl: 0  ·  cetirizine (ZYRTEC) 10 MG tablet, Take 1 tablet (10 mg total) by mouth once daily., Disp: 30 tablet, Rfl: 0  .      Review of Systems   Constitutional:  Negative for fever, chills and fatigue.   Skin:  Positive for itching and activity-related sunscreen use. Negative for daily sunscreen use.   Hematologic/Lymphatic: Bruises/bleeds easily.      Objective:    Physical Exam   Constitutional: She appears well-developed and well-nourished. No distress.   Neurological: She is alert and oriented to person, place, and time. She is not disoriented.   Psychiatric: She has a normal mood and affect.   Skin:   Areas Examined (abnormalities noted in diagram):   Scalp / Hair Palpated and Inspected  Head / Face Inspection Performed  Neck Inspection Performed  Chest / Axilla Inspection Performed  Abdomen Inspection Performed  Back Inspection Performed  RUE Inspected  LUE Inspection Performed  Nails and Digits Inspection Performed                 Diagram Legend     Erythematous scaling macule/papule c/w actinic keratosis       Vascular papule c/w angioma      Pigmented verrucoid papule/plaque c/w seborrheic keratosis      Yellow umbilicated papule c/w sebaceous hyperplasia      Irregularly shaped tan macule c/w lentigo     1-2 mm smooth white papules consistent with Milia      Movable subcutaneous cyst with punctum c/w epidermal  inclusion cyst      Subcutaneous movable cyst c/w pilar cyst      Firm pink to brown papule c/w dermatofibroma      Pedunculated fleshy papule(s) c/w skin tag(s)      Evenly pigmented macule c/w junctional nevus     Mildly variegated pigmented, slightly irregular-bordered macule c/w mildly atypical nevus      Flesh colored to evenly pigmented papule c/w intradermal nevus       Pink pearly papule/plaque c/w basal cell carcinoma      Erythematous hyperkeratotic cursted plaque c/w SCC      Surgical scar with no sign of skin cancer recurrence      Open and closed comedones      Inflammatory papules and pustules      Verrucoid papule consistent consistent with wart     Erythematous eczematous patches and plaques     Dystrophic onycholytic nail with subungual debris c/w onychomycosis     Umbilicated papule    Erythematous-base heme-crusted tan verrucoid plaque consistent with inflamed seborrheic keratosis     Erythematous Silvery Scaling Plaque c/w Psoriasis     See annotation      Assessment / Plan:        Inflamed seborrheic keratosis, back, pruritic  Cryosurgery procedure note:    Verbal consent from the patient is obtained. Liquid nitrogen cryosurgery is applied to 1 lesions to produce a freeze injury. The patient is aware that blisters may form and is instructed on wound care with gentle cleansing and use of vaseline ointment to keep moist until healed. The patient is supplied a handout on cryosurgery and is instructed to call if lesions do not completely resolve.      Seborrheic keratoses  These are benign inherited growths without a malignant potential. Reassurance given to patient. No treatment is necessary.     History of nonmelanoma skin cancer  Scar  Area of previous NMSC (left cheek, back) examined. Sites well healed with no signs of recurrence.  Upper body skin examination performed today including at least 9 points as noted in physical examination. No lesions suspicious for malignancy noted.    Multiple  benign nevi  Careful dermoscopy evaluation of nevi performed with none identified as needing biopsy today  Monitor for new mole or moles that are becoming bigger, darker, irritated, or developing irregular borders.     Cherry angioma  This is a benign vascular lesion. Reassurance given. No treatment required.     Patient instructed in importance in daily broad spectrum sun protection of at least spf 30. Mineral sunscreen ingredients preferred (Zinc +/- Titanium) and can be found OTC.   Recommend Elta MD for daily use on face and neck.  Patient encouraged to wear hat for all outdoor exposure.   Also discussed sun avoidance and use of protective clothing.             Follow up if symptoms worsen or fail to improve.

## 2023-01-03 ENCOUNTER — OFFICE VISIT (OUTPATIENT)
Dept: FAMILY MEDICINE | Facility: CLINIC | Age: 83
End: 2023-01-03
Payer: MEDICARE

## 2023-01-03 VITALS
HEART RATE: 73 BPM | DIASTOLIC BLOOD PRESSURE: 68 MMHG | WEIGHT: 113.81 LBS | SYSTOLIC BLOOD PRESSURE: 128 MMHG | HEIGHT: 59 IN | BODY MASS INDEX: 22.94 KG/M2 | OXYGEN SATURATION: 98 %

## 2023-01-03 DIAGNOSIS — E78.5 DYSLIPIDEMIA: ICD-10-CM

## 2023-01-03 DIAGNOSIS — K21.9 GASTROESOPHAGEAL REFLUX DISEASE, UNSPECIFIED WHETHER ESOPHAGITIS PRESENT: ICD-10-CM

## 2023-01-03 DIAGNOSIS — A04.72 C. DIFFICILE DIARRHEA: ICD-10-CM

## 2023-01-03 DIAGNOSIS — F51.01 PRIMARY INSOMNIA: ICD-10-CM

## 2023-01-03 DIAGNOSIS — Z86.73 HISTORY OF STROKE: ICD-10-CM

## 2023-01-03 DIAGNOSIS — I10 ESSENTIAL HYPERTENSION: Primary | ICD-10-CM

## 2023-01-03 DIAGNOSIS — M15.9 PRIMARY OSTEOARTHRITIS INVOLVING MULTIPLE JOINTS: ICD-10-CM

## 2023-01-03 DIAGNOSIS — R63.4 WEIGHT LOSS: ICD-10-CM

## 2023-01-03 DIAGNOSIS — R41.3 MEMORY LOSS: ICD-10-CM

## 2023-01-03 PROCEDURE — 3288F PR FALLS RISK ASSESSMENT DOCUMENTED: ICD-10-PCS | Mod: CPTII,S$GLB,, | Performed by: NURSE PRACTITIONER

## 2023-01-03 PROCEDURE — 1160F RVW MEDS BY RX/DR IN RCRD: CPT | Mod: CPTII,S$GLB,, | Performed by: NURSE PRACTITIONER

## 2023-01-03 PROCEDURE — 1101F PT FALLS ASSESS-DOCD LE1/YR: CPT | Mod: CPTII,S$GLB,, | Performed by: NURSE PRACTITIONER

## 2023-01-03 PROCEDURE — 3288F FALL RISK ASSESSMENT DOCD: CPT | Mod: CPTII,S$GLB,, | Performed by: NURSE PRACTITIONER

## 2023-01-03 PROCEDURE — 3078F PR MOST RECENT DIASTOLIC BLOOD PRESSURE < 80 MM HG: ICD-10-PCS | Mod: CPTII,S$GLB,, | Performed by: NURSE PRACTITIONER

## 2023-01-03 PROCEDURE — 1101F PR PT FALLS ASSESS DOC 0-1 FALLS W/OUT INJ PAST YR: ICD-10-PCS | Mod: CPTII,S$GLB,, | Performed by: NURSE PRACTITIONER

## 2023-01-03 PROCEDURE — 3074F PR MOST RECENT SYSTOLIC BLOOD PRESSURE < 130 MM HG: ICD-10-PCS | Mod: CPTII,S$GLB,, | Performed by: NURSE PRACTITIONER

## 2023-01-03 PROCEDURE — 99214 PR OFFICE/OUTPT VISIT, EST, LEVL IV, 30-39 MIN: ICD-10-PCS | Mod: S$GLB,,, | Performed by: NURSE PRACTITIONER

## 2023-01-03 PROCEDURE — 99214 OFFICE O/P EST MOD 30 MIN: CPT | Mod: S$GLB,,, | Performed by: NURSE PRACTITIONER

## 2023-01-03 PROCEDURE — 1159F MED LIST DOCD IN RCRD: CPT | Mod: CPTII,S$GLB,, | Performed by: NURSE PRACTITIONER

## 2023-01-03 PROCEDURE — 3078F DIAST BP <80 MM HG: CPT | Mod: CPTII,S$GLB,, | Performed by: NURSE PRACTITIONER

## 2023-01-03 PROCEDURE — 3074F SYST BP LT 130 MM HG: CPT | Mod: CPTII,S$GLB,, | Performed by: NURSE PRACTITIONER

## 2023-01-03 PROCEDURE — 1159F PR MEDICATION LIST DOCUMENTED IN MEDICAL RECORD: ICD-10-PCS | Mod: CPTII,S$GLB,, | Performed by: NURSE PRACTITIONER

## 2023-01-03 PROCEDURE — 1160F PR REVIEW ALL MEDS BY PRESCRIBER/CLIN PHARMACIST DOCUMENTED: ICD-10-PCS | Mod: CPTII,S$GLB,, | Performed by: NURSE PRACTITIONER

## 2023-01-03 RX ORDER — PANTOPRAZOLE SODIUM 40 MG/1
40 TABLET, DELAYED RELEASE ORAL DAILY
Qty: 90 TABLET | Refills: 1 | Status: SHIPPED | OUTPATIENT
Start: 2023-01-03 | End: 2023-10-10 | Stop reason: SDUPTHER

## 2023-01-03 RX ORDER — MELOXICAM 7.5 MG/1
7.5 TABLET ORAL DAILY
Qty: 90 TABLET | Refills: 1 | Status: SHIPPED | OUTPATIENT
Start: 2023-01-03 | End: 2023-10-10 | Stop reason: SDUPTHER

## 2023-01-03 RX ORDER — LOSARTAN POTASSIUM AND HYDROCHLOROTHIAZIDE 12.5; 1 MG/1; MG/1
1 TABLET ORAL DAILY
Qty: 90 TABLET | Refills: 1 | Status: SHIPPED | OUTPATIENT
Start: 2023-01-03 | End: 2023-10-10 | Stop reason: SDUPTHER

## 2023-01-03 RX ORDER — SERTRALINE HYDROCHLORIDE 100 MG/1
100 TABLET, FILM COATED ORAL DAILY
Qty: 90 TABLET | Refills: 1 | Status: SHIPPED | OUTPATIENT
Start: 2023-01-03 | End: 2023-10-10 | Stop reason: SDUPTHER

## 2023-01-03 RX ORDER — DONEPEZIL HYDROCHLORIDE 5 MG/1
5 TABLET, FILM COATED ORAL NIGHTLY
Qty: 90 TABLET | Refills: 1 | Status: SHIPPED | OUTPATIENT
Start: 2023-01-03 | End: 2023-10-10 | Stop reason: SDUPTHER

## 2023-01-03 RX ORDER — ATORVASTATIN CALCIUM 80 MG/1
80 TABLET, FILM COATED ORAL DAILY
Qty: 90 TABLET | Refills: 1 | Status: SHIPPED | OUTPATIENT
Start: 2023-01-03 | End: 2023-10-10 | Stop reason: SDUPTHER

## 2023-01-03 RX ORDER — ASPIRIN 81 MG/1
81 TABLET ORAL DAILY
Refills: 0 | COMMUNITY
Start: 2023-01-03 | End: 2024-01-03

## 2023-01-03 RX ORDER — CHOLESTYRAMINE 4 G/4.8G
1 POWDER, FOR SUSPENSION ORAL 2 TIMES DAILY
Qty: 60 PACKET | Refills: 2 | Status: SHIPPED | OUTPATIENT
Start: 2023-01-03 | End: 2023-04-06 | Stop reason: SDUPTHER

## 2023-01-03 NOTE — PROGRESS NOTES
SUBJECTIVE:    Patient ID: Maricel Abdul is a 82 y.o. female.    Chief Complaint: Hypertension (Bottles brought//Pt is here for a check up and medication refills//MUNDO )    Pt here for regular f/u- HTN/OA    Pt here with her daughter- they report overall she's been doing pretty well recently.    Since last visit in October for diarrhea treated for C diff with 10 days of oral vanc- saw Dr. Joseph and she prescribed alternate RX but couldn't get it filled d/t cost- recently had repeat stool specimen collected but waiting to hear on results. Reports has continued with some diarrhea but improved with cholestyramine. Pancreatic elastase was also low in October but not on treatment- unclear if  prescribed creon or dificid which she couldn't afford. Cscope scheduled for 1/24/2023. Pt reports appetite is okay but doesn't eat as much- also afraid what she eats will cause diarrhea, weight down 7lbs since Oct  Daughter reports she stopped pt taking , thought it could be causing some of her diarrhea issues. Pt does have hx of CVA- advised to resume ASA 81mg at least          Office Visit on 10/06/2022   Component Date Value Ref Range Status    Pancreatic Elastase, Fecal 10/10/2022 113 (L)  mcg/g Final    Ova and Parasites, Stool Conc and * 10/10/2022    Final    CAMPYLOBACTER SP. AG, EIA 10/10/2022    Final    Shiga Toxins, EIA W/Rfl To E.Coli * 10/10/2022    Final    Culture, Salmonella and Shigella 10/10/2022    Final    Giardia Antigen - EIA 10/10/2022    Final    C DIFF TOXIN/GDH W/REFLEX TO PCR 10/10/2022    Final    C diff Toxin B 10/10/2022 DETECTED (A)   Final   Admission on 08/13/2022, Discharged on 08/13/2022   Component Date Value Ref Range Status    WBC 08/13/2022 6.04  3.90 - 12.70 K/uL Final    RBC 08/13/2022 3.89 (L)  4.00 - 5.40 M/uL Final    Hemoglobin 08/13/2022 11.5 (L)  12.0 - 16.0 g/dL Final    Hematocrit 08/13/2022 35.7 (L)  37.0 - 48.5 % Final    MCV 08/13/2022 92  82 - 98 fL Final    MCH  08/13/2022 29.6  27.0 - 31.0 pg Final    MCHC 08/13/2022 32.2  32.0 - 36.0 g/dL Final    RDW 08/13/2022 14.3  11.5 - 14.5 % Final    Platelets 08/13/2022 224  150 - 450 K/uL Final    MPV 08/13/2022 10.2  9.2 - 12.9 fL Final    Immature Granulocytes 08/13/2022 0.5  0.0 - 0.5 % Final    Gran # (ANC) 08/13/2022 4.1  1.8 - 7.7 K/uL Final    Immature Grans (Abs) 08/13/2022 0.03  0.00 - 0.04 K/uL Final    Lymph # 08/13/2022 1.0  1.0 - 4.8 K/uL Final    Mono # 08/13/2022 0.7  0.3 - 1.0 K/uL Final    Eos # 08/13/2022 0.2  0.0 - 0.5 K/uL Final    Baso # 08/13/2022 0.06  0.00 - 0.20 K/uL Final    nRBC 08/13/2022 0  0 /100 WBC Final    Gran % 08/13/2022 68.0  38.0 - 73.0 % Final    Lymph % 08/13/2022 15.9 (L)  18.0 - 48.0 % Final    Mono % 08/13/2022 11.3  4.0 - 15.0 % Final    Eosinophil % 08/13/2022 3.3  0.0 - 8.0 % Final    Basophil % 08/13/2022 1.0  0.0 - 1.9 % Final    Differential Method 08/13/2022 Automated   Final    Sodium 08/13/2022 142  136 - 145 mmol/L Final    Potassium 08/13/2022 3.5  3.5 - 5.1 mmol/L Final    Chloride 08/13/2022 102  95 - 110 mmol/L Final    CO2 08/13/2022 29  23 - 29 mmol/L Final    Glucose 08/13/2022 102  70 - 110 mg/dL Final    BUN 08/13/2022 11  8 - 23 mg/dL Final    Creatinine 08/13/2022 0.7  0.5 - 1.4 mg/dL Final    Calcium 08/13/2022 9.1  8.7 - 10.5 mg/dL Final    Total Protein 08/13/2022 6.8  6.0 - 8.4 g/dL Final    Albumin 08/13/2022 4.1  3.5 - 5.2 g/dL Final    Total Bilirubin 08/13/2022 0.8  0.1 - 1.0 mg/dL Final    Alkaline Phosphatase 08/13/2022 64  55 - 135 U/L Final    AST 08/13/2022 25  10 - 40 U/L Final    ALT 08/13/2022 19  10 - 44 U/L Final    Anion Gap 08/13/2022 11  8 - 16 mmol/L Final    eGFR 08/13/2022 >60.0  >60 mL/min/1.73 m^2 Final    Magnesium 08/13/2022 1.9  1.6 - 2.6 mg/dL Final    Specimen UA 08/13/2022 Urine, Clean Catch   Final    Color, UA 08/13/2022 Colorless (A)  Yellow, Straw, Netta Final    Appearance, UA 08/13/2022 Clear  Clear Final    pH, UA 08/13/2022 8.0   5.0 - 8.0 Final    Specific [place], UA 08/13/2022 1.000 (A)  1.005 - 1.030 Final    Protein, UA 08/13/2022 Negative  Negative Final    Glucose, UA 08/13/2022 Negative  Negative Final    Ketones, UA 08/13/2022 Negative  Negative Final    Bilirubin (UA) 08/13/2022 Negative  Negative Final    Occult Blood UA 08/13/2022 Negative  Negative Final    Nitrite, UA 08/13/2022 Negative  Negative Final    Urobilinogen, UA 08/13/2022 Negative  Negative EU/dL Final    Leukocytes, UA 08/13/2022 Negative  Negative Final       Past Medical History:   Diagnosis Date    Arthritis     Diverticulitis     Encounter for blood transfusion     Hypertension     Kidney stones     Pacemaker     Shingles     Squamous cell carcinoma of skin     TIA (transient ischemic attack) 12/2017     Past Surgical History:   Procedure Laterality Date    APPENDECTOMY      CARDIAC SURGERY      pacemaker    CERVICAL FUSION      CHOLECYSTECTOMY      COLONOSCOPY N/A 8/14/2019    Procedure: COLONOSCOPY;  Surgeon: Elio Perez MD;  Location: [facility];  Service: Endoscopy;  Laterality: N/A;    HYSTERECTOMY      SHOULDER SURGERY       Family History   Problem Relation Age of Onset    Cancer Father         lung    Cancer Brother         colon cancer    Colon cancer Brother     Pancreatitis Mother     Eczema Neg Hx     Psoriasis Neg Hx     Lupus Neg Hx     Melanoma Neg Hx     Stomach cancer Neg Hx     Esophageal cancer Neg Hx        The CVD Risk score (D'Agostino, et al., 2008) failed to calculate for the following reasons:    The 2008 CVD risk score is only valid for ages 30 to 74    The patient has a prior MI, stroke, CHF, or peripheral vascular disease diagnosis     Marital Status:   Alcohol History:  reports current alcohol use of about 7.0 standard drinks per week.  Tobacco History:  reports that she quit smoking about 48 years ago. She has never used smokeless tobacco.  Drug History:  reports no history of drug use.    Health Maintenance Topics  with due status: Not Due       Topic Last Completion Date    TETANUS VACCINE 02/26/2019    Colonoscopy 08/14/2019    Lipid Panel 04/25/2022    DEXA Scan 09/16/2022     Immunization History   Administered Date(s) Administered    COVID-19, MRNA, LN-S, PF (Pfizer) (Purple Cap) 01/10/2021, 01/31/2021, 10/22/2021    Influenza - High Dose - PF (65 years and older) 08/30/2013, 08/31/2015, 09/19/2016, 09/19/2017, 09/12/2018, 10/15/2019    Influenza - Quadrivalent - High Dose - PF (65 years and older) 09/09/2020, 09/28/2021    Influenza - Quadrivalent - PF *Preferred* (6 months and older) 11/18/2002, 01/19/2006    Influenza - Trivalent (ADULT) 11/18/2002, 01/19/2006    Influenza - Trivalent - PF (ADULT) 08/31/2015    Influenza A (H5N1) 2004 Monovalent 01/01/2016    Pneumococcal 01/01/2016    Pneumococcal Conjugate - 13 Valent 08/30/2016    Pneumococcal Polysaccharide - 23 Valent 11/18/2002, 09/12/2018    Td (ADULT) 02/26/2019    Varicella 01/01/2014       Review of patient's allergies indicates:   Allergen Reactions    Codeine Nausea And Vomiting       Current Outpatient Medications:     amitriptyline (ELAVIL) 50 MG tablet, Take 1 tablet (50 mg total) by mouth every evening., Disp: 90 tablet, Rfl: 2    cholecalciferol, vitamin D3, 125 mcg (5,000 unit) Tab, Take 5,000 Units by mouth once daily., Disp: , Rfl:     cranberry 500 mg Cap, Take 1 capsule by mouth once daily., Disp: , Rfl:     fluticasone propionate (FLONASE) 50 mcg/actuation nasal spray, 1 spray (50 mcg total) by Each Nostril route once daily., Disp: 54 g, Rfl: 2    metoprolol succinate (TOPROL-XL) 25 MG 24 hr tablet, Take 1 tablet (25 mg total) by mouth 2 (two) times daily., Disp: 180 tablet, Rfl: 1    multivitamin (ONE DAILY MULTIVITAMIN) per tablet, Take 1 tablet by mouth once daily., Disp: , Rfl:     vit A/vit C/vit E/zinc/copper (PRESERVISION AREDS ORAL), Take by mouth., Disp: , Rfl:     aspirin (ECOTRIN) 81 MG EC tablet, Take 1 tablet (81 mg total) by mouth  once daily., Disp: , Rfl: 0    atorvastatin (LIPITOR) 80 MG tablet, Take 1 tablet (80 mg total) by mouth once daily., Disp: 90 tablet, Rfl: 1    biotin 10,000 mcg Cap, Take 1 capsule by mouth once daily., Disp: , Rfl:     cetirizine (ZYRTEC) 10 MG tablet, Take 1 tablet (10 mg total) by mouth once daily. (Patient not taking: Reported on 1/3/2023), Disp: 30 tablet, Rfl: 0    cholestyramine-aspartame (CHOLESTYRAMINE LIGHT) 4 gram PwPk, Take 1 packet (4 g total) by mouth 2 (two) times daily., Disp: 60 packet, Rfl: 2    diphenoxylate-atropine 2.5-0.025 mg (LOMOTIL) 2.5-0.025 mg per tablet, Take 1 tablet by mouth 3 (three) times daily as needed for Diarrhea. TAKE 1 TABLET BY MOUTH 3 TIMES DAILY AS NEEDED FOR DIARRHEA (Patient not taking: Reported on 1/3/2023), Disp: 30 tablet, Rfl: 1    donepeziL (ARICEPT) 5 MG tablet, Take 1 tablet (5 mg total) by mouth every evening., Disp: 90 tablet, Rfl: 1    losartan-hydrochlorothiazide 100-12.5 mg (HYZAAR) 100-12.5 mg Tab, Take 1 tablet by mouth once daily., Disp: 90 tablet, Rfl: 1    meloxicam (MOBIC) 7.5 MG tablet, Take 1 tablet (7.5 mg total) by mouth once daily. Anti-inflammatory for arthritis pain, Disp: 90 tablet, Rfl: 1    pantoprazole (PROTONIX) 40 MG tablet, Take 1 tablet (40 mg total) by mouth once daily., Disp: 90 tablet, Rfl: 1    sertraline (ZOLOFT) 100 MG tablet, Take 1 tablet (100 mg total) by mouth once daily., Disp: 90 tablet, Rfl: 1    Review of Systems   Constitutional:  Positive for appetite change (fear of eating d/t diarrhea) and unexpected weight change (wt down 7lbs since October). Negative for chills and fever.   HENT:  Negative for sore throat and trouble swallowing.    Respiratory:  Negative for cough, shortness of breath and wheezing.    Cardiovascular:  Negative for chest pain, palpitations and leg swelling.   Gastrointestinal:  Positive for diarrhea (improved but using cholestyramine daily). Negative for abdominal pain, constipation, nausea and  "vomiting.   Genitourinary:  Positive for frequency. Negative for dysuria and hematuria.   Musculoskeletal:  Negative for gait problem (no falls).   Skin:  Negative for rash.   Neurological:  Negative for dizziness, syncope, speech difficulty, numbness and headaches.   Psychiatric/Behavioral:  Negative for confusion (short term memory issues) and dysphoric mood (stable on med).         Objective:      Vitals:    01/03/23 0836   BP: 128/68   Pulse: 73   SpO2: 98%   Weight: 51.6 kg (113 lb 12.8 oz)   Height: 4' 11" (1.499 m)     Physical Exam  Vitals and nursing note reviewed.   Constitutional:       General: She is not in acute distress.     Appearance: Normal appearance. She is well-developed.   HENT:      Head: Normocephalic and atraumatic.   Neck:      Vascular: No carotid bruit.   Cardiovascular:      Rate and Rhythm: Normal rate and regular rhythm.      Heart sounds: Murmur heard.   Systolic murmur is present with a grade of 2/6.     No friction rub. No gallop.   Pulmonary:      Effort: Pulmonary effort is normal. No respiratory distress.      Breath sounds: Normal breath sounds. No wheezing or rales.   Abdominal:      General: There is no distension.      Palpations: Abdomen is soft.      Tenderness: There is no abdominal tenderness.   Musculoskeletal:      Cervical back: Neck supple.      Right lower leg: No edema.      Left lower leg: No edema.   Lymphadenopathy:      Cervical: No cervical adenopathy.   Skin:     General: Skin is warm and dry.      Findings: No rash.   Neurological:      General: No focal deficit present.      Mental Status: She is alert.      Comments: Gait slightly stooped, mild head tremor   Psychiatric:         Mood and Affect: Mood normal.         Assessment:       1. Essential hypertension    2. C. difficile diarrhea    3. Weight loss    4. Gastroesophageal reflux disease, unspecified whether esophagitis present    5. Primary insomnia    6. Memory loss    7. Primary osteoarthritis " involving multiple joints    8. Dyslipidemia    9. History of stroke           Plan:       Essential hypertension  Comments:  BP well controlled  Orders:  -     losartan-hydrochlorothiazide 100-12.5 mg (HYZAAR) 100-12.5 mg Tab; Take 1 tablet by mouth once daily.  Dispense: 90 tablet; Refill: 1  -     CBC Auto Differential; Future; Expected date: 01/03/2023  -     Comprehensive Metabolic Panel; Future; Expected date: 01/03/2023  -     Lipid Panel; Future; Expected date: 01/03/2023  -     Microalbumin/Creatinine Ratio, Urine; Future; Expected date: 01/03/2023  -     Urinalysis, Reflex to Urine Culture Urine, Clean Catch; Future; Expected date: 01/03/2023  -     TSH w/reflex to FT4; Future; Expected date: 01/03/2023    C. difficile diarrhea  Comments:  Repeat stool specimen pending with KIANA Cool scheduled for the end of the month  Orders:  -     cholestyramine-aspartame (CHOLESTYRAMINE LIGHT) 4 gram PwPk; Take 1 packet (4 g total) by mouth 2 (two) times daily.  Dispense: 60 packet; Refill: 2    Weight loss  Comments:  Encouraged daily protein supplement    Gastroesophageal reflux disease, unspecified whether esophagitis present  Comments:  Stable  Orders:  -     pantoprazole (PROTONIX) 40 MG tablet; Take 1 tablet (40 mg total) by mouth once daily.  Dispense: 90 tablet; Refill: 1    Primary insomnia  Comments:  Stable on med  Orders:  -     sertraline (ZOLOFT) 100 MG tablet; Take 1 tablet (100 mg total) by mouth once daily.  Dispense: 90 tablet; Refill: 1    Memory loss  Comments:  Stable on donepezil  Orders:  -     donepeziL (ARICEPT) 5 MG tablet; Take 1 tablet (5 mg total) by mouth every evening.  Dispense: 90 tablet; Refill: 1    Primary osteoarthritis involving multiple joints  Comments:  Stable  Orders:  -     meloxicam (MOBIC) 7.5 MG tablet; Take 1 tablet (7.5 mg total) by mouth once daily. Anti-inflammatory for arthritis pain  Dispense: 90 tablet; Refill: 1    Dyslipidemia  Comments:  Repeat labs  before next visit  Orders:  -     atorvastatin (LIPITOR) 80 MG tablet; Take 1 tablet (80 mg total) by mouth once daily.  Dispense: 90 tablet; Refill: 1    History of stroke  Comments:  Family had stopped pt's aspirin 325 mg, Recommend aspirin 81 mg at least  Orders:  -     aspirin (ECOTRIN) 81 MG EC tablet; Take 1 tablet (81 mg total) by mouth once daily.; Refill: 0      Follow up in about 3 months (around 4/3/2023) for HTN, Labs before next visit.          Counseled on age and gender appropriate medical preventative services, including cancer screenings, immunizations, overall nutritional health, need for a consistent exercise regimen and an overall push towards maintaining a vigorous and active lifestyle.      1/3/2023 Shayla Tello NP

## 2023-03-03 DIAGNOSIS — I10 ESSENTIAL HYPERTENSION: ICD-10-CM

## 2023-03-03 DIAGNOSIS — F51.01 PRIMARY INSOMNIA: ICD-10-CM

## 2023-03-03 RX ORDER — AMITRIPTYLINE HYDROCHLORIDE 50 MG/1
50 TABLET, FILM COATED ORAL NIGHTLY
Qty: 90 TABLET | Refills: 2 | Status: SHIPPED | OUTPATIENT
Start: 2023-03-03 | End: 2023-10-10 | Stop reason: SDUPTHER

## 2023-03-03 RX ORDER — METOPROLOL SUCCINATE 25 MG/1
25 TABLET, EXTENDED RELEASE ORAL 2 TIMES DAILY
Qty: 180 TABLET | Refills: 1 | Status: SHIPPED | OUTPATIENT
Start: 2023-03-03 | End: 2023-10-10 | Stop reason: SDUPTHER

## 2023-03-03 NOTE — TELEPHONE ENCOUNTER
Pt is needing a refill on her amitriptyline. Last office visit 01/03/2023. Next office visit 04/06/2023

## 2023-03-23 ENCOUNTER — TELEPHONE (OUTPATIENT)
Dept: FAMILY MEDICINE | Facility: CLINIC | Age: 83
End: 2023-03-23

## 2023-03-23 NOTE — TELEPHONE ENCOUNTER
Spoke to patient daughter, Alysha, that fasting lab and urine are due a week prior to visit. Encouraged water.

## 2023-03-29 LAB
ALBUMIN SERPL-MCNC: 4.3 G/DL (ref 3.6–4.6)
ALBUMIN/CREAT UR: 13 MG/G CREAT (ref 0–29)
ALBUMIN/GLOB SERPL: 2 {RATIO} (ref 1.2–2.2)
ALP SERPL-CCNC: 104 IU/L (ref 44–121)
ALT SERPL-CCNC: 17 IU/L (ref 0–32)
APPEARANCE UR: CLEAR
AST SERPL-CCNC: 25 IU/L (ref 0–40)
BACTERIA #/AREA URNS HPF: NORMAL /[HPF]
BACTERIA UR CULT: NORMAL
BACTERIA UR CULT: NORMAL
BASOPHILS # BLD AUTO: 0 X10E3/UL (ref 0–0.2)
BASOPHILS NFR BLD AUTO: 0 %
BILIRUB SERPL-MCNC: <0.2 MG/DL (ref 0–1.2)
BILIRUB UR QL STRIP: NEGATIVE
BUN SERPL-MCNC: 24 MG/DL (ref 8–27)
BUN/CREAT SERPL: 30 (ref 12–28)
CALCIUM SERPL-MCNC: 9.5 MG/DL (ref 8.7–10.3)
CASTS URNS QL MICRO: NORMAL /LPF
CHLORIDE SERPL-SCNC: 103 MMOL/L (ref 96–106)
CHOLEST SERPL-MCNC: 163 MG/DL (ref 100–199)
CO2 SERPL-SCNC: 22 MMOL/L (ref 20–29)
COLOR UR: YELLOW
CREAT SERPL-MCNC: 0.8 MG/DL (ref 0.57–1)
CREAT UR-MCNC: 59.6 MG/DL
EOSINOPHIL # BLD AUTO: 0.3 X10E3/UL (ref 0–0.4)
EOSINOPHIL NFR BLD AUTO: 3 %
EPI CELLS #/AREA URNS HPF: NORMAL /HPF (ref 0–10)
ERYTHROCYTE [DISTWIDTH] IN BLOOD BY AUTOMATED COUNT: 12.5 % (ref 11.7–15.4)
EST. GFR  (NO RACE VARIABLE): 73 ML/MIN/1.73
GLOBULIN SER CALC-MCNC: 2.2 G/DL (ref 1.5–4.5)
GLUCOSE SERPL-MCNC: 93 MG/DL (ref 70–99)
GLUCOSE UR QL STRIP: NEGATIVE
HCT VFR BLD AUTO: 38 % (ref 34–46.6)
HDLC SERPL-MCNC: 82 MG/DL
HGB BLD-MCNC: 11.9 G/DL (ref 11.1–15.9)
HGB UR QL STRIP: NEGATIVE
IMM GRANULOCYTES # BLD AUTO: 0 X10E3/UL (ref 0–0.1)
IMM GRANULOCYTES NFR BLD AUTO: 0 %
KETONES UR QL STRIP: NEGATIVE
LDLC SERPL CALC-MCNC: 61 MG/DL (ref 0–99)
LEUKOCYTE ESTERASE UR QL STRIP: ABNORMAL
LYMPHOCYTES # BLD AUTO: 1.1 X10E3/UL (ref 0.7–3.1)
LYMPHOCYTES NFR BLD AUTO: 12 %
MCH RBC QN AUTO: 28.7 PG (ref 26.6–33)
MCHC RBC AUTO-ENTMCNC: 31.3 G/DL (ref 31.5–35.7)
MCV RBC AUTO: 92 FL (ref 79–97)
MICRO URNS: ABNORMAL
MICROALBUMIN UR-MCNC: 7.6 UG/ML
MONOCYTES # BLD AUTO: 0.8 X10E3/UL (ref 0.1–0.9)
MONOCYTES NFR BLD AUTO: 8 %
NEUTROPHILS # BLD AUTO: 7.4 X10E3/UL (ref 1.4–7)
NEUTROPHILS NFR BLD AUTO: 77 %
NITRITE UR QL STRIP: NEGATIVE
PH UR STRIP: 6 [PH] (ref 5–7.5)
PLATELET # BLD AUTO: 233 X10E3/UL (ref 150–450)
POTASSIUM SERPL-SCNC: 4 MMOL/L (ref 3.5–5.2)
PROT SERPL-MCNC: 6.5 G/DL (ref 6–8.5)
PROT UR QL STRIP: NEGATIVE
RBC # BLD AUTO: 4.15 X10E6/UL (ref 3.77–5.28)
RBC #/AREA URNS HPF: NORMAL /HPF (ref 0–2)
SODIUM SERPL-SCNC: 142 MMOL/L (ref 134–144)
SP GR UR STRIP: 1.02 (ref 1–1.03)
TRIGL SERPL-MCNC: 115 MG/DL (ref 0–149)
TSH SERPL DL<=0.005 MIU/L-ACNC: 3.3 UIU/ML (ref 0.45–4.5)
URINALYSIS REFLEX: ABNORMAL
UROBILINOGEN UR STRIP-MCNC: 0.2 MG/DL (ref 0.2–1)
VLDLC SERPL CALC-MCNC: 20 MG/DL (ref 5–40)
WBC # BLD AUTO: 9.6 X10E3/UL (ref 3.4–10.8)
WBC #/AREA URNS HPF: NORMAL /HPF (ref 0–5)

## 2023-04-06 ENCOUNTER — OFFICE VISIT (OUTPATIENT)
Dept: FAMILY MEDICINE | Facility: CLINIC | Age: 83
End: 2023-04-06
Payer: MEDICARE

## 2023-04-06 VITALS
BODY MASS INDEX: 21.61 KG/M2 | HEART RATE: 85 BPM | DIASTOLIC BLOOD PRESSURE: 58 MMHG | SYSTOLIC BLOOD PRESSURE: 112 MMHG | OXYGEN SATURATION: 100 % | WEIGHT: 107.19 LBS | HEIGHT: 59 IN

## 2023-04-06 DIAGNOSIS — G31.84 MCI (MILD COGNITIVE IMPAIRMENT): ICD-10-CM

## 2023-04-06 DIAGNOSIS — F33.0 MILD EPISODE OF RECURRENT MAJOR DEPRESSIVE DISORDER: ICD-10-CM

## 2023-04-06 DIAGNOSIS — M15.9 PRIMARY OSTEOARTHRITIS INVOLVING MULTIPLE JOINTS: ICD-10-CM

## 2023-04-06 DIAGNOSIS — Z86.73 HISTORY OF STROKE: ICD-10-CM

## 2023-04-06 DIAGNOSIS — I10 ESSENTIAL HYPERTENSION: Primary | ICD-10-CM

## 2023-04-06 DIAGNOSIS — K52.9 CHRONIC DIARRHEA: ICD-10-CM

## 2023-04-06 DIAGNOSIS — Z95.0 CARDIAC PACEMAKER IN SITU: ICD-10-CM

## 2023-04-06 DIAGNOSIS — E78.5 DYSLIPIDEMIA: ICD-10-CM

## 2023-04-06 PROCEDURE — 99214 OFFICE O/P EST MOD 30 MIN: CPT | Mod: S$GLB,,, | Performed by: NURSE PRACTITIONER

## 2023-04-06 PROCEDURE — 1159F MED LIST DOCD IN RCRD: CPT | Mod: CPTII,S$GLB,, | Performed by: NURSE PRACTITIONER

## 2023-04-06 PROCEDURE — 1101F PT FALLS ASSESS-DOCD LE1/YR: CPT | Mod: CPTII,S$GLB,, | Performed by: NURSE PRACTITIONER

## 2023-04-06 PROCEDURE — 3078F DIAST BP <80 MM HG: CPT | Mod: CPTII,S$GLB,, | Performed by: NURSE PRACTITIONER

## 2023-04-06 PROCEDURE — 1160F PR REVIEW ALL MEDS BY PRESCRIBER/CLIN PHARMACIST DOCUMENTED: ICD-10-PCS | Mod: CPTII,S$GLB,, | Performed by: NURSE PRACTITIONER

## 2023-04-06 PROCEDURE — 99214 PR OFFICE/OUTPT VISIT, EST, LEVL IV, 30-39 MIN: ICD-10-PCS | Mod: S$GLB,,, | Performed by: NURSE PRACTITIONER

## 2023-04-06 PROCEDURE — 1101F PR PT FALLS ASSESS DOC 0-1 FALLS W/OUT INJ PAST YR: ICD-10-PCS | Mod: CPTII,S$GLB,, | Performed by: NURSE PRACTITIONER

## 2023-04-06 PROCEDURE — 1160F RVW MEDS BY RX/DR IN RCRD: CPT | Mod: CPTII,S$GLB,, | Performed by: NURSE PRACTITIONER

## 2023-04-06 PROCEDURE — 3288F FALL RISK ASSESSMENT DOCD: CPT | Mod: CPTII,S$GLB,, | Performed by: NURSE PRACTITIONER

## 2023-04-06 PROCEDURE — 3074F PR MOST RECENT SYSTOLIC BLOOD PRESSURE < 130 MM HG: ICD-10-PCS | Mod: CPTII,S$GLB,, | Performed by: NURSE PRACTITIONER

## 2023-04-06 PROCEDURE — 3078F PR MOST RECENT DIASTOLIC BLOOD PRESSURE < 80 MM HG: ICD-10-PCS | Mod: CPTII,S$GLB,, | Performed by: NURSE PRACTITIONER

## 2023-04-06 PROCEDURE — 1159F PR MEDICATION LIST DOCUMENTED IN MEDICAL RECORD: ICD-10-PCS | Mod: CPTII,S$GLB,, | Performed by: NURSE PRACTITIONER

## 2023-04-06 PROCEDURE — 3074F SYST BP LT 130 MM HG: CPT | Mod: CPTII,S$GLB,, | Performed by: NURSE PRACTITIONER

## 2023-04-06 PROCEDURE — 3288F PR FALLS RISK ASSESSMENT DOCUMENTED: ICD-10-PCS | Mod: CPTII,S$GLB,, | Performed by: NURSE PRACTITIONER

## 2023-04-06 RX ORDER — CHOLESTYRAMINE 4 G/9G
1 POWDER, FOR SUSPENSION ORAL 2 TIMES DAILY
Qty: 180 PACKET | Refills: 3 | Status: SHIPPED | OUTPATIENT
Start: 2023-04-06 | End: 2023-06-26 | Stop reason: ALTCHOICE

## 2023-04-06 RX ORDER — CHOLESTYRAMINE 4 G/9G
1 POWDER, FOR SUSPENSION ORAL 2 TIMES DAILY
COMMUNITY
Start: 2022-12-18 | End: 2023-04-06 | Stop reason: SDUPTHER

## 2023-04-06 NOTE — PROGRESS NOTES
SUBJECTIVE:    Patient ID: Maricel Abdul is a 82 y.o. female.    Chief Complaint: Hypertension (No bottles//Pt is here for a check up and medication refills//MUNDO )    Pt here for regular f/u- HTN, lipids, OA, depression. Pt here with her daughter    Pt reports overall she's been doing pretty well recently. Reports appetite is okay but just doesn't eat as much as she used to- reports used to eat 2 eggs every morning but sorta lost the taste for eggs. No longer cooking but family tries to bring her food. Tries to drink an ensure daily. Denies abd pain, nausea or vomiting    Stays active around her house, still does most of her housework. Denies any falls    Had f/u with Dr. Joseph last month- was treated with Cdiff and diarrhea overall much improved    Follows with Dr Ralph cyr for PPM- saw him last month also      Office Visit on 01/03/2023   Component Date Value Ref Range Status    WBC 03/25/2023 9.6  3.4 - 10.8 x10E3/uL Final    RBC 03/25/2023 4.15  3.77 - 5.28 x10E6/uL Final    Hemoglobin 03/25/2023 11.9  11.1 - 15.9 g/dL Final    Hematocrit 03/25/2023 38.0  34.0 - 46.6 % Final    MCV 03/25/2023 92  79 - 97 fL Final    MCH 03/25/2023 28.7  26.6 - 33.0 pg Final    MCHC 03/25/2023 31.3 (L)  31.5 - 35.7 g/dL Final    RDW 03/25/2023 12.5  11.7 - 15.4 % Final    Platelets 03/25/2023 233  150 - 450 x10E3/uL Final    Neutrophils 03/25/2023 77  Not Estab. % Final    Lymphs 03/25/2023 12  Not Estab. % Final    Monocytes 03/25/2023 8  Not Estab. % Final    Eos 03/25/2023 3  Not Estab. % Final    Basos 03/25/2023 0  Not Estab. % Final    Neutrophils (Absolute) 03/25/2023 7.4 (H)  1.4 - 7.0 x10E3/uL Final    Lymphs (Absolute) 03/25/2023 1.1  0.7 - 3.1 x10E3/uL Final    Monocytes(Absolute) 03/25/2023 0.8  0.1 - 0.9 x10E3/uL Final    Eos (Absolute) 03/25/2023 0.3  0.0 - 0.4 x10E3/uL Final    Baso (Absolute) 03/25/2023 0.0  0.0 - 0.2 x10E3/uL Final    Immature Granulocytes 03/25/2023 0  Not Estab. % Final    Immature Grans  (Abs) 03/25/2023 0.0  0.0 - 0.1 x10E3/uL Final    Glucose 03/25/2023 93  70 - 99 mg/dL Final    BUN 03/25/2023 24  8 - 27 mg/dL Final    Creatinine 03/25/2023 0.80  0.57 - 1.00 mg/dL Final    eGFR 03/25/2023 73  >59 mL/min/1.73 Final    BUN/Creatinine Ratio 03/25/2023 30 (H)  12 - 28 Final    Sodium 03/25/2023 142  134 - 144 mmol/L Final    Potassium 03/25/2023 4.0  3.5 - 5.2 mmol/L Final    Chloride 03/25/2023 103  96 - 106 mmol/L Final    CO2 03/25/2023 22  20 - 29 mmol/L Final    Calcium 03/25/2023 9.5  8.7 - 10.3 mg/dL Final    Protein, Total 03/25/2023 6.5  6.0 - 8.5 g/dL Final    Albumin 03/25/2023 4.3  3.6 - 4.6 g/dL Final    Globulin, Total 03/25/2023 2.2  1.5 - 4.5 g/dL Final    Albumin/Globulin Ratio 03/25/2023 2.0  1.2 - 2.2 Final    Total Bilirubin 03/25/2023 <0.2  0.0 - 1.2 mg/dL Final    Alkaline Phosphatase 03/25/2023 104  44 - 121 IU/L Final    AST 03/25/2023 25  0 - 40 IU/L Final    ALT 03/25/2023 17  0 - 32 IU/L Final    Cholesterol 03/25/2023 163  100 - 199 mg/dL Final    Triglycerides 03/25/2023 115  0 - 149 mg/dL Final    HDL 03/25/2023 82  >39 mg/dL Final    VLDL Cholesterol Tashi 03/25/2023 20  5 - 40 mg/dL Final    LDL Calculated 03/25/2023 61  0 - 99 mg/dL Final    Creatinine, Urine 03/25/2023 59.6  Not Estab. mg/dL Final    Microalb, Ur 03/25/2023 7.6  Not Estab. ug/mL Final    Microalb/Crt. Ratio 03/25/2023 13  0 - 29 mg/g creat Final    Specific Gravity, UA 03/25/2023 1.016  1.005 - 1.030 Final    pH, UA 03/25/2023 6.0  5.0 - 7.5 Final    Color, UA 03/25/2023 Yellow  Yellow Final    Clarity, UA 03/25/2023 Clear  Clear Final    Leukocytes, UA 03/25/2023 1+ (A)  Negative Final    Protein, UA 03/25/2023 Negative  Negative/Trace Final    Glucose, UA 03/25/2023 Negative  Negative Final    Ketones, UA 03/25/2023 Negative  Negative Final    Occult Blood UA 03/25/2023 Negative  Negative Final    Bilirubin, UA 03/25/2023 Negative  Negative Final    Urobilinogen, UA 03/25/2023 0.2  0.2 - 1.0 mg/dL  Final    Nitrite, UA 03/25/2023 Negative  Negative Final    Microscopic Examination 03/25/2023 See below:   Final    Urinalysis Reflex 03/25/2023 Comment   Final    TSH 03/25/2023 3.300  0.450 - 4.500 uIU/mL Final    WBC, UA 03/25/2023 None seen  0 - 5 /hpf Final    RBC, UA 03/25/2023 0-2  0 - 2 /hpf Final    Epithelial Cells (non renal) 03/25/2023 None seen  0 - 10 /hpf Final    Casts 03/25/2023 None seen  None seen /lpf Final    Bacteria, UA 03/25/2023 None seen  None seen/Few Final    Urine Culture, Comprehensive 03/25/2023 Final report   Final    Result 03/25/2023 Mixed skin eva   Final       Past Medical History:   Diagnosis Date    Arthritis     Diverticulitis     Encounter for blood transfusion     Hypertension     Kidney stones     Pacemaker     Shingles     Squamous cell carcinoma of skin     TIA (transient ischemic attack) 12/2017     Past Surgical History:   Procedure Laterality Date    APPENDECTOMY      CARDIAC SURGERY      pacemaker    CERVICAL FUSION      CHOLECYSTECTOMY      COLONOSCOPY N/A 8/14/2019    Procedure: COLONOSCOPY;  Surgeon: Elio Perez MD;  Location: [facility];  Service: Endoscopy;  Laterality: N/A;    HYSTERECTOMY      SHOULDER SURGERY       Family History   Problem Relation Age of Onset    Cancer Father         lung    Cancer Brother         colon cancer    Colon cancer Brother     Pancreatitis Mother     Eczema Neg Hx     Psoriasis Neg Hx     Lupus Neg Hx     Melanoma Neg Hx     Stomach cancer Neg Hx     Esophageal cancer Neg Hx        The CVD Risk score (Vivekino et al., 2008) failed to calculate for the following reasons:    The 2008 CVD risk score is only valid for ages 30 to 74    The patient has a prior MI, stroke, CHF, or peripheral vascular disease diagnosis     Marital Status:   Alcohol History:  reports current alcohol use of about 7.0 standard drinks per week.  Tobacco History:  reports that she quit smoking about 48 years ago. Her smoking use included  cigarettes. She has never been exposed to tobacco smoke. She has never used smokeless tobacco.  Drug History:  reports no history of drug use.    Health Maintenance Topics with due status: Not Due       Topic Last Completion Date    TETANUS VACCINE 02/26/2019    Colonoscopy 08/14/2019    DEXA Scan 09/16/2022    Lipid Panel 03/25/2023     Immunization History   Administered Date(s) Administered    COVID-19, MRNA, LN-S, PF (Pfizer) (Purple Cap) 01/10/2021, 01/31/2021, 10/22/2021    Influenza - High Dose - PF (65 years and older) 08/30/2013, 08/31/2015, 09/19/2016, 09/19/2017, 09/12/2018, 10/15/2019    Influenza - Quadrivalent - High Dose - PF (65 years and older) 09/09/2020, 09/28/2021    Influenza - Quadrivalent - PF *Preferred* (6 months and older) 11/18/2002, 01/19/2006    Influenza - Trivalent (ADULT) 11/18/2002, 01/19/2006    Influenza - Trivalent - PF (ADULT) 08/31/2015    Influenza A (H5N1) 2004 Monovalent 01/01/2016    Pneumococcal 01/01/2016    Pneumococcal Conjugate - 13 Valent 08/30/2016    Pneumococcal Polysaccharide - 23 Valent 11/18/2002, 09/12/2018    Td (ADULT) 02/26/2019    Varicella 01/01/2014       Review of patient's allergies indicates:   Allergen Reactions    Codeine Nausea And Vomiting       Current Outpatient Medications:     amitriptyline (ELAVIL) 50 MG tablet, Take 1 tablet (50 mg total) by mouth every evening., Disp: 90 tablet, Rfl: 2    aspirin (ECOTRIN) 81 MG EC tablet, Take 1 tablet (81 mg total) by mouth once daily., Disp: , Rfl: 0    atorvastatin (LIPITOR) 80 MG tablet, Take 1 tablet (80 mg total) by mouth once daily., Disp: 90 tablet, Rfl: 1    biotin 10,000 mcg Cap, Take 1 capsule by mouth once daily., Disp: , Rfl:     cetirizine (ZYRTEC) 10 MG tablet, Take 1 tablet (10 mg total) by mouth once daily. (Patient not taking: Reported on 1/3/2023), Disp: 30 tablet, Rfl: 0    cholecalciferol, vitamin D3, 125 mcg (5,000 unit) Tab, Take 5,000 Units by mouth once daily., Disp: , Rfl:      cholestyramine (QUESTRAN) 4 gram packet, Take 1 packet (4 g total) by mouth 2 (two) times daily., Disp: 180 packet, Rfl: 3    cranberry 500 mg Cap, Take 1 capsule by mouth once daily., Disp: , Rfl:     diphenoxylate-atropine 2.5-0.025 mg (LOMOTIL) 2.5-0.025 mg per tablet, Take 1 tablet by mouth 3 (three) times daily as needed for Diarrhea. TAKE 1 TABLET BY MOUTH 3 TIMES DAILY AS NEEDED FOR DIARRHEA (Patient not taking: Reported on 1/3/2023), Disp: 30 tablet, Rfl: 1    donepeziL (ARICEPT) 5 MG tablet, Take 1 tablet (5 mg total) by mouth every evening., Disp: 90 tablet, Rfl: 1    fluticasone propionate (FLONASE) 50 mcg/actuation nasal spray, 1 spray (50 mcg total) by Each Nostril route once daily., Disp: 54 g, Rfl: 2    losartan-hydrochlorothiazide 100-12.5 mg (HYZAAR) 100-12.5 mg Tab, Take 1 tablet by mouth once daily., Disp: 90 tablet, Rfl: 1    meloxicam (MOBIC) 7.5 MG tablet, Take 1 tablet (7.5 mg total) by mouth once daily. Anti-inflammatory for arthritis pain, Disp: 90 tablet, Rfl: 1    metoprolol succinate (TOPROL-XL) 25 MG 24 hr tablet, Take 1 tablet (25 mg total) by mouth 2 (two) times daily., Disp: 180 tablet, Rfl: 1    multivitamin (ONE DAILY MULTIVITAMIN) per tablet, Take 1 tablet by mouth once daily., Disp: , Rfl:     pantoprazole (PROTONIX) 40 MG tablet, Take 1 tablet (40 mg total) by mouth once daily., Disp: 90 tablet, Rfl: 1    sertraline (ZOLOFT) 100 MG tablet, Take 1 tablet (100 mg total) by mouth once daily., Disp: 90 tablet, Rfl: 1    vit A/vit C/vit E/zinc/copper (PRESERVISION AREDS ORAL), Take by mouth., Disp: , Rfl:     Review of Systems   Constitutional:  Positive for appetite change (eating smaller amts) and unexpected weight change (wt down 6lbs since jan, 14lbs over past year). Negative for chills and fever.   HENT:  Negative for sore throat and trouble swallowing.    Respiratory:  Negative for cough, shortness of breath and wheezing.    Cardiovascular:  Negative for chest pain, palpitations  "and leg swelling.   Gastrointestinal:  Positive for diarrhea (improved but using cholestyramine daily). Negative for abdominal pain, constipation, nausea and vomiting.   Genitourinary:  Positive for frequency. Negative for dysuria and hematuria.   Musculoskeletal:  Positive for back pain (chronic lower back pain worse first thing in am, does morning stretches which helps with pain). Negative for gait problem (no falls).   Skin:  Negative for rash.   Neurological:  Negative for dizziness, syncope, speech difficulty, numbness and headaches.   Psychiatric/Behavioral:  Negative for behavioral problems, confusion (short term memory issues), dysphoric mood (stable on med) and hallucinations. The patient is not nervous/anxious.         Objective:      Vitals:    04/06/23 0816   BP: (!) 112/58   Pulse: 85   SpO2: 100%   Weight: 48.6 kg (107 lb 3.2 oz)   Height: 4' 11" (1.499 m)     Physical Exam  Vitals and nursing note reviewed.   Constitutional:       General: She is not in acute distress.     Appearance: Normal appearance. She is well-developed.   HENT:      Head: Normocephalic and atraumatic.      Right Ear: Tympanic membrane and ear canal normal.      Left Ear: Tympanic membrane and ear canal normal.   Neck:      Vascular: No carotid bruit.   Cardiovascular:      Rate and Rhythm: Normal rate and regular rhythm.      Heart sounds: Murmur heard.   Systolic murmur is present with a grade of 2/6.     No friction rub. No gallop.   Pulmonary:      Effort: Pulmonary effort is normal. No respiratory distress.      Breath sounds: Normal breath sounds. No wheezing or rales.   Abdominal:      General: There is no distension.      Palpations: Abdomen is soft.      Tenderness: There is no abdominal tenderness.   Musculoskeletal:      Cervical back: Neck supple.      Right lower leg: No edema.      Left lower leg: No edema.   Lymphadenopathy:      Cervical: No cervical adenopathy.   Skin:     General: Skin is warm and dry.      " Findings: No rash.   Neurological:      General: No focal deficit present.      Mental Status: She is alert.      Comments: Gait slightly stooped, mild head tremor   Psychiatric:         Mood and Affect: Mood normal.         Assessment:       1. Essential hypertension    2. Dyslipidemia    3. Primary osteoarthritis involving multiple joints    4. MCI (mild cognitive impairment)    5. History of stroke    6. Cardiac pacemaker in situ    7. Chronic diarrhea    8. Mild episode of recurrent major depressive disorder           Plan:       1. Essential hypertension   -BP well controlled    2. Dyslipidemia   -reviewed recent labs with patient/daughter, well controlled    3. Primary osteoarthritis involving multiple joints   -stable on meloxicam    4. MCI (mild cognitive impairment)   -stable    5. History of stroke   -stable on baby aspirin  6. Cardiac pacemaker in situ    7. Chronic diarrhea  -improved after C diff treatment, still takes cholestyramine which helps  -     cholestyramine (QUESTRAN) 4 gram packet; Take 1 packet (4 g total) by mouth 2 (two) times daily.  Dispense: 180 packet; Refill: 3    8. Mild episode of recurrent major depressive disorder   -stable on med    Follow up in about 6 months (around 10/6/2023) for HTN, lipids.          Counseled on age and gender appropriate medical preventative services, including cancer screenings, immunizations, overall nutritional health, need for a consistent exercise regimen and an overall push towards maintaining a vigorous and active lifestyle.      4/6/2023 Shayla eTllo NP

## 2023-04-21 ENCOUNTER — TELEPHONE (OUTPATIENT)
Dept: FAMILY MEDICINE | Facility: CLINIC | Age: 83
End: 2023-04-21

## 2023-06-19 NOTE — TELEPHONE ENCOUNTER
5-14-18 Left message for patient to call me back to set up a consult appointment with Dr. Saldana.    MEDICATIONS  (STANDING):  amLODIPine   Tablet 2.5 milliGRAM(s) Oral daily  aspirin  chewable 81 milliGRAM(s) Oral daily  atorvastatin 10 milliGRAM(s) Oral at bedtime  calcium carbonate   1250 mG (OsCal) 1 Tablet(s) Oral two times a day  cholecalciferol 1000 Unit(s) Oral daily  cyanocobalamin 1000 MICROGram(s) Oral daily  divalproex Sprinkle 250 milliGRAM(s) Oral daily  divalproex Sprinkle 500 milliGRAM(s) Oral at bedtime  folic acid 1 milliGRAM(s) Oral daily  glucagon  Injectable 1 milliGRAM(s) IntraMuscular once  LORazepam     Tablet 0.5 milliGRAM(s) Oral two times a day  losartan 100 milliGRAM(s) Oral daily  naproxen 500 milliGRAM(s) Oral daily  pantoprazole   Suspension 40 milliGRAM(s) Oral before breakfast  QUEtiapine 50 milliGRAM(s) Oral every 12 hours  senna 2 Tablet(s) Oral at bedtime  sodium chloride 0.65% Nasal 2 Spray(s) Both Nostrils two times a day  venlafaxine 37.5 milliGRAM(s) Oral with dinner  venlafaxine 75 milliGRAM(s) Oral with breakfast    MEDICATIONS  (PRN):  acetaminophen     Tablet .. 650 milliGRAM(s) Oral every 6 hours PRN Temp greater or equal to 38C (100.4F), Mild Pain (1 - 3), Moderate Pain (4 - 6)  dextrose Oral Gel 15 Gram(s) Oral once PRN Blood Glucose LESS THAN 70 milliGRAM(s)/deciliter  LORazepam     Tablet 0.5 milliGRAM(s) Oral every 12 hours PRN anxiety/agitation  melatonin. 3 milliGRAM(s) Oral at bedtime PRN Insomnia  QUEtiapine 12.5 milliGRAM(s) Oral every 6 hours PRN anxiety

## 2023-06-26 ENCOUNTER — PATIENT MESSAGE (OUTPATIENT)
Dept: FAMILY MEDICINE | Facility: CLINIC | Age: 83
End: 2023-06-26

## 2023-06-26 DIAGNOSIS — K52.9 CHRONIC DIARRHEA: Primary | ICD-10-CM

## 2023-06-26 RX ORDER — CHOLESTYRAMINE 4 G/9G
4 POWDER, FOR SUSPENSION ORAL 2 TIMES DAILY
Qty: 378 G | Refills: 5 | Status: SHIPPED | OUTPATIENT
Start: 2023-06-26 | End: 2023-10-10 | Stop reason: SDUPTHER

## 2023-09-07 ENCOUNTER — PATIENT MESSAGE (OUTPATIENT)
Dept: FAMILY MEDICINE | Facility: CLINIC | Age: 83
End: 2023-09-07

## 2023-09-08 ENCOUNTER — TELEPHONE (OUTPATIENT)
Dept: FAMILY MEDICINE | Facility: CLINIC | Age: 83
End: 2023-09-08

## 2023-09-08 NOTE — TELEPHONE ENCOUNTER
Please call patient and tell her to hold her atorvastatin 80mg while taking the Paxlovid and then she can resume taking it.

## 2023-09-08 NOTE — TELEPHONE ENCOUNTER
Okay to send the paxlovid in?    Pt presents to ED c/o of pain and rash to right breast and right back. Pt states that it started on her back Tuesday and is now on her breath. Pt spoke to her neurologist and was told to go to ED to be evaluated. Pt denies sick contacts. Denies chest pain, shortness of breath, abdominal pain, nausea/vomiting, LOC, dizziness, itchiness to rash.

## 2023-09-08 NOTE — TELEPHONE ENCOUNTER
Would like to get the rx that Shayla offered yesterday.   Please call and let her know if sending.  [facility] [place].   Daughter calling - Alysha [phone]

## 2023-10-10 ENCOUNTER — OFFICE VISIT (OUTPATIENT)
Dept: FAMILY MEDICINE | Facility: CLINIC | Age: 83
End: 2023-10-10
Payer: MEDICARE

## 2023-10-10 VITALS
HEART RATE: 88 BPM | SYSTOLIC BLOOD PRESSURE: 128 MMHG | DIASTOLIC BLOOD PRESSURE: 72 MMHG | HEIGHT: 59 IN | OXYGEN SATURATION: 98 % | BODY MASS INDEX: 23.02 KG/M2 | WEIGHT: 114.19 LBS

## 2023-10-10 DIAGNOSIS — K21.9 GASTROESOPHAGEAL REFLUX DISEASE, UNSPECIFIED WHETHER ESOPHAGITIS PRESENT: ICD-10-CM

## 2023-10-10 DIAGNOSIS — F51.01 PRIMARY INSOMNIA: ICD-10-CM

## 2023-10-10 DIAGNOSIS — F02.A0 MILD LATE ONSET ALZHEIMER'S DEMENTIA WITHOUT BEHAVIORAL DISTURBANCE, PSYCHOTIC DISTURBANCE, MOOD DISTURBANCE, OR ANXIETY: ICD-10-CM

## 2023-10-10 DIAGNOSIS — J30.9 ALLERGIC RHINITIS, UNSPECIFIED SEASONALITY, UNSPECIFIED TRIGGER: ICD-10-CM

## 2023-10-10 DIAGNOSIS — M15.9 PRIMARY OSTEOARTHRITIS INVOLVING MULTIPLE JOINTS: ICD-10-CM

## 2023-10-10 DIAGNOSIS — E78.5 DYSLIPIDEMIA: ICD-10-CM

## 2023-10-10 DIAGNOSIS — G30.1 MILD LATE ONSET ALZHEIMER'S DEMENTIA WITHOUT BEHAVIORAL DISTURBANCE, PSYCHOTIC DISTURBANCE, MOOD DISTURBANCE, OR ANXIETY: ICD-10-CM

## 2023-10-10 DIAGNOSIS — K52.9 CHRONIC DIARRHEA: ICD-10-CM

## 2023-10-10 DIAGNOSIS — I10 ESSENTIAL HYPERTENSION: Primary | ICD-10-CM

## 2023-10-10 PROCEDURE — 3078F PR MOST RECENT DIASTOLIC BLOOD PRESSURE < 80 MM HG: ICD-10-PCS | Mod: CPTII,S$GLB,, | Performed by: NURSE PRACTITIONER

## 2023-10-10 PROCEDURE — 99214 PR OFFICE/OUTPT VISIT, EST, LEVL IV, 30-39 MIN: ICD-10-PCS | Mod: S$GLB,,, | Performed by: NURSE PRACTITIONER

## 2023-10-10 PROCEDURE — 3074F PR MOST RECENT SYSTOLIC BLOOD PRESSURE < 130 MM HG: ICD-10-PCS | Mod: CPTII,S$GLB,, | Performed by: NURSE PRACTITIONER

## 2023-10-10 PROCEDURE — 1101F PR PT FALLS ASSESS DOC 0-1 FALLS W/OUT INJ PAST YR: ICD-10-PCS | Mod: CPTII,S$GLB,, | Performed by: NURSE PRACTITIONER

## 2023-10-10 PROCEDURE — 3078F DIAST BP <80 MM HG: CPT | Mod: CPTII,S$GLB,, | Performed by: NURSE PRACTITIONER

## 2023-10-10 PROCEDURE — 99214 OFFICE O/P EST MOD 30 MIN: CPT | Mod: S$GLB,,, | Performed by: NURSE PRACTITIONER

## 2023-10-10 PROCEDURE — 1160F PR REVIEW ALL MEDS BY PRESCRIBER/CLIN PHARMACIST DOCUMENTED: ICD-10-PCS | Mod: CPTII,S$GLB,, | Performed by: NURSE PRACTITIONER

## 2023-10-10 PROCEDURE — 1160F RVW MEDS BY RX/DR IN RCRD: CPT | Mod: CPTII,S$GLB,, | Performed by: NURSE PRACTITIONER

## 2023-10-10 PROCEDURE — 1101F PT FALLS ASSESS-DOCD LE1/YR: CPT | Mod: CPTII,S$GLB,, | Performed by: NURSE PRACTITIONER

## 2023-10-10 PROCEDURE — 1159F MED LIST DOCD IN RCRD: CPT | Mod: CPTII,S$GLB,, | Performed by: NURSE PRACTITIONER

## 2023-10-10 PROCEDURE — 3074F SYST BP LT 130 MM HG: CPT | Mod: CPTII,S$GLB,, | Performed by: NURSE PRACTITIONER

## 2023-10-10 PROCEDURE — 3288F FALL RISK ASSESSMENT DOCD: CPT | Mod: CPTII,S$GLB,, | Performed by: NURSE PRACTITIONER

## 2023-10-10 PROCEDURE — 3288F PR FALLS RISK ASSESSMENT DOCUMENTED: ICD-10-PCS | Mod: CPTII,S$GLB,, | Performed by: NURSE PRACTITIONER

## 2023-10-10 PROCEDURE — 1159F PR MEDICATION LIST DOCUMENTED IN MEDICAL RECORD: ICD-10-PCS | Mod: CPTII,S$GLB,, | Performed by: NURSE PRACTITIONER

## 2023-10-10 RX ORDER — ATORVASTATIN CALCIUM 80 MG/1
80 TABLET, FILM COATED ORAL DAILY
Qty: 90 TABLET | Refills: 3 | Status: SHIPPED | OUTPATIENT
Start: 2023-10-10

## 2023-10-10 RX ORDER — CHOLESTYRAMINE 4 G/9G
4 POWDER, FOR SUSPENSION ORAL 2 TIMES DAILY
Qty: 348 G | Refills: 5 | Status: SHIPPED | OUTPATIENT
Start: 2023-10-10 | End: 2024-10-09

## 2023-10-10 RX ORDER — FLUTICASONE PROPIONATE 50 MCG
1 SPRAY, SUSPENSION (ML) NASAL DAILY
Qty: 54 G | Refills: 3 | Status: SHIPPED | OUTPATIENT
Start: 2023-10-10

## 2023-10-10 RX ORDER — METOPROLOL SUCCINATE 25 MG/1
25 TABLET, EXTENDED RELEASE ORAL 2 TIMES DAILY
Qty: 180 TABLET | Refills: 3 | Status: SHIPPED | OUTPATIENT
Start: 2023-10-10

## 2023-10-10 RX ORDER — DONEPEZIL HYDROCHLORIDE 10 MG/1
10 TABLET, FILM COATED ORAL NIGHTLY
Qty: 90 TABLET | Refills: 3 | Status: SHIPPED | OUTPATIENT
Start: 2023-10-10 | End: 2024-10-09

## 2023-10-10 RX ORDER — PANTOPRAZOLE SODIUM 40 MG/1
40 TABLET, DELAYED RELEASE ORAL DAILY
Qty: 90 TABLET | Refills: 3 | Status: SHIPPED | OUTPATIENT
Start: 2023-10-10

## 2023-10-10 RX ORDER — LOSARTAN POTASSIUM AND HYDROCHLOROTHIAZIDE 12.5; 1 MG/1; MG/1
1 TABLET ORAL DAILY
Qty: 90 TABLET | Refills: 3 | Status: SHIPPED | OUTPATIENT
Start: 2023-10-10

## 2023-10-10 RX ORDER — DIPHENOXYLATE HYDROCHLORIDE AND ATROPINE SULFATE 2.5; .025 MG/1; MG/1
1 TABLET ORAL 3 TIMES DAILY PRN
Qty: 30 TABLET | Refills: 3 | Status: SHIPPED | OUTPATIENT
Start: 2023-10-10

## 2023-10-10 RX ORDER — MELOXICAM 7.5 MG/1
7.5 TABLET ORAL DAILY
Qty: 90 TABLET | Refills: 3 | Status: SHIPPED | OUTPATIENT
Start: 2023-10-10

## 2023-10-10 RX ORDER — SERTRALINE HYDROCHLORIDE 100 MG/1
100 TABLET, FILM COATED ORAL DAILY
Qty: 90 TABLET | Refills: 3 | Status: SHIPPED | OUTPATIENT
Start: 2023-10-10

## 2023-10-10 RX ORDER — AMITRIPTYLINE HYDROCHLORIDE 50 MG/1
50 TABLET, FILM COATED ORAL NIGHTLY
Qty: 90 TABLET | Refills: 3 | Status: SHIPPED | OUTPATIENT
Start: 2023-10-10

## 2023-10-10 NOTE — PROGRESS NOTES
SUBJECTIVE:    Patient ID: Maricel Abdul is a 83 y.o. female.    Chief Complaint: Follow-up (Pt brought medication list//Pt is here for a 6 month check up and medication refills//MUNDO )    Pt here for regular f/u- HTN, lipids, OA, depression. Pt here with her daughter    Pt reports overall has been doing okay. Tested positive for COVID early September- overall wasn't really that sick. Did take paxlovid and all symptoms have now resolved.    Reports appetite is good, has gained 7lbs since last visit here.     Daughter reports she's noticed pt having more troubles with memory- very forgetful, having trouble working the  and TV. Daughter would like for her to see neurologist. Still living alone but daughter comes over daily and works there during the day and gives her her medicine. Daughter has a camera set up on her so she can check on her when she's not there.  Pt stays active around her house, still does most of her housework. Denies any falls. Daughter reports pt is no longer driving. Sleeping good.    Follows with Dr Ralph cyr for PPM-         No visits with results within 6 Month(s) from this visit.   Latest known visit with results is:   Office Visit on 01/03/2023   Component Date Value Ref Range Status    WBC 03/25/2023 9.6  3.4 - 10.8 x10E3/uL Final    RBC 03/25/2023 4.15  3.77 - 5.28 x10E6/uL Final    Hemoglobin 03/25/2023 11.9  11.1 - 15.9 g/dL Final    Hematocrit 03/25/2023 38.0  34.0 - 46.6 % Final    MCV 03/25/2023 92  79 - 97 fL Final    MCH 03/25/2023 28.7  26.6 - 33.0 pg Final    MCHC 03/25/2023 31.3 (L)  31.5 - 35.7 g/dL Final    RDW 03/25/2023 12.5  11.7 - 15.4 % Final    Platelets 03/25/2023 233  150 - 450 x10E3/uL Final    Neutrophils 03/25/2023 77  Not Estab. % Final    Lymphs 03/25/2023 12  Not Estab. % Final    Monocytes 03/25/2023 8  Not Estab. % Final    Eos 03/25/2023 3  Not Estab. % Final    Basos 03/25/2023 0  Not Estab. % Final    Neutrophils (Absolute) 03/25/2023 7.4 (H)   1.4 - 7.0 x10E3/uL Final    Lymphs (Absolute) 03/25/2023 1.1  0.7 - 3.1 x10E3/uL Final    Monocytes(Absolute) 03/25/2023 0.8  0.1 - 0.9 x10E3/uL Final    Eos (Absolute) 03/25/2023 0.3  0.0 - 0.4 x10E3/uL Final    Baso (Absolute) 03/25/2023 0.0  0.0 - 0.2 x10E3/uL Final    Immature Granulocytes 03/25/2023 0  Not Estab. % Final    Immature Grans (Abs) 03/25/2023 0.0  0.0 - 0.1 x10E3/uL Final    Glucose 03/25/2023 93  70 - 99 mg/dL Final    BUN 03/25/2023 24  8 - 27 mg/dL Final    Creatinine 03/25/2023 0.80  0.57 - 1.00 mg/dL Final    eGFR 03/25/2023 73  >59 mL/min/1.73 Final    BUN/Creatinine Ratio 03/25/2023 30 (H)  12 - 28 Final    Sodium 03/25/2023 142  134 - 144 mmol/L Final    Potassium 03/25/2023 4.0  3.5 - 5.2 mmol/L Final    Chloride 03/25/2023 103  96 - 106 mmol/L Final    CO2 03/25/2023 22  20 - 29 mmol/L Final    Calcium 03/25/2023 9.5  8.7 - 10.3 mg/dL Final    Protein, Total 03/25/2023 6.5  6.0 - 8.5 g/dL Final    Albumin 03/25/2023 4.3  3.6 - 4.6 g/dL Final    Globulin, Total 03/25/2023 2.2  1.5 - 4.5 g/dL Final    Albumin/Globulin Ratio 03/25/2023 2.0  1.2 - 2.2 Final    Total Bilirubin 03/25/2023 <0.2  0.0 - 1.2 mg/dL Final    Alkaline Phosphatase 03/25/2023 104  44 - 121 IU/L Final    AST 03/25/2023 25  0 - 40 IU/L Final    ALT 03/25/2023 17  0 - 32 IU/L Final    Cholesterol 03/25/2023 163  100 - 199 mg/dL Final    Triglycerides 03/25/2023 115  0 - 149 mg/dL Final    HDL 03/25/2023 82  >39 mg/dL Final    VLDL Cholesterol Tashi 03/25/2023 20  5 - 40 mg/dL Final    LDL Calculated 03/25/2023 61  0 - 99 mg/dL Final    Creatinine, Urine 03/25/2023 59.6  Not Estab. mg/dL Final    Microalb, Ur 03/25/2023 7.6  Not Estab. ug/mL Final    Microalb/Crt. Ratio 03/25/2023 13  0 - 29 mg/g creat Final    Specific Gravity, UA 03/25/2023 1.016  1.005 - 1.030 Final    pH, UA 03/25/2023 6.0  5.0 - 7.5 Final    Color, UA 03/25/2023 Yellow  Yellow Final    Clarity, UA 03/25/2023 Clear  Clear Final    Leukocytes, UA  03/25/2023 1+ (A)  Negative Final    Protein, UA 03/25/2023 Negative  Negative/Trace Final    Glucose, UA 03/25/2023 Negative  Negative Final    Ketones, UA 03/25/2023 Negative  Negative Final    Occult Blood UA 03/25/2023 Negative  Negative Final    Bilirubin, UA 03/25/2023 Negative  Negative Final    Urobilinogen, UA 03/25/2023 0.2  0.2 - 1.0 mg/dL Final    Nitrite, UA 03/25/2023 Negative  Negative Final    Microscopic Examination 03/25/2023 See below:   Final    Urinalysis Reflex 03/25/2023 Comment   Final    TSH 03/25/2023 3.300  0.450 - 4.500 uIU/mL Final    WBC, UA 03/25/2023 None seen  0 - 5 /hpf Final    RBC, UA 03/25/2023 0-2  0 - 2 /hpf Final    Epithelial Cells (non renal) 03/25/2023 None seen  0 - 10 /hpf Final    Casts 03/25/2023 None seen  None seen /lpf Final    Bacteria, UA 03/25/2023 None seen  None seen/Few Final    Urine Culture, Comprehensive 03/25/2023 Final report   Final    Result 03/25/2023 Mixed skin eva   Final       Past Medical History:   Diagnosis Date    Arthritis     Diverticulitis     Encounter for blood transfusion     Hypertension     Kidney stones     Pacemaker     Shingles     Squamous cell carcinoma of skin     TIA (transient ischemic attack) 12/2017     Past Surgical History:   Procedure Laterality Date    APPENDECTOMY      CARDIAC SURGERY      pacemaker    CERVICAL FUSION      CHOLECYSTECTOMY      COLONOSCOPY N/A 8/14/2019    Procedure: COLONOSCOPY;  Surgeon: Elio Perez MD;  Location: [facility];  Service: Endoscopy;  Laterality: N/A;    HYSTERECTOMY      SHOULDER SURGERY       Family History   Problem Relation Age of Onset    Cancer Father         lung    Cancer Brother         colon cancer    Colon cancer Brother     Pancreatitis Mother     Eczema Neg Hx     Psoriasis Neg Hx     Lupus Neg Hx     Melanoma Neg Hx     Stomach cancer Neg Hx     Esophageal cancer Neg Hx        All of your core healthy metrics are met.      The CVD Risk score (OMEGA'Agofrances, et al., 2008)  failed to calculate for the following reasons:    The 2008 CVD risk score is only valid for ages 30 to 74    The patient has a prior MI, stroke, CHF, or peripheral vascular disease diagnosis     Marital Status:   Alcohol History:  reports current alcohol use of about 7.0 standard drinks of alcohol per week.  Tobacco History:  reports that she quit smoking about 49 years ago. Her smoking use included cigarettes. She started smoking about 69 years ago. She has never been exposed to tobacco smoke. She has never used smokeless tobacco.  Drug History:  reports no history of drug use.    Health Maintenance Topics with due status: Not Due       Topic Last Completion Date    TETANUS VACCINE 02/26/2019    Colonoscopy 08/14/2019    DEXA Scan 09/16/2022    Lipid Panel 03/25/2023     Immunization History   Administered Date(s) Administered    COVID-19, MRNA, LN-S, PF (Pfizer) (Purple Cap) 01/10/2021, 01/31/2021, 10/22/2021    Influenza - High Dose - PF (65 years and older) 08/30/2013, 08/31/2015, 09/19/2016, 09/19/2017, 09/12/2018, 10/15/2019    Influenza - Quadrivalent - High Dose - PF (65 years and older) 09/09/2020, 09/28/2021    Influenza - Quadrivalent - PF *Preferred* (6 months and older) 11/18/2002, 01/19/2006    Influenza - Trivalent (ADULT) 11/18/2002, 01/19/2006    Influenza - Trivalent - PF (ADULT) 08/31/2015    Influenza A (H5N1) 2004 Monovalent 01/01/2016    Pneumococcal 01/01/2016    Pneumococcal Conjugate - 13 Valent 08/30/2016    Pneumococcal Polysaccharide - 23 Valent 11/18/2002, 09/12/2018    Td (ADULT) 02/26/2019    Varicella 01/01/2014       Review of patient's allergies indicates:   Allergen Reactions    Codeine Nausea And Vomiting       Current Outpatient Medications:     aspirin (ECOTRIN) 81 MG EC tablet, Take 1 tablet (81 mg total) by mouth once daily., Disp: , Rfl: 0    cholecalciferol, vitamin D3, 125 mcg (5,000 unit) Tab, Take 5,000 Units by mouth once daily., Disp: , Rfl:     multivitamin (ONE  DAILY MULTIVITAMIN) per tablet, Take 1 tablet by mouth once daily., Disp: , Rfl:     vit A/vit C/vit E/zinc/copper (PRESERVISION AREDS ORAL), Take by mouth., Disp: , Rfl:     amitriptyline (ELAVIL) 50 MG tablet, Take 1 tablet (50 mg total) by mouth every evening., Disp: 90 tablet, Rfl: 3    atorvastatin (LIPITOR) 80 MG tablet, Take 1 tablet (80 mg total) by mouth once daily., Disp: 90 tablet, Rfl: 3    biotin 10,000 mcg Cap, Take 1 capsule by mouth once daily., Disp: , Rfl:     cetirizine (ZYRTEC) 10 MG tablet, Take 1 tablet (10 mg total) by mouth once daily. (Patient not taking: Reported on 1/3/2023), Disp: 30 tablet, Rfl: 0    cholestyramine, with sugar, 4 gram Powd, Take 4 g by mouth 2 (two) times a day., Disp: 348 g, Rfl: 5    cranberry 500 mg Cap, Take 1 capsule by mouth once daily., Disp: , Rfl:     diphenoxylate-atropine 2.5-0.025 mg (LOMOTIL) 2.5-0.025 mg per tablet, Take 1 tablet by mouth 3 (three) times daily as needed for Diarrhea. TAKE 1 TABLET BY MOUTH 3 TIMES DAILY AS NEEDED FOR DIARRHEA, Disp: 30 tablet, Rfl: 3    donepeziL (ARICEPT) 10 MG tablet, Take 1 tablet (10 mg total) by mouth every evening., Disp: 90 tablet, Rfl: 3    fluticasone propionate (FLONASE) 50 mcg/actuation nasal spray, 1 spray (50 mcg total) by Each Nostril route once daily., Disp: 54 g, Rfl: 3    losartan-hydrochlorothiazide 100-12.5 mg (HYZAAR) 100-12.5 mg Tab, Take 1 tablet by mouth once daily., Disp: 90 tablet, Rfl: 3    meloxicam (MOBIC) 7.5 MG tablet, Take 1 tablet (7.5 mg total) by mouth once daily. Anti-inflammatory for arthritis pain, Disp: 90 tablet, Rfl: 3    metoprolol succinate (TOPROL-XL) 25 MG 24 hr tablet, Take 1 tablet (25 mg total) by mouth 2 (two) times daily., Disp: 180 tablet, Rfl: 3    pantoprazole (PROTONIX) 40 MG tablet, Take 1 tablet (40 mg total) by mouth once daily., Disp: 90 tablet, Rfl: 3    sertraline (ZOLOFT) 100 MG tablet, Take 1 tablet (100 mg total) by mouth once daily., Disp: 90 tablet, Rfl:  "3    Review of Systems   Constitutional:  Negative for appetite change, chills, fever and unexpected weight change (wt up 7lbs since April visit).   HENT:  Negative for sore throat and trouble swallowing.    Eyes:  Negative for visual disturbance.   Respiratory:  Negative for cough, shortness of breath and wheezing.    Cardiovascular:  Negative for chest pain, palpitations and leg swelling.   Gastrointestinal:  Positive for diarrhea (improved but using cholestyramine daily). Negative for abdominal pain, constipation, nausea and vomiting.   Genitourinary:  Positive for frequency. Negative for dysuria and hematuria.   Musculoskeletal:  Positive for back pain (chronic lower back pain worse first thing in am, does morning stretches which helps with pain). Negative for gait problem (no falls).   Skin:  Negative for rash.   Neurological:  Negative for dizziness, syncope, speech difficulty, numbness and headaches.   Psychiatric/Behavioral:  Positive for confusion (worsening memory concerns). Negative for agitation, behavioral problems, dysphoric mood (stable on med), hallucinations and sleep disturbance. The patient is not nervous/anxious.           Objective:      Vitals:    10/10/23 0818   BP: 128/72   Pulse: 88   SpO2: 98%   Weight: 51.8 kg (114 lb 3.2 oz)   Height: 4' 11" (1.499 m)     Physical Exam  Vitals and nursing note reviewed.   Constitutional:       General: She is not in acute distress.     Appearance: Normal appearance. She is well-developed.   HENT:      Head: Normocephalic and atraumatic.      Right Ear: Tympanic membrane and ear canal normal.      Left Ear: Tympanic membrane and ear canal normal.   Neck:      Vascular: No carotid bruit.   Cardiovascular:      Rate and Rhythm: Normal rate and regular rhythm.      Heart sounds: Murmur heard.      Systolic murmur is present with a grade of 2/6.      No friction rub. No gallop.   Pulmonary:      Effort: Pulmonary effort is normal. No respiratory distress.      " Breath sounds: Normal breath sounds. No wheezing or rales.   Abdominal:      General: There is no distension.      Palpations: Abdomen is soft.      Tenderness: There is no abdominal tenderness.   Musculoskeletal:      Cervical back: Neck supple.      Right lower leg: No edema.      Left lower leg: No edema.   Lymphadenopathy:      Cervical: No cervical adenopathy.   Skin:     General: Skin is warm and dry.      Findings: No rash.   Neurological:      General: No focal deficit present.      Mental Status: She is alert. Mental status is at baseline.      Comments: Pleasant and conversant, cooperative. Disoriented to date/month, Gait slightly stooped, mild head tremor   Psychiatric:         Mood and Affect: Mood normal.           Assessment:       1. Essential hypertension    2. Mild late onset Alzheimer's dementia without behavioral disturbance, psychotic disturbance, mood disturbance, or anxiety    3. Dyslipidemia    4. Gastroesophageal reflux disease, unspecified whether esophagitis present    5. Primary osteoarthritis involving multiple joints    6. Chronic diarrhea    7. Primary insomnia    8. Allergic rhinitis, unspecified seasonality, unspecified trigger           Plan:       1. Essential hypertension  -BP well controlled  -     losartan-hydrochlorothiazide 100-12.5 mg (HYZAAR) 100-12.5 mg Tab; Take 1 tablet by mouth once daily.  Dispense: 90 tablet; Refill: 3  -     metoprolol succinate (TOPROL-XL) 25 MG 24 hr tablet; Take 1 tablet (25 mg total) by mouth 2 (two) times daily.  Dispense: 180 tablet; Refill: 3    2. 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 Daughter advised I agree pt should not be driving anymore. Medications administered by her daughter  -     donepeziL (ARICEPT) 10 MG tablet; Take 1 tablet (10 mg total) by mouth every evening.   Dispense: 90 tablet; Refill: 3  -     Ambulatory referral/consult to Neurology; Future; Expected date: 10/17/2023    3. Dyslipidemia  -well controlled on last labs  -     atorvastatin (LIPITOR) 80 MG tablet; Take 1 tablet (80 mg total) by mouth once daily.  Dispense: 90 tablet; Refill: 3    4. Gastroesophageal reflux disease, unspecified whether esophagitis present  -stable  -     pantoprazole (PROTONIX) 40 MG tablet; Take 1 tablet (40 mg total) by mouth once daily.  Dispense: 90 tablet; Refill: 3    5. Primary osteoarthritis involving multiple joints  -stable  -     meloxicam (MOBIC) 7.5 MG tablet; Take 1 tablet (7.5 mg total) by mouth once daily. Anti-inflammatory for arthritis pain  Dispense: 90 tablet; Refill: 3    6. Chronic diarrhea  -controlled with cholestyramine and using Lomotil p.r.n.  -     cholestyramine, with sugar, 4 gram Powd; Take 4 g by mouth 2 (two) times a day.  Dispense: 348 g; Refill: 5  -     diphenoxylate-atropine 2.5-0.025 mg (LOMOTIL) 2.5-0.025 mg per tablet; Take 1 tablet by mouth 3 (three) times daily as needed for Diarrhea. TAKE 1 TABLET BY MOUTH 3 TIMES DAILY AS NEEDED FOR DIARRHEA  Dispense: 30 tablet; Refill: 3    7. Primary insomnia  -daughter reports patient sleeping well  -     amitriptyline (ELAVIL) 50 MG tablet; Take 1 tablet (50 mg total) by mouth every evening.  Dispense: 90 tablet; Refill: 3  -     sertraline (ZOLOFT) 100 MG tablet; Take 1 tablet (100 mg total) by mouth once daily.  Dispense: 90 tablet; Refill: 3    8. Allergic rhinitis, unspecified seasonality, unspecified trigger  -     fluticasone propionate (FLONASE) 50 mcg/actuation nasal spray; 1 spray (50 mcg total) by Each Nostril route once daily.  Dispense: 54 g; Refill: 3      Follow up in about 6 months (around 4/10/2024) for HTN, lipids.          Counseled on age and gender appropriate medical preventative services, including cancer screenings, immunizations, overall nutritional health, need for a consistent  exercise regimen and an overall push towards maintaining a vigorous and active lifestyle.      10/10/2023 Shayla Tello NP

## 2023-10-10 NOTE — PATIENT INSTRUCTIONS
Elian Solis MD- neurologist- call to schedule appointment  [id number] [address] [address]  Phone: [phone]

## 2023-11-20 ENCOUNTER — PATIENT MESSAGE (OUTPATIENT)
Dept: FAMILY MEDICINE | Facility: CLINIC | Age: 83
End: 2023-11-20

## 2023-11-20 DIAGNOSIS — H91.90 HEARING LOSS, UNSPECIFIED HEARING LOSS TYPE, UNSPECIFIED LATERALITY: Primary | ICD-10-CM

## 2024-01-18 ENCOUNTER — TELEPHONE (OUTPATIENT)
Dept: DERMATOLOGY | Facility: CLINIC | Age: 84
End: 2024-01-18

## 2024-01-18 NOTE — TELEPHONE ENCOUNTER
----- Message from Lanette Gomez sent at 1/18/2024  1:30 PM CST -----  Contact: pt daughter rachel  Type:  Sooner Appointment Request    Caller is requesting a sooner appointment.  Caller declined first available appointment listed below.  Caller will not accept being placed on the waitlist and is requesting a message be sent to doctor.    Name of Caller:  pt daughter rachel  When is the first available appointment?  N/a  Symptoms:  wart on hand/pt keeps hitting it and it bleeds  Would the patient rather a call back or a response via [facility]? call  Best Call Back Number:  [phone]   Additional Information:  would like to know if something can be done with pcp office or does she need to see dermatology.please call

## 2024-02-02 ENCOUNTER — OFFICE VISIT (OUTPATIENT)
Dept: DERMATOLOGY | Facility: CLINIC | Age: 84
End: 2024-02-02
Payer: MEDICARE

## 2024-02-02 DIAGNOSIS — Z08 ENCOUNTER FOR FOLLOW-UP SURVEILLANCE OF SKIN CANCER: ICD-10-CM

## 2024-02-02 DIAGNOSIS — D18.01 CHERRY ANGIOMA: ICD-10-CM

## 2024-02-02 DIAGNOSIS — D22.9 MULTIPLE BENIGN NEVI: ICD-10-CM

## 2024-02-02 DIAGNOSIS — L82.1 SEBORRHEIC KERATOSES: ICD-10-CM

## 2024-02-02 DIAGNOSIS — B07.8 COMMON WART: Primary | ICD-10-CM

## 2024-02-02 DIAGNOSIS — Z85.828 ENCOUNTER FOR FOLLOW-UP SURVEILLANCE OF SKIN CANCER: ICD-10-CM

## 2024-02-02 PROCEDURE — 99213 OFFICE O/P EST LOW 20 MIN: CPT | Mod: 25,S$GLB,, | Performed by: DERMATOLOGY

## 2024-02-02 PROCEDURE — 17110 DESTRUCTION B9 LES UP TO 14: CPT | Mod: S$GLB,,, | Performed by: DERMATOLOGY

## 2024-02-02 NOTE — PROGRESS NOTES
Subjective:      Patient ID:  Maricel Abdul is a 83 y.o. female who presents for   Chief Complaint   Patient presents with    Spot     Left wrist     LOV 12/13/22 - ISK, SK, Hx of NMSC    Patient here today for skin check UBSE   Complains of spot on left wrist x 3 months    Derm Hx:  Phx SCC-Dr. Saldana left cheek 2018  BCC (Pinkus) midline midback E&S 2021  Denies Fhx of MM    *PACEMAKER*      Current Outpatient Medications:   ·  amitriptyline (ELAVIL) 50 MG tablet, Take 1 tablet (50 mg total) by mouth every evening., Disp: 90 tablet, Rfl: 3  ·  atorvastatin (LIPITOR) 80 MG tablet, Take 1 tablet (80 mg total) by mouth once daily., Disp: 90 tablet, Rfl: 3  ·  biotin 10,000 mcg Cap, Take 1 capsule by mouth once daily., Disp: , Rfl:   ·  cholecalciferol, vitamin D3, 125 mcg (5,000 unit) Tab, Take 5,000 Units by mouth once daily., Disp: , Rfl:   ·  cholestyramine, with sugar, 4 gram Powd, Take 4 g by mouth 2 (two) times a day., Disp: 348 g, Rfl: 5  ·  cranberry 500 mg Cap, Take 1 capsule by mouth once daily., Disp: , Rfl:   ·  diphenoxylate-atropine 2.5-0.025 mg (LOMOTIL) 2.5-0.025 mg per tablet, Take 1 tablet by mouth 3 (three) times daily as needed for Diarrhea. TAKE 1 TABLET BY MOUTH 3 TIMES DAILY AS NEEDED FOR DIARRHEA, Disp: 30 tablet, Rfl: 3  ·  donepeziL (ARICEPT) 10 MG tablet, Take 1 tablet (10 mg total) by mouth every evening., Disp: 90 tablet, Rfl: 3  ·  fluticasone propionate (FLONASE) 50 mcg/actuation nasal spray, 1 spray (50 mcg total) by Each Nostril route once daily., Disp: 54 g, Rfl: 3  ·  losartan-hydrochlorothiazide 100-12.5 mg (HYZAAR) 100-12.5 mg Tab, Take 1 tablet by mouth once daily., Disp: 90 tablet, Rfl: 3  ·  meloxicam (MOBIC) 7.5 MG tablet, Take 1 tablet (7.5 mg total) by mouth once daily. Anti-inflammatory for arthritis pain, Disp: 90 tablet, Rfl: 3  ·  metoprolol succinate (TOPROL-XL) 25 MG 24 hr tablet, Take 1 tablet (25 mg total) by mouth 2 (two) times daily., Disp: 180 tablet, Rfl:  3  ·  multivitamin (ONE DAILY MULTIVITAMIN) per tablet, Take 1 tablet by mouth once daily., Disp: , Rfl:   ·  pantoprazole (PROTONIX) 40 MG tablet, Take 1 tablet (40 mg total) by mouth once daily., Disp: 90 tablet, Rfl: 3  ·  sertraline (ZOLOFT) 100 MG tablet, Take 1 tablet (100 mg total) by mouth once daily., Disp: 90 tablet, Rfl: 3  ·  vit A/vit C/vit E/zinc/copper (PRESERVISION AREDS ORAL), Take by mouth., Disp: , Rfl:   ·  aspirin (ECOTRIN) 81 MG EC tablet, Take 1 tablet (81 mg total) by mouth once daily., Disp: , Rfl: 0  ·  cetirizine (ZYRTEC) 10 MG tablet, Take 1 tablet (10 mg total) by mouth once daily. (Patient not taking: Reported on 1/3/2023), Disp: 30 tablet, Rfl: 0           Review of Systems   Constitutional:  Negative for fever, chills and fatigue.   Skin:  Positive for activity-related sunscreen use. Negative for daily sunscreen use.   Hematologic/Lymphatic: Bruises/bleeds easily.       Objective:   Physical Exam   Constitutional: She appears well-developed and well-nourished. No distress.   Neurological: She is alert and oriented to person, place, and time. She is not disoriented.   Psychiatric: She has a normal mood and affect.   Skin:   Areas Examined (abnormalities noted in diagram):   Scalp / Hair Palpated and Inspected  Head / Face Inspection Performed  Neck Inspection Performed  Chest / Axilla Inspection Performed  Abdomen Inspection Performed  Back Inspection Performed  RUE Inspected  LUE Inspection Performed  Nails and Digits Inspection Performed                 Diagram Legend     Erythematous scaling macule/papule c/w actinic keratosis       Vascular papule c/w angioma      Pigmented verrucoid papule/plaque c/w seborrheic keratosis      Yellow umbilicated papule c/w sebaceous hyperplasia      Irregularly shaped tan macule c/w lentigo     1-2 mm smooth white papules consistent with Milia      Movable subcutaneous cyst with punctum c/w epidermal inclusion cyst      Subcutaneous movable cyst  c/w pilar cyst      Firm pink to brown papule c/w dermatofibroma      Pedunculated fleshy papule(s) c/w skin tag(s)      Evenly pigmented macule c/w junctional nevus     Mildly variegated pigmented, slightly irregular-bordered macule c/w mildly atypical nevus      Flesh colored to evenly pigmented papule c/w intradermal nevus       Pink pearly papule/plaque c/w basal cell carcinoma      Erythematous hyperkeratotic cursted plaque c/w SCC      Surgical scar with no sign of skin cancer recurrence      Open and closed comedones      Inflammatory papules and pustules      Verrucoid papule consistent consistent with wart     Erythematous eczematous patches and plaques     Dystrophic onycholytic nail with subungual debris c/w onychomycosis     Umbilicated papule    Erythematous-base heme-crusted tan verrucoid plaque consistent with inflamed seborrheic keratosis     Erythematous Silvery Scaling Plaque c/w Psoriasis     See annotation      Assessment / Plan:        Common wart  Procedure note for destruction via shave debulking:    Discussed risks of procedure including but not limited to infection, persistence of lesion, recurrence of lesion, and scar. Verbal consent obtained. Area cleaned with alcohol and anesthetized with 1% lidocaine with epinephrine. Lesion(s) shaved with sharp razor then base destroyed with hyfrecation. No complications.    Encounter for follow-up surveillance of skin cancer  Area of previous NMSC (x2 face and back) examined. Site well healed with no signs of recurrence.  Upper body skin examination performed today including at least 9 points as noted in physical examination. No lesions suspicious for malignancy noted.    Patient instructed in importance in daily broad spectrum sun protection of at least spf 30. Mineral sunscreen ingredients preferred (Zinc +/- Titanium) and can be found OTC.   Recommend Elta MD for daily use on face and neck.  Patient encouraged to wear hat for all outdoor exposure.    Also discussed sun avoidance and use of protective clothing.    Seborrheic keratoses  These are benign inherited growths without a malignant potential. Reassurance given to patient. No treatment is necessary.     Cherry angioma  This is a benign vascular lesion. Reassurance given. No treatment required.     Multiple benign nevi  Careful dermoscopy evaluation of nevi performed with none identified as needing biopsy today  Monitor for new mole or moles that are becoming bigger, darker, irritated, or developing irregular borders.                    Follow up if symptoms worsen or fail to improve.

## 2024-03-27 ENCOUNTER — TELEPHONE (OUTPATIENT)
Dept: FAMILY MEDICINE | Facility: CLINIC | Age: 84
End: 2024-03-27
Payer: MEDICARE

## 2024-03-27 DIAGNOSIS — E78.5 DYSLIPIDEMIA: ICD-10-CM

## 2024-03-27 DIAGNOSIS — Z00.00 ANNUAL PHYSICAL EXAM: ICD-10-CM

## 2024-03-27 DIAGNOSIS — I10 ESSENTIAL HYPERTENSION: Primary | ICD-10-CM

## 2024-04-09 ENCOUNTER — TELEPHONE (OUTPATIENT)
Dept: FAMILY MEDICINE | Facility: CLINIC | Age: 84
End: 2024-04-09
Payer: MEDICARE

## 2024-04-09 NOTE — TELEPHONE ENCOUNTER
----- Message from Sophia Fair sent at 4/9/2024 10:14 AM CDT -----  Daughter rachel calling to reschedule pt for tomorrow's appointment due to the weather and traveling from [place]. The only days her daughter would not be able to bring her Tuesday or wednesday   [phone]

## 2024-04-15 ENCOUNTER — OFFICE VISIT (OUTPATIENT)
Dept: FAMILY MEDICINE | Facility: CLINIC | Age: 84
End: 2024-04-15
Payer: MEDICARE

## 2024-04-15 VITALS
WEIGHT: 116.81 LBS | HEIGHT: 59 IN | HEART RATE: 82 BPM | BODY MASS INDEX: 23.55 KG/M2 | SYSTOLIC BLOOD PRESSURE: 120 MMHG | OXYGEN SATURATION: 98 % | DIASTOLIC BLOOD PRESSURE: 64 MMHG

## 2024-04-15 DIAGNOSIS — Z86.73 HISTORY OF STROKE: ICD-10-CM

## 2024-04-15 DIAGNOSIS — I35.0 NONRHEUMATIC AORTIC VALVE STENOSIS: ICD-10-CM

## 2024-04-15 DIAGNOSIS — G30.1 MILD LATE ONSET ALZHEIMER'S DEMENTIA WITHOUT BEHAVIORAL DISTURBANCE, PSYCHOTIC DISTURBANCE, MOOD DISTURBANCE, OR ANXIETY: ICD-10-CM

## 2024-04-15 DIAGNOSIS — E78.5 DYSLIPIDEMIA: ICD-10-CM

## 2024-04-15 DIAGNOSIS — F02.A0 MILD LATE ONSET ALZHEIMER'S DEMENTIA WITHOUT BEHAVIORAL DISTURBANCE, PSYCHOTIC DISTURBANCE, MOOD DISTURBANCE, OR ANXIETY: ICD-10-CM

## 2024-04-15 DIAGNOSIS — I10 ESSENTIAL HYPERTENSION: Primary | ICD-10-CM

## 2024-04-15 DIAGNOSIS — I44.2 COMPLETE HEART BLOCK: ICD-10-CM

## 2024-04-15 DIAGNOSIS — F33.0 MILD EPISODE OF RECURRENT MAJOR DEPRESSIVE DISORDER: ICD-10-CM

## 2024-04-15 DIAGNOSIS — I70.0 AORTIC ATHEROSCLEROSIS: ICD-10-CM

## 2024-04-15 DIAGNOSIS — Z95.0 CARDIAC PACEMAKER IN SITU: ICD-10-CM

## 2024-04-15 PROCEDURE — 1160F RVW MEDS BY RX/DR IN RCRD: CPT | Mod: CPTII,S$GLB,, | Performed by: NURSE PRACTITIONER

## 2024-04-15 PROCEDURE — 3074F SYST BP LT 130 MM HG: CPT | Mod: CPTII,S$GLB,, | Performed by: NURSE PRACTITIONER

## 2024-04-15 PROCEDURE — 1101F PT FALLS ASSESS-DOCD LE1/YR: CPT | Mod: CPTII,S$GLB,, | Performed by: NURSE PRACTITIONER

## 2024-04-15 PROCEDURE — 3078F DIAST BP <80 MM HG: CPT | Mod: CPTII,S$GLB,, | Performed by: NURSE PRACTITIONER

## 2024-04-15 PROCEDURE — 1126F AMNT PAIN NOTED NONE PRSNT: CPT | Mod: CPTII,S$GLB,, | Performed by: NURSE PRACTITIONER

## 2024-04-15 PROCEDURE — 3288F FALL RISK ASSESSMENT DOCD: CPT | Mod: CPTII,S$GLB,, | Performed by: NURSE PRACTITIONER

## 2024-04-15 PROCEDURE — 1157F ADVNC CARE PLAN IN RCRD: CPT | Mod: CPTII,S$GLB,, | Performed by: NURSE PRACTITIONER

## 2024-04-15 PROCEDURE — 99214 OFFICE O/P EST MOD 30 MIN: CPT | Mod: S$GLB,,, | Performed by: NURSE PRACTITIONER

## 2024-04-15 PROCEDURE — 1159F MED LIST DOCD IN RCRD: CPT | Mod: CPTII,S$GLB,, | Performed by: NURSE PRACTITIONER

## 2024-04-15 NOTE — PROGRESS NOTES
SUBJECTIVE:    Patient ID: Maricel Abdul is a 83 y.o. female.    Chief Complaint: Follow-up (Pt brought medication list//Pt is here for a check up and medication refills//MUNDO )    Pt here for regular f/u- HTN, lipids, OA, depression. Pt here with her daughter    Pt and daughter reports overall she's been doing well. Still living alone but daughter comes over daily and works from her house.   Pt stays active around her house, still does most of her housework. Denies any falls. Daughter reports pt is no longer driving. Sleeping good.    Pt and daughter has been going to the [facility] for exercise classes    Since last visit she saw , neuro and no changes made- f/u 6 months    Follows with Dr Ralph cyr for PPM- has routine echo scheduled next month        No visits with results within 6 Month(s) from this visit.   Latest known visit with results is:   Office Visit on 01/03/2023   Component Date Value Ref Range Status    WBC 03/25/2023 9.6  3.4 - 10.8 x10E3/uL Final    RBC 03/25/2023 4.15  3.77 - 5.28 x10E6/uL Final    Hemoglobin 03/25/2023 11.9  11.1 - 15.9 g/dL Final    Hematocrit 03/25/2023 38.0  34.0 - 46.6 % Final    MCV 03/25/2023 92  79 - 97 fL Final    MCH 03/25/2023 28.7  26.6 - 33.0 pg Final    MCHC 03/25/2023 31.3 (L)  31.5 - 35.7 g/dL Final    RDW 03/25/2023 12.5  11.7 - 15.4 % Final    Platelets 03/25/2023 233  150 - 450 x10E3/uL Final    Neutrophils 03/25/2023 77  Not Estab. % Final    Lymphs 03/25/2023 12  Not Estab. % Final    Monocytes 03/25/2023 8  Not Estab. % Final    Eos 03/25/2023 3  Not Estab. % Final    Basos 03/25/2023 0  Not Estab. % Final    Neutrophils (Absolute) 03/25/2023 7.4 (H)  1.4 - 7.0 x10E3/uL Final    Lymphs (Absolute) 03/25/2023 1.1  0.7 - 3.1 x10E3/uL Final    Monocytes(Absolute) 03/25/2023 0.8  0.1 - 0.9 x10E3/uL Final    Eos (Absolute) 03/25/2023 0.3  0.0 - 0.4 x10E3/uL Final    Baso (Absolute) 03/25/2023 0.0  0.0 - 0.2 x10E3/uL Final    Immature Granulocytes  03/25/2023 0  Not Estab. % Final    Immature Grans (Abs) 03/25/2023 0.0  0.0 - 0.1 x10E3/uL Final    Glucose 03/25/2023 93  70 - 99 mg/dL Final    BUN 03/25/2023 24  8 - 27 mg/dL Final    Creatinine 03/25/2023 0.80  0.57 - 1.00 mg/dL Final    eGFR 03/25/2023 73  >59 mL/min/1.73 Final    BUN/Creatinine Ratio 03/25/2023 30 (H)  12 - 28 Final    Sodium 03/25/2023 142  134 - 144 mmol/L Final    Potassium 03/25/2023 4.0  3.5 - 5.2 mmol/L Final    Chloride 03/25/2023 103  96 - 106 mmol/L Final    CO2 03/25/2023 22  20 - 29 mmol/L Final    Calcium 03/25/2023 9.5  8.7 - 10.3 mg/dL Final    Protein, Total 03/25/2023 6.5  6.0 - 8.5 g/dL Final    Albumin 03/25/2023 4.3  3.6 - 4.6 g/dL Final    Globulin, Total 03/25/2023 2.2  1.5 - 4.5 g/dL Final    Albumin/Globulin Ratio 03/25/2023 2.0  1.2 - 2.2 Final    Total Bilirubin 03/25/2023 <0.2  0.0 - 1.2 mg/dL Final    Alkaline Phosphatase 03/25/2023 104  44 - 121 IU/L Final    AST 03/25/2023 25  0 - 40 IU/L Final    ALT 03/25/2023 17  0 - 32 IU/L Final    Cholesterol 03/25/2023 163  100 - 199 mg/dL Final    Triglycerides 03/25/2023 115  0 - 149 mg/dL Final    HDL 03/25/2023 82  >39 mg/dL Final    VLDL Cholesterol Tashi 03/25/2023 20  5 - 40 mg/dL Final    LDL Calculated 03/25/2023 61  0 - 99 mg/dL Final    Creatinine, Urine 03/25/2023 59.6  Not Estab. mg/dL Final    Microalb, Ur 03/25/2023 7.6  Not Estab. ug/mL Final    Microalb/Crt. Ratio 03/25/2023 13  0 - 29 mg/g creat Final    Specific Gravity, UA 03/25/2023 1.016  1.005 - 1.030 Final    pH, UA 03/25/2023 6.0  5.0 - 7.5 Final    Color, UA 03/25/2023 Yellow  Yellow Final    Clarity, UA 03/25/2023 Clear  Clear Final    Leukocytes, UA 03/25/2023 1+ (A)  Negative Final    Protein, UA 03/25/2023 Negative  Negative/Trace Final    Glucose, UA 03/25/2023 Negative  Negative Final    Ketones, UA 03/25/2023 Negative  Negative Final    Occult Blood UA 03/25/2023 Negative  Negative Final    Bilirubin, UA 03/25/2023 Negative  Negative Final     Urobilinogen, UA 03/25/2023 0.2  0.2 - 1.0 mg/dL Final    Nitrite, UA 03/25/2023 Negative  Negative Final    Microscopic Examination 03/25/2023 See below:   Final    Urinalysis Reflex 03/25/2023 Comment   Final    TSH 03/25/2023 3.300  0.450 - 4.500 uIU/mL Final    WBC, UA 03/25/2023 None seen  0 - 5 /hpf Final    RBC, UA 03/25/2023 0-2  0 - 2 /hpf Final    Epithelial Cells (non renal) 03/25/2023 None seen  0 - 10 /hpf Final    Casts 03/25/2023 None seen  None seen /lpf Final    Bacteria, UA 03/25/2023 None seen  None seen/Few Final    Urine Culture, Comprehensive 03/25/2023 Final report   Final    Result 03/25/2023 Mixed skin eva   Final       Past Medical History:   Diagnosis Date    Arthritis     Diverticulitis     Encounter for blood transfusion     Hypertension     Kidney stones     Pacemaker     Shingles     Squamous cell carcinoma of skin     TIA (transient ischemic attack) 12/2017     Past Surgical History:   Procedure Laterality Date    APPENDECTOMY      CARDIAC SURGERY      pacemaker    CERVICAL FUSION      CHOLECYSTECTOMY      COLONOSCOPY N/A 8/14/2019    Procedure: COLONOSCOPY;  Surgeon: Elio Perez MD;  Location: [facility];  Service: Endoscopy;  Laterality: N/A;    HYSTERECTOMY      SHOULDER SURGERY       Family History   Problem Relation Name Age of Onset    Cancer Father          lung    Cancer Brother          colon cancer    Colon cancer Brother      Pancreatitis Mother      Eczema Neg Hx      Psoriasis Neg Hx      Lupus Neg Hx      Melanoma Neg Hx      Stomach cancer Neg Hx      Esophageal cancer Neg Hx         All of your core healthy metrics are met.      The CVD Risk score (D'Agostino, et al., 2008) failed to calculate for the following reasons:    The 2008 CVD risk score is only valid for ages 30 to 74    The patient has a prior MI, stroke, CHF, or peripheral vascular disease diagnosis     Marital Status:   Alcohol History:  reports current alcohol use of about 7.0 standard  drinks of alcohol per week.  Tobacco History:  reports that she quit smoking about 49 years ago. Her smoking use included cigarettes. She started smoking about 69 years ago. She has never been exposed to tobacco smoke. She has never used smokeless tobacco.  Drug History:  reports no history of drug use.    Health Maintenance Topics with due status: Not Due       Topic Last Completion Date    TETANUS VACCINE 02/26/2019    Colonoscopy 08/14/2019    DEXA Scan 09/16/2022     Immunization History   Administered Date(s) Administered    COVID-19, MRNA, LN-S, PF (Pfizer) (Purple Cap) 01/10/2021, 01/31/2021, 10/22/2021    Influenza - High Dose - PF (65 years and older) 08/30/2013, 08/31/2015, 09/19/2016, 09/19/2017, 09/12/2018, 10/15/2019    Influenza - Quadrivalent - High Dose - PF (65 years and older) 09/09/2020, 09/28/2021    Influenza - Quadrivalent - PF *Preferred* (6 months and older) 11/18/2002, 01/19/2006    Influenza - Trivalent (ADULT) 11/18/2002, 01/19/2006    Influenza - Trivalent - PF (ADULT) 08/31/2015    Influenza A (H5N1) 2004 Monovalent 01/01/2016    Pneumococcal 01/01/2016    Pneumococcal Conjugate - 13 Valent 08/30/2016    Pneumococcal Polysaccharide - 23 Valent 11/18/2002, 09/12/2018    Td (ADULT) 02/26/2019    Varicella 01/01/2014       Review of patient's allergies indicates:   Allergen Reactions    Codeine Nausea And Vomiting       Current Outpatient Medications:     amitriptyline (ELAVIL) 50 MG tablet, Take 1 tablet (50 mg total) by mouth every evening., Disp: 90 tablet, Rfl: 3    atorvastatin (LIPITOR) 80 MG tablet, Take 1 tablet (80 mg total) by mouth once daily., Disp: 90 tablet, Rfl: 3    cholecalciferol, vitamin D3, 125 mcg (5,000 unit) Tab, Take 5,000 Units by mouth once daily., Disp: , Rfl:     cholestyramine, with sugar, 4 gram Powd, Take 4 g by mouth 2 (two) times a day., Disp: 348 g, Rfl: 5    diphenoxylate-atropine 2.5-0.025 mg (LOMOTIL) 2.5-0.025 mg per tablet, Take 1 tablet by mouth 3  (three) times daily as needed for Diarrhea. TAKE 1 TABLET BY MOUTH 3 TIMES DAILY AS NEEDED FOR DIARRHEA, Disp: 30 tablet, Rfl: 3    donepeziL (ARICEPT) 10 MG tablet, Take 1 tablet (10 mg total) by mouth every evening., Disp: 90 tablet, Rfl: 3    fluticasone propionate (FLONASE) 50 mcg/actuation nasal spray, 1 spray (50 mcg total) by Each Nostril route once daily., Disp: 54 g, Rfl: 3    losartan-hydrochlorothiazide 100-12.5 mg (HYZAAR) 100-12.5 mg Tab, Take 1 tablet by mouth once daily., Disp: 90 tablet, Rfl: 3    meloxicam (MOBIC) 7.5 MG tablet, Take 1 tablet (7.5 mg total) by mouth once daily. Anti-inflammatory for arthritis pain, Disp: 90 tablet, Rfl: 3    metoprolol succinate (TOPROL-XL) 25 MG 24 hr tablet, Take 1 tablet (25 mg total) by mouth 2 (two) times daily., Disp: 180 tablet, Rfl: 3    multivitamin (ONE DAILY MULTIVITAMIN) per tablet, Take 1 tablet by mouth once daily., Disp: , Rfl:     pantoprazole (PROTONIX) 40 MG tablet, Take 1 tablet (40 mg total) by mouth once daily., Disp: 90 tablet, Rfl: 3    sertraline (ZOLOFT) 100 MG tablet, Take 1 tablet (100 mg total) by mouth once daily., Disp: 90 tablet, Rfl: 3    vit A/vit C/vit E/zinc/copper (PRESERVISION AREDS ORAL), Take by mouth., Disp: , Rfl:     aspirin (ECOTRIN) 81 MG EC tablet, Take 1 tablet (81 mg total) by mouth once daily., Disp: , Rfl: 0    biotin 10,000 mcg Cap, Take 1 capsule by mouth once daily. (Patient not taking: Reported on 4/15/2024), Disp: , Rfl:     cetirizine (ZYRTEC) 10 MG tablet, Take 1 tablet (10 mg total) by mouth once daily. (Patient not taking: Reported on 1/3/2023), Disp: 30 tablet, Rfl: 0    cranberry 500 mg Cap, Take 1 capsule by mouth once daily. (Patient not taking: Reported on 4/15/2024), Disp: , Rfl:     Review of Systems   Constitutional:  Negative for appetite change, chills, fever and unexpected weight change (wt up 7lbs since April visit).   HENT:  Negative for sore throat and trouble swallowing.    Eyes:  Negative for  "visual disturbance.   Respiratory:  Negative for cough, shortness of breath and wheezing.    Cardiovascular:  Negative for chest pain, palpitations and leg swelling.   Gastrointestinal:  Positive for diarrhea (reports improved, not using lomotil or cholestyramine). Negative for abdominal pain, constipation, nausea and vomiting.   Genitourinary:  Positive for frequency. Negative for dysuria and hematuria.   Musculoskeletal:  Positive for back pain (chronic lower back pain worse first thing in am, does morning stretches which helps with pain). Negative for gait problem (no falls).   Skin:  Negative for rash.   Neurological:  Negative for dizziness, syncope, speech difficulty, numbness and headaches.   Psychiatric/Behavioral:  Positive for confusion (worsening memory concerns). Negative for agitation, behavioral problems, dysphoric mood (stable on med), hallucinations and sleep disturbance. The patient is not nervous/anxious.           Objective:      Vitals:    04/15/24 1508   BP: 120/64   Pulse: 82   SpO2: 98%   Weight: 53 kg (116 lb 12.8 oz)   Height: 4' 11" (1.499 m)     Physical Exam  Vitals and nursing note reviewed.   Constitutional:       General: She is not in acute distress.     Appearance: Normal appearance. She is well-developed.   HENT:      Head: Normocephalic and atraumatic.      Right Ear: Tympanic membrane and ear canal normal.      Left Ear: Tympanic membrane and ear canal normal.   Neck:      Vascular: No carotid bruit.   Cardiovascular:      Rate and Rhythm: Normal rate and regular rhythm.      Heart sounds: Murmur heard.      Systolic murmur is present with a grade of 2/6.      No friction rub. No gallop.   Pulmonary:      Effort: Pulmonary effort is normal. No respiratory distress.      Breath sounds: Normal breath sounds. No wheezing or rales.   Abdominal:      General: There is no distension.      Palpations: Abdomen is soft.      Tenderness: There is no abdominal tenderness.   Musculoskeletal: "      Cervical back: Neck supple.      Right lower leg: No edema.      Left lower leg: No edema.   Lymphadenopathy:      Cervical: No cervical adenopathy.   Skin:     General: Skin is warm and dry.      Findings: No rash.   Neurological:      General: No focal deficit present.      Mental Status: She is alert. Mental status is at baseline.      Comments: Pleasant and conversant, cooperative. Disoriented to date/month, Gait slightly stooped, mild head tremor   Psychiatric:         Mood and Affect: Mood normal.           Assessment:       1. Essential hypertension    2. Dyslipidemia    3. Mild late onset Alzheimer's dementia without behavioral disturbance, psychotic disturbance, mood disturbance, or anxiety    4. Mild episode of recurrent major depressive disorder    5. Complete heart block    6. Nonrheumatic aortic valve stenosis    7. Aortic atherosclerosis    8. Cardiac pacemaker in situ    9. History of stroke           Plan:       1. Essential hypertension  -BP well controlled.  Patient/daughter advised she is due for annual labs  -     CBC Auto Differential; Future; Expected date: 04/15/2024  -     Comprehensive Metabolic Panel; Future; Expected date: 04/15/2024  -     Microalbumin/Creatinine Ratio, Urine; Future  -     Urinalysis, Reflex to Urine Culture Urine, Clean Catch; Future  -     TSH; Future; Expected date: 04/15/2024  -     T4, Free; Future; Expected date: 04/15/2024    2. Dyslipidemia  -labs ordered  -     Lipid Panel; Future; Expected date: 04/15/2024  -     TSH; Future; Expected date: 04/15/2024  -     T4, Free; Future; Expected date: 04/15/2024    3. 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  Daughter denies any significant change or concerns    4. Mild episode of recurrent major depressive disorder   -overall doing well on medicine, still grieving loss of her  3 years ago    5. Complete  heart block   -s/p PPM, follows with Dr. Rosas    6. Nonrheumatic aortic valve stenosis   -asymptomatic, denies chest pain, SOB or syncope.  has echo scheduled for next month    7. Aortic atherosclerosis   -asymptomatic, denies claudication    8. Cardiac pacemaker in situ    9. History of stroke      Follow up in about 6 months (around 10/15/2024) for HTN.          Counseled on age and gender appropriate medical preventative services, including cancer screenings, immunizations, overall nutritional health, need for a consistent exercise regimen and an overall push towards maintaining a vigorous and active lifestyle.      4/15/2024 Shayla Tello NP

## 2024-04-24 ENCOUNTER — TELEPHONE (OUTPATIENT)
Dept: FAMILY MEDICINE | Facility: CLINIC | Age: 84
End: 2024-04-24
Payer: MEDICARE

## 2024-04-24 LAB
ALBUMIN SERPL-MCNC: 4.5 G/DL (ref 3.7–4.7)
ALBUMIN/CREAT UR: 13 MG/G CREAT (ref 0–29)
ALBUMIN/GLOB SERPL: 2 {RATIO} (ref 1.2–2.2)
ALP SERPL-CCNC: 80 IU/L (ref 44–121)
ALT SERPL-CCNC: 21 IU/L (ref 0–32)
APPEARANCE UR: CLEAR
AST SERPL-CCNC: 29 IU/L (ref 0–40)
BACTERIA #/AREA URNS HPF: NORMAL /[HPF]
BACTERIA UR CULT: ABNORMAL
BACTERIA UR CULT: ABNORMAL
BASOPHILS # BLD AUTO: 0.1 X10E3/UL (ref 0–0.2)
BASOPHILS NFR BLD AUTO: 1 %
BILIRUB SERPL-MCNC: 0.5 MG/DL (ref 0–1.2)
BILIRUB UR QL STRIP: NEGATIVE
BUN SERPL-MCNC: 17 MG/DL (ref 8–27)
BUN/CREAT SERPL: 18 (ref 12–28)
CALCIUM SERPL-MCNC: 9.9 MG/DL (ref 8.7–10.3)
CASTS URNS QL MICRO: NORMAL /LPF
CHLORIDE SERPL-SCNC: 99 MMOL/L (ref 96–106)
CHOLEST SERPL-MCNC: 197 MG/DL (ref 100–199)
CO2 SERPL-SCNC: 25 MMOL/L (ref 20–29)
COLOR UR: YELLOW
CREAT SERPL-MCNC: 0.93 MG/DL (ref 0.57–1)
CREAT UR-MCNC: 110.1 MG/DL
EOSINOPHIL # BLD AUTO: 0.3 X10E3/UL (ref 0–0.4)
EOSINOPHIL NFR BLD AUTO: 3 %
EPI CELLS #/AREA URNS HPF: NORMAL /HPF (ref 0–10)
ERYTHROCYTE [DISTWIDTH] IN BLOOD BY AUTOMATED COUNT: 12.8 % (ref 11.7–15.4)
EST. GFR  (NO RACE VARIABLE): 60 ML/MIN/1.73
GLOBULIN SER CALC-MCNC: 2.3 G/DL (ref 1.5–4.5)
GLUCOSE SERPL-MCNC: 102 MG/DL (ref 70–99)
GLUCOSE UR QL STRIP: NEGATIVE
HCT VFR BLD AUTO: 38.7 % (ref 34–46.6)
HDLC SERPL-MCNC: 79 MG/DL
HGB BLD-MCNC: 12.4 G/DL (ref 11.1–15.9)
HGB UR QL STRIP: NEGATIVE
IMM GRANULOCYTES # BLD AUTO: 0 X10E3/UL (ref 0–0.1)
IMM GRANULOCYTES NFR BLD AUTO: 0 %
KETONES UR QL STRIP: NEGATIVE
LDLC SERPL CALC-MCNC: 98 MG/DL (ref 0–99)
LEUKOCYTE ESTERASE UR QL STRIP: ABNORMAL
LYMPHOCYTES # BLD AUTO: 1.3 X10E3/UL (ref 0.7–3.1)
LYMPHOCYTES NFR BLD AUTO: 13 %
MCH RBC QN AUTO: 29.9 PG (ref 26.6–33)
MCHC RBC AUTO-ENTMCNC: 32 G/DL (ref 31.5–35.7)
MCV RBC AUTO: 93 FL (ref 79–97)
MICRO URNS: ABNORMAL
MICROALBUMIN UR-MCNC: 13.9 UG/ML
MONOCYTES # BLD AUTO: 0.8 X10E3/UL (ref 0.1–0.9)
MONOCYTES NFR BLD AUTO: 8 %
NEUTROPHILS # BLD AUTO: 7.8 X10E3/UL (ref 1.4–7)
NEUTROPHILS NFR BLD AUTO: 75 %
NITRITE UR QL STRIP: NEGATIVE
OTHER ANTIBIOTIC SUSC ISLT: ABNORMAL
PH UR STRIP: 7 [PH] (ref 5–7.5)
PLATELET # BLD AUTO: 264 X10E3/UL (ref 150–450)
POTASSIUM SERPL-SCNC: 4.5 MMOL/L (ref 3.5–5.2)
PROT SERPL-MCNC: 6.8 G/DL (ref 6–8.5)
PROT UR QL STRIP: NEGATIVE
RBC # BLD AUTO: 4.15 X10E6/UL (ref 3.77–5.28)
RBC #/AREA URNS HPF: NORMAL /HPF (ref 0–2)
SODIUM SERPL-SCNC: 140 MMOL/L (ref 134–144)
SP GR UR STRIP: 1.02 (ref 1–1.03)
T4 FREE SERPL-MCNC: 1.1 NG/DL (ref 0.82–1.77)
TRIGL SERPL-MCNC: 115 MG/DL (ref 0–149)
TSH SERPL DL<=0.005 MIU/L-ACNC: 2.51 UIU/ML (ref 0.45–4.5)
URINALYSIS REFLEX: ABNORMAL
UROBILINOGEN UR STRIP-MCNC: 0.2 MG/DL (ref 0.2–1)
VLDLC SERPL CALC-MCNC: 20 MG/DL (ref 5–40)
WBC # BLD AUTO: 10.4 X10E3/UL (ref 3.4–10.8)
WBC #/AREA URNS HPF: NORMAL /HPF (ref 0–5)

## 2024-04-25 ENCOUNTER — TELEPHONE (OUTPATIENT)
Dept: FAMILY MEDICINE | Facility: CLINIC | Age: 84
End: 2024-04-25
Payer: MEDICARE

## 2024-04-25 NOTE — TELEPHONE ENCOUNTER
Spoke with Alysha pt's daughter in regards to recent labs results. Verbalized verbatim per Shayla. Alysha acknowledged understanding. Alysha stated that the pt has no complaints of a UTI. No treatment is need then. Alysha verbalized understanding.    Name band;

## 2024-05-28 DIAGNOSIS — Z00.00 ENCOUNTER FOR MEDICARE ANNUAL WELLNESS EXAM: ICD-10-CM

## 2024-05-31 ENCOUNTER — TELEPHONE (OUTPATIENT)
Dept: FAMILY MEDICINE | Facility: CLINIC | Age: 84
End: 2024-05-31
Payer: MEDICARE

## 2024-05-31 ENCOUNTER — HOSPITAL ENCOUNTER (EMERGENCY)
Facility: HOSPITAL | Age: 84
Discharge: HOME | End: 2024-05-31
Attending: EMERGENCY MEDICINE
Payer: MEDICARE

## 2024-05-31 VITALS
BODY MASS INDEX: 23.39 KG/M2 | DIASTOLIC BLOOD PRESSURE: 69 MMHG | WEIGHT: 116 LBS | TEMPERATURE: 98 F | RESPIRATION RATE: 17 BRPM | SYSTOLIC BLOOD PRESSURE: 157 MMHG | HEIGHT: 59 IN | OXYGEN SATURATION: 98 % | HEART RATE: 88 BPM

## 2024-05-31 DIAGNOSIS — R41.82 ALTERED MENTAL STATUS: ICD-10-CM

## 2024-05-31 LAB
ALBUMIN SERPL BCP-MCNC: 4.7 G/DL (ref 3.5–5.2)
ALP SERPL-CCNC: 77 U/L (ref 55–135)
ALT SERPL W/O P-5'-P-CCNC: 20 U/L (ref 10–44)
ANION GAP SERPL CALC-SCNC: 9 MMOL/L (ref 8–16)
AST SERPL-CCNC: 26 U/L (ref 10–40)
BACTERIA #/AREA URNS HPF: NORMAL /HPF
BASOPHILS # BLD AUTO: 0.07 K/UL (ref 0–0.2)
BASOPHILS NFR BLD: 0.9 % (ref 0–1.9)
BILIRUB SERPL-MCNC: 0.6 MG/DL (ref 0.1–1)
BILIRUB UR QL STRIP: NEGATIVE
BNP SERPL-MCNC: 111 PG/ML (ref 0–99)
BUN SERPL-MCNC: 19 MG/DL (ref 8–23)
CALCIUM SERPL-MCNC: 10.3 MG/DL (ref 8.7–10.5)
CHLORIDE SERPL-SCNC: 103 MMOL/L (ref 95–110)
CLARITY UR: ABNORMAL
CO2 SERPL-SCNC: 28 MMOL/L (ref 23–29)
COLOR UR: YELLOW
CREAT SERPL-MCNC: 0.9 MG/DL (ref 0.5–1.4)
DIFFERENTIAL METHOD BLD: ABNORMAL
EOSINOPHIL # BLD AUTO: 0.2 K/UL (ref 0–0.5)
EOSINOPHIL NFR BLD: 2.7 % (ref 0–8)
ERYTHROCYTE [DISTWIDTH] IN BLOOD BY AUTOMATED COUNT: 14.2 % (ref 11.5–14.5)
EST. GFR  (NO RACE VARIABLE): >60 ML/MIN/1.73 M^2
GLUCOSE SERPL-MCNC: 114 MG/DL (ref 70–110)
GLUCOSE UR QL STRIP: NEGATIVE
HCT VFR BLD AUTO: 42.7 % (ref 37–48.5)
HGB BLD-MCNC: 13.9 G/DL (ref 12–16)
HGB UR QL STRIP: NEGATIVE
HYALINE CASTS #/AREA URNS LPF: 1 /LPF
IMM GRANULOCYTES # BLD AUTO: 0.03 K/UL (ref 0–0.04)
IMM GRANULOCYTES NFR BLD AUTO: 0.4 % (ref 0–0.5)
KETONES UR QL STRIP: NEGATIVE
LEUKOCYTE ESTERASE UR QL STRIP: ABNORMAL
LYMPHOCYTES # BLD AUTO: 1.1 K/UL (ref 1–4.8)
LYMPHOCYTES NFR BLD: 14.2 % (ref 18–48)
MAGNESIUM SERPL-MCNC: 1.8 MG/DL (ref 1.6–2.6)
MCH RBC QN AUTO: 30 PG (ref 27–31)
MCHC RBC AUTO-ENTMCNC: 32.6 G/DL (ref 32–36)
MCV RBC AUTO: 92 FL (ref 82–98)
MICROSCOPIC COMMENT: NORMAL
MONOCYTES # BLD AUTO: 0.8 K/UL (ref 0.3–1)
MONOCYTES NFR BLD: 11.2 % (ref 4–15)
NEUTROPHILS # BLD AUTO: 5.2 K/UL (ref 1.8–7.7)
NEUTROPHILS NFR BLD: 70.6 % (ref 38–73)
NITRITE UR QL STRIP: NEGATIVE
NRBC BLD-RTO: 0 /100 WBC
PH UR STRIP: 7 [PH] (ref 5–8)
PLATELET # BLD AUTO: 248 K/UL (ref 150–450)
PMV BLD AUTO: 9.8 FL (ref 9.2–12.9)
POTASSIUM SERPL-SCNC: 4.1 MMOL/L (ref 3.5–5.1)
PROT SERPL-MCNC: 8.1 G/DL (ref 6–8.4)
PROT UR QL STRIP: ABNORMAL
RBC # BLD AUTO: 4.64 M/UL (ref 4–5.4)
RBC #/AREA URNS HPF: 3 /HPF (ref 0–4)
SODIUM SERPL-SCNC: 140 MMOL/L (ref 136–145)
SP GR UR STRIP: 1.02 (ref 1–1.03)
SQUAMOUS #/AREA URNS HPF: 0 /HPF
TROPONIN I SERPL HS-MCNC: 13.6 PG/ML (ref 0–14.9)
TROPONIN I SERPL HS-MCNC: 18 PG/ML (ref 0–14.9)
URN SPEC COLLECT METH UR: ABNORMAL
UROBILINOGEN UR STRIP-ACNC: NEGATIVE EU/DL
WBC # BLD AUTO: 7.4 K/UL (ref 3.9–12.7)
WBC #/AREA URNS HPF: 4 /HPF (ref 0–5)

## 2024-05-31 PROCEDURE — 99285 EMERGENCY DEPT VISIT HI MDM: CPT | Mod: 25

## 2024-05-31 PROCEDURE — 83880 ASSAY OF NATRIURETIC PEPTIDE: CPT

## 2024-05-31 PROCEDURE — 93010 ELECTROCARDIOGRAM REPORT: CPT | Mod: ,,, | Performed by: INTERNAL MEDICINE

## 2024-05-31 PROCEDURE — 85025 COMPLETE CBC W/AUTO DIFF WBC: CPT

## 2024-05-31 PROCEDURE — 84484 ASSAY OF TROPONIN QUANT: CPT | Mod: 91

## 2024-05-31 PROCEDURE — 81001 URINALYSIS AUTO W/SCOPE: CPT

## 2024-05-31 PROCEDURE — 83735 ASSAY OF MAGNESIUM: CPT

## 2024-05-31 PROCEDURE — 93005 ELECTROCARDIOGRAM TRACING: CPT | Performed by: INTERNAL MEDICINE

## 2024-05-31 PROCEDURE — 80053 COMPREHEN METABOLIC PANEL: CPT

## 2024-05-31 NOTE — ED PROVIDER NOTES
Encounter Date: 2024       History     Chief Complaint   Patient presents with    Fatigue    Urinary Incontinence     More fatigued recently, with urinary incontinence.      Patient with a history of previous CVA and dementia.  Patient became confused last night.  Daughter was able to communicate by cameras in the house.  She had urinary incontinence.  Patient remained confused this morning when daughter arrived.  This is more severe than normal sundowning episodes.  There is increasing frequency of episodes.  No fever chills.  No focal weakness.  No trouble with speech.  No vision changes.  Patient back in normal mental status now.      Review of patient's allergies indicates:   Allergen Reactions    Codeine Nausea And Vomiting     Past Medical History:   Diagnosis Date    Arthritis     Diverticulitis     Encounter for blood transfusion     Hypertension     Kidney stones     Pacemaker     Shingles     Squamous cell carcinoma of skin     TIA (transient ischemic attack) 2017     Past Surgical History:   Procedure Laterality Date    APPENDECTOMY      CARDIAC SURGERY      pacemaker    CERVICAL FUSION      CHOLECYSTECTOMY      COLONOSCOPY N/A 2019    Procedure: COLONOSCOPY;  Surgeon: Elio Perez MD;  Location: [facility];  Service: Endoscopy;  Laterality: N/A;    HYSTERECTOMY      SHOULDER SURGERY       Family History   Problem Relation Name Age of Onset    Cancer Father          lung    Cancer Brother          colon cancer    Colon cancer Brother      Pancreatitis Mother      Eczema Neg Hx      Psoriasis Neg Hx      Lupus Neg Hx      Melanoma Neg Hx      Stomach cancer Neg Hx      Esophageal cancer Neg Hx       Social History     Tobacco Use    Smoking status: Former     Current packs/day: 0.00     Types: Cigarettes     Start date: 1954     Quit date: 1974     Years since quittin.8     Passive exposure: Never    Smokeless tobacco: Never   Substance Use Topics    Alcohol use: Yes      Alcohol/week: 7.0 standard drinks of alcohol     Types: 7 Glasses of wine per week     Comment: Glass of wine each night    Drug use: No     Review of Systems   Constitutional:  Negative for chills and fever.   HENT:  Negative for congestion.    Eyes:  Negative for visual disturbance.   Respiratory:  Negative for shortness of breath.    Cardiovascular:  Negative for chest pain and palpitations.   Gastrointestinal:  Negative for abdominal pain and vomiting.   Genitourinary:  Negative for dysuria.   Musculoskeletal:  Negative for joint swelling.   Neurological:  Negative for headaches.   Psychiatric/Behavioral:  Positive for confusion.        Physical Exam     Initial Vitals [05/31/24 1020]   BP Pulse Resp Temp SpO2   (!) 154/87 99 16 98.1 °F (36.7 °C) 97 %      MAP       --         Physical Exam    Nursing note and vitals reviewed.  Constitutional: She is not diaphoretic. No distress.   HENT:   Head: Normocephalic and atraumatic.   Eyes: Conjunctivae are normal.   Neck:   Normal range of motion.  Cardiovascular:  Normal rate.           Pulmonary/Chest: Breath sounds normal.   Abdominal: Abdomen is soft. There is no abdominal tenderness.   Musculoskeletal:         General: Normal range of motion.      Cervical back: Normal range of motion.     Neurological: She is alert. She has normal strength. No cranial nerve deficit or sensory deficit.   No gross deficits   Skin: No rash noted.   Psychiatric:   Patient is pleasant cooperative.  Patient able to give simple history but daughter answers most of complex questions.         ED Course   Procedures  Labs Reviewed   CBC W/ AUTO DIFFERENTIAL - Abnormal; Notable for the following components:       Result Value    Lymph % 14.2 (*)     All other components within normal limits   COMPREHENSIVE METABOLIC PANEL - Abnormal; Notable for the following components:    Glucose 114 (*)     All other components within normal limits   TROPONIN I HIGH SENSITIVITY - Abnormal; Notable for  the following components:    Troponin I High Sensitivity 18.0 (*)     All other components within normal limits   B-TYPE NATRIURETIC PEPTIDE - Abnormal; Notable for the following components:     (*)     All other components within normal limits   URINALYSIS, REFLEX TO URINE CULTURE - Abnormal; Notable for the following components:    Appearance, UA Hazy (*)     Protein, UA 1+ (*)     Leukocytes, UA Trace (*)     All other components within normal limits    Narrative:     Specimen Source->Urine   MAGNESIUM   TROPONIN I HIGH SENSITIVITY   URINALYSIS MICROSCOPIC    Narrative:     Specimen Source->Urine   TROPONIN I HIGH SENSITIVITY        ECG Results              EKG 12-lead (In process)        Collection Time Result Time QRS Duration OHS QTC Calculation    05/31/24 11:12:26 05/31/24 11:42:20 148 509                     In process by Interface, Lab In [facility] (05/31/24 11:42:26)                   Narrative:    Test Reason : R41.82,    Vent. Rate : 086 BPM     Atrial Rate : 086 BPM     P-R Int : 162 ms          QRS Dur : 148 ms      QT Int : 426 ms       P-R-T Axes : 076 -86 090 degrees     QTc Int : 509 ms    Atrial-sensed ventricular-paced rhythm  Abnormal ECG  When compared with ECG of 13-AUG-2022 09:52,  No significant change was found    Referred By: [email or web address]   SELF           Confirmed By:                                   Imaging Results              X-Ray Chest AP Portable (Final result)  Result time 05/31/24 12:56:24      Final result by Edwin Morley MD (05/31/24 12:56:24)                   Impression:      No acute cardiac or pulmonary process.      Electronically signed by: Edwin Morley  Date:    05/31/2024  Time:    12:56               Narrative:    CLINICAL HISTORY:  ([id number])82 y/o  (1940) F    altered mental status;    TECHNIQUE:  (A#[id number], exam time 5/31/2024 12:47)    XR CHEST AP PORTABLE [id number]    COMPARISON:  Radiograph from 12/12/2017    FINDINGS:  The lungs are clear.  There is blunting of the left costophrenic angle suggesting trace fluid, atelectasis and or scarring, similar to the previous radiograph.  No pneumothorax is identified. The heart is normal in size. Left-sided pacemaker is unchanged in configuration. Atheromatous calcifications are seen at the aortic arch. Osseous structures appear within normal limits. The visualized upper abdomen is unremarkable.                                       Medications - No data to display  Medical Decision Making  Patient presents with some confusion last night and this morning.  Here patient is neurologically intact at baseline.  CBC CMP urinalysis all unremarkable.  Troponin indeterminate.  Second troponin drawn in his less than 5 point change.  EKGs sinus rhythm, chest x-ray no acute cardiopulmonary process.  Patient with no symptoms acute coronary syndrome.  Given can not completely rule out multi-infarct dementia MRI ordered.  Patient's family does not want to wait for MRI and requests discharge.  They report they have neurology appointment in 2 weeks.  Daughter will observe patient closely today in this evening for any changes.  Return for any problems.    Amount and/or Complexity of Data Reviewed  Labs:  Decision-making details documented in ED Course.  Radiology: ordered. Decision-making details documented in ED Course.  ECG/medicine tests:  Decision-making details documented in ED Course.                                      Clinical Impression:  Final diagnoses:  [R41.82] Altered mental status          ED Disposition Condition    Discharge Stable          ED Prescriptions    None       Follow-up Information       Follow up With Specialties Details Why Contact Info Additional Information    [facility] - Emergency Dept Emergency Medicine  If symptoms worsen [address] [address]  [phone] 1st floor    Shayla Tello NP Family Medicine In 2 days  [address]  [address]  [place]  [place] [address]  [phone]                Grupo Farrell MD  05/31/24 1530       Grupo Farrell MD  05/31/24 1535

## 2024-06-03 ENCOUNTER — PATIENT OUTREACH (OUTPATIENT)
Dept: EMERGENCY MEDICINE | Facility: HOSPITAL | Age: 84
End: 2024-06-03

## 2024-06-03 ENCOUNTER — TELEPHONE (OUTPATIENT)
Dept: FAMILY MEDICINE | Facility: CLINIC | Age: 84
End: 2024-06-03
Payer: MEDICARE

## 2024-06-03 NOTE — PROGRESS NOTES
Unable to reach pt after 2 attempts. Encounter will be closed.    Fartun Celestin  ED Navigator  [phone]

## 2024-06-03 NOTE — TELEPHONE ENCOUNTER
----- Message from Carey Preston MA sent at 6/3/2024  8:44 AM CDT -----  Patients daughter is calling stating patient was seen in [facility] ER on Saturday, was instructed to have patient seen in office BANDAR Encarnacion: [phone]  -DN

## 2024-06-08 LAB
OHS QRS DURATION: 148 MS
OHS QTC CALCULATION: 509 MS

## 2024-06-13 ENCOUNTER — OFFICE VISIT (OUTPATIENT)
Dept: FAMILY MEDICINE | Facility: CLINIC | Age: 84
End: 2024-06-13
Payer: MEDICARE

## 2024-06-13 VITALS
WEIGHT: 118.38 LBS | SYSTOLIC BLOOD PRESSURE: 120 MMHG | BODY MASS INDEX: 23.87 KG/M2 | DIASTOLIC BLOOD PRESSURE: 64 MMHG | HEART RATE: 78 BPM | OXYGEN SATURATION: 98 % | HEIGHT: 59 IN

## 2024-06-13 DIAGNOSIS — F41.9 ANXIETY: Primary | ICD-10-CM

## 2024-06-13 DIAGNOSIS — G30.1 MILD LATE ONSET ALZHEIMER'S DEMENTIA WITHOUT BEHAVIORAL DISTURBANCE, PSYCHOTIC DISTURBANCE, MOOD DISTURBANCE, OR ANXIETY: ICD-10-CM

## 2024-06-13 DIAGNOSIS — I10 ESSENTIAL HYPERTENSION: ICD-10-CM

## 2024-06-13 DIAGNOSIS — F02.A0 MILD LATE ONSET ALZHEIMER'S DEMENTIA WITHOUT BEHAVIORAL DISTURBANCE, PSYCHOTIC DISTURBANCE, MOOD DISTURBANCE, OR ANXIETY: ICD-10-CM

## 2024-06-13 PROCEDURE — 1159F MED LIST DOCD IN RCRD: CPT | Mod: CPTII,S$GLB,, | Performed by: NURSE PRACTITIONER

## 2024-06-13 PROCEDURE — 1126F AMNT PAIN NOTED NONE PRSNT: CPT | Mod: CPTII,S$GLB,, | Performed by: NURSE PRACTITIONER

## 2024-06-13 PROCEDURE — 3074F SYST BP LT 130 MM HG: CPT | Mod: CPTII,S$GLB,, | Performed by: NURSE PRACTITIONER

## 2024-06-13 PROCEDURE — 99214 OFFICE O/P EST MOD 30 MIN: CPT | Mod: S$GLB,,, | Performed by: NURSE PRACTITIONER

## 2024-06-13 PROCEDURE — 3288F FALL RISK ASSESSMENT DOCD: CPT | Mod: CPTII,S$GLB,, | Performed by: NURSE PRACTITIONER

## 2024-06-13 PROCEDURE — 1157F ADVNC CARE PLAN IN RCRD: CPT | Mod: CPTII,S$GLB,, | Performed by: NURSE PRACTITIONER

## 2024-06-13 PROCEDURE — 1160F RVW MEDS BY RX/DR IN RCRD: CPT | Mod: CPTII,S$GLB,, | Performed by: NURSE PRACTITIONER

## 2024-06-13 PROCEDURE — 3078F DIAST BP <80 MM HG: CPT | Mod: CPTII,S$GLB,, | Performed by: NURSE PRACTITIONER

## 2024-06-13 PROCEDURE — 1101F PT FALLS ASSESS-DOCD LE1/YR: CPT | Mod: CPTII,S$GLB,, | Performed by: NURSE PRACTITIONER

## 2024-06-13 NOTE — PROGRESS NOTES
SUBJECTIVE:    Patient ID: Maricel Abdul is a 83 y.o. female.    Chief Complaint: Follow-up (Pt brought medication list//Pt is here for a ER f/u increased anxiety. Pt stated that she is feeling well.//Pt decline colo//MUNDO )    Pt here for ER f/u- pt here with her daughter    Daughter reports on 5/31/2024 pt called her on video phone in the evening and seemed very extremely anxious- didn't know where she was. Daughter tried to calm her down and she was finally able to go lay back in bed and fall asleep. Daughter denies any obvious speech change, no facial droop, vision change or hallucinations noted that evening. The following morning daughter went to her house and noticed pt hadn't taken some of her evening meds the night before. Pt seemed more fatigued but was almost back to baseline when daughter brought her to ER. W/u in ER showed UA was clean, labs unremarkable. ER MD offered brain MRI but pt/family didn't want to wait so they left. Daughter reports since ER she's been back to her normal self- no obvious anxiety or change in her baseline confusion.  Appetite is good.  Has f/u with Dr. Solis, neuro next week    Pt is still living alone, daughter has cameras set up and works from her mother's house during the day but since this episode they have started to look around at assisted living facilities- she is currently on the waiting list for [facility].         Admission on 05/31/2024, Discharged on 05/31/2024   Component Date Value Ref Range Status    Magnesium 05/31/2024 1.8  1.6 - 2.6 mg/dL Final    QRS Duration 05/31/2024 148  ms Final    [facility] QTC Calculation 05/31/2024 509  ms Final    WBC 05/31/2024 7.40  3.90 - 12.70 K/uL Final    RBC 05/31/2024 4.64  4.00 - 5.40 M/uL Final    Hemoglobin 05/31/2024 13.9  12.0 - 16.0 g/dL Final    Hematocrit 05/31/2024 42.7  37.0 - 48.5 % Final    MCV 05/31/2024 92  82 - 98 fL Final    MCH 05/31/2024 30.0  27.0 - 31.0 pg Final    MCHC 05/31/2024 32.6  32.0 - 36.0 g/dL  Final    RDW 05/31/2024 14.2  11.5 - 14.5 % Final    Platelets 05/31/2024 248  150 - 450 K/uL Final    MPV 05/31/2024 9.8  9.2 - 12.9 fL Final    Immature Granulocytes 05/31/2024 0.4  0.0 - 0.5 % Final    Gran # (ANC) 05/31/2024 5.2  1.8 - 7.7 K/uL Final    Immature Grans (Abs) 05/31/2024 0.03  0.00 - 0.04 K/uL Final    Lymph # 05/31/2024 1.1  1.0 - 4.8 K/uL Final    Mono # 05/31/2024 0.8  0.3 - 1.0 K/uL Final    Eos # 05/31/2024 0.2  0.0 - 0.5 K/uL Final    Baso # 05/31/2024 0.07  0.00 - 0.20 K/uL Final    nRBC 05/31/2024 0  0 /100 WBC Final    Gran % 05/31/2024 70.6  38.0 - 73.0 % Final    Lymph % 05/31/2024 14.2 (L)  18.0 - 48.0 % Final    Mono % 05/31/2024 11.2  4.0 - 15.0 % Final    Eosinophil % 05/31/2024 2.7  0.0 - 8.0 % Final    Basophil % 05/31/2024 0.9  0.0 - 1.9 % Final    Differential Method 05/31/2024 Automated   Final    Sodium 05/31/2024 140  136 - 145 mmol/L Final    Potassium 05/31/2024 4.1  3.5 - 5.1 mmol/L Final    Chloride 05/31/2024 103  95 - 110 mmol/L Final    CO2 05/31/2024 28  23 - 29 mmol/L Final    Glucose 05/31/2024 114 (H)  70 - 110 mg/dL Final    BUN 05/31/2024 19  8 - 23 mg/dL Final    Creatinine 05/31/2024 0.9  0.5 - 1.4 mg/dL Final    Calcium 05/31/2024 10.3  8.7 - 10.5 mg/dL Final    Total Protein 05/31/2024 8.1  6.0 - 8.4 g/dL Final    Albumin 05/31/2024 4.7  3.5 - 5.2 g/dL Final    Total Bilirubin 05/31/2024 0.6  0.1 - 1.0 mg/dL Final    Alkaline Phosphatase 05/31/2024 77  55 - 135 U/L Final    AST 05/31/2024 26  10 - 40 U/L Final    ALT 05/31/2024 20  10 - 44 U/L Final    eGFR 05/31/2024 >60.0  >60 mL/min/1.73 m^2 Final    Anion Gap 05/31/2024 9  8 - 16 mmol/L Final    Troponin I High Sensitivity 05/31/2024 13.6  0.0 - 14.9 pg/mL Final    Troponin I High Sensitivity 05/31/2024 18.0 (H)  0.0 - 14.9 pg/mL Final    BNP 05/31/2024 111 (H)  0 - 99 pg/mL Final    Specimen UA 05/31/2024 Urine, Clean Catch   Final    Color, UA 05/31/2024 Yellow  Yellow, Straw, Netta Final    Appearance,  UA 05/31/2024 Hazy (A)  Clear Final    pH, UA 05/31/2024 7.0  5.0 - 8.0 Final    Specific Gravity, UA 05/31/2024 1.020  1.005 - 1.030 Final    Protein, UA 05/31/2024 1+ (A)  Negative Final    Glucose, UA 05/31/2024 Negative  Negative Final    Ketones, UA 05/31/2024 Negative  Negative Final    Bilirubin (UA) 05/31/2024 Negative  Negative Final    Occult Blood UA 05/31/2024 Negative  Negative Final    Nitrite, UA 05/31/2024 Negative  Negative Final    Urobilinogen, UA 05/31/2024 Negative  Negative EU/dL Final    Leukocytes, UA 05/31/2024 Trace (A)  Negative Final    RBC, UA 05/31/2024 3  0 - 4 /hpf Final    WBC, UA 05/31/2024 4  0 - 5 /hpf Final    Bacteria 05/31/2024 None  None-Occ /hpf Final    Squam Epithel, UA 05/31/2024 0  /hpf Final    Hyaline Casts, UA 05/31/2024 1  0-1/lpf /lpf Final    Microscopic Comment 05/31/2024 SEE COMMENT   Final   Office Visit on 04/15/2024   Component Date Value Ref Range Status    WBC 04/20/2024 10.4  3.4 - 10.8 x10E3/uL Final    RBC 04/20/2024 4.15  3.77 - 5.28 x10E6/uL Final    Hemoglobin 04/20/2024 12.4  11.1 - 15.9 g/dL Final    Hematocrit 04/20/2024 38.7  34.0 - 46.6 % Final    MCV 04/20/2024 93  79 - 97 fL Final    MCH 04/20/2024 29.9  26.6 - 33.0 pg Final    MCHC 04/20/2024 32.0  31.5 - 35.7 g/dL Final    RDW 04/20/2024 12.8  11.7 - 15.4 % Final    Platelets 04/20/2024 264  150 - 450 x10E3/uL Final    Neutrophils 04/20/2024 75  Not Estab. % Final    Lymphs 04/20/2024 13  Not Estab. % Final    Monocytes 04/20/2024 8  Not Estab. % Final    Eos 04/20/2024 3  Not Estab. % Final    Basos 04/20/2024 1  Not Estab. % Final    Neutrophils (Absolute) 04/20/2024 7.8 (H)  1.4 - 7.0 x10E3/uL Final    Lymphs (Absolute) 04/20/2024 1.3  0.7 - 3.1 x10E3/uL Final    Monocytes(Absolute) 04/20/2024 0.8  0.1 - 0.9 x10E3/uL Final    Eos (Absolute) 04/20/2024 0.3  0.0 - 0.4 x10E3/uL Final    Baso (Absolute) 04/20/2024 0.1  0.0 - 0.2 x10E3/uL Final    Immature Granulocytes 04/20/2024 0  Not Estab. %  Final    Immature Grans (Abs) 04/20/2024 0.0  0.0 - 0.1 x10E3/uL Final    Glucose 04/20/2024 102 (H)  70 - 99 mg/dL Final    BUN 04/20/2024 17  8 - 27 mg/dL Final    Creatinine 04/20/2024 0.93  0.57 - 1.00 mg/dL Final    eGFR 04/20/2024 60  >59 mL/min/1.73 Final    BUN/Creatinine Ratio 04/20/2024 18  12 - 28 Final    Sodium 04/20/2024 140  134 - 144 mmol/L Final    Potassium 04/20/2024 4.5  3.5 - 5.2 mmol/L Final    Chloride 04/20/2024 99  96 - 106 mmol/L Final    CO2 04/20/2024 25  20 - 29 mmol/L Final    Calcium 04/20/2024 9.9  8.7 - 10.3 mg/dL Final    Protein, Total 04/20/2024 6.8  6.0 - 8.5 g/dL Final    Albumin 04/20/2024 4.5  3.7 - 4.7 g/dL Final    Globulin, Total 04/20/2024 2.3  1.5 - 4.5 g/dL Final    Albumin/Globulin Ratio 04/20/2024 2.0  1.2 - 2.2 Final    Total Bilirubin 04/20/2024 0.5  0.0 - 1.2 mg/dL Final    Alkaline Phosphatase 04/20/2024 80  44 - 121 IU/L Final    AST 04/20/2024 29  0 - 40 IU/L Final    ALT 04/20/2024 21  0 - 32 IU/L Final    Cholesterol 04/20/2024 197  100 - 199 mg/dL Final    Triglycerides 04/20/2024 115  0 - 149 mg/dL Final    HDL 04/20/2024 79  >39 mg/dL Final    VLDL Cholesterol Tashi 04/20/2024 20  5 - 40 mg/dL Final    LDL Calculated 04/20/2024 98  0 - 99 mg/dL Final    Creatinine, Urine 04/20/2024 110.1  Not Estab. mg/dL Final    Microalb, Ur 04/20/2024 13.9  Not Estab. ug/mL Final    Microalb/Crt. Ratio 04/20/2024 13  0 - 29 mg/g creat Final    Specific Gravity, UA 04/20/2024 1.018  1.005 - 1.030 Final    pH, UA 04/20/2024 7.0  5.0 - 7.5 Final    Color, UA 04/20/2024 Yellow  Yellow Final    Clarity, UA 04/20/2024 Clear  Clear Final    Leukocytes, UA 04/20/2024 2+ (A)  Negative Final    Protein, UA 04/20/2024 Negative  Negative/Trace Final    Glucose, UA 04/20/2024 Negative  Negative Final    Ketones, UA 04/20/2024 Negative  Negative Final    Occult Blood UA 04/20/2024 Negative  Negative Final    Bilirubin, UA 04/20/2024 Negative  Negative Final    Urobilinogen, UA 04/20/2024  0.2  0.2 - 1.0 mg/dL Final    Nitrite, UA 04/20/2024 Negative  Negative Final    Microscopic Examination 04/20/2024 See below:   Final    Urinalysis Reflex 04/20/2024 Comment   Final    TSH 04/20/2024 2.510  0.450 - 4.500 uIU/mL Final    T4, Free 04/20/2024 1.10  0.82 - 1.77 ng/dL Final    WBC, UA 04/20/2024 None seen  0 - 5 /hpf Final    RBC, UA 04/20/2024 0-2  0 - 2 /hpf Final    Epithelial Cells (non renal) 04/20/2024 None seen  0 - 10 /hpf Final    Casts 04/20/2024 None seen  None seen /lpf Final    Bacteria, UA 04/20/2024 Few  None seen/Few Final    Urine Culture, Comprehensive 04/20/2024 Final report (A)   Final    Result 04/20/2024 Enterococcus faecalis (A)   Final    Antimicrobial Susceptibility 04/20/2024 Comment   Final       Past Medical History:   Diagnosis Date    Arthritis     Diverticulitis     Encounter for blood transfusion     Hypertension     Kidney stones     Pacemaker     Shingles     Squamous cell carcinoma of skin     TIA (transient ischemic attack) 12/2017     Past Surgical History:   Procedure Laterality Date    APPENDECTOMY      CARDIAC SURGERY      pacemaker    CERVICAL FUSION      CHOLECYSTECTOMY      COLONOSCOPY N/A 8/14/2019    Procedure: COLONOSCOPY;  Surgeon: Elio Perez MD;  Location: [facility];  Service: Endoscopy;  Laterality: N/A;    HYSTERECTOMY      SHOULDER SURGERY       Family History   Problem Relation Name Age of Onset    Cancer Father          lung    Cancer Brother          colon cancer    Colon cancer Brother      Pancreatitis Mother      Eczema Neg Hx      Psoriasis Neg Hx      Lupus Neg Hx      Melanoma Neg Hx      Stomach cancer Neg Hx      Esophageal cancer Neg Hx         All of your core healthy metrics are met.      The CVD Risk score (D'Agostino, et al., 2008) failed to calculate for the following reasons:    The 2008 CVD risk score is only valid for ages 30 to 74    The patient has a prior MI, stroke, CHF, or peripheral vascular disease diagnosis     Marital  Status:   Alcohol History:  reports current alcohol use of about 7.0 standard drinks of alcohol per week.  Tobacco History:  reports that she quit smoking about 49 years ago. Her smoking use included cigarettes. She started smoking about 69 years ago. She has never been exposed to tobacco smoke. She has never used smokeless tobacco.  Drug History:  reports no history of drug use.    Health Maintenance Topics with due status: Not Due       Topic Last Completion Date    DEXA Scan 09/16/2022    Lipid Panel 04/20/2024    TETANUS VACCINE 05/22/2024     Immunization History   Administered Date(s) Administered    COVID-19, MRNA, LN-S, PF (Pfizer) (Purple Cap) 01/10/2021, 01/31/2021, 10/22/2021    Influenza - High Dose - PF (65 years and older) 08/30/2013, 08/31/2015, 09/19/2016, 09/19/2017, 09/12/2018, 10/15/2019    Influenza - Quadrivalent - High Dose - PF (65 years and older) 09/09/2020, 09/28/2021    Influenza - Quadrivalent - PF *Preferred* (6 months and older) 11/18/2002, 01/19/2006    Influenza - Trivalent (ADULT) 11/18/2002, 01/19/2006    Influenza - Trivalent - PF (ADULT) 08/31/2015    Influenza A (H5N1) 2004 Monovalent 01/01/2016    Pneumococcal 01/01/2016    Pneumococcal Conjugate - 13 Valent 08/30/2016    Pneumococcal Polysaccharide - 23 Valent 11/18/2002, 09/12/2018    Td (ADULT) 02/26/2019    Varicella 01/01/2014       Review of patient's allergies indicates:   Allergen Reactions    Codeine Nausea And Vomiting       Current Outpatient Medications:     amitriptyline (ELAVIL) 50 MG tablet, Take 1 tablet (50 mg total) by mouth every evening., Disp: 90 tablet, Rfl: 3    atorvastatin (LIPITOR) 80 MG tablet, Take 1 tablet (80 mg total) by mouth once daily., Disp: 90 tablet, Rfl: 3    cholecalciferol, vitamin D3, 125 mcg (5,000 unit) Tab, Take 5,000 Units by mouth once daily., Disp: , Rfl:     donepeziL (ARICEPT) 10 MG tablet, Take 1 tablet (10 mg total) by mouth every evening., Disp: 90 tablet, Rfl: 3     fluticasone propionate (FLONASE) 50 mcg/actuation nasal spray, 1 spray (50 mcg total) by Each Nostril route once daily., Disp: 54 g, Rfl: 3    losartan-hydrochlorothiazide 100-12.5 mg (HYZAAR) 100-12.5 mg Tab, Take 1 tablet by mouth once daily., Disp: 90 tablet, Rfl: 3    meloxicam (MOBIC) 7.5 MG tablet, Take 1 tablet (7.5 mg total) by mouth once daily. Anti-inflammatory for arthritis pain, Disp: 90 tablet, Rfl: 3    metoprolol succinate (TOPROL-XL) 25 MG 24 hr tablet, Take 1 tablet (25 mg total) by mouth 2 (two) times daily., Disp: 180 tablet, Rfl: 3    multivitamin (ONE DAILY MULTIVITAMIN) per tablet, Take 1 tablet by mouth once daily., Disp: , Rfl:     pantoprazole (PROTONIX) 40 MG tablet, Take 1 tablet (40 mg total) by mouth once daily., Disp: 90 tablet, Rfl: 3    sertraline (ZOLOFT) 100 MG tablet, Take 1 tablet (100 mg total) by mouth once daily., Disp: 90 tablet, Rfl: 3    vit A/vit C/vit E/zinc/copper (PRESERVISION AREDS ORAL), Take by mouth., Disp: , Rfl:     aspirin (ECOTRIN) 81 MG EC tablet, Take 1 tablet (81 mg total) by mouth once daily., Disp: , Rfl: 0    biotin 10,000 mcg Cap, Take 1 capsule by mouth once daily. (Patient not taking: Reported on 4/15/2024), Disp: , Rfl:     cranberry 500 mg Cap, Take 1 capsule by mouth once daily. (Patient not taking: Reported on 4/15/2024), Disp: , Rfl:     diphenoxylate-atropine 2.5-0.025 mg (LOMOTIL) 2.5-0.025 mg per tablet, Take 1 tablet by mouth 3 (three) times daily as needed for Diarrhea. TAKE 1 TABLET BY MOUTH 3 TIMES DAILY AS NEEDED FOR DIARRHEA (Patient not taking: Reported on 4/15/2024), Disp: 30 tablet, Rfl: 3    Review of Systems   Constitutional:  Negative for appetite change, chills, fever and unexpected weight change.   Eyes:  Negative for visual disturbance.   Respiratory:  Negative for cough, shortness of breath and wheezing.    Cardiovascular:  Negative for chest pain, palpitations and leg swelling.   Gastrointestinal:  Negative for abdominal pain,  "constipation, diarrhea, nausea and vomiting.   Genitourinary:  Positive for frequency. Negative for dysuria and hematuria.   Musculoskeletal:  Positive for back pain (chronic lower back pain worse first thing in am, does morning stretches which helps with pain). Negative for gait problem (no falls).   Skin:  Negative for rash.   Neurological:  Negative for dizziness, syncope, speech difficulty, numbness and headaches.   Psychiatric/Behavioral:  Positive for confusion (see HPI). Negative for agitation, behavioral problems, dysphoric mood (stable on med), hallucinations and sleep disturbance. The patient is nervous/anxious (see HPI).           Objective:      Vitals:    06/13/24 1515   BP: 120/64   Pulse: 78   SpO2: 98%   Weight: 53.7 kg (118 lb 6.4 oz)   Height: 4' 11" (1.499 m)     Physical Exam  Vitals and nursing note reviewed.   Constitutional:       General: She is not in acute distress.     Appearance: She is well-developed.      Comments: Elderly WF, well groomed, very pleasant and cooperative   HENT:      Head: Normocephalic and atraumatic.   Neck:      Vascular: No carotid bruit.   Cardiovascular:      Rate and Rhythm: Normal rate and regular rhythm.      Heart sounds: Murmur heard.      Systolic murmur is present with a grade of 2/6.   Pulmonary:      Effort: Pulmonary effort is normal. No respiratory distress.      Breath sounds: Normal breath sounds. No wheezing or rales.   Abdominal:      General: There is no distension.      Palpations: Abdomen is soft.      Tenderness: There is no abdominal tenderness.   Musculoskeletal:      Cervical back: Neck supple.      Right lower leg: No edema.      Left lower leg: No edema.   Lymphadenopathy:      Cervical: No cervical adenopathy.   Skin:     General: Skin is warm and dry.      Findings: No rash.   Neurological:      General: No focal deficit present.      Mental Status: She is alert. Mental status is at baseline.      Cranial Nerves: No cranial nerve deficit. "      Motor: No weakness.      Gait: Gait normal.      Comments: Pleasant and conversant, cooperative.    Psychiatric:         Mood and Affect: Mood normal.           Assessment:       1. Anxiety    2. Mild late onset Alzheimer's dementia without behavioral disturbance, psychotic disturbance, mood disturbance, or anxiety    3. Essential hypertension           Plan:       1. Anxiety   -appears to be doing better since ER visit.    2. Mild late onset Alzheimer's dementia without behavioral disturbance, psychotic disturbance, mood disturbance, or anxiety   -overall stable, patient is now on the waiting list for [facility] as daughter is worried about her living alone    3. Essential hypertension   -BP well controlled    Follow up if symptoms worsen or fail to improve.            6/13/2024 Shayla Tello NP

## 2024-06-26 ENCOUNTER — TELEPHONE (OUTPATIENT)
Dept: FAMILY MEDICINE | Facility: CLINIC | Age: 84
End: 2024-06-26
Payer: MEDICARE

## 2024-06-26 NOTE — TELEPHONE ENCOUNTER
----- Message from Daisy Patel sent at 6/26/2024 11:54 AM CDT -----  Patients daughter brought in paperwork for [facility] please fill out and fax gave to Tiny.

## 2024-07-03 ENCOUNTER — TELEPHONE (OUTPATIENT)
Dept: FAMILY MEDICINE | Facility: CLINIC | Age: 84
End: 2024-07-03
Payer: MEDICARE

## 2024-07-03 NOTE — TELEPHONE ENCOUNTER
----- Message from Sophia Fair sent at 7/3/2024 10:25 AM CDT -----  Contact: Alysha  Cristhian Encarnacion calling to check the status of assistant living paper work for pt.   [phone]

## 2024-07-03 NOTE — TELEPHONE ENCOUNTER
Spoke to pts daughter Alysha and let her know I refaxed the forms. And she verbalized understanding

## 2024-07-16 ENCOUNTER — PATIENT MESSAGE (OUTPATIENT)
Dept: FAMILY MEDICINE | Facility: CLINIC | Age: 84
End: 2024-07-16
Payer: MEDICARE

## 2024-07-16 NOTE — LETTER
"  [address] [facility] [address]  [place] [address]  Phone: [phone]   [phone]                        MD Riley Hoyos, MD Yousif Nassar, PA-C     Tanya Sanders, DARREN Xavier, DARREN Su PA-C      Date: 07/16/2024        Patient: Maricel Abdul  YOB: 1940      Per Shayla Tello " Melatonin 5 - 10 mg nightly for sleep to avoid oversedation and fall risk"         Sincerely,     Electronically Signed By: Shayla Tello, DARREN     "

## 2024-07-16 NOTE — TELEPHONE ENCOUNTER
I would first recommend trying melatonin 5-10mg nightly for sleep to avoid oversedation and fall risks- please send order for melatonin to [facility]

## 2024-07-23 RX ORDER — QUETIAPINE FUMARATE 25 MG/1
TABLET, FILM COATED ORAL
Qty: 30 TABLET | Refills: 11 | Status: SHIPPED | OUTPATIENT
Start: 2024-07-23

## 2024-07-23 NOTE — TELEPHONE ENCOUNTER
Silvana Amanda Staff  Caller: Unspecified (Today,  8:34 AM)  Alysha the patients daughter called.  She wants her mom to be seen today. Alysha's # [phone] GH

## 2024-07-23 NOTE — TELEPHONE ENCOUNTER
Please let daughter know we can add low dose quetiapine to help with sleep/anxiety- start quetiapine 25mg 1/2 tablet nightly and may increase to 1 full tablet if needed for sleep- not sure if she needs this order faxed to [facility] and not sure what pharmacy it needs to be sent to

## 2024-07-31 ENCOUNTER — PATIENT MESSAGE (OUTPATIENT)
Dept: FAMILY MEDICINE | Facility: CLINIC | Age: 84
End: 2024-07-31
Payer: MEDICARE

## 2024-08-06 ENCOUNTER — PATIENT MESSAGE (OUTPATIENT)
Dept: FAMILY MEDICINE | Facility: CLINIC | Age: 84
End: 2024-08-06
Payer: MEDICARE

## 2024-09-30 ENCOUNTER — OFFICE VISIT (OUTPATIENT)
Dept: FAMILY MEDICINE | Facility: CLINIC | Age: 84
End: 2024-09-30
Payer: MEDICARE

## 2024-09-30 VITALS
SYSTOLIC BLOOD PRESSURE: 138 MMHG | HEIGHT: 59 IN | WEIGHT: 126 LBS | OXYGEN SATURATION: 95 % | DIASTOLIC BLOOD PRESSURE: 68 MMHG | BODY MASS INDEX: 25.4 KG/M2 | HEART RATE: 75 BPM

## 2024-09-30 DIAGNOSIS — Z23 NEED FOR INFLUENZA VACCINATION: ICD-10-CM

## 2024-09-30 DIAGNOSIS — G30.1 MILD LATE ONSET ALZHEIMER'S DEMENTIA WITHOUT BEHAVIORAL DISTURBANCE, PSYCHOTIC DISTURBANCE, MOOD DISTURBANCE, OR ANXIETY: ICD-10-CM

## 2024-09-30 DIAGNOSIS — F51.01 PRIMARY INSOMNIA: ICD-10-CM

## 2024-09-30 DIAGNOSIS — E78.5 DYSLIPIDEMIA: ICD-10-CM

## 2024-09-30 DIAGNOSIS — I10 ESSENTIAL HYPERTENSION: Primary | ICD-10-CM

## 2024-09-30 DIAGNOSIS — F02.A0 MILD LATE ONSET ALZHEIMER'S DEMENTIA WITHOUT BEHAVIORAL DISTURBANCE, PSYCHOTIC DISTURBANCE, MOOD DISTURBANCE, OR ANXIETY: ICD-10-CM

## 2024-09-30 DIAGNOSIS — K21.9 GASTROESOPHAGEAL REFLUX DISEASE, UNSPECIFIED WHETHER ESOPHAGITIS PRESENT: ICD-10-CM

## 2024-09-30 DIAGNOSIS — M15.0 PRIMARY OSTEOARTHRITIS INVOLVING MULTIPLE JOINTS: ICD-10-CM

## 2024-09-30 PROCEDURE — 1101F PT FALLS ASSESS-DOCD LE1/YR: CPT | Mod: CPTII,S$GLB,, | Performed by: NURSE PRACTITIONER

## 2024-09-30 PROCEDURE — 1126F AMNT PAIN NOTED NONE PRSNT: CPT | Mod: CPTII,S$GLB,, | Performed by: NURSE PRACTITIONER

## 2024-09-30 PROCEDURE — 1160F RVW MEDS BY RX/DR IN RCRD: CPT | Mod: CPTII,S$GLB,, | Performed by: NURSE PRACTITIONER

## 2024-09-30 PROCEDURE — 1159F MED LIST DOCD IN RCRD: CPT | Mod: CPTII,S$GLB,, | Performed by: NURSE PRACTITIONER

## 2024-09-30 PROCEDURE — 90653 IIV ADJUVANT VACCINE IM: CPT | Mod: S$GLB,,, | Performed by: NURSE PRACTITIONER

## 2024-09-30 PROCEDURE — 3288F FALL RISK ASSESSMENT DOCD: CPT | Mod: CPTII,S$GLB,, | Performed by: NURSE PRACTITIONER

## 2024-09-30 PROCEDURE — 1157F ADVNC CARE PLAN IN RCRD: CPT | Mod: CPTII,S$GLB,, | Performed by: NURSE PRACTITIONER

## 2024-09-30 PROCEDURE — G0008 ADMIN INFLUENZA VIRUS VAC: HCPCS | Mod: S$GLB,,, | Performed by: NURSE PRACTITIONER

## 2024-09-30 PROCEDURE — 99214 OFFICE O/P EST MOD 30 MIN: CPT | Mod: S$GLB,,, | Performed by: NURSE PRACTITIONER

## 2024-09-30 RX ORDER — MELOXICAM 7.5 MG/1
7.5 TABLET ORAL DAILY
Qty: 90 TABLET | Refills: 3 | Status: SHIPPED | OUTPATIENT
Start: 2024-09-30

## 2024-09-30 RX ORDER — ATORVASTATIN CALCIUM 80 MG/1
80 TABLET, FILM COATED ORAL DAILY
Qty: 90 TABLET | Refills: 3 | Status: SHIPPED | OUTPATIENT
Start: 2024-09-30

## 2024-09-30 RX ORDER — METOPROLOL SUCCINATE 25 MG/1
25 TABLET, EXTENDED RELEASE ORAL 2 TIMES DAILY
Qty: 180 TABLET | Refills: 3 | Status: SHIPPED | OUTPATIENT
Start: 2024-09-30

## 2024-09-30 RX ORDER — AMITRIPTYLINE HYDROCHLORIDE 50 MG/1
50 TABLET, FILM COATED ORAL NIGHTLY
Qty: 90 TABLET | Refills: 3 | Status: SHIPPED | OUTPATIENT
Start: 2024-09-30

## 2024-09-30 RX ORDER — SERTRALINE HYDROCHLORIDE 100 MG/1
100 TABLET, FILM COATED ORAL DAILY
Qty: 90 TABLET | Refills: 3 | Status: SHIPPED | OUTPATIENT
Start: 2024-09-30

## 2024-09-30 RX ORDER — PANTOPRAZOLE SODIUM 40 MG/1
40 TABLET, DELAYED RELEASE ORAL DAILY
Qty: 90 TABLET | Refills: 3 | Status: SHIPPED | OUTPATIENT
Start: 2024-09-30

## 2024-09-30 RX ORDER — DONEPEZIL HYDROCHLORIDE 10 MG/1
10 TABLET, FILM COATED ORAL NIGHTLY
Qty: 90 TABLET | Refills: 3 | Status: SHIPPED | OUTPATIENT
Start: 2024-09-30 | End: 2025-09-30

## 2024-09-30 RX ORDER — LOSARTAN POTASSIUM AND HYDROCHLOROTHIAZIDE 12.5; 1 MG/1; MG/1
1 TABLET ORAL DAILY
Qty: 90 TABLET | Refills: 3 | Status: SHIPPED | OUTPATIENT
Start: 2024-09-30

## 2024-09-30 NOTE — PROGRESS NOTES
SUBJECTIVE:    Patient ID: Maricel Abdul is a 84 y.o. female.    Chief Complaint: Follow-up (Pt brought medication list//Pt is here for a 6 month check up//Pt would like her flu vaccine//MUNDO )    Pt here for regular f/u- HTN, dementia, lipids, OA, depression. Pt here with her daughter    Pt moved into [facility] assisted living in July- pt and daughter both report overall she's settling in pretty good. Pt is participating in social functions and exercise class. Eating well, has gained 8lbs since moving in.    Has previously seen , neuro and told to f/u prn. Daughter reports her word finding issues seems to be better. Sleeping good with quetiapine    Follows with Dr Rosas cards for PPM- will need battery replaced within the next week or so        Admission on 05/31/2024, Discharged on 05/31/2024   Component Date Value Ref Range Status    Magnesium 05/31/2024 1.8  1.6 - 2.6 mg/dL Final    QRS Duration 05/31/2024 148  ms Final    [facility] QTC Calculation 05/31/2024 509  ms Final    WBC 05/31/2024 7.40  3.90 - 12.70 K/uL Final    RBC 05/31/2024 4.64  4.00 - 5.40 M/uL Final    Hemoglobin 05/31/2024 13.9  12.0 - 16.0 g/dL Final    Hematocrit 05/31/2024 42.7  37.0 - 48.5 % Final    MCV 05/31/2024 92  82 - 98 fL Final    MCH 05/31/2024 30.0  27.0 - 31.0 pg Final    MCHC 05/31/2024 32.6  32.0 - 36.0 g/dL Final    RDW 05/31/2024 14.2  11.5 - 14.5 % Final    Platelets 05/31/2024 248  150 - 450 K/uL Final    MPV 05/31/2024 9.8  9.2 - 12.9 fL Final    Immature Granulocytes 05/31/2024 0.4  0.0 - 0.5 % Final    Gran # (ANC) 05/31/2024 5.2  1.8 - 7.7 K/uL Final    Immature Grans (Abs) 05/31/2024 0.03  0.00 - 0.04 K/uL Final    Lymph # 05/31/2024 1.1  1.0 - 4.8 K/uL Final    Mono # 05/31/2024 0.8  0.3 - 1.0 K/uL Final    Eos # 05/31/2024 0.2  0.0 - 0.5 K/uL Final    Baso # 05/31/2024 0.07  0.00 - 0.20 K/uL Final    nRBC 05/31/2024 0  0 /100 WBC Final    Gran % 05/31/2024 70.6  38.0 - 73.0 % Final    Lymph % 05/31/2024  14.2 (L)  18.0 - 48.0 % Final    Mono % 05/31/2024 11.2  4.0 - 15.0 % Final    Eosinophil % 05/31/2024 2.7  0.0 - 8.0 % Final    Basophil % 05/31/2024 0.9  0.0 - 1.9 % Final    Differential Method 05/31/2024 Automated   Final    Sodium 05/31/2024 140  136 - 145 mmol/L Final    Potassium 05/31/2024 4.1  3.5 - 5.1 mmol/L Final    Chloride 05/31/2024 103  95 - 110 mmol/L Final    CO2 05/31/2024 28  23 - 29 mmol/L Final    Glucose 05/31/2024 114 (H)  70 - 110 mg/dL Final    BUN 05/31/2024 19  8 - 23 mg/dL Final    Creatinine 05/31/2024 0.9  0.5 - 1.4 mg/dL Final    Calcium 05/31/2024 10.3  8.7 - 10.5 mg/dL Final    Total Protein 05/31/2024 8.1  6.0 - 8.4 g/dL Final    Albumin 05/31/2024 4.7  3.5 - 5.2 g/dL Final    Total Bilirubin 05/31/2024 0.6  0.1 - 1.0 mg/dL Final    Alkaline Phosphatase 05/31/2024 77  55 - 135 U/L Final    AST 05/31/2024 26  10 - 40 U/L Final    ALT 05/31/2024 20  10 - 44 U/L Final    eGFR 05/31/2024 >60.0  >60 mL/min/1.73 m^2 Final    Anion Gap 05/31/2024 9  8 - 16 mmol/L Final    Troponin I High Sensitivity 05/31/2024 13.6  0.0 - 14.9 pg/mL Final    Troponin I High Sensitivity 05/31/2024 18.0 (H)  0.0 - 14.9 pg/mL Final    BNP 05/31/2024 111 (H)  0 - 99 pg/mL Final    Specimen UA 05/31/2024 Urine, Clean Catch   Final    Color, UA 05/31/2024 Yellow  Yellow, Straw, Netta Final    Appearance, UA 05/31/2024 Hazy (A)  Clear Final    pH, UA 05/31/2024 7.0  5.0 - 8.0 Final    Specific Gravity, UA 05/31/2024 1.020  1.005 - 1.030 Final    Protein, UA 05/31/2024 1+ (A)  Negative Final    Glucose, UA 05/31/2024 Negative  Negative Final    Ketones, UA 05/31/2024 Negative  Negative Final    Bilirubin (UA) 05/31/2024 Negative  Negative Final    Occult Blood UA 05/31/2024 Negative  Negative Final    Nitrite, UA 05/31/2024 Negative  Negative Final    Urobilinogen, UA 05/31/2024 Negative  Negative EU/dL Final    Leukocytes, UA 05/31/2024 Trace (A)  Negative Final    RBC, UA 05/31/2024 3  0 - 4 /hpf Final    WBC,  UA 05/31/2024 4  0 - 5 /hpf Final    Bacteria 05/31/2024 None  None-Occ /hpf Final    Squam Epithel, UA 05/31/2024 0  /hpf Final    Hyaline Casts, UA 05/31/2024 1  0-1/lpf /lpf Final    Microscopic Comment 05/31/2024 SEE COMMENT   Final   Office Visit on 04/15/2024   Component Date Value Ref Range Status    WBC 04/20/2024 10.4  3.4 - 10.8 x10E3/uL Final    RBC 04/20/2024 4.15  3.77 - 5.28 x10E6/uL Final    Hemoglobin 04/20/2024 12.4  11.1 - 15.9 g/dL Final    Hematocrit 04/20/2024 38.7  34.0 - 46.6 % Final    MCV 04/20/2024 93  79 - 97 fL Final    MCH 04/20/2024 29.9  26.6 - 33.0 pg Final    MCHC 04/20/2024 32.0  31.5 - 35.7 g/dL Final    RDW 04/20/2024 12.8  11.7 - 15.4 % Final    Platelets 04/20/2024 264  150 - 450 x10E3/uL Final    Neutrophils 04/20/2024 75  Not Estab. % Final    Lymphs 04/20/2024 13  Not Estab. % Final    Monocytes 04/20/2024 8  Not Estab. % Final    Eos 04/20/2024 3  Not Estab. % Final    Basos 04/20/2024 1  Not Estab. % Final    Neutrophils (Absolute) 04/20/2024 7.8 (H)  1.4 - 7.0 x10E3/uL Final    Lymphs (Absolute) 04/20/2024 1.3  0.7 - 3.1 x10E3/uL Final    Monocytes(Absolute) 04/20/2024 0.8  0.1 - 0.9 x10E3/uL Final    Eos (Absolute) 04/20/2024 0.3  0.0 - 0.4 x10E3/uL Final    Baso (Absolute) 04/20/2024 0.1  0.0 - 0.2 x10E3/uL Final    Immature Granulocytes 04/20/2024 0  Not Estab. % Final    Immature Grans (Abs) 04/20/2024 0.0  0.0 - 0.1 x10E3/uL Final    Glucose 04/20/2024 102 (H)  70 - 99 mg/dL Final    BUN 04/20/2024 17  8 - 27 mg/dL Final    Creatinine 04/20/2024 0.93  0.57 - 1.00 mg/dL Final    eGFR 04/20/2024 60  >59 mL/min/1.73 Final    BUN/Creatinine Ratio 04/20/2024 18  12 - 28 Final    Sodium 04/20/2024 140  134 - 144 mmol/L Final    Potassium 04/20/2024 4.5  3.5 - 5.2 mmol/L Final    Chloride 04/20/2024 99  96 - 106 mmol/L Final    CO2 04/20/2024 25  20 - 29 mmol/L Final    Calcium 04/20/2024 9.9  8.7 - 10.3 mg/dL Final    Protein, Total 04/20/2024 6.8  6.0 - 8.5 g/dL Final     Albumin 04/20/2024 4.5  3.7 - 4.7 g/dL Final    Globulin, Total 04/20/2024 2.3  1.5 - 4.5 g/dL Final    Albumin/Globulin Ratio 04/20/2024 2.0  1.2 - 2.2 Final    Total Bilirubin 04/20/2024 0.5  0.0 - 1.2 mg/dL Final    Alkaline Phosphatase 04/20/2024 80  44 - 121 IU/L Final    AST 04/20/2024 29  0 - 40 IU/L Final    ALT 04/20/2024 21  0 - 32 IU/L Final    Cholesterol 04/20/2024 197  100 - 199 mg/dL Final    Triglycerides 04/20/2024 115  0 - 149 mg/dL Final    HDL 04/20/2024 79  >39 mg/dL Final    VLDL Cholesterol Tashi 04/20/2024 20  5 - 40 mg/dL Final    LDL Calculated 04/20/2024 98  0 - 99 mg/dL Final    Creatinine, Urine 04/20/2024 110.1  Not Estab. mg/dL Final    Microalb, Ur 04/20/2024 13.9  Not Estab. ug/mL Final    Microalb/Crt. Ratio 04/20/2024 13  0 - 29 mg/g creat Final    Specific Gravity, UA 04/20/2024 1.018  1.005 - 1.030 Final    pH, UA 04/20/2024 7.0  5.0 - 7.5 Final    Color, UA 04/20/2024 Yellow  Yellow Final    Clarity, UA 04/20/2024 Clear  Clear Final    Leukocytes, UA 04/20/2024 2+ (A)  Negative Final    Protein, UA 04/20/2024 Negative  Negative/Trace Final    Glucose, UA 04/20/2024 Negative  Negative Final    Ketones, UA 04/20/2024 Negative  Negative Final    Occult Blood UA 04/20/2024 Negative  Negative Final    Bilirubin, UA 04/20/2024 Negative  Negative Final    Urobilinogen, UA 04/20/2024 0.2  0.2 - 1.0 mg/dL Final    Nitrite, UA 04/20/2024 Negative  Negative Final    Microscopic Examination 04/20/2024 See below:   Final    Urinalysis Reflex 04/20/2024 Comment   Final    TSH 04/20/2024 2.510  0.450 - 4.500 uIU/mL Final    T4, Free 04/20/2024 1.10  0.82 - 1.77 ng/dL Final    WBC, UA 04/20/2024 None seen  0 - 5 /hpf Final    RBC, UA 04/20/2024 0-2  0 - 2 /hpf Final    Epithelial Cells (non renal) 04/20/2024 None seen  0 - 10 /hpf Final    Casts 04/20/2024 None seen  None seen /lpf Final    Bacteria, UA 04/20/2024 Few  None seen/Few Final    Urine Culture, Comprehensive 04/20/2024 Final report  (A)   Final    Result 04/20/2024 Enterococcus faecalis (A)   Final    Antimicrobial Susceptibility 04/20/2024 Comment   Final       Past Medical History:   Diagnosis Date    Arthritis     Diverticulitis     Encounter for blood transfusion     Hypertension     Kidney stones     Pacemaker     Shingles     Squamous cell carcinoma of skin     TIA (transient ischemic attack) 12/2017     Past Surgical History:   Procedure Laterality Date    APPENDECTOMY      CARDIAC SURGERY      pacemaker    CERVICAL FUSION      CHOLECYSTECTOMY      COLONOSCOPY N/A 8/14/2019    Procedure: COLONOSCOPY;  Surgeon: Elio Perez MD;  Location: [facility];  Service: Endoscopy;  Laterality: N/A;    HYSTERECTOMY      SHOULDER SURGERY       Family History   Problem Relation Name Age of Onset    Cancer Father          lung    Cancer Brother          colon cancer    Colon cancer Brother      Pancreatitis Mother      Eczema Neg Hx      Psoriasis Neg Hx      Lupus Neg Hx      Melanoma Neg Hx      Stomach cancer Neg Hx      Esophageal cancer Neg Hx         All of your core healthy metrics are met.      The CVD Risk score (OMEGA'Agostino, et al., 2008) failed to calculate for the following reasons:    The 2008 CVD risk score is only valid for ages 30 to 74    The patient has a prior MI, stroke, CHF, or peripheral vascular disease diagnosis     Marital Status:   Alcohol History:  reports current alcohol use of about 7.0 standard drinks of alcohol per week.  Tobacco History:  reports that she quit smoking about 50 years ago. Her smoking use included cigarettes. She started smoking about 70 years ago. She has never been exposed to tobacco smoke. She has never used smokeless tobacco.  Drug History:  reports no history of drug use.    Health Maintenance Topics with due status: Not Due       Topic Last Completion Date    Lipid Panel 04/20/2024    TETANUS VACCINE 05/22/2024     Immunization History   Administered Date(s) Administered    COVID-19, MRNA,  LN-S, PF (Pfizer) (Purple Cap) 01/10/2021, 01/31/2021, 10/22/2021    Influenza - Quadrivalent - High Dose - PF (65 years and older) 09/09/2020, 09/28/2021    Influenza - Quadrivalent - PF *Preferred* (6 months and older) 11/18/2002, 01/19/2006    Influenza - Trivalent - Afluria, Fluzone MDV 11/18/2002, 01/19/2006    Influenza - Trivalent - Fluad - Adjuvanted - PF (65 years and older 09/30/2024    Influenza - Trivalent - Fluarix, Flulaval, Fluzone, Afluria - PF 08/31/2015    Influenza - Trivalent - Fluzone High Dose - PF (65 years and older) 08/30/2013, 08/31/2015, 09/19/2016, 09/19/2017, 09/12/2018, 10/15/2019    Influenza A (H5N1) 2004 Monovalent 01/01/2016    Pneumococcal 01/01/2016    Pneumococcal Conjugate - 13 Valent 08/30/2016    Pneumococcal Polysaccharide - 23 Valent 11/18/2002, 09/12/2018    Td (ADULT) 02/26/2019    Varicella 01/01/2014       Review of patient's allergies indicates:   Allergen Reactions    Codeine Nausea And Vomiting       Current Outpatient Medications:     cholecalciferol, vitamin D3, 125 mcg (5,000 unit) Tab, Take 5,000 Units by mouth once daily., Disp: , Rfl:     cranberry 500 mg Cap, Take 1 capsule by mouth once daily., Disp: , Rfl:     diphenoxylate-atropine 2.5-0.025 mg (LOMOTIL) 2.5-0.025 mg per tablet, Take 1 tablet by mouth 3 (three) times daily as needed for Diarrhea. TAKE 1 TABLET BY MOUTH 3 TIMES DAILY AS NEEDED FOR DIARRHEA, Disp: 30 tablet, Rfl: 3    fluticasone propionate (FLONASE) 50 mcg/actuation nasal spray, 1 spray (50 mcg total) by Each Nostril route once daily., Disp: 54 g, Rfl: 3    multivitamin (ONE DAILY MULTIVITAMIN) per tablet, Take 1 tablet by mouth once daily., Disp: , Rfl:     QUEtiapine (SEROQUEL) 25 MG Tab, Start 1/2 tablet at bedtime for sleep, may increase to 1 full tablet if needed, Disp: 30 tablet, Rfl: 11    vit A/vit C/vit E/zinc/copper (PRESERVISION AREDS ORAL), Take by mouth., Disp: , Rfl:     amitriptyline (ELAVIL) 50 MG tablet, Take 1 tablet (50 mg  total) by mouth every evening., Disp: 90 tablet, Rfl: 3    aspirin (ECOTRIN) 81 MG EC tablet, Take 1 tablet (81 mg total) by mouth once daily., Disp: , Rfl: 0    atorvastatin (LIPITOR) 80 MG tablet, Take 1 tablet (80 mg total) by mouth once daily., Disp: 90 tablet, Rfl: 3    biotin 10,000 mcg Cap, Take 1 capsule by mouth once daily. (Patient not taking: Reported on 9/30/2024), Disp: , Rfl:     donepeziL (ARICEPT) 10 MG tablet, Take 1 tablet (10 mg total) by mouth every evening., Disp: 90 tablet, Rfl: 3    losartan-hydrochlorothiazide 100-12.5 mg (HYZAAR) 100-12.5 mg Tab, Take 1 tablet by mouth once daily., Disp: 90 tablet, Rfl: 3    meloxicam (MOBIC) 7.5 MG tablet, Take 1 tablet (7.5 mg total) by mouth once daily. Anti-inflammatory for arthritis pain, Disp: 90 tablet, Rfl: 3    metoprolol succinate (TOPROL-XL) 25 MG 24 hr tablet, Take 1 tablet (25 mg total) by mouth 2 (two) times daily., Disp: 180 tablet, Rfl: 3    pantoprazole (PROTONIX) 40 MG tablet, Take 1 tablet (40 mg total) by mouth once daily., Disp: 90 tablet, Rfl: 3    sertraline (ZOLOFT) 100 MG tablet, Take 1 tablet (100 mg total) by mouth once daily., Disp: 90 tablet, Rfl: 3  No current facility-administered medications for this visit.    Review of Systems   Constitutional:  Negative for appetite change, chills, fever and unexpected weight change.   Eyes:  Negative for visual disturbance.   Respiratory:  Negative for cough, shortness of breath and wheezing.    Cardiovascular:  Negative for chest pain, palpitations and leg swelling.   Gastrointestinal:  Negative for abdominal pain, constipation, diarrhea, nausea and vomiting.   Genitourinary:  Positive for frequency. Negative for dysuria and hematuria.   Musculoskeletal:  Positive for back pain (chronic lower back pain worse first thing in am, does morning stretches which helps with pain). Negative for gait problem (no falls).   Skin:  Negative for rash.   Neurological:  Negative for dizziness, syncope,  "speech difficulty, numbness and headaches.   Psychiatric/Behavioral:  Positive for confusion. Negative for agitation, behavioral problems, dysphoric mood (stable on med), hallucinations and sleep disturbance. The patient is nervous/anxious (stable).           Objective:      Vitals:    09/30/24 1113 09/30/24 1120 09/30/24 1137   BP: (!) 144/62 (!) 148/64 138/68   Pulse: 75     SpO2: 95%     Weight: 57.2 kg (126 lb)     Height: 4' 11" (1.499 m)       Physical Exam  Vitals and nursing note reviewed.   Constitutional:       General: She is not in acute distress.     Appearance: She is well-developed.      Comments: Elderly WF, well groomed, very pleasant and cooperative   HENT:      Head: Normocephalic and atraumatic.   Neck:      Vascular: No carotid bruit.   Cardiovascular:      Rate and Rhythm: Normal rate and regular rhythm.      Heart sounds: Murmur heard.      Systolic murmur is present with a grade of 2/6.   Pulmonary:      Effort: Pulmonary effort is normal. No respiratory distress.      Breath sounds: Normal breath sounds. No wheezing or rales.   Abdominal:      General: There is no distension.      Palpations: Abdomen is soft.      Tenderness: There is no abdominal tenderness.   Musculoskeletal:      Cervical back: Neck supple.      Right lower leg: No edema.      Left lower leg: No edema.   Lymphadenopathy:      Cervical: No cervical adenopathy.   Skin:     General: Skin is warm and dry.      Findings: No rash.   Neurological:      General: No focal deficit present.      Mental Status: She is alert. Mental status is at baseline.      Cranial Nerves: No cranial nerve deficit.      Motor: No weakness.      Gait: Gait normal.      Comments: Pleasant and conversant, cooperative.    Psychiatric:         Mood and Affect: Mood normal.           Assessment:       1. Essential hypertension    2. Dyslipidemia    3. Primary osteoarthritis involving multiple joints    4. Primary insomnia    5. Mild late onset " Alzheimer's dementia without behavioral disturbance, psychotic disturbance, mood disturbance, or anxiety    6. Gastroesophageal reflux disease, unspecified whether esophagitis present    7. Need for influenza vaccination           Plan:       1. Essential hypertension  -BP improved on recheck, stable for age  -     losartan-hydrochlorothiazide 100-12.5 mg (HYZAAR) 100-12.5 mg Tab; Take 1 tablet by mouth once daily.  Dispense: 90 tablet; Refill: 3  -     metoprolol succinate (TOPROL-XL) 25 MG 24 hr tablet; Take 1 tablet (25 mg total) by mouth 2 (two) times daily.  Dispense: 180 tablet; Refill: 3    2. Dyslipidemia  -well controlled on last labs  -     atorvastatin (LIPITOR) 80 MG tablet; Take 1 tablet (80 mg total) by mouth once daily.  Dispense: 90 tablet; Refill: 3    3. Primary osteoarthritis involving multiple joints  -stable  -     meloxicam (MOBIC) 7.5 MG tablet; Take 1 tablet (7.5 mg total) by mouth once daily. Anti-inflammatory for arthritis pain  Dispense: 90 tablet; Refill: 3    4. Primary insomnia  -stable on med  -     amitriptyline (ELAVIL) 50 MG tablet; Take 1 tablet (50 mg total) by mouth every evening.  Dispense: 90 tablet; Refill: 3  -     sertraline (ZOLOFT) 100 MG tablet; Take 1 tablet (100 mg total) by mouth once daily.  Dispense: 90 tablet; Refill: 3    5. Mild late onset Alzheimer's dementia without behavioral disturbance, psychotic disturbance, mood disturbance, or anxiety  -daughter reports she seems to be doing well at assisted living  -     donepeziL (ARICEPT) 10 MG tablet; Take 1 tablet (10 mg total) by mouth every evening.  Dispense: 90 tablet; Refill: 3    6. Gastroesophageal reflux disease, unspecified whether esophagitis present  -stable  -     pantoprazole (PROTONIX) 40 MG tablet; Take 1 tablet (40 mg total) by mouth once daily.  Dispense: 90 tablet; Refill: 3    7. Need for influenza vaccination  -     influenza (adjuvanted) (Fluad) 45 mcg/0.5 mL IM vaccine (> or = 66 yo) 0.5  mL      Follow up for round at [facility].          Counseled on age and gender appropriate medical preventative services, including cancer screenings, immunizations, overall nutritional health, need for a consistent exercise regimen and an overall push towards maintaining a vigorous and active lifestyle.      9/30/2024 Shayla Tello NP

## 2024-10-22 ENCOUNTER — TELEPHONE (OUTPATIENT)
Dept: FAMILY MEDICINE | Facility: CLINIC | Age: 84
End: 2024-10-22

## 2024-10-22 ENCOUNTER — OFFICE VISIT (OUTPATIENT)
Dept: FAMILY MEDICINE | Facility: CLINIC | Age: 84
End: 2024-10-22
Payer: MEDICARE

## 2024-10-22 VITALS
OXYGEN SATURATION: 97 % | HEART RATE: 75 BPM | DIASTOLIC BLOOD PRESSURE: 78 MMHG | HEIGHT: 59 IN | SYSTOLIC BLOOD PRESSURE: 168 MMHG | WEIGHT: 127 LBS | BODY MASS INDEX: 25.6 KG/M2

## 2024-10-22 DIAGNOSIS — R39.9 UTI SYMPTOMS: Primary | ICD-10-CM

## 2024-10-22 DIAGNOSIS — G30.1 MILD LATE ONSET ALZHEIMER'S DEMENTIA WITHOUT BEHAVIORAL DISTURBANCE, PSYCHOTIC DISTURBANCE, MOOD DISTURBANCE, OR ANXIETY: ICD-10-CM

## 2024-10-22 DIAGNOSIS — I10 ESSENTIAL HYPERTENSION: ICD-10-CM

## 2024-10-22 DIAGNOSIS — F02.A0 MILD LATE ONSET ALZHEIMER'S DEMENTIA WITHOUT BEHAVIORAL DISTURBANCE, PSYCHOTIC DISTURBANCE, MOOD DISTURBANCE, OR ANXIETY: ICD-10-CM

## 2024-10-22 LAB
BILIRUBIN, UA POC OHS: NEGATIVE
BLOOD, UA POC OHS: NEGATIVE
CLARITY, UA POC OHS: CLEAR
COLOR, UA POC OHS: YELLOW
GLUCOSE, UA POC OHS: NEGATIVE
KETONES, UA POC OHS: NEGATIVE
LEUKOCYTES, UA POC OHS: NEGATIVE
NITRITE, UA POC OHS: NEGATIVE
PH, UA POC OHS: 7.5
PROTEIN, UA POC OHS: NEGATIVE
SPECIFIC GRAVITY, UA POC OHS: 1.01
UROBILINOGEN, UA POC OHS: 0.2

## 2024-10-22 PROCEDURE — 1157F ADVNC CARE PLAN IN RCRD: CPT | Mod: CPTII,S$GLB,, | Performed by: NURSE PRACTITIONER

## 2024-10-22 PROCEDURE — 3288F FALL RISK ASSESSMENT DOCD: CPT | Mod: CPTII,S$GLB,, | Performed by: NURSE PRACTITIONER

## 2024-10-22 PROCEDURE — 1126F AMNT PAIN NOTED NONE PRSNT: CPT | Mod: CPTII,S$GLB,, | Performed by: NURSE PRACTITIONER

## 2024-10-22 PROCEDURE — 1160F RVW MEDS BY RX/DR IN RCRD: CPT | Mod: CPTII,S$GLB,, | Performed by: NURSE PRACTITIONER

## 2024-10-22 PROCEDURE — 99214 OFFICE O/P EST MOD 30 MIN: CPT | Mod: S$GLB,,, | Performed by: NURSE PRACTITIONER

## 2024-10-22 PROCEDURE — 1159F MED LIST DOCD IN RCRD: CPT | Mod: CPTII,S$GLB,, | Performed by: NURSE PRACTITIONER

## 2024-10-22 PROCEDURE — 81003 URINALYSIS AUTO W/O SCOPE: CPT | Mod: QW,S$GLB,, | Performed by: NURSE PRACTITIONER

## 2024-10-22 PROCEDURE — 1101F PT FALLS ASSESS-DOCD LE1/YR: CPT | Mod: CPTII,S$GLB,, | Performed by: NURSE PRACTITIONER

## 2024-10-22 RX ORDER — CEFUROXIME AXETIL 500 MG/1
500 TABLET ORAL 2 TIMES DAILY
Qty: 10 TABLET | Refills: 0 | Status: SHIPPED | OUTPATIENT
Start: 2024-10-22 | End: 2024-10-27

## 2024-10-22 NOTE — PROGRESS NOTES
Patient ID: Maricel Abdul is a 84 y.o. female.    Chief Complaint: Urinary Tract Infection (No bottles//Pt increased frequency/urgency and forgetfulness. Pt daughter stated that the nursing staff at [facility] stated that the pt forgot to turn off the shower last night )    Pt here for sick visit- here with daughter    Daughter reports pt lives at [facility] assisted living and they called this morning reporting pt seemed to be more confused. She left the shower on last night and clogged up the toilet. Pt denies any dysuria, hematuria or pelvic/flank pain. Daughter reports she does have urinary frequency and urgency which is chronic with some mild urinary incontinence. Denies any nausea, vomiting, abd pain or diarrhea            Past Medical History:   Diagnosis Date    Arthritis     Diverticulitis     Encounter for blood transfusion     Hypertension     Kidney stones     Pacemaker     Shingles     Squamous cell carcinoma of skin     TIA (transient ischemic attack) 12/2017     Past Surgical History:   Procedure Laterality Date    APPENDECTOMY      CARDIAC SURGERY      pacemaker    CERVICAL FUSION      CHOLECYSTECTOMY      COLONOSCOPY N/A 8/14/2019    Procedure: COLONOSCOPY;  Surgeon: Elio Perez MD;  Location: [facility];  Service: Endoscopy;  Laterality: N/A;    HYSTERECTOMY      SHOULDER SURGERY         All of your core healthy metrics are met.        Tobacco History:  reports that she quit smoking about 50 years ago. Her smoking use included cigarettes. She started smoking about 70 years ago. She has never been exposed to tobacco smoke. She has never used smokeless tobacco.      Review of patient's allergies indicates:   Allergen Reactions    Codeine Nausea And Vomiting       Current Outpatient Medications:     amitriptyline (ELAVIL) 50 MG tablet, Take 1 tablet (50 mg total) by mouth every evening., Disp: 90 tablet, Rfl: 3    aspirin (ECOTRIN) 81 MG EC tablet, Take 1 tablet (81 mg total) by mouth  once daily., Disp: , Rfl: 0    atorvastatin (LIPITOR) 80 MG tablet, Take 1 tablet (80 mg total) by mouth once daily., Disp: 90 tablet, Rfl: 3    biotin 10,000 mcg Cap, Take 1 capsule by mouth once daily. (Patient not taking: Reported on 9/30/2024), Disp: , Rfl:     cefUROXime (CEFTIN) 500 MG tablet, Take 1 tablet (500 mg total) by mouth 2 (two) times daily. for 5 days, Disp: 10 tablet, Rfl: 0    cholecalciferol, vitamin D3, 125 mcg (5,000 unit) Tab, Take 5,000 Units by mouth once daily., Disp: , Rfl:     cranberry 500 mg Cap, Take 1 capsule by mouth once daily., Disp: , Rfl:     diphenoxylate-atropine 2.5-0.025 mg (LOMOTIL) 2.5-0.025 mg per tablet, Take 1 tablet by mouth 3 (three) times daily as needed for Diarrhea. TAKE 1 TABLET BY MOUTH 3 TIMES DAILY AS NEEDED FOR DIARRHEA, Disp: 30 tablet, Rfl: 3    donepeziL (ARICEPT) 10 MG tablet, Take 1 tablet (10 mg total) by mouth every evening., Disp: 90 tablet, Rfl: 3    fluticasone propionate (FLONASE) 50 mcg/actuation nasal spray, 1 spray (50 mcg total) by Each Nostril route once daily., Disp: 54 g, Rfl: 3    losartan-hydrochlorothiazide 100-12.5 mg (HYZAAR) 100-12.5 mg Tab, Take 1 tablet by mouth once daily., Disp: 90 tablet, Rfl: 3    meloxicam (MOBIC) 7.5 MG tablet, Take 1 tablet (7.5 mg total) by mouth once daily. Anti-inflammatory for arthritis pain, Disp: 90 tablet, Rfl: 3    metoprolol succinate (TOPROL-XL) 25 MG 24 hr tablet, Take 1 tablet (25 mg total) by mouth 2 (two) times daily., Disp: 180 tablet, Rfl: 3    multivitamin (ONE DAILY MULTIVITAMIN) per tablet, Take 1 tablet by mouth once daily., Disp: , Rfl:     pantoprazole (PROTONIX) 40 MG tablet, Take 1 tablet (40 mg total) by mouth once daily., Disp: 90 tablet, Rfl: 3    QUEtiapine (SEROQUEL) 25 MG Tab, Start 1/2 tablet at bedtime for sleep, may increase to 1 full tablet if needed, Disp: 30 tablet, Rfl: 11    sertraline (ZOLOFT) 100 MG tablet, Take 1 tablet (100 mg total) by mouth once daily., Disp: 90  "tablet, Rfl: 3    vit A/vit C/vit E/zinc/copper (PRESERVISION AREDS ORAL), Take by mouth., Disp: , Rfl:     Review of Systems   Constitutional:  Negative for chills, fever and unexpected weight change.   Respiratory:  Negative for cough and shortness of breath.    Cardiovascular:  Negative for chest pain, palpitations and leg swelling.   Gastrointestinal:  Negative for abdominal pain, diarrhea, nausea and vomiting.   Genitourinary:  Positive for frequency and urgency. Negative for difficulty urinating, dysuria and flank pain.   Skin:  Negative for rash.   Neurological:  Negative for dizziness, syncope and weakness.          Objective:      Vitals:    10/22/24 1508 10/22/24 1515 10/22/24 1538   BP: (!) 170/80 (!) 172/74 (!) 168/78   Pulse: 75     SpO2: 97%     Weight: 57.6 kg (127 lb)     Height: 4' 11" (1.499 m)       Physical Exam  Vitals and nursing note reviewed.   Constitutional:       General: She is not in acute distress.     Appearance: Normal appearance. She is well-developed. She is not toxic-appearing.   Cardiovascular:      Rate and Rhythm: Normal rate and regular rhythm.      Heart sounds: Murmur heard.      Systolic murmur is present with a grade of 2/6.   Pulmonary:      Effort: Pulmonary effort is normal. No respiratory distress.      Breath sounds: Normal breath sounds. No wheezing or rales.   Abdominal:      General: There is no distension.      Palpations: Abdomen is soft.      Tenderness: There is no abdominal tenderness. There is no right CVA tenderness or left CVA tenderness.   Musculoskeletal:      Right lower leg: No edema.      Left lower leg: No edema.   Skin:     General: Skin is warm and dry.      Findings: No rash.   Neurological:      General: No focal deficit present.      Mental Status: She is alert. Mental status is at baseline.   Psychiatric:         Mood and Affect: Mood normal.           Assessment:       1. UTI symptoms    2. Mild late onset Alzheimer's dementia without behavioral " disturbance, psychotic disturbance, mood disturbance, or anxiety    3. Essential hypertension           Plan:       UTI symptoms  -UA dip today clean. Discussed with pt/daughter UTI symptoms vs OAB. Discussed we could wait for final urine culture results or start abx now- daughter would like to go ahead and start abx now. Advised if urine culture is negative then we can add gemtessa for her OAB c/o's  -     POCT Urinalysis(Instrument)  -     Urine culture; Future; Expected date: 10/22/2024  -     cefUROXime (CEFTIN) 500 MG tablet; Take 1 tablet (500 mg total) by mouth 2 (two) times daily. for 5 days  Dispense: 10 tablet; Refill: 0    Mild late onset Alzheimer's dementia without behavioral disturbance, psychotic disturbance, mood disturbance, or anxiety   -stable, daughter reports she does have some mild anxiety. Sleeping well with seroquel    Essential hypertension   -BP elevated today. Recommend monitoring BP at home and call if BP consistently >150/90    Follow up if symptoms worsen or fail to improve, for as scheduled.        10/22/2024 Shayla Tello NP

## 2024-10-22 NOTE — TELEPHONE ENCOUNTER
----- Message from Lanette sent at 10/22/2024 10:13 AM CDT -----  Pt daughter would like to know if she can come in for uti. She has frequency and dimple called and said she is doing strange things   Alysha [phone]

## 2024-10-25 ENCOUNTER — TELEPHONE (OUTPATIENT)
Dept: FAMILY MEDICINE | Facility: CLINIC | Age: 84
End: 2024-10-25
Payer: MEDICARE

## 2024-10-25 DIAGNOSIS — R39.9 UTI SYMPTOMS: Primary | ICD-10-CM

## 2024-10-25 LAB — BACTERIA UR CULT: ABNORMAL

## 2024-10-25 RX ORDER — CIPROFLOXACIN 500 MG/1
500 TABLET ORAL 2 TIMES DAILY
Qty: 10 TABLET | Refills: 0 | Status: SHIPPED | OUTPATIENT
Start: 2024-10-25

## 2024-10-25 NOTE — TELEPHONE ENCOUNTER
Spoke with Alysha, pt's daughter in regards to recent urine culture results. Alysha stated that the is still having symptoms. Still having increased forgetfulness and frequency/urgency. Pt was prescribe Ceftin, looks like that Ceftin is not susceptible.

## 2024-11-22 ENCOUNTER — HOSPITAL ENCOUNTER (INPATIENT)
Facility: HOSPITAL | Age: 84
LOS: 2 days | Discharge: NURSING, REHAB OR LONG-TERM CARE | DRG: 312 | End: 2024-11-26
Attending: STUDENT IN AN ORGANIZED HEALTH CARE EDUCATION/TRAINING PROGRAM | Admitting: STUDENT IN AN ORGANIZED HEALTH CARE EDUCATION/TRAINING PROGRAM
Payer: MEDICARE

## 2024-11-22 DIAGNOSIS — R07.9 CHEST PAIN: ICD-10-CM

## 2024-11-22 DIAGNOSIS — R55 SYNCOPE AND COLLAPSE: ICD-10-CM

## 2024-11-22 DIAGNOSIS — R55 SYNCOPE: ICD-10-CM

## 2024-11-22 DIAGNOSIS — Z95.0 PACEMAKER: ICD-10-CM

## 2024-11-22 DIAGNOSIS — T14.8XXA DISLOCATION CLOSED: ICD-10-CM
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
## 2024-11-22 DIAGNOSIS — K52.9 CHRONIC DIARRHEA: ICD-10-CM

## 2024-11-22 PROCEDURE — 99285 EMERGENCY DEPT VISIT HI MDM: CPT | Mod: 25

## 2024-11-22 PROCEDURE — 23650 CLTX SHO DSLC W/MNPJ WO ANES: CPT | Mod: LT

## 2024-11-22 PROCEDURE — 85025 COMPLETE CBC W/AUTO DIFF WBC: CPT

## 2024-11-22 PROCEDURE — 84484 ASSAY OF TROPONIN QUANT: CPT

## 2024-11-22 PROCEDURE — 96374 THER/PROPH/DIAG INJ IV PUSH: CPT

## 2024-11-22 PROCEDURE — 99152 MOD SED SAME PHYS/QHP 5/>YRS: CPT

## 2024-11-22 PROCEDURE — 96375 TX/PRO/DX INJ NEW DRUG ADDON: CPT

## 2024-11-22 PROCEDURE — 12011 RPR F/E/E/N/L/M 2.5 CM/<: CPT

## 2024-11-22 PROCEDURE — 94761 N-INVAS EAR/PLS OXIMETRY MLT: CPT

## 2024-11-22 PROCEDURE — 96361 HYDRATE IV INFUSION ADD-ON: CPT

## 2024-11-22 PROCEDURE — 83880 ASSAY OF NATRIURETIC PEPTIDE: CPT

## 2024-11-22 PROCEDURE — 86901 BLOOD TYPING SEROLOGIC RH(D): CPT

## 2024-11-22 PROCEDURE — 63600175 PHARM REV CODE 636 W HCPCS

## 2024-11-22 PROCEDURE — 80053 COMPREHEN METABOLIC PANEL: CPT

## 2024-11-22 RX ORDER — FENTANYL CITRATE 50 UG/ML
50 INJECTION, SOLUTION INTRAMUSCULAR; INTRAVENOUS
Status: COMPLETED | OUTPATIENT
Start: 2024-11-22 | End: 2024-11-22

## 2024-11-22 RX ADMIN — FENTANYL CITRATE 50 MCG: 50 INJECTION INTRAMUSCULAR; INTRAVENOUS at 11:11

## 2024-11-23 ENCOUNTER — CLINICAL SUPPORT (OUTPATIENT)
Dept: CARDIOLOGY | Facility: HOSPITAL | Age: 84
End: 2024-11-23
Attending: INTERNAL MEDICINE
Payer: MEDICARE

## 2024-11-23 VITALS — WEIGHT: 126 LBS | BODY MASS INDEX: 25.4 KG/M2 | HEIGHT: 59 IN

## 2024-11-23 PROBLEM — Z95.0 CARDIAC PACEMAKER IN SITU: Status: RESOLVED | Noted: 2018-03-28 | Resolved: 2024-11-23

## 2024-11-23 PROBLEM — N39.0 UTI (URINARY TRACT INFECTION): Status: ACTIVE | Noted: 2024-11-23

## 2024-11-23 PROBLEM — R55 SYNCOPE: Status: ACTIVE | Noted: 2024-11-23

## 2024-11-23 PROBLEM — S43.005A SHOULDER DISLOCATION, LEFT, INITIAL ENCOUNTER: Status: ACTIVE | Noted: 2024-11-23

## 2024-11-23 LAB
ABO + RH BLD: NORMAL
ALBUMIN SERPL BCP-MCNC: 3.9 G/DL (ref 3.5–5.2)
ALBUMIN SERPL BCP-MCNC: 4.6 G/DL (ref 3.5–5.2)
ALP SERPL-CCNC: 76 U/L (ref 55–135)
ALP SERPL-CCNC: 97 U/L (ref 55–135)
ALT SERPL W/O P-5'-P-CCNC: 20 U/L (ref 10–44)
ALT SERPL W/O P-5'-P-CCNC: 23 U/L (ref 10–44)
ANION GAP SERPL CALC-SCNC: 11 MMOL/L (ref 8–16)
ANION GAP SERPL CALC-SCNC: 6 MMOL/L (ref 8–16)
AORTIC ROOT ANNULUS: 2.6 CM
AORTIC VALVE CUSP SEPERATION: 1.1 CM
AST SERPL-CCNC: 27 U/L (ref 10–40)
AST SERPL-CCNC: 29 U/L (ref 10–40)
BACTERIA #/AREA URNS HPF: ABNORMAL /HPF
BASOPHILS # BLD AUTO: 0.04 K/UL (ref 0–0.2)
BASOPHILS # BLD AUTO: 0.05 K/UL (ref 0–0.2)
BASOPHILS NFR BLD: 0.3 % (ref 0–1.9)
BASOPHILS NFR BLD: 0.3 % (ref 0–1.9)
BILIRUB SERPL-MCNC: 0.4 MG/DL (ref 0.1–1)
BILIRUB SERPL-MCNC: 0.6 MG/DL (ref 0.1–1)
BILIRUB UR QL STRIP: NEGATIVE
BLD GP AB SCN CELLS X3 SERPL QL: NORMAL
BNP SERPL-MCNC: 123 PG/ML (ref 0–99)
BSA FOR ECHO PROCEDURE: 1.54 M2
BUN SERPL-MCNC: 16 MG/DL (ref 8–23)
BUN SERPL-MCNC: 22 MG/DL (ref 8–23)
CALCIUM SERPL-MCNC: 9 MG/DL (ref 8.7–10.5)
CALCIUM SERPL-MCNC: 9.7 MG/DL (ref 8.7–10.5)
CHLORIDE SERPL-SCNC: 101 MMOL/L (ref 95–110)
CHLORIDE SERPL-SCNC: 104 MMOL/L (ref 95–110)
CLARITY UR: ABNORMAL
CO2 SERPL-SCNC: 27 MMOL/L (ref 23–29)
CO2 SERPL-SCNC: 29 MMOL/L (ref 23–29)
COLOR UR: YELLOW
CREAT SERPL-MCNC: 0.7 MG/DL (ref 0.5–1.4)
CREAT SERPL-MCNC: 0.9 MG/DL (ref 0.5–1.4)
CV ECHO LV RWT: 0.51 CM
DIFFERENTIAL METHOD BLD: ABNORMAL
DIFFERENTIAL METHOD BLD: ABNORMAL
DOP CALC LVOT AREA: 3.1 CM2
DOP CALC LVOT DIAMETER: 2 CM
ECHO LV POSTERIOR WALL: 0.9 CM (ref 0.6–1.1)
EOSINOPHIL # BLD AUTO: 0 K/UL (ref 0–0.5)
EOSINOPHIL # BLD AUTO: 0 K/UL (ref 0–0.5)
EOSINOPHIL NFR BLD: 0.1 % (ref 0–8)
EOSINOPHIL NFR BLD: 0.1 % (ref 0–8)
ERYTHROCYTE [DISTWIDTH] IN BLOOD BY AUTOMATED COUNT: 13.6 % (ref 11.5–14.5)
ERYTHROCYTE [DISTWIDTH] IN BLOOD BY AUTOMATED COUNT: 13.7 % (ref 11.5–14.5)
EST. GFR  (NO RACE VARIABLE): >60 ML/MIN/1.73 M^2
EST. GFR  (NO RACE VARIABLE): >60 ML/MIN/1.73 M^2
FRACTIONAL SHORTENING: 8.6 % (ref 28–44)
GLUCOSE SERPL-MCNC: 104 MG/DL (ref 70–110)
GLUCOSE SERPL-MCNC: 198 MG/DL (ref 70–110)
GLUCOSE UR QL STRIP: NEGATIVE
HCT VFR BLD AUTO: 35.9 % (ref 37–48.5)
HCT VFR BLD AUTO: 41.1 % (ref 37–48.5)
HGB BLD-MCNC: 11.5 G/DL (ref 12–16)
HGB BLD-MCNC: 13.2 G/DL (ref 12–16)
HGB UR QL STRIP: ABNORMAL
IMM GRANULOCYTES # BLD AUTO: 0.05 K/UL (ref 0–0.04)
IMM GRANULOCYTES # BLD AUTO: 0.08 K/UL (ref 0–0.04)
IMM GRANULOCYTES NFR BLD AUTO: 0.3 % (ref 0–0.5)
IMM GRANULOCYTES NFR BLD AUTO: 0.4 % (ref 0–0.5)
INTERVENTRICULAR SEPTUM: 1 CM (ref 0.6–1.1)
KETONES UR QL STRIP: NEGATIVE
LEFT INTERNAL DIMENSION IN SYSTOLE: 3.2 CM (ref 2.1–4)
LEFT VENTRICLE DIASTOLIC VOLUME INDEX: 33.95 ML/M2
LEFT VENTRICLE DIASTOLIC VOLUME: 51.6 ML
LEFT VENTRICLE MASS INDEX: 63.1 G/M2
LEFT VENTRICLE SYSTOLIC VOLUME INDEX: 26.1 ML/M2
LEFT VENTRICLE SYSTOLIC VOLUME: 39.7 ML
LEFT VENTRICULAR INTERNAL DIMENSION IN DIASTOLE: 3.5 CM (ref 3.5–6)
LEFT VENTRICULAR MASS: 95.9 G
LEUKOCYTE ESTERASE UR QL STRIP: ABNORMAL
LVED V (TEICH): 51.6 ML
LVES V (TEICH): 39.7 ML
LYMPHOCYTES # BLD AUTO: 0.4 K/UL (ref 1–4.8)
LYMPHOCYTES # BLD AUTO: 1 K/UL (ref 1–4.8)
LYMPHOCYTES NFR BLD: 2.4 % (ref 18–48)
LYMPHOCYTES NFR BLD: 6.8 % (ref 18–48)
MCH RBC QN AUTO: 29.3 PG (ref 27–31)
MCH RBC QN AUTO: 29.3 PG (ref 27–31)
MCHC RBC AUTO-ENTMCNC: 32 G/DL (ref 32–36)
MCHC RBC AUTO-ENTMCNC: 32.1 G/DL (ref 32–36)
MCV RBC AUTO: 91 FL (ref 82–98)
MCV RBC AUTO: 91 FL (ref 82–98)
MICROSCOPIC COMMENT: ABNORMAL
MONOCYTES # BLD AUTO: 0.7 K/UL (ref 0.3–1)
MONOCYTES # BLD AUTO: 1.5 K/UL (ref 0.3–1)
MONOCYTES NFR BLD: 3.9 % (ref 4–15)
MONOCYTES NFR BLD: 9.8 % (ref 4–15)
NEUTROPHILS # BLD AUTO: 12.7 K/UL (ref 1.8–7.7)
NEUTROPHILS # BLD AUTO: 16.9 K/UL (ref 1.8–7.7)
NEUTROPHILS NFR BLD: 82.7 % (ref 38–73)
NEUTROPHILS NFR BLD: 92.9 % (ref 38–73)
NITRITE UR QL STRIP: NEGATIVE
NRBC BLD-RTO: 0 /100 WBC
NRBC BLD-RTO: 0 /100 WBC
OHS QRS DURATION: 168 MS
OHS QTC CALCULATION: 523 MS
PH UR STRIP: 6 [PH] (ref 5–8)
PLATELET # BLD AUTO: 235 K/UL (ref 150–450)
PLATELET # BLD AUTO: 244 K/UL (ref 150–450)
PMV BLD AUTO: 10.3 FL (ref 9.2–12.9)
PMV BLD AUTO: 10.3 FL (ref 9.2–12.9)
POTASSIUM SERPL-SCNC: 3.7 MMOL/L (ref 3.5–5.1)
POTASSIUM SERPL-SCNC: 3.9 MMOL/L (ref 3.5–5.1)
PROT SERPL-MCNC: 6.9 G/DL (ref 6–8.4)
PROT SERPL-MCNC: 7.6 G/DL (ref 6–8.4)
PROT UR QL STRIP: NEGATIVE
PV MV: 0.67 M/S
PV PEAK GRADIENT: 4 MMHG
PV PEAK VELOCITY: 0.97 M/S
RA PRESSURE ESTIMATED: 3 MMHG
RBC # BLD AUTO: 3.93 M/UL (ref 4–5.4)
RBC # BLD AUTO: 4.51 M/UL (ref 4–5.4)
RBC #/AREA URNS HPF: 9 /HPF (ref 0–4)
SODIUM SERPL-SCNC: 139 MMOL/L (ref 136–145)
SODIUM SERPL-SCNC: 139 MMOL/L (ref 136–145)
SP GR UR STRIP: 1.01 (ref 1–1.03)
SPECIMEN OUTDATE: NORMAL
TROPONIN I SERPL HS-MCNC: 13.8 PG/ML (ref 0–14.9)
URN SPEC COLLECT METH UR: ABNORMAL
UROBILINOGEN UR STRIP-ACNC: NEGATIVE EU/DL
WBC # BLD AUTO: 15.4 K/UL (ref 3.9–12.7)
WBC # BLD AUTO: 18.14 K/UL (ref 3.9–12.7)
WBC #/AREA URNS HPF: 20 /HPF (ref 0–5)
Z-SCORE OF LEFT VENTRICULAR DIMENSION IN END DIASTOLE: -2.41
Z-SCORE OF LEFT VENTRICULAR DIMENSION IN END SYSTOLE: 1.16

## 2024-11-23 PROCEDURE — A4216 STERILE WATER/SALINE, 10 ML: HCPCS | Performed by: STUDENT IN AN ORGANIZED HEALTH CARE EDUCATION/TRAINING PROGRAM

## 2024-11-23 PROCEDURE — G0378 HOSPITAL OBSERVATION PER HR: HCPCS

## 2024-11-23 PROCEDURE — 23650 CLTX SHO DSLC W/MNPJ WO ANES: CPT | Mod: LT

## 2024-11-23 PROCEDURE — 63600175 PHARM REV CODE 636 W HCPCS: Performed by: STUDENT IN AN ORGANIZED HEALTH CARE EDUCATION/TRAINING PROGRAM

## 2024-11-23 PROCEDURE — 0RSKXZZ REPOSITION LEFT SHOULDER JOINT, EXTERNAL APPROACH: ICD-10-PCS | Performed by: STUDENT IN AN ORGANIZED HEALTH CARE EDUCATION/TRAINING PROGRAM

## 2024-11-23 PROCEDURE — 25000003 PHARM REV CODE 250: Performed by: STUDENT IN AN ORGANIZED HEALTH CARE EDUCATION/TRAINING PROGRAM

## 2024-11-23 PROCEDURE — 81001 URINALYSIS AUTO W/SCOPE: CPT | Performed by: STUDENT IN AN ORGANIZED HEALTH CARE EDUCATION/TRAINING PROGRAM

## 2024-11-23 PROCEDURE — 80053 COMPREHEN METABOLIC PANEL: CPT | Performed by: STUDENT IN AN ORGANIZED HEALTH CARE EDUCATION/TRAINING PROGRAM

## 2024-11-23 PROCEDURE — 96361 HYDRATE IV INFUSION ADD-ON: CPT

## 2024-11-23 PROCEDURE — 93308 TTE F-UP OR LMTD: CPT

## 2024-11-23 PROCEDURE — 87186 SC STD MICRODIL/AGAR DIL: CPT | Performed by: STUDENT IN AN ORGANIZED HEALTH CARE EDUCATION/TRAINING PROGRAM

## 2024-11-23 PROCEDURE — 93010 ELECTROCARDIOGRAM REPORT: CPT | Mod: ,,, | Performed by: INTERNAL MEDICINE

## 2024-11-23 PROCEDURE — 99152 MOD SED SAME PHYS/QHP 5/>YRS: CPT

## 2024-11-23 PROCEDURE — 93005 ELECTROCARDIOGRAM TRACING: CPT | Performed by: INTERNAL MEDICINE

## 2024-11-23 PROCEDURE — 0CQ0XZZ REPAIR UPPER LIP, EXTERNAL APPROACH: ICD-10-PCS | Performed by: STUDENT IN AN ORGANIZED HEALTH CARE EDUCATION/TRAINING PROGRAM

## 2024-11-23 PROCEDURE — 87086 URINE CULTURE/COLONY COUNT: CPT | Performed by: STUDENT IN AN ORGANIZED HEALTH CARE EDUCATION/TRAINING PROGRAM

## 2024-11-23 PROCEDURE — 63600175 PHARM REV CODE 636 W HCPCS

## 2024-11-23 PROCEDURE — 96376 TX/PRO/DX INJ SAME DRUG ADON: CPT

## 2024-11-23 PROCEDURE — 97530 THERAPEUTIC ACTIVITIES: CPT

## 2024-11-23 PROCEDURE — 12011 RPR F/E/E/N/L/M 2.5 CM/<: CPT

## 2024-11-23 PROCEDURE — 85025 COMPLETE CBC W/AUTO DIFF WBC: CPT | Performed by: STUDENT IN AN ORGANIZED HEALTH CARE EDUCATION/TRAINING PROGRAM

## 2024-11-23 PROCEDURE — 25000003 PHARM REV CODE 250

## 2024-11-23 PROCEDURE — 93308 TTE F-UP OR LMTD: CPT | Mod: 26,,, | Performed by: INTERNAL MEDICINE

## 2024-11-23 PROCEDURE — 97162 PT EVAL MOD COMPLEX 30 MIN: CPT

## 2024-11-23 RX ORDER — AMOXICILLIN 250 MG
1 CAPSULE ORAL DAILY PRN
Status: DISCONTINUED | OUTPATIENT
Start: 2024-11-23 | End: 2024-11-26 | Stop reason: HOSPADM

## 2024-11-23 RX ORDER — PANTOPRAZOLE SODIUM 40 MG/1
40 TABLET, DELAYED RELEASE ORAL DAILY
Status: DISCONTINUED | OUTPATIENT
Start: 2024-11-23 | End: 2024-11-26 | Stop reason: HOSPADM

## 2024-11-23 RX ORDER — PROPOFOL 10 MG/ML
20 VIAL (ML) INTRAVENOUS
Status: COMPLETED | OUTPATIENT
Start: 2024-11-23 | End: 2024-11-23

## 2024-11-23 RX ORDER — OXYCODONE AND ACETAMINOPHEN 10; 325 MG/1; MG/1
1 TABLET ORAL EVERY 4 HOURS PRN
Status: DISCONTINUED | OUTPATIENT
Start: 2024-11-23 | End: 2024-11-26 | Stop reason: HOSPADM

## 2024-11-23 RX ORDER — SODIUM,POTASSIUM PHOSPHATES 280-250MG
2 POWDER IN PACKET (EA) ORAL
Status: DISCONTINUED | OUTPATIENT
Start: 2024-11-23 | End: 2024-11-26 | Stop reason: HOSPADM

## 2024-11-23 RX ORDER — ACETAMINOPHEN 325 MG/1
650 TABLET ORAL EVERY 8 HOURS PRN
Status: DISCONTINUED | OUTPATIENT
Start: 2024-11-23 | End: 2024-11-26 | Stop reason: HOSPADM

## 2024-11-23 RX ORDER — FENTANYL CITRATE 50 UG/ML
100 INJECTION, SOLUTION INTRAMUSCULAR; INTRAVENOUS
Status: DISCONTINUED | OUTPATIENT
Start: 2024-11-23 | End: 2024-11-23

## 2024-11-23 RX ORDER — LOSARTAN POTASSIUM 50 MG/1
100 TABLET ORAL DAILY
Status: DISCONTINUED | OUTPATIENT
Start: 2024-11-23 | End: 2024-11-26 | Stop reason: HOSPADM

## 2024-11-23 RX ORDER — MORPHINE SULFATE 2 MG/ML
2 INJECTION, SOLUTION INTRAMUSCULAR; INTRAVENOUS
Status: DISCONTINUED | OUTPATIENT
Start: 2024-11-23 | End: 2024-11-23

## 2024-11-23 RX ORDER — LANOLIN ALCOHOL/MO/W.PET/CERES
800 CREAM (GRAM) TOPICAL
Status: DISCONTINUED | OUTPATIENT
Start: 2024-11-23 | End: 2024-11-26 | Stop reason: HOSPADM

## 2024-11-23 RX ORDER — IBUPROFEN 200 MG
24 TABLET ORAL
Status: DISCONTINUED | OUTPATIENT
Start: 2024-11-23 | End: 2024-11-26 | Stop reason: HOSPADM

## 2024-11-23 RX ORDER — METOPROLOL SUCCINATE 25 MG/1
25 TABLET, EXTENDED RELEASE ORAL 2 TIMES DAILY
Status: DISCONTINUED | OUTPATIENT
Start: 2024-11-23 | End: 2024-11-23

## 2024-11-23 RX ORDER — HYDROCODONE BITARTRATE AND ACETAMINOPHEN 5; 325 MG/1; MG/1
1 TABLET ORAL EVERY 6 HOURS PRN
Status: DISCONTINUED | OUTPATIENT
Start: 2024-11-23 | End: 2024-11-23

## 2024-11-23 RX ORDER — ASPIRIN 81 MG/1
81 TABLET ORAL DAILY
Status: DISCONTINUED | OUTPATIENT
Start: 2024-11-23 | End: 2024-11-26 | Stop reason: HOSPADM

## 2024-11-23 RX ORDER — METOPROLOL SUCCINATE 25 MG/1
25 TABLET, EXTENDED RELEASE ORAL 2 TIMES DAILY
Status: DISCONTINUED | OUTPATIENT
Start: 2024-11-23 | End: 2024-11-26 | Stop reason: HOSPADM

## 2024-11-23 RX ORDER — SODIUM CHLORIDE 0.9 % (FLUSH) 0.9 %
3 SYRINGE (ML) INJECTION EVERY 8 HOURS
Status: DISCONTINUED | OUTPATIENT
Start: 2024-11-23 | End: 2024-11-26 | Stop reason: HOSPADM

## 2024-11-23 RX ORDER — SERTRALINE HYDROCHLORIDE 50 MG/1
100 TABLET, FILM COATED ORAL DAILY
Status: DISCONTINUED | OUTPATIENT
Start: 2024-11-23 | End: 2024-11-26 | Stop reason: HOSPADM

## 2024-11-23 RX ORDER — OXYCODONE AND ACETAMINOPHEN 5; 325 MG/1; MG/1
1 TABLET ORAL EVERY 4 HOURS PRN
Status: DISCONTINUED | OUTPATIENT
Start: 2024-11-23 | End: 2024-11-26 | Stop reason: HOSPADM

## 2024-11-23 RX ORDER — ATORVASTATIN CALCIUM 40 MG/1
80 TABLET, FILM COATED ORAL DAILY
Status: DISCONTINUED | OUTPATIENT
Start: 2024-11-23 | End: 2024-11-26 | Stop reason: HOSPADM

## 2024-11-23 RX ORDER — LOSARTAN POTASSIUM AND HYDROCHLOROTHIAZIDE 12.5; 1 MG/1; MG/1
1 TABLET ORAL DAILY
Status: DISCONTINUED | OUTPATIENT
Start: 2024-11-23 | End: 2024-11-23

## 2024-11-23 RX ORDER — IBUPROFEN 200 MG
16 TABLET ORAL
Status: DISCONTINUED | OUTPATIENT
Start: 2024-11-23 | End: 2024-11-26 | Stop reason: HOSPADM

## 2024-11-23 RX ORDER — CEFTRIAXONE 1 G/1
1 INJECTION, POWDER, FOR SOLUTION INTRAMUSCULAR; INTRAVENOUS
Status: DISCONTINUED | OUTPATIENT
Start: 2024-11-23 | End: 2024-11-25

## 2024-11-23 RX ORDER — FENTANYL CITRATE 50 UG/ML
50 INJECTION, SOLUTION INTRAMUSCULAR; INTRAVENOUS
Status: COMPLETED | OUTPATIENT
Start: 2024-11-23 | End: 2024-11-23

## 2024-11-23 RX ORDER — HYDROCHLOROTHIAZIDE 12.5 MG/1
12.5 TABLET ORAL DAILY
Status: DISCONTINUED | OUTPATIENT
Start: 2024-11-23 | End: 2024-11-26 | Stop reason: HOSPADM

## 2024-11-23 RX ORDER — FENTANYL CITRATE 50 UG/ML
100 INJECTION, SOLUTION INTRAMUSCULAR; INTRAVENOUS
Status: COMPLETED | OUTPATIENT
Start: 2024-11-23 | End: 2024-11-23

## 2024-11-23 RX ORDER — GLUCAGON 1 MG
1 KIT INJECTION
Status: DISCONTINUED | OUTPATIENT
Start: 2024-11-23 | End: 2024-11-26 | Stop reason: HOSPADM

## 2024-11-23 RX ORDER — AMITRIPTYLINE HYDROCHLORIDE 25 MG/1
50 TABLET, FILM COATED ORAL NIGHTLY
Status: DISCONTINUED | OUTPATIENT
Start: 2024-11-23 | End: 2024-11-26 | Stop reason: HOSPADM

## 2024-11-23 RX ORDER — ACETAMINOPHEN 500 MG
5000 TABLET ORAL DAILY
Status: DISCONTINUED | OUTPATIENT
Start: 2024-11-23 | End: 2024-11-26 | Stop reason: HOSPADM

## 2024-11-23 RX ORDER — NALOXONE HCL 0.4 MG/ML
0.02 VIAL (ML) INJECTION
Status: DISCONTINUED | OUTPATIENT
Start: 2024-11-23 | End: 2024-11-26 | Stop reason: HOSPADM

## 2024-11-23 RX ORDER — PROPOFOL 10 MG/ML
10 VIAL (ML) INTRAVENOUS
Status: COMPLETED | OUTPATIENT
Start: 2024-11-23 | End: 2024-11-23

## 2024-11-23 RX ORDER — ALUMINUM HYDROXIDE, MAGNESIUM HYDROXIDE, AND SIMETHICONE 1200; 120; 1200 MG/30ML; MG/30ML; MG/30ML
30 SUSPENSION ORAL 4 TIMES DAILY PRN
Status: DISCONTINUED | OUTPATIENT
Start: 2024-11-23 | End: 2024-11-23

## 2024-11-23 RX ORDER — PROPOFOL 10 MG/ML
50 VIAL (ML) INTRAVENOUS ONCE
Status: COMPLETED | OUTPATIENT
Start: 2024-11-23 | End: 2024-11-23

## 2024-11-23 RX ORDER — ACETAMINOPHEN 325 MG/1
650 TABLET ORAL EVERY 4 HOURS PRN
Status: DISCONTINUED | OUTPATIENT
Start: 2024-11-23 | End: 2024-11-26 | Stop reason: HOSPADM

## 2024-11-23 RX ORDER — DONEPEZIL HYDROCHLORIDE 5 MG/1
10 TABLET, FILM COATED ORAL NIGHTLY
Status: DISCONTINUED | OUTPATIENT
Start: 2024-11-23 | End: 2024-11-26 | Stop reason: HOSPADM

## 2024-11-23 RX ORDER — TALC
6 POWDER (GRAM) TOPICAL NIGHTLY PRN
Status: DISCONTINUED | OUTPATIENT
Start: 2024-11-23 | End: 2024-11-26 | Stop reason: HOSPADM

## 2024-11-23 RX ORDER — ONDANSETRON HYDROCHLORIDE 2 MG/ML
4 INJECTION, SOLUTION INTRAVENOUS EVERY 6 HOURS PRN
Status: DISCONTINUED | OUTPATIENT
Start: 2024-11-23 | End: 2024-11-26 | Stop reason: HOSPADM

## 2024-11-23 RX ADMIN — SERTRALINE HYDROCHLORIDE 100 MG: 50 TABLET ORAL at 12:11

## 2024-11-23 RX ADMIN — ATORVASTATIN CALCIUM 80 MG: 40 TABLET, FILM COATED ORAL at 12:11

## 2024-11-23 RX ADMIN — FENTANYL CITRATE 25 MCG: 0.05 INJECTION, SOLUTION INTRAMUSCULAR; INTRAVENOUS at 04:11

## 2024-11-23 RX ADMIN — PROPOFOL 20 MG: 10 INJECTION, EMULSION INTRAVENOUS at 04:11

## 2024-11-23 RX ADMIN — METOPROLOL SUCCINATE 25 MG: 25 TABLET, FILM COATED, EXTENDED RELEASE ORAL at 09:11

## 2024-11-23 RX ADMIN — OXYCODONE HYDROCHLORIDE AND ACETAMINOPHEN 1 TABLET: 10; 325 TABLET ORAL at 01:11

## 2024-11-23 RX ADMIN — HYDROCHLOROTHIAZIDE 12.5 MG: 12.5 TABLET ORAL at 12:11

## 2024-11-23 RX ADMIN — Medication: at 06:11

## 2024-11-23 RX ADMIN — DONEPEZIL HYDROCHLORIDE 10 MG: 5 TABLET, FILM COATED ORAL at 09:11

## 2024-11-23 RX ADMIN — PANTOPRAZOLE SODIUM 40 MG: 40 TABLET, DELAYED RELEASE ORAL at 12:11

## 2024-11-23 RX ADMIN — SODIUM CHLORIDE, PRESERVATIVE FREE 3 ML: 5 INJECTION INTRAVENOUS at 02:11

## 2024-11-23 RX ADMIN — METOPROLOL SUCCINATE 25 MG: 25 TABLET, FILM COATED, EXTENDED RELEASE ORAL at 12:11

## 2024-11-23 RX ADMIN — CEFTRIAXONE 1 G: 1 INJECTION, POWDER, FOR SOLUTION INTRAMUSCULAR; INTRAVENOUS at 12:11

## 2024-11-23 RX ADMIN — AMITRIPTYLINE HYDROCHLORIDE 50 MG: 25 TABLET, FILM COATED ORAL at 09:11

## 2024-11-23 RX ADMIN — MORPHINE SULFATE 2 MG: 2 INJECTION, SOLUTION INTRAMUSCULAR; INTRAVENOUS at 10:11

## 2024-11-23 RX ADMIN — FENTANYL CITRATE 50 MCG: 50 INJECTION INTRAMUSCULAR; INTRAVENOUS at 01:11

## 2024-11-23 RX ADMIN — ASPIRIN 81 MG: 81 TABLET, COATED ORAL at 12:11

## 2024-11-23 RX ADMIN — PROPOFOL 10 MG: 10 INJECTION, EMULSION INTRAVENOUS at 05:11

## 2024-11-23 RX ADMIN — LOSARTAN POTASSIUM 100 MG: 50 TABLET, FILM COATED ORAL at 12:11

## 2024-11-23 RX ADMIN — FENTANYL CITRATE 50 MCG: 50 INJECTION INTRAMUSCULAR; INTRAVENOUS at 02:11

## 2024-11-23 RX ADMIN — SODIUM CHLORIDE 1144 ML: 9 INJECTION, SOLUTION INTRAVENOUS at 05:11

## 2024-11-23 RX ADMIN — SODIUM CHLORIDE, PRESERVATIVE FREE 3 ML: 5 INJECTION INTRAVENOUS at 09:11

## 2024-11-23 NOTE — ED PROVIDER NOTES
Encounter Date: 11/22/2024       History     Chief Complaint   Patient presents with    Fall    Shoulder Injury    Facial Laceration     Fell in the shower.  Complains of left shoulder pain, has laceration to tip lip.       Maricel Abdul is a 84 y.o. female presenting to the Emergency Department accompanied by daughter for evaluation of injuries s/p fall.  Patient currently resides at assisted living facility and reportedly had a fall while in the shower just prior to arrival.  Unclear if this fall was witnessed or not.  Patient states she is not exactly sure what precipitated her fall but it is possible she slipped.  Did strike her face causing her to obtain a laceration just above her upper lip.  Does not believe she lost consciousness.  Not on anticoagulants.  Patient states her main area of concern is pain in her left shoulder where she directly impacted the ground.  Right-hand dominant.  No prior orthopedic surgical interventions or injuries to the shoulder.  Denies any chest pain, shortness of breath, dizziness, palpitations, diaphoresis.  Known history of dementia.    PCP: Shayla Tello NP    Social hx: Lives at assisted living facility, originally from [facility], daughter at bedside is DPOA, former smoker    The history is provided by the patient and a relative.     Review of patient's allergies indicates:   Allergen Reactions    Codeine Nausea And Vomiting     Past Medical History:   Diagnosis Date    Arthritis     Diverticulitis     Encounter for blood transfusion     Hypertension     Kidney stones     Pacemaker     Shingles     Squamous cell carcinoma of skin     TIA (transient ischemic attack) 12/2017     Past Surgical History:   Procedure Laterality Date    APPENDECTOMY      CARDIAC SURGERY      pacemaker    CERVICAL FUSION      CHOLECYSTECTOMY      COLONOSCOPY N/A 8/14/2019    Procedure: COLONOSCOPY;  Surgeon: Elio Perez MD;  Location: [facility];  Service: Endoscopy;  Laterality:  N/A;    HYSTERECTOMY      SHOULDER SURGERY       Family History   Problem Relation Name Age of Onset    Cancer Father          lung    Cancer Brother          colon cancer    Colon cancer Brother      Pancreatitis Mother      Eczema Neg Hx      Psoriasis Neg Hx      Lupus Neg Hx      Melanoma Neg Hx      Stomach cancer Neg Hx      Esophageal cancer Neg Hx       Social History     Tobacco Use    Smoking status: Former     Current packs/day: 0.00     Types: Cigarettes     Start date: 1954     Quit date: 1974     Years since quittin.3     Passive exposure: Never    Smokeless tobacco: Never   Substance Use Topics    Alcohol use: Yes     Alcohol/week: 7.0 standard drinks of alcohol     Types: 7 Glasses of wine per week     Comment: Glass of wine each night    Drug use: No     Review of Systems   HENT:  Positive for facial swelling. Negative for nosebleeds.    Respiratory:  Negative for shortness of breath.    Cardiovascular:  Negative for chest pain.   Musculoskeletal:  Positive for arthralgias.   Skin:  Positive for color change (Ecchymosis) and wound.       Physical Exam     Initial Vitals [24 2154]   BP Pulse Resp Temp SpO2   (!) 197/80 61 19 97.9 °F (36.6 °C) 97 %      MAP       --         Physical Exam    Nursing note and vitals reviewed.  Constitutional: She appears well-developed. No distress.   HENT:   Head: Normocephalic.   Right Ear: External ear normal.   Left Ear: External ear normal.   Just superior to the upper lip there is an avulsion vs laceration with dried blood and scabbing with small amount of oozing present with surrounding soft tissue swelling and ecchymosis  fully cleaned later in ED stay - approx 2 cm curved laceration shaped as an upside-down U, there is a small superficial protocol laceration just superior to this (giving the entire wound the shape of an upside-down Y); no retained foreign bodies does not cross the vermillion border; does not go through and through to the  intraoral mucosa   Soft tissue swelling and ecchymosis to the upper lip with dried blood  No septal hematoma   Teeth appear well aligned without evidence of malocclusion    Eyes: Pupils are equal, round, and reactive to light.   Neck: No tracheal deviation present.   Normal range of motion.  Cardiovascular:  Normal rate and regular rhythm.           Pulses:       Radial pulses are 2+ on the right side and 2+ on the left side.   Paced rhythm    Pulmonary/Chest: Breath sounds normal. No respiratory distress. She has no rhonchi. She exhibits no tenderness.   Abdominal: Abdomen is soft. She exhibits no distension. There is no abdominal tenderness.   Musculoskeletal:         General: Tenderness present.      Right shoulder: Normal.      Left shoulder: Deformity, tenderness and bony tenderness present. Normal pulse.      Right upper arm: No tenderness.      Left upper arm: Tenderness present.      Left elbow: Normal. Normal range of motion. No tenderness.      Left forearm: No tenderness or bony tenderness.      Cervical back: Normal range of motion.      Comments: Left shoulder with obvious deformity with decreased ROM - held preferentially in adducted, internally rotated position; neurovascular intact distally; bony tenderness to the humerus to the proximal and mid humerus with soft tissue swelling, no obvious deformity      Neurological: She is alert. She is disoriented.   Disoriented - daughter states baseline 2/2 dementia    Skin: Skin is warm and dry. Capillary refill takes less than 2 seconds. There is pallor.         ED Course   Procedural Sedation        Date/Time: 11/23/2024 5:10 AM    Performed by: Naila Duong DO  Authorized by: Adolfo Haro MD  Consent Done: Yes  Consent: Verbal consent obtained. Written consent obtained.  Risks and benefits: risks, benefits and alternatives were discussed  Consent given by: power of   Patient understanding: patient states understanding of the procedure  "being performed  Patient consent: the patient's understanding of the procedure matches consent given  Relevant documents: relevant documents present and verified  Test results: test results available and properly labeled  Required items: required blood products, implants, devices, and special equipment available  Patient identity confirmed: name, verbally with patient and   Time out: Immediately prior to procedure a "time out" was called to verify the correct patient, procedure, equipment, support staff and site/side marked as required.  ASA Class: Class 3 - Systemic Illness with functional impairment.  Mallampati Score: Class 2 - Visualization of the soft palate, fauces, and uvula.   NPO STATUS:  Date/Time of last solid: 2024 5:00 PM    Equipment: on cardiac monitor., on supplemental oxygen., on BP monitor., suction available., on CO2 monitor. and airway equipment available.     Sedation type: moderate (conscious) sedation    Sedatives: propofol  Analgesia: fentanyl  Complications: No complications.       Orthopedic Injury    Date/Time: 2024 5:12 AM    Performed by: Naila Duong DO  Authorized by: Adolfo Haro MD    Location procedure was performed:  [facility] EMERGENCY DEPARTMENT  Injury:     Injury location:  Shoulder    Location details:  Left shoulder    Injury type:  Dislocation    Dislocation type: anterior      Chronicity:  New    Hill-Sachs deformity?: No        Pre-procedure assessment:     Neurovascular status: Neurovascularly intact      Distal perfusion: normal      Neurological function: normal      Range of motion: normal      Local anesthesia used?: No      Patient sedated?: Yes        Selections made in this section will also lock the Injury type section above.:     Manipulation performed?: Yes      Reduction method:  Traction and counter traction    Reduction method:  Traction and counter traction    Reduction method:  Traction and counter traction    Reduction method:  " Traction and counter traction    Reduction method:  Traction and counter traction    Reduction method:  Traction and counter traction    Reduction successful?: Yes      Confirmation: Reduction confirmed by x-ray      Immobilization:  Sling    Complications: No      Specimens: No      Implants: No    Post-procedure assessment:     Distal perfusion: normal      Neurological function: normal      Range of motion: normal    Orthopedic Injury    Date/Time: 11/23/2024 2:30 AM    Performed by: Naila Duong DO  Authorized by: Adolfo Haro MD    Location procedure was performed:  [facility] EMERGENCY DEPARTMENT  Injury:     Injury location:  Shoulder    Location details:  Left shoulder    Injury type:  Dislocation    Dislocation type: anterior      Chronicity:  New    Hill-Sachs deformity?: No        Pre-procedure assessment:     Neurovascular status: Neurovascularly intact      Distal perfusion: normal      Neurological function: normal      Range of motion: reduced      Local anesthesia used?: No      Patient sedated?: No        Selections made in this section will also lock the Injury type section above.:     Manipulation performed?: Yes      Reduction method:  External rotation and traction and counter traction    Reduction method:  External rotation and traction and counter traction    Reduction method:  External rotation and traction and counter traction    Reduction method:  External rotation and traction and counter traction    Reduction method:  External rotation and traction and counter traction    Reduction method:  External rotation and traction and counter traction    Reduction successful?: No      Complications: No      Specimens: No      Implants: No    Post-procedure assessment:     Neurovascular status: Neurovascularly intact      Distal perfusion: normal      Neurological function: normal      Range of motion: normal       Attempted to reduce shoulder with pt awake using fentanyl for analgesia,  unsuccessful; pt was subsequently sedated with successful reduction   Lac Repair    Date/Time: 11/23/2024 7:00 AM    Performed by: Naila Duong DO  Authorized by: Adolfo Haro MD    Consent:     Consent obtained:  Verbal    Consent given by:  Patient and guardian    Risks, benefits, and alternatives were discussed: yes      Risks discussed:  Infection, pain, poor cosmetic result and poor wound healing    Alternatives discussed:  No treatment  Universal protocol:     Procedure explained and questions answered to patient or proxy's satisfaction: yes      Imaging studies available: yes      Patient identity confirmed:  Verbally with patient, arm band and hospital-assigned identification number  Anesthesia:     Anesthesia method:  Topical application    Topical anesthetic:  LET  Laceration details:     Location:  Lip    Lip location:  Upper exterior lip    Length (cm):  2  Pre-procedure details:     Preparation:  Imaging obtained to evaluate for foreign bodies  Exploration:     Hemostasis achieved with:  Direct pressure    Imaging outcome: foreign body not noted      Wound exploration: wound explored through full range of motion      Contaminated: no    Treatment:     Area cleansed with:  Saline    Amount of cleaning:  Standard    Irrigation solution:  Sterile saline    Irrigation method:  Syringe  Skin repair:     Repair method:  Sutures    Suture size:  6-0    Suture material:  Prolene    Suture technique:  Simple interrupted    Number of sutures:  3  Approximation:     Approximation:  Close    Vermilion border well-aligned: yes    Repair type:     Repair type:  Simple  Post-procedure details:     Dressing:  Open (no dressing)  Comments:      Curved laceration with superficial vertical laceration just superior to it (forming an upside down Y appearance); able to closely approximate in the setting of pt's facial swelling; superior portion of the wound is superficia and does not require any sutures    Labs  Reviewed - No data to display       Imaging Results              X-Ray Shoulder Trauma Left (Final result)  Result time 11/22/24 23:01:51      Final result by Jonathan Light MD (11/22/24 23:01:51)                   Impression:      Anterior glenohumeral dislocation      Electronically signed by: Jonathan iLght  Date:    11/22/2024  Time:    23:01               Narrative:    EXAMINATION:  XR SHOULDER TRAUMA 3 VIEW LEFT    CLINICAL HISTORY:  Unspecified fall, initial encounter    TECHNIQUE:  Three views of the left shoulder were performed.    COMPARISON  None    FINDINGS:  Anterior glenohumeral dislocation.  Moderate AC joint osteoarthritis.                                       Medications - No data to display  Medical Decision Making  Amount and/or Complexity of Data Reviewed  Independent Historian:      Details: Daughter   External Data Reviewed: notes.  Labs: ordered.  Radiology: ordered.  ECG/medicine tests: ordered and independent interpretation performed. Decision-making details documented in ED Course.     Details: Atrial sensed ventricular paced rhythm at 80 beats per minute      PGY- 3 MDM:    84-year-old female presenting for evaluation of injuries s/p fall in shower.  Unclear precipitating events leading up to fall and unclear if patient experience LOC. daughter at bedside states she is currently at her baseline.  Initial exam and initial workup and triage significant for anterior dislocation of the left shoulder.  Neurovascularly intact.  Given patient's age, signs of trauma on exam, unknown events surrounding the fall, obtained labs and additional imaging to screen for etiology of syncope as well as other traumatic injuries.  Administered fentanyl for pain control and closely monitor patient in coordination with nursing staff.      CT cervical spine showed inflammatory arthritis at C1-2. Mild anterolisthesis at C2-3, C3-4, and C7-T1.  Postsurgical changes of C4-5 interbody grafting.   Nonsurgical levels demonstrate moderate to advanced multilevel spondylosis.  Up to moderate central canal stenosis.  Reversal of the normal cervical lordosis.  No acute fracture.  CT maxillofacial showed: Acute mildly displaced fracture of the left nasal bone. Chronic right sphenoid sinus disease. Paranasal sinuses are otherwise essentially clear. Globes are intact.  CT head without acute findings.  EKG shows paced rhythm.  Labs with leukocytosis but otherwise no significant derangements.      Initially attempted to reduce patient's shoulder in setting of analgesia given patient's age and increased risk for complication from sedation.  However, this was unsuccessful.  Ultimately proceeded with conscious sedation and successful reduction as outlined above and procedure section.  Discussed typical management for shoulder dislocations including sling and follow up with Orthopedic surgery.  Also discussed no further management for patient's mildly displaced fracture of the left nasal bone, no septal hematoma on exam.  Lastly, repair patient's laceration above her upper lip after extensive cleaning and wound exploration.  Does not cross the vermilion border and depth does not warrant dermal sutures or complex repair. Repaired with non-absorbable sutures as outlined above to be removed in 5-7 days.  Patient's presentation discussed with internal medicine team for admission given injuries obtained, pain control, further syncope workup and observation.  Admission orders placed their service.    Electronically Signed by: Naila Duong DO   [facility] Emergency Medicine/Pediatrics, PGY- 3  11/23/2024 7:38 PM              ED Course as of 11/23/24 0509   Sat Nov 23, 2024   0120 Pt recheck. States pain was previously under control but recently returned and is severe in nature. Discussed pending CT results. While awaiting these results will administer additional 50mcg fentanyl and continue to monitor closely in coordination with  nursing staff [LB]      ED Course User Index  [LB] Naila Duong DO                           Clinical Impression:  Final diagnoses:  [W19.XXXA] Fall                 Naila Duong DO  Resident  11/1940

## 2024-11-23 NOTE — ASSESSMENT & PLAN NOTE
Reduced in the emergency room   Continue pain medications as needed   Continue sling for least 3 weeks, follow up with PCP outpatient  PT/OT

## 2024-11-23 NOTE — H&P
[facility] - Emergency Dept  Hospital Medicine  History & Physical    Patient Name: Maricel Abdul  MRN: [id number]  Patient Class: OP- Observation  Admission Date: 11/22/2024  Attending Physician: Tico Rhodes MD   Primary Care Provider: Shayla Tello NP         Patient information was obtained from patient and ER records.     Subjective:     Principal Problem:Syncope    Chief Complaint:   Chief Complaint   Patient presents with    Fall    Shoulder Injury    Facial Laceration     Fell in the shower.  Complains of left shoulder pain, has laceration to tip lip.          HPI: Patient with a history of CVA, hypertension, complete heart block status post pacemaker placement, hyperlipidemia presents to the emergency room with complaints of ground level fall.  Patient reports she was taking a shower, denies any dizziness or syncope but also a poor historian per family member at bedside.  Unclear whether patient actually syncopized, but patient reports brief loss of consciousness after she fell.  Denies any chest pain, shortness for breath, fever, chills, nausea, vomiting.  In the emergency room was found to have anterior glenohumeral dislocation of the left shoulder which was reduced in the emergency room.  Placed in sling.  CT head showed no acute abnormalities.  CT cervical spine showed no acute fractures.  Mildly displaced fracture of the left nasal bone.  Patient required stitches of her upper lip.  Patient being admitted for possible syncope.  Carotid ultrasound showed no significant stenosis.  WBC 18.14, urinalysis shows possible UTI.  Echocardiogram pending.    Past Medical History:   Diagnosis Date    Arthritis     Diverticulitis     Encounter for blood transfusion     Hypertension     Kidney stones     Pacemaker     Shingles     Squamous cell carcinoma of skin     TIA (transient ischemic attack) 12/2017       Past Surgical History:   Procedure Laterality Date    APPENDECTOMY      CARDIAC  SURGERY      pacemaker    CERVICAL FUSION      CHOLECYSTECTOMY      COLONOSCOPY N/A 8/14/2019    Procedure: COLONOSCOPY;  Surgeon: Elio Perez MD;  Location: [facility];  Service: Endoscopy;  Laterality: N/A;    HYSTERECTOMY      SHOULDER SURGERY         Review of patient's allergies indicates:   Allergen Reactions    Codeine Nausea And Vomiting       No current facility-administered medications on file prior to encounter.     Current Outpatient Medications on File Prior to Encounter   Medication Sig    amitriptyline (ELAVIL) 50 MG tablet Take 1 tablet (50 mg total) by mouth every evening.    atorvastatin (LIPITOR) 80 MG tablet Take 1 tablet (80 mg total) by mouth once daily.    cholecalciferol, vitamin D3, 125 mcg (5,000 unit) Tab Take 5,000 Units by mouth once daily.    diphenoxylate-atropine 2.5-0.025 mg (LOMOTIL) 2.5-0.025 mg per tablet Take 1 tablet by mouth 3 (three) times daily as needed for Diarrhea. TAKE 1 TABLET BY MOUTH 3 TIMES DAILY AS NEEDED FOR DIARRHEA    donepeziL (ARICEPT) 10 MG tablet Take 1 tablet (10 mg total) by mouth every evening.    fluticasone propionate (FLONASE) 50 mcg/actuation nasal spray 1 spray (50 mcg total) by Each Nostril route once daily.    losartan-hydrochlorothiazide 100-12.5 mg (HYZAAR) 100-12.5 mg Tab Take 1 tablet by mouth once daily.    meloxicam (MOBIC) 7.5 MG tablet Take 1 tablet (7.5 mg total) by mouth once daily. Anti-inflammatory for arthritis pain    metoprolol succinate (TOPROL-XL) 25 MG 24 hr tablet Take 1 tablet (25 mg total) by mouth 2 (two) times daily.    multivitamin (ONE DAILY MULTIVITAMIN) per tablet Take 1 tablet by mouth once daily.    pantoprazole (PROTONIX) 40 MG tablet Take 1 tablet (40 mg total) by mouth once daily.    QUEtiapine (SEROQUEL) 25 MG Tab Start 1/2 tablet at bedtime for sleep, may increase to 1 full tablet if needed (Patient taking differently: Take 12.5 mg by mouth every evening. Start 1/2 tablet at bedtime for sleep, may increase to 1  full tablet if needed)    sertraline (ZOLOFT) 100 MG tablet Take 1 tablet (100 mg total) by mouth once daily.    vit A/vit C/vit E/zinc/copper (PRESERVISION AREDS ORAL) Take 1 tablet by mouth 2 (two) times a day.    aspirin (ECOTRIN) 81 MG EC tablet Take 1 tablet (81 mg total) by mouth once daily.    [DISCONTINUED] biotin 10,000 mcg Cap Take 1 capsule by mouth once daily. (Patient not taking: Reported on 2024)    [DISCONTINUED] ciprofloxacin HCl (CIPRO) 500 MG tablet Take 1 tablet (500 mg total) by mouth 2 (two) times daily.    [DISCONTINUED] cranberry 500 mg Cap Take 1 capsule by mouth once daily.     Family History       Problem Relation (Age of Onset)    Cancer Father, Brother    Colon cancer Brother    Pancreatitis Mother          Tobacco Use    Smoking status: Former     Current packs/day: 0.00     Types: Cigarettes     Start date: 1954     Quit date: 1974     Years since quittin.3     Passive exposure: Never    Smokeless tobacco: Never   Substance and Sexual Activity    Alcohol use: Yes     Alcohol/week: 7.0 standard drinks of alcohol     Types: 7 Glasses of wine per week     Comment: Glass of wine each night    Drug use: No    Sexual activity: Not on file     Review of Systems   Constitutional:  Negative for appetite change and diaphoresis.   HENT:  Negative for congestion.    Respiratory:  Negative for chest tightness and wheezing.    Cardiovascular:  Negative for chest pain and palpitations.   Gastrointestinal:  Negative for abdominal pain.   Genitourinary:  Negative for difficulty urinating.   Musculoskeletal:  Positive for arthralgias. Negative for neck pain.   Neurological:  Negative for dizziness.   Psychiatric/Behavioral:  Negative for agitation and confusion.      Objective:     Vital Signs (Most Recent):  Temp: 97.9 °F (36.6 °C) (24)  Pulse: 99 (24 0956)  Resp: 18 (24 1003)  BP: (!) 176/76 (24 0956)  SpO2: 95 % (24 09) Vital Signs (24h  Range):  Temp:  [97.9 °F (36.6 °C)] 97.9 °F (36.6 °C)  Pulse:  [61-99] 99  Resp:  [15-23] 18  SpO2:  [95 %-100 %] 95 %  BP: (123-215)/() 176/76     Weight: 57.2 kg (126 lb)  Body mass index is 25.45 kg/m².     Physical Exam  Constitutional:       General: She is not in acute distress.  HENT:      Mouth/Throat:      Comments: Stitches upper lip  Eyes:      Pupils: Pupils are equal, round, and reactive to light.   Cardiovascular:      Rate and Rhythm: Normal rate.      Comments: Paced rhythm  Pulmonary:      Effort: No respiratory distress.      Breath sounds: No wheezing.   Abdominal:      Palpations: Abdomen is soft.   Musculoskeletal:         General: Tenderness (Left shoulder) present. No swelling.      Comments: LUE in sling   Skin:     General: Skin is dry.   Neurological:      General: No focal deficit present.      Mental Status: She is alert. Mental status is at baseline.              CRANIAL NERVES     CN III, IV, VI   Pupils are equal, round, and reactive to light.       Significant Labs: All pertinent labs within the past 24 hours have been reviewed.  CBC:   Recent Labs   Lab 11/22/24  2353 11/23/24  0941   WBC 18.14* 15.40*   HGB 13.2 11.5*   HCT 41.1 35.9*    235     CMP:   Recent Labs   Lab 11/22/24  2353 11/23/24  0941    139   K 3.9 3.7    104   CO2 27 29   * 104   BUN 22 16   CREATININE 0.9 0.7   CALCIUM 9.7 9.0   PROT 7.6 6.9   ALBUMIN 4.6 3.9   BILITOT 0.4 0.6   ALKPHOS 97 76   AST 27 29   ALT 23 20   ANIONGAP 11 6*       Significant Imaging: I have reviewed all pertinent imaging results/findings within the past 24 hours.  Assessment/Plan:     * Syncope  Questionable syncope, unwitnessed fall   Patient denies but also has poor memory  Carotid ultrasound showed no significant stenosis, head CT showed no acute abnormalities   Follow up echocardiogram   PT/OT   If unremarkable can likely be discharged back to independent living facility      UTI (urinary tract  infection)  Started the patient on Rocephin   Follow up urine cultures      Shoulder dislocation, left, initial encounter  Reduced in the emergency room   Continue pain medications as needed   Continue sling for least 3 weeks, follow up with PCP outpatient  PT/OT      Gastroesophageal reflux disease  Continue Protonix      Cerebrovascular accident (CVA)  History of CVA   Continue aspirin and statin        Complete heart block  Aware, status post pacemaker placement   Follow up with Cardiology outpatient      Essential hypertension  Patients blood pressure range in the last 24 hours was: BP  Min: 123/64  Max: 215/165.The patient's inpatient anti-hypertensive regimen is listed below:  Current Antihypertensives  metoprolol succinate (TOPROL-XL) 24 hr tablet 25 mg, 2 times daily, Oral  losartan tablet 100 mg, Daily, Oral  hydroCHLOROthiazide tablet 12.5 mg, Daily, Oral    Plan  - BP is controlled, no changes needed to their regimen  - resume home meds       VTE Risk Mitigation (From admission, onward)           Ordered     IP VTE HIGH RISK PATIENT  Once         11/23/24 0724     Place sequential compression device  Until discontinued         11/23/24 0724                       On 11/23/2024, patient should be placed in hospital observation services under my care.             Tico Rhodes MD  Department of Hospital Medicine  [facility] - Emergency Dept

## 2024-11-23 NOTE — ASSESSMENT & PLAN NOTE
Patients blood pressure range in the last 24 hours was: BP  Min: 123/64  Max: 215/165.The patient's inpatient anti-hypertensive regimen is listed below:  Current Antihypertensives  metoprolol succinate (TOPROL-XL) 24 hr tablet 25 mg, 2 times daily, Oral  losartan tablet 100 mg, Daily, Oral  hydroCHLOROthiazide tablet 12.5 mg, Daily, Oral    Plan  - BP is controlled, no changes needed to their regimen  - resume home meds

## 2024-11-23 NOTE — ED NOTES
Orthostatic VS   11/23/24 0951 11/23/24 0953 11/23/24 0956   Vital Signs   Pulse 85 91 99   Resp (!) 22 (!) 23 (!) 22   SpO2 95 % 96 % 95 %   BP (!) 159/67 (!) 180/81 (!) 176/76   MAP (mmHg) 97 117 109   BP Location Right arm Right arm Right arm   BP Method Automatic Automatic Automatic   Patient Position Lying Sitting Standing   Orthostatic VS Yes Yes Yes   Is Patient Symptomatic in this Position? No Yes Yes   Associated Signs/Symptoms  --  dizziness;hypertension dizziness;hypertension

## 2024-11-23 NOTE — FIRST PROVIDER EVALUATION
Emergency Department TeleTriage Encounter Note      CHIEF COMPLAINT    Chief Complaint   Patient presents with    Fall    Shoulder Injury    Facial Laceration     Fell in the shower.  Complains of left shoulder pain, has laceration to tip lip.         VITAL SIGNS   Initial Vitals [11/22/24 2154]   BP Pulse Resp Temp SpO2   (!) 197/80 61 19 97.9 °F (36.6 °C) 97 %      MAP       --            ALLERGIES    Review of patient's allergies indicates:   Allergen Reactions    Codeine Nausea And Vomiting       PROVIDER TRIAGE NOTE  Verbal consent for the teletriage evaluation was given by the patient at the start of the evaluation.  All efforts will be made to maintain patient's privacy during the evaluation.      This is a teletriage evaluation of a 84 y.o. female presenting to the ED per daughter with c/o slip and fall in the shoulder.  Hip face with lac to lip and left shoulder pain. Limited physical exam via telehealth: The patient is awake, alert, answering questions appropriately and is not in respiratory distress.  As the Teletriage provider, I performed an initial assessment and ordered appropriate labs and imaging studies, if any, to facilitate the patient's care once placed in the ED. Once a room is available, care and a full evaluation will be completed by an alternate ED provider.  Any additional orders and the final disposition will be determined by that provider.  All imaging and labs will not be followed-up by the Teletriage Team, including myself.       Will await in-person provider evaluation and PE for any advanced imaging orders as deemed warranted.      ORDERS  Labs Reviewed - No data to display    ED Orders (720h ago, onward)      Start Ordered     Status Ordering Provider    11/22/24 2202 11/22/24 2201  X-Ray Shoulder Trauma Left  1 time imaging         Acknowledged LAST SALAS              Virtual Visit Note: The provider triage portion of this emergency department evaluation and  documentation was performed via [employer], a HIPAA-compliant telemedicine application, in concert with a tele-presenter in the room. A face to face patient evaluation with one of my colleagues will occur once the patient is placed in an emergency department room.      DISCLAIMER: This note was prepared with [employer] voice recognition transcription software. Garbled syntax, mangled pronouns, and other bizarre constructions may be attributed to that software system.

## 2024-11-23 NOTE — ED NOTES
Gave 25mcg of 100mcg fentaNYL injection filled out waste form and sent to pharmacy and YVETTE Grady was witness

## 2024-11-23 NOTE — ASSESSMENT & PLAN NOTE
Questionable syncope, unwitnessed fall   Patient denies but also has poor memory  Carotid ultrasound showed no significant stenosis, head CT showed no acute abnormalities   Follow up echocardiogram   PT/OT   If unremarkable can likely be discharged back to independent living facility

## 2024-11-23 NOTE — HPI
Patient with a history of CVA, hypertension, complete heart block status post pacemaker placement, hyperlipidemia presents to the emergency room with complaints of ground level fall.  Patient reports she was taking a shower, denies any dizziness or syncope but also a poor historian per family member at bedside.  Unclear whether patient actually syncopized, but patient reports brief loss of consciousness after she fell.  Denies any chest pain, shortness for breath, fever, chills, nausea, vomiting.  In the emergency room was found to have anterior glenohumeral dislocation of the left shoulder which was reduced in the emergency room.  Placed in sling.  CT head showed no acute abnormalities.  CT cervical spine showed no acute fractures.  Mildly displaced fracture of the left nasal bone.  Patient required stitches of her upper lip.  Patient being admitted for possible syncope.  Carotid ultrasound showed no significant stenosis.  WBC 18.14, urinalysis shows possible UTI.  Echocardiogram pending.

## 2024-11-23 NOTE — PT/OT/SLP EVAL
Physical Therapy Evaluation    Patient Name:  Maricel Abdul   MRN:  [id number]    Recommendations:     Discharge Recommendations: Moderate Intensity Therapy   Discharge Equipment Recommendations: to be determined by next level of care   Barriers to discharge: Decreased caregiver support,  High fall risk, impaired mobility, lightheadedness    Assessment:   Pt has  a history of CVA, hypertension, complete heart block status post pacemaker placement, hyperlipidemia presents to the emergency room with complaints of ground level fall.  Patient reports she was taking a shower, denies any dizziness or syncope but also a poor historian per family member at bedside.  Unclear whether patient actually syncopized, but patient reports brief loss of consciousness after she fell.  Denies any chest pain, shortness for breath, fever, chills, nausea, vomiting.  In the emergency room was found to have anterior glenohumeral dislocation of the left shoulder which was reduced in the emergency room.  Placed in sling.  CT head showed no acute abnormalities.  CT cervical spine showed no acute fractures.  Mildly displaced fracture of the left nasal bone.  Patient required stitches of her upper lip.  Patient being admitted for possible syncope.  Carotid ultrasound showed no significant stenosis.  WBC 18.14, urinalysis shows possible UTI.  Echocardiogram pending.   She presents with the following impairments/functional limitations: weakness, impaired endurance, impaired self care skills, impaired functional mobility, gait instability, impaired cognition, decreased upper extremity function, decreased safety awareness, impaired cardiopulmonary response to activity, orthopedic precautions.    Pt presented with HOB elevated L shoulder in sling. She required extra time for verbal expressions , taking extra time to give her first name. Pt was an unreliable historian and daughter present in room reported declining  of pt's mental status. Pt was a  resident of an assisted Living facility where pt was Mod I. However pt does not appear appropriate to return to that setting as her needs have changed.    Rehab Prognosis: Fair; patient would benefit from acute skilled PT services to address these deficits and reach maximum level of function.    Recent Surgery: * No surgery found *      Plan:     During this hospitalization, patient to be seen daily to address the identified rehab impairments via   and progress toward the following goals:    Plan of Care Expires:   12/23/2024    Subjective     Chief Complaint: impaired mobility   Patient/Family Comments/goals: SNF   Pain/Comfort:  Pain Rating 1: 0/10    Patients cultural, spiritual, Confucianist conflicts given the current situation:      Living Environment:  Pt has been a resident of assistant living [facility] sine July 2024  Prior to admission, patients level of function was Mod I .  Equipment used at home: none.  DME owned (not currently used): none.  Upon discharge, patient will have assistance from facility.    Objective:     Communicated with nurse prior to session.  Patient found HOB elevated with blood pressure cuff, telemetry, PureWick  upon PT entry to room.    General Precautions: Standard, fall  Orthopedic Precautions:    Braces: UE Sling  Respiratory Status: Room air    Exams:  Cognitive Exam:  Patient is oriented to Person, Place, Situation  RLE ROM: WFL  RLE Strength: WFL  LLE ROM: WFL  LLE Strength: WFL    Functional Mobility:  Bed Mobility:     Supine to Sit: moderate assistance and of 2 persons  Sit to Supine: moderate assistance and of 2 persons  Transfers:     Sit to Stand:  moderate assistance with hand-held assist  Balance: unsupported sitting  balance      AM-PAC 6 CLICK MOBILITY  Total Score:        Treatment & Education:  Pt was educated on safety, use of call light, PT POC/DC recommendation    Patient left HOB elevated with all lines intact, call button in reach, and her daughter  present.    GOALS:   Multidisciplinary Problems       Physical Therapy Goals          Problem: Physical Therapy    Goal Priority Disciplines Outcome Interventions   Physical Therapy Goal     PT, PT/OT     Description: Goals to be met by: 2024     Patient will increase functional independence with mobility by performin. Supine to sit with Contact Guard Assistance  2. Sit to stand transfer with Contact Guard Assistance  3. Gait  x 100  feet with Minimal Hand held Assistance using No Assistive Device.                          History:     Past Medical History:   Diagnosis Date    Arthritis     Diverticulitis     Encounter for blood transfusion     Hypertension     Kidney stones     Pacemaker     Shingles     Squamous cell carcinoma of skin     TIA (transient ischemic attack) 2017       Past Surgical History:   Procedure Laterality Date    APPENDECTOMY      CARDIAC SURGERY      pacemaker    CERVICAL FUSION      CHOLECYSTECTOMY      COLONOSCOPY N/A 2019    Procedure: COLONOSCOPY;  Surgeon: Elio Perez MD;  Location: [facility];  Service: Endoscopy;  Laterality: N/A;    HYSTERECTOMY      SHOULDER SURGERY         Time Tracking:     PT Received On: 24  PT Start Time: 1500     PT Stop Time: 1520  PT Total Time (min): 20 min     Billable Minutes: Evaluation 10 minutes  and Therapeutic Activity 10 minutes      2024

## 2024-11-23 NOTE — SUBJECTIVE & OBJECTIVE
Past Medical History:   Diagnosis Date    Arthritis     Diverticulitis     Encounter for blood transfusion     Hypertension     Kidney stones     Pacemaker     Shingles     Squamous cell carcinoma of skin     TIA (transient ischemic attack) 12/2017       Past Surgical History:   Procedure Laterality Date    APPENDECTOMY      CARDIAC SURGERY      pacemaker    CERVICAL FUSION      CHOLECYSTECTOMY      COLONOSCOPY N/A 8/14/2019    Procedure: COLONOSCOPY;  Surgeon: Elio Perez MD;  Location: [facility];  Service: Endoscopy;  Laterality: N/A;    HYSTERECTOMY      SHOULDER SURGERY         Review of patient's allergies indicates:   Allergen Reactions    Codeine Nausea And Vomiting       No current facility-administered medications on file prior to encounter.     Current Outpatient Medications on File Prior to Encounter   Medication Sig    amitriptyline (ELAVIL) 50 MG tablet Take 1 tablet (50 mg total) by mouth every evening.    atorvastatin (LIPITOR) 80 MG tablet Take 1 tablet (80 mg total) by mouth once daily.    cholecalciferol, vitamin D3, 125 mcg (5,000 unit) Tab Take 5,000 Units by mouth once daily.    diphenoxylate-atropine 2.5-0.025 mg (LOMOTIL) 2.5-0.025 mg per tablet Take 1 tablet by mouth 3 (three) times daily as needed for Diarrhea. TAKE 1 TABLET BY MOUTH 3 TIMES DAILY AS NEEDED FOR DIARRHEA    donepeziL (ARICEPT) 10 MG tablet Take 1 tablet (10 mg total) by mouth every evening.    fluticasone propionate (FLONASE) 50 mcg/actuation nasal spray 1 spray (50 mcg total) by Each Nostril route once daily.    losartan-hydrochlorothiazide 100-12.5 mg (HYZAAR) 100-12.5 mg Tab Take 1 tablet by mouth once daily.    meloxicam (MOBIC) 7.5 MG tablet Take 1 tablet (7.5 mg total) by mouth once daily. Anti-inflammatory for arthritis pain    metoprolol succinate (TOPROL-XL) 25 MG 24 hr tablet Take 1 tablet (25 mg total) by mouth 2 (two) times daily.    multivitamin (ONE DAILY MULTIVITAMIN) per tablet Take 1 tablet by mouth  once daily.    pantoprazole (PROTONIX) 40 MG tablet Take 1 tablet (40 mg total) by mouth once daily.    QUEtiapine (SEROQUEL) 25 MG Tab Start 1/2 tablet at bedtime for sleep, may increase to 1 full tablet if needed (Patient taking differently: Take 12.5 mg by mouth every evening. Start 1/2 tablet at bedtime for sleep, may increase to 1 full tablet if needed)    sertraline (ZOLOFT) 100 MG tablet Take 1 tablet (100 mg total) by mouth once daily.    vit A/vit C/vit E/zinc/copper (PRESERVISION AREDS ORAL) Take 1 tablet by mouth 2 (two) times a day.    aspirin (ECOTRIN) 81 MG EC tablet Take 1 tablet (81 mg total) by mouth once daily.    [DISCONTINUED] biotin 10,000 mcg Cap Take 1 capsule by mouth once daily. (Patient not taking: Reported on 2024)    [DISCONTINUED] ciprofloxacin HCl (CIPRO) 500 MG tablet Take 1 tablet (500 mg total) by mouth 2 (two) times daily.    [DISCONTINUED] cranberry 500 mg Cap Take 1 capsule by mouth once daily.     Family History       Problem Relation (Age of Onset)    Cancer Father, Brother    Colon cancer Brother    Pancreatitis Mother          Tobacco Use    Smoking status: Former     Current packs/day: 0.00     Types: Cigarettes     Start date: 1954     Quit date: 1974     Years since quittin.3     Passive exposure: Never    Smokeless tobacco: Never   Substance and Sexual Activity    Alcohol use: Yes     Alcohol/week: 7.0 standard drinks of alcohol     Types: 7 Glasses of wine per week     Comment: Glass of wine each night    Drug use: No    Sexual activity: Not on file     Review of Systems   Constitutional:  Negative for appetite change and diaphoresis.   HENT:  Negative for congestion.    Respiratory:  Negative for chest tightness and wheezing.    Cardiovascular:  Negative for chest pain and palpitations.   Gastrointestinal:  Negative for abdominal pain.   Genitourinary:  Negative for difficulty urinating.   Musculoskeletal:  Positive for arthralgias. Negative for neck  pain.   Neurological:  Negative for dizziness.   Psychiatric/Behavioral:  Negative for agitation and confusion.      Objective:     Vital Signs (Most Recent):  Temp: 97.9 °F (36.6 °C) (11/22/24 2154)  Pulse: 99 (11/23/24 0956)  Resp: 18 (11/23/24 1003)  BP: (!) 176/76 (11/23/24 0956)  SpO2: 95 % (11/23/24 0956) Vital Signs (24h Range):  Temp:  [97.9 °F (36.6 °C)] 97.9 °F (36.6 °C)  Pulse:  [61-99] 99  Resp:  [15-23] 18  SpO2:  [95 %-100 %] 95 %  BP: (123-215)/() 176/76     Weight: 57.2 kg (126 lb)  Body mass index is 25.45 kg/m².     Physical Exam  Constitutional:       General: She is not in acute distress.  HENT:      Mouth/Throat:      Comments: Stitches upper lip  Eyes:      Pupils: Pupils are equal, round, and reactive to light.   Cardiovascular:      Rate and Rhythm: Normal rate.      Comments: Paced rhythm  Pulmonary:      Effort: No respiratory distress.      Breath sounds: No wheezing.   Abdominal:      Palpations: Abdomen is soft.   Musculoskeletal:         General: Tenderness (Left shoulder) present. No swelling.      Comments: LUE in sling   Skin:     General: Skin is dry.   Neurological:      General: No focal deficit present.      Mental Status: She is alert. Mental status is at baseline.              CRANIAL NERVES     CN III, IV, VI   Pupils are equal, round, and reactive to light.       Significant Labs: All pertinent labs within the past 24 hours have been reviewed.  CBC:   Recent Labs   Lab 11/22/24 2353 11/23/24  0941   WBC 18.14* 15.40*   HGB 13.2 11.5*   HCT 41.1 35.9*    235     CMP:   Recent Labs   Lab 11/22/24 2353 11/23/24  0941    139   K 3.9 3.7    104   CO2 27 29   * 104   BUN 22 16   CREATININE 0.9 0.7   CALCIUM 9.7 9.0   PROT 7.6 6.9   ALBUMIN 4.6 3.9   BILITOT 0.4 0.6   ALKPHOS 97 76   AST 27 29   ALT 23 20   ANIONGAP 11 6*       Significant Imaging: I have reviewed all pertinent imaging results/findings within the past 24 hours.

## 2024-11-24 LAB
ALBUMIN SERPL BCP-MCNC: 3.8 G/DL (ref 3.5–5.2)
ALP SERPL-CCNC: 111 U/L (ref 55–135)
ALT SERPL W/O P-5'-P-CCNC: 110 U/L (ref 10–44)
ANION GAP SERPL CALC-SCNC: 8 MMOL/L (ref 8–16)
AST SERPL-CCNC: 95 U/L (ref 10–40)
BASOPHILS # BLD AUTO: 0.06 K/UL (ref 0–0.2)
BASOPHILS NFR BLD: 0.4 % (ref 0–1.9)
BILIRUB SERPL-MCNC: 0.7 MG/DL (ref 0.1–1)
BUN SERPL-MCNC: 16 MG/DL (ref 8–23)
CALCIUM SERPL-MCNC: 9.3 MG/DL (ref 8.7–10.5)
CHLORIDE SERPL-SCNC: 104 MMOL/L (ref 95–110)
CO2 SERPL-SCNC: 26 MMOL/L (ref 23–29)
CREAT SERPL-MCNC: 0.7 MG/DL (ref 0.5–1.4)
DIFFERENTIAL METHOD BLD: ABNORMAL
EOSINOPHIL # BLD AUTO: 0.2 K/UL (ref 0–0.5)
EOSINOPHIL NFR BLD: 1.2 % (ref 0–8)
ERYTHROCYTE [DISTWIDTH] IN BLOOD BY AUTOMATED COUNT: 13.8 % (ref 11.5–14.5)
EST. GFR  (NO RACE VARIABLE): >60 ML/MIN/1.73 M^2
GLUCOSE SERPL-MCNC: 108 MG/DL (ref 70–110)
HCT VFR BLD AUTO: 39.8 % (ref 37–48.5)
HGB BLD-MCNC: 12.5 G/DL (ref 12–16)
IMM GRANULOCYTES # BLD AUTO: 0.08 K/UL (ref 0–0.04)
IMM GRANULOCYTES NFR BLD AUTO: 0.5 % (ref 0–0.5)
LYMPHOCYTES # BLD AUTO: 1 K/UL (ref 1–4.8)
LYMPHOCYTES NFR BLD: 6.8 % (ref 18–48)
MAGNESIUM SERPL-MCNC: 1.8 MG/DL (ref 1.6–2.6)
MCH RBC QN AUTO: 28.8 PG (ref 27–31)
MCHC RBC AUTO-ENTMCNC: 31.4 G/DL (ref 32–36)
MCV RBC AUTO: 92 FL (ref 82–98)
MONOCYTES # BLD AUTO: 1.1 K/UL (ref 0.3–1)
MONOCYTES NFR BLD: 7.5 % (ref 4–15)
NEUTROPHILS # BLD AUTO: 12.7 K/UL (ref 1.8–7.7)
NEUTROPHILS NFR BLD: 83.6 % (ref 38–73)
NRBC BLD-RTO: 0 /100 WBC
PLATELET # BLD AUTO: 236 K/UL (ref 150–450)
PMV BLD AUTO: 10.6 FL (ref 9.2–12.9)
POTASSIUM SERPL-SCNC: 4 MMOL/L (ref 3.5–5.1)
PROT SERPL-MCNC: 6.9 G/DL (ref 6–8.4)
RBC # BLD AUTO: 4.34 M/UL (ref 4–5.4)
SODIUM SERPL-SCNC: 138 MMOL/L (ref 136–145)
WBC # BLD AUTO: 15.16 K/UL (ref 3.9–12.7)

## 2024-11-24 PROCEDURE — 21400001 HC TELEMETRY ROOM

## 2024-11-24 PROCEDURE — 63600175 PHARM REV CODE 636 W HCPCS: Performed by: STUDENT IN AN ORGANIZED HEALTH CARE EDUCATION/TRAINING PROGRAM

## 2024-11-24 PROCEDURE — 99900031 HC PATIENT EDUCATION (STAT)

## 2024-11-24 PROCEDURE — 97116 GAIT TRAINING THERAPY: CPT | Mod: CQ

## 2024-11-24 PROCEDURE — 36415 COLL VENOUS BLD VENIPUNCTURE: CPT | Performed by: STUDENT IN AN ORGANIZED HEALTH CARE EDUCATION/TRAINING PROGRAM

## 2024-11-24 PROCEDURE — 80053 COMPREHEN METABOLIC PANEL: CPT | Performed by: STUDENT IN AN ORGANIZED HEALTH CARE EDUCATION/TRAINING PROGRAM

## 2024-11-24 PROCEDURE — 94761 N-INVAS EAR/PLS OXIMETRY MLT: CPT

## 2024-11-24 PROCEDURE — A4216 STERILE WATER/SALINE, 10 ML: HCPCS | Performed by: STUDENT IN AN ORGANIZED HEALTH CARE EDUCATION/TRAINING PROGRAM

## 2024-11-24 PROCEDURE — 85025 COMPLETE CBC W/AUTO DIFF WBC: CPT | Performed by: STUDENT IN AN ORGANIZED HEALTH CARE EDUCATION/TRAINING PROGRAM

## 2024-11-24 PROCEDURE — 83735 ASSAY OF MAGNESIUM: CPT | Performed by: STUDENT IN AN ORGANIZED HEALTH CARE EDUCATION/TRAINING PROGRAM

## 2024-11-24 PROCEDURE — 96361 HYDRATE IV INFUSION ADD-ON: CPT

## 2024-11-24 PROCEDURE — 25000003 PHARM REV CODE 250: Performed by: STUDENT IN AN ORGANIZED HEALTH CARE EDUCATION/TRAINING PROGRAM

## 2024-11-24 PROCEDURE — 97530 THERAPEUTIC ACTIVITIES: CPT | Mod: CQ

## 2024-11-24 PROCEDURE — 97535 SELF CARE MNGMENT TRAINING: CPT

## 2024-11-24 PROCEDURE — 97166 OT EVAL MOD COMPLEX 45 MIN: CPT

## 2024-11-24 RX ADMIN — METOPROLOL SUCCINATE 25 MG: 25 TABLET, FILM COATED, EXTENDED RELEASE ORAL at 08:11

## 2024-11-24 RX ADMIN — SODIUM CHLORIDE, PRESERVATIVE FREE 3 ML: 5 INJECTION INTRAVENOUS at 05:11

## 2024-11-24 RX ADMIN — SERTRALINE HYDROCHLORIDE 100 MG: 50 TABLET ORAL at 09:11

## 2024-11-24 RX ADMIN — Medication 6 MG: at 08:11

## 2024-11-24 RX ADMIN — DONEPEZIL HYDROCHLORIDE 10 MG: 5 TABLET, FILM COATED ORAL at 08:11

## 2024-11-24 RX ADMIN — LOSARTAN POTASSIUM 100 MG: 50 TABLET, FILM COATED ORAL at 09:11

## 2024-11-24 RX ADMIN — PANTOPRAZOLE SODIUM 40 MG: 40 TABLET, DELAYED RELEASE ORAL at 05:11

## 2024-11-24 RX ADMIN — HYDROCHLOROTHIAZIDE 12.5 MG: 12.5 TABLET ORAL at 09:11

## 2024-11-24 RX ADMIN — CHOLECALCIFEROL TAB 125 MCG (5000 UNIT) 5000 UNITS: 125 TAB at 09:11

## 2024-11-24 RX ADMIN — CEFTRIAXONE 1 G: 1 INJECTION, POWDER, FOR SOLUTION INTRAMUSCULAR; INTRAVENOUS at 02:11

## 2024-11-24 RX ADMIN — SODIUM CHLORIDE, PRESERVATIVE FREE 3 ML: 5 INJECTION INTRAVENOUS at 02:11

## 2024-11-24 RX ADMIN — ASPIRIN 81 MG: 81 TABLET, COATED ORAL at 09:11

## 2024-11-24 RX ADMIN — METOPROLOL SUCCINATE 25 MG: 25 TABLET, FILM COATED, EXTENDED RELEASE ORAL at 09:11

## 2024-11-24 RX ADMIN — Medication 800 MG: at 09:11

## 2024-11-24 RX ADMIN — ATORVASTATIN CALCIUM 80 MG: 40 TABLET, FILM COATED ORAL at 09:11

## 2024-11-24 RX ADMIN — AMITRIPTYLINE HYDROCHLORIDE 50 MG: 25 TABLET, FILM COATED ORAL at 08:11

## 2024-11-24 NOTE — CARE UPDATE
11/24/24 0800   Patient Assessment/Suction   Level of Consciousness (AVPU) alert   Respiratory Effort Normal;Unlabored   PRE-TX-O2   Device (Oxygen Therapy) room air   SpO2 96 %   Pulse Oximetry Type Intermittent   $ Pulse Oximetry - Multiple Charge Pulse Oximetry - Multiple   Pulse 79   Resp 16

## 2024-11-24 NOTE — ASSESSMENT & PLAN NOTE
Questionable syncope, unwitnessed fall   Patient denies but also has poor memory  Carotid ultrasound showed no significant stenosis, head CT showed no acute abnormalities   Echocardiogram showed ejection fraction 60%, mild valvular disease.  Plan on discharge to skilled nursing facility, pending placement

## 2024-11-24 NOTE — PLAN OF CARE
Maricel Abdul has warranted treatment spanning two or more midnights of hospital level care for the management of  Syncope and UTI . She continues to require IV antibiotics, daily labs, monitoring of vital signs, medication adjustments, and further evaluation by consultants. Her condition is also complicated by the following comorbidities: Hypertension and TIA and Heart block with Pacemaker placement  .

## 2024-11-24 NOTE — ASSESSMENT & PLAN NOTE
Patients blood pressure range in the last 24 hours was: BP  Min: 124/56  Max: 171/83.The patient's inpatient anti-hypertensive regimen is listed below:  Current Antihypertensives  losartan tablet 100 mg, Daily, Oral  hydroCHLOROthiazide tablet 12.5 mg, Daily, Oral  metoprolol succinate (TOPROL-XL) 24 hr tablet 25 mg, 2 times daily, Oral    Plan  - BP is controlled, no changes needed to their regimen  - resume home meds

## 2024-11-24 NOTE — HOSPITAL COURSE
Patient with history of CVA, hypertension, complete heart block requiring pacemaker presented after ground level fall, questionable syncope.  Bilateral carotid ultrasound showed no significant stenosis.  Found to have dislocated left shoulder, reduced in the emergency room and placed in sling.  CT maxillofacial showed mildly displaced fracture of the left nasal bone.  No acute fracture on CT cervical spine.  No acute abnormalities on CT head.  Found to have UTI, started on antibiotics.  Echocardiogram showed EF 60%.  Evaluated by PT/OT, recommended skilled nursing facility.  Discharge to SNF.     Physical Exam  Constitutional:       General: She is not in acute distress.  Eyes:      Pupils: Pupils are equal, round, and reactive to light.   Cardiovascular:      Rate and Rhythm: Normal rate.

## 2024-11-24 NOTE — PLAN OF CARE
CM received orders for SNF- Pt's daughter has Power of  per Patient.  Her daughter choose [facility] as provider for SNF.  A referral was sent to [facility] and Patient choice was signed and uploaded

## 2024-11-24 NOTE — PLAN OF CARE
[facility]  Initial Discharge Assessment       Primary Care Provider: Shayla Tello NP    Admission Diagnosis: Fall [W19.XXXA]    Admission Date: 11/22/2024  Expected Discharge Date:   Pt is a 84-year-old female who arrived from home with Syncope problem. Information verified as correct on facesheet. The assessment was completed at the patient's bedside.  Pt lives at an assist living facility [facility]. Pt does have a Living Will or Advance Directive. The Pt is oriented to person, places, and times. PCP last seen 1 month ago.  Pt denies Coumadin, dialysis, DME, HH, and has not been readmitted into the hospital in the last thirty days.  Pt is capable of performing ADLs without assistance. Pt's daughter drives her to and from medical appointments. Pt reports she takes medication as prescribed; pharmacy used [facility].  Pt verbalized plan to discharge home via family transport. Pt has no other needs to be addressed at this time. CM reviewed the chart and will continue to monitor.    Transition of Care Barriers: None    Payor: [employer] / Plan: [employer] CHOICES / Product Type: Medicare Advantage /     Extended Emergency Contact Information  Primary Emergency Contact: rachel conley  Mobile Phone: [phone]  Relation: Daughter   needed? No    Discharge Plan A: Assisted Living  Discharge Plan B: Assisted Living      [address]  [address] [facility] [address]  Phone: [phone] Fax: [phone]    [facility] - [place] [id number] [address]  Phone: [phone] Fax: [phone]      Initial Assessment (most recent)       Adult Discharge Assessment - 11/24/24 1207          Discharge Assessment    Assessment Type Discharge Planning Assessment     Confirmed/corrected address, phone number and  insurance Yes     Confirmed Demographics Correct on Facesheet     Source of Information patient;family     When was your last doctors appointment? --   1 month ago    Does patient/caregiver understand observation status Yes     Reason For Admission Syncope     Facility Arrived From: Assist [facility]     Do you expect to return to your current living situation? Yes     Do you have help at home or someone to help you manage your care at home? Yes     Who are your caregiver(s) and their phone number(s)? Cristhian White-[phone]- Has power of      Prior to hospitilization cognitive status: Alert/Oriented     Current cognitive status: Alert/Oriented     Walking or Climbing Stairs Difficulty no     Dressing/Bathing Difficulty no     Home Accessibility wheelchair accessible     Home Layout Able to live on 1st floor     Equipment Currently Used at Home none     Readmission within 30 days? No     Patient currently being followed by outpatient case management? No     Do you currently have service(s) that help you manage your care at home? No     Do you take prescription medications? Yes     Do you have prescription coverage? Yes     Coverage [facility]     Is the patient taking medications as prescribed? yes     Who is going to help you get home at discharge? Cristhian Encarnacoin     How do you get to doctors appointments? family or friend will provide     Are you on dialysis? No     Do you take coumadin? No     Discharge Plan A Assisted Living     Discharge Plan B Assisted Living     DME Needed Upon Discharge  none     Transition of Care Barriers None

## 2024-11-24 NOTE — PROGRESS NOTES
[facility]  Progress Note    Patient Name: Maricel Abdul  MRN: [id number]  Patient Class: IP- Inpatient   Admission Date: 11/22/2024  Length of Stay: 0 days  Attending Physician: Tico Rhodes MD  Primary Care Provider: Shayla Tello NP        Subjective:     Principal Problem:Syncope        HPI:  Patient with a history of CVA, hypertension, complete heart block status post pacemaker placement, hyperlipidemia presents to the emergency room with complaints of ground level fall.  Patient reports she was taking a shower, denies any dizziness or syncope but also a poor historian per family member at bedside.  Unclear whether patient actually syncopized, but patient reports brief loss of consciousness after she fell.  Denies any chest pain, shortness for breath, fever, chills, nausea, vomiting.  In the emergency room was found to have anterior glenohumeral dislocation of the left shoulder which was reduced in the emergency room.  Placed in sling.  CT head showed no acute abnormalities.  CT cervical spine showed no acute fractures.  Mildly displaced fracture of the left nasal bone.  Patient required stitches of her upper lip.  Patient being admitted for possible syncope.  Carotid ultrasound showed no significant stenosis.  WBC 18.14, urinalysis shows possible UTI.  Echocardiogram pending.    Overview/Hospital Course:  Patient with history of CVA, hypertension, complete heart block requiring pacemaker presented after ground level fall, questionable syncope.  Bilateral carotid ultrasound showed no significant stenosis.  Found to have dislocated left shoulder, reduced in the emergency room and placed in sling.  CT maxillofacial showed mildly displaced fracture of the left nasal bone.  No acute fracture on CT cervical spine.  No acute abnormalities on CT head.  Found to have UTI, started on antibiotics.  Echocardiogram showed EF 60%.  Evaluated by PT/OT, recommended skilled nursing  facility.  Discharge pending placement.    Interval History:  Patient seen and examined this morning, reports improvement in symptoms.  Urine cultures growing Gram-negative rods.  WBC 15.16.    Review of Systems   Constitutional:  Negative for chills and diaphoresis.   Respiratory:  Negative for shortness of breath.    Cardiovascular:  Negative for chest pain.     Objective:     Vital Signs (Most Recent):  Temp: 98.1 °F (36.7 °C) (11/24/24 1206)  Pulse: 88 (11/24/24 1206)  Resp: 17 (11/24/24 1206)  BP: (!) 145/90 (11/24/24 1206)  SpO2: 95 % (11/24/24 1206) Vital Signs (24h Range):  Temp:  [97.5 °F (36.4 °C)-98.1 °F (36.7 °C)] 98.1 °F (36.7 °C)  Pulse:  [52-99] 88  Resp:  [13-26] 17  SpO2:  [87 %-96 %] 95 %  BP: (124-171)/(56-90) 145/90     Weight: 57 kg (125 lb 9 oz)  Body mass index is 25.36 kg/m².    Intake/Output Summary (Last 24 hours) at 11/24/2024 1429  Last data filed at 11/24/2024 0504  Gross per 24 hour   Intake 240 ml   Output 200 ml   Net 40 ml         Physical Exam  Constitutional:       General: She is not in acute distress.  HENT:      Mouth/Throat:      Comments: Stitches upper lip  Eyes:      Pupils: Pupils are equal, round, and reactive to light.   Cardiovascular:      Rate and Rhythm: Normal rate.      Comments: Paced rhythm  Pulmonary:      Effort: No respiratory distress.      Breath sounds: No wheezing.   Abdominal:      Palpations: Abdomen is soft.   Musculoskeletal:         General: Tenderness (Left shoulder) present. No swelling.      Comments: LUE in sling   Skin:     General: Skin is dry.   Neurological:      General: No focal deficit present.      Mental Status: She is alert. Mental status is at baseline.             Significant Labs: All pertinent labs within the past 24 hours have been reviewed.  CBC:   Recent Labs   Lab 11/22/24  2353 11/23/24  0941 11/24/24  0759   WBC 18.14* 15.40* 15.16*   HGB 13.2 11.5* 12.5   HCT 41.1 35.9* 39.8    235 236     CMP:   Recent Labs   Lab  11/22/24  2353 11/23/24  0941 11/24/24  0601    139 138   K 3.9 3.7 4.0    104 104   CO2 27 29 26   * 104 108   BUN 22 16 16   CREATININE 0.9 0.7 0.7   CALCIUM 9.7 9.0 9.3   PROT 7.6 6.9 6.9   ALBUMIN 4.6 3.9 3.8   BILITOT 0.4 0.6 0.7   ALKPHOS 97 76 111   AST 27 29 95*   ALT 23 20 110*   ANIONGAP 11 6* 8       Significant Imaging: I have reviewed all pertinent imaging results/findings within the past 24 hours.    Assessment/Plan:      * Syncope  Questionable syncope, unwitnessed fall   Patient denies but also has poor memory  Carotid ultrasound showed no significant stenosis, head CT showed no acute abnormalities   Echocardiogram showed ejection fraction 60%, mild valvular disease.  Plan on discharge to skilled nursing facility, pending placement      UTI (urinary tract infection)  Started the patient on Rocephin   Follow up urine cultures      Shoulder dislocation, left, initial encounter  Reduced in the emergency room   Continue pain medications as needed   Continue sling for least 3 weeks, follow up with PCP outpatient  PT/OT, PT recommended moderate therapy   Family patient agreeable to skilled nursing facility, case management consulted.  Pending placement.      Gastroesophageal reflux disease  Continue Protonix      Cerebrovascular accident (CVA)  History of CVA   Continue aspirin and statin        Complete heart block  Aware, status post pacemaker placement   Follow up with Cardiology outpatient      Essential hypertension  Patients blood pressure range in the last 24 hours was: BP  Min: 124/56  Max: 171/83.The patient's inpatient anti-hypertensive regimen is listed below:  Current Antihypertensives  losartan tablet 100 mg, Daily, Oral  hydroCHLOROthiazide tablet 12.5 mg, Daily, Oral  metoprolol succinate (TOPROL-XL) 24 hr tablet 25 mg, 2 times daily, Oral    Plan  - BP is controlled, no changes needed to their regimen  - resume home meds       VTE Risk Mitigation (From admission, onward)            Ordered     IP VTE HIGH RISK PATIENT  Once         11/23/24 0724     Place sequential compression device  Until discontinued         11/23/24 0724                    Discharge Planning   VINI:      Code Status: Full Code   Is the patient medically ready for discharge?:     Reason for patient still in hospital (select all that apply): Patient trending condition  Discharge Plan A: Assisted Living                  Tico Rhodes MD  Department of Hospital Medicine   [facility]

## 2024-11-24 NOTE — PLAN OF CARE
ENGLISH completed with patient at bedside.  Patient verbalized understanding and signed ENGLISH.  ENGLISH scanned into media.     11/24/24 1652   ENGLISH Message   Medicare Outpatient and Observation Notification regarding financial responsibility Given to patient/caregiver;Explained to patient/caregiver;Signed/date by patient/caregiver   Date ENGLISH was signed 11/24/24   Time ENGLISH was signed [id number]

## 2024-11-24 NOTE — PT/OT/SLP PROGRESS
Physical Therapy Treatment    Patient Name:  Maricel Abdul   MRN:  [id number]    Recommendations:     Discharge Recommendations: Moderate Intensity Therapy  Discharge Equipment Recommendations: to be determined by next level of care  Barriers to discharge:  Decreased functional mobility. Fall Risk    Assessment:     Maricel Abdul is a 84 y.o. female admitted with a medical diagnosis of Syncope.  She presents with the following impairments/functional limitations: weakness, impaired balance, impaired endurance, impaired self care skills, impaired functional mobility, gait instability, impaired cognition, decreased upper extremity function, decreased lower extremity function, decreased safety awareness, impaired cardiopulmonary response to activity, orthopedic precautions . Pt seen HOB elevated and agreeable to PT. She required mod a from supine sit with VI for sequencing and technique secondary to UE sling. Sling adjusted prior to ambulation. Pt ambulated 30' min a HHA displaying a short shuffling gait requiring VI for a widened JERALD.     Rehab Prognosis: Fair; patient would benefit from acute skilled PT services to address these deficits and reach maximum level of function.    Recent Surgery: * No surgery found *      Plan:     During this hospitalization, patient to be seen daily to address the identified rehab impairments via   and progress toward the following goals:    Plan of Care Expires:       Subjective     Chief Complaint: None stated  Patient/Family Comments/goals: Pt agreeable to PT.   Pain/Comfort:  Pain Rating 1: 0/10  Pain Rating Post-Intervention 1: 0/10      Objective:     Communicated with RN prior to session.  Patient found HOB elevated with telemetry, PureWick upon PT entry to room.     General Precautions: Standard, fall  Orthopedic Precautions:    Braces: UE Sling  Respiratory Status: Room air     Functional Mobility:  Bed Mobility:     Supine to Sit: moderate assistance  Transfers:     Sit to  Stand:  minimum assistance with hand-held assist  Gait: 30' min a with HHA      AM-PAC 6 CLICK MOBILITY          Treatment & Education:  Pt was educated on the following: call light use, importance of OOB activity and functional mobility to negate the negative effects of prolonged bed rest during this hospitalization, safe transfers/ambulation and discharge planning recommendations/options.     Patient left up in chair with all lines intact, call button in reach, chair alarm on, and daughter present..    GOALS:   Multidisciplinary Problems       Physical Therapy Goals          Problem: Physical Therapy    Goal Priority Disciplines Outcome Interventions   Physical Therapy Goal     PT, PT/OT     Description: Goals to be met by: 2024     Patient will increase functional independence with mobility by performin. Supine to sit with Contact Guard Assistance  2. Sit to stand transfer with Contact Guard Assistance  3. Gait  x 100  feet with Minimal Hand held Assistance using No Assistive Device.                          Time Tracking:     PT Received On: 24  PT Start Time: 1026     PT Stop Time: 1049  PT Total Time (min): 23 min     Billable Minutes: Gait Training 13 and Therapeutic Activity 10    Treatment Type: Treatment  PT/PTA: PTA     Number of PTA visits since last PT visit: 1     2024

## 2024-11-24 NOTE — CARE UPDATE
11/24/24 0000   Patient Assessment/Suction   Level of Consciousness (AVPU) alert   Respiratory Effort Normal;Unlabored   Expansion/Accessory Muscles/Retractions no use of accessory muscles;expansion symmetric   Rhythm/Pattern, Respiratory unlabored   PRE-TX-O2   Device (Oxygen Therapy) room air   SpO2 (!) 92 %   Pulse Oximetry Type Intermittent   $ Pulse Oximetry - Multiple Charge Pulse Oximetry - Multiple   Pulse 79   Education   $ Education 15 min;Oxygen

## 2024-11-24 NOTE — ASSESSMENT & PLAN NOTE
Reduced in the emergency room   Continue pain medications as needed   Continue sling for least 3 weeks, follow up with PCP outpatient  PT/OT, PT recommended moderate therapy   Family patient agreeable to skilled nursing facility, case management consulted.  Pending placement.

## 2024-11-24 NOTE — SUBJECTIVE & OBJECTIVE
Interval History:  Patient seen and examined this morning, reports improvement in symptoms.  Urine cultures growing Gram-negative rods.  WBC 15.16.    Review of Systems   Constitutional:  Negative for chills and diaphoresis.   Respiratory:  Negative for shortness of breath.    Cardiovascular:  Negative for chest pain.     Objective:     Vital Signs (Most Recent):  Temp: 98.1 °F (36.7 °C) (11/24/24 1206)  Pulse: 88 (11/24/24 1206)  Resp: 17 (11/24/24 1206)  BP: (!) 145/90 (11/24/24 1206)  SpO2: 95 % (11/24/24 1206) Vital Signs (24h Range):  Temp:  [97.5 °F (36.4 °C)-98.1 °F (36.7 °C)] 98.1 °F (36.7 °C)  Pulse:  [52-99] 88  Resp:  [13-26] 17  SpO2:  [87 %-96 %] 95 %  BP: (124-171)/(56-90) 145/90     Weight: 57 kg (125 lb 9 oz)  Body mass index is 25.36 kg/m².    Intake/Output Summary (Last 24 hours) at 11/24/2024 1429  Last data filed at 11/24/2024 0504  Gross per 24 hour   Intake 240 ml   Output 200 ml   Net 40 ml         Physical Exam  Constitutional:       General: She is not in acute distress.  HENT:      Mouth/Throat:      Comments: Stitches upper lip  Eyes:      Pupils: Pupils are equal, round, and reactive to light.   Cardiovascular:      Rate and Rhythm: Normal rate.      Comments: Paced rhythm  Pulmonary:      Effort: No respiratory distress.      Breath sounds: No wheezing.   Abdominal:      Palpations: Abdomen is soft.   Musculoskeletal:         General: Tenderness (Left shoulder) present. No swelling.      Comments: LUE in sling   Skin:     General: Skin is dry.   Neurological:      General: No focal deficit present.      Mental Status: She is alert. Mental status is at baseline.             Significant Labs: All pertinent labs within the past 24 hours have been reviewed.  CBC:   Recent Labs   Lab 11/22/24  2353 11/23/24  0941 11/24/24  0759   WBC 18.14* 15.40* 15.16*   HGB 13.2 11.5* 12.5   HCT 41.1 35.9* 39.8    235 236     CMP:   Recent Labs   Lab 11/22/24  2353 11/23/24  0941 11/24/24  0601   NA  139 139 138   K 3.9 3.7 4.0    104 104   CO2 27 29 26   * 104 108   BUN 22 16 16   CREATININE 0.9 0.7 0.7   CALCIUM 9.7 9.0 9.3   PROT 7.6 6.9 6.9   ALBUMIN 4.6 3.9 3.8   BILITOT 0.4 0.6 0.7   ALKPHOS 97 76 111   AST 27 29 95*   ALT 23 20 110*   ANIONGAP 11 6* 8       Significant Imaging: I have reviewed all pertinent imaging results/findings within the past 24 hours.

## 2024-11-25 ENCOUNTER — TELEPHONE (OUTPATIENT)
Dept: FAMILY MEDICINE | Facility: CLINIC | Age: 84
End: 2024-11-25
Payer: MEDICARE

## 2024-11-25 LAB
ALBUMIN SERPL BCP-MCNC: 3.8 G/DL (ref 3.5–5.2)
ALP SERPL-CCNC: 102 U/L (ref 55–135)
ALT SERPL W/O P-5'-P-CCNC: 73 U/L (ref 10–44)
ANION GAP SERPL CALC-SCNC: 5 MMOL/L (ref 8–16)
AST SERPL-CCNC: 42 U/L (ref 10–40)
BACTERIA UR CULT: ABNORMAL
BASOPHILS # BLD AUTO: 0.04 K/UL (ref 0–0.2)
BASOPHILS NFR BLD: 0.4 % (ref 0–1.9)
BILIRUB SERPL-MCNC: 0.6 MG/DL (ref 0.1–1)
BUN SERPL-MCNC: 16 MG/DL (ref 8–23)
CALCIUM SERPL-MCNC: 9.8 MG/DL (ref 8.7–10.5)
CHLORIDE SERPL-SCNC: 99 MMOL/L (ref 95–110)
CO2 SERPL-SCNC: 31 MMOL/L (ref 23–29)
CREAT SERPL-MCNC: 0.7 MG/DL (ref 0.5–1.4)
DIFFERENTIAL METHOD BLD: ABNORMAL
EOSINOPHIL # BLD AUTO: 0.3 K/UL (ref 0–0.5)
EOSINOPHIL NFR BLD: 3.6 % (ref 0–8)
ERYTHROCYTE [DISTWIDTH] IN BLOOD BY AUTOMATED COUNT: 13.9 % (ref 11.5–14.5)
EST. GFR  (NO RACE VARIABLE): >60 ML/MIN/1.73 M^2
GLUCOSE SERPL-MCNC: 110 MG/DL (ref 70–110)
HCT VFR BLD AUTO: 42.1 % (ref 37–48.5)
HGB BLD-MCNC: 13.2 G/DL (ref 12–16)
IMM GRANULOCYTES # BLD AUTO: 0.06 K/UL (ref 0–0.04)
IMM GRANULOCYTES NFR BLD AUTO: 0.7 % (ref 0–0.5)
LYMPHOCYTES # BLD AUTO: 1.1 K/UL (ref 1–4.8)
LYMPHOCYTES NFR BLD: 12.1 % (ref 18–48)
MAGNESIUM SERPL-MCNC: 1.9 MG/DL (ref 1.6–2.6)
MCH RBC QN AUTO: 28.8 PG (ref 27–31)
MCHC RBC AUTO-ENTMCNC: 31.4 G/DL (ref 32–36)
MCV RBC AUTO: 92 FL (ref 82–98)
MONOCYTES # BLD AUTO: 1 K/UL (ref 0.3–1)
MONOCYTES NFR BLD: 11.2 % (ref 4–15)
NEUTROPHILS # BLD AUTO: 6.6 K/UL (ref 1.8–7.7)
NEUTROPHILS NFR BLD: 72 % (ref 38–73)
NRBC BLD-RTO: 0 /100 WBC
PLATELET # BLD AUTO: 214 K/UL (ref 150–450)
PMV BLD AUTO: 10.4 FL (ref 9.2–12.9)
POTASSIUM SERPL-SCNC: 4 MMOL/L (ref 3.5–5.1)
PROT SERPL-MCNC: 7 G/DL (ref 6–8.4)
RBC # BLD AUTO: 4.59 M/UL (ref 4–5.4)
SODIUM SERPL-SCNC: 135 MMOL/L (ref 136–145)
WBC # BLD AUTO: 9.16 K/UL (ref 3.9–12.7)

## 2024-11-25 PROCEDURE — 97116 GAIT TRAINING THERAPY: CPT | Mod: CQ

## 2024-11-25 PROCEDURE — 80053 COMPREHEN METABOLIC PANEL: CPT | Performed by: STUDENT IN AN ORGANIZED HEALTH CARE EDUCATION/TRAINING PROGRAM

## 2024-11-25 PROCEDURE — 25000003 PHARM REV CODE 250: Performed by: STUDENT IN AN ORGANIZED HEALTH CARE EDUCATION/TRAINING PROGRAM

## 2024-11-25 PROCEDURE — A4216 STERILE WATER/SALINE, 10 ML: HCPCS | Performed by: STUDENT IN AN ORGANIZED HEALTH CARE EDUCATION/TRAINING PROGRAM

## 2024-11-25 PROCEDURE — 36415 COLL VENOUS BLD VENIPUNCTURE: CPT | Performed by: STUDENT IN AN ORGANIZED HEALTH CARE EDUCATION/TRAINING PROGRAM

## 2024-11-25 PROCEDURE — 85025 COMPLETE CBC W/AUTO DIFF WBC: CPT | Performed by: STUDENT IN AN ORGANIZED HEALTH CARE EDUCATION/TRAINING PROGRAM

## 2024-11-25 PROCEDURE — 21400001 HC TELEMETRY ROOM

## 2024-11-25 PROCEDURE — 83735 ASSAY OF MAGNESIUM: CPT | Performed by: STUDENT IN AN ORGANIZED HEALTH CARE EDUCATION/TRAINING PROGRAM

## 2024-11-25 RX ORDER — CEPHALEXIN 250 MG/1
500 CAPSULE ORAL EVERY 6 HOURS
Status: DISCONTINUED | OUTPATIENT
Start: 2024-11-25 | End: 2024-11-26 | Stop reason: HOSPADM

## 2024-11-25 RX ADMIN — PANTOPRAZOLE SODIUM 40 MG: 40 TABLET, DELAYED RELEASE ORAL at 06:11

## 2024-11-25 RX ADMIN — ASPIRIN 81 MG: 81 TABLET, COATED ORAL at 09:11

## 2024-11-25 RX ADMIN — CEPHALEXIN 500 MG: 250 CAPSULE ORAL at 06:11

## 2024-11-25 RX ADMIN — AMITRIPTYLINE HYDROCHLORIDE 50 MG: 25 TABLET, FILM COATED ORAL at 09:11

## 2024-11-25 RX ADMIN — CHOLECALCIFEROL TAB 125 MCG (5000 UNIT) 5000 UNITS: 125 TAB at 09:11

## 2024-11-25 RX ADMIN — METOPROLOL SUCCINATE 25 MG: 25 TABLET, FILM COATED, EXTENDED RELEASE ORAL at 09:11

## 2024-11-25 RX ADMIN — LOSARTAN POTASSIUM 100 MG: 50 TABLET, FILM COATED ORAL at 09:11

## 2024-11-25 RX ADMIN — CEPHALEXIN 500 MG: 250 CAPSULE ORAL at 12:11

## 2024-11-25 RX ADMIN — SODIUM CHLORIDE, PRESERVATIVE FREE 3 ML: 5 INJECTION INTRAVENOUS at 12:11

## 2024-11-25 RX ADMIN — DONEPEZIL HYDROCHLORIDE 10 MG: 5 TABLET, FILM COATED ORAL at 09:11

## 2024-11-25 RX ADMIN — ATORVASTATIN CALCIUM 80 MG: 40 TABLET, FILM COATED ORAL at 09:11

## 2024-11-25 RX ADMIN — SERTRALINE HYDROCHLORIDE 100 MG: 50 TABLET ORAL at 09:11

## 2024-11-25 RX ADMIN — HYDROCHLOROTHIAZIDE 12.5 MG: 12.5 TABLET ORAL at 09:11

## 2024-11-25 RX ADMIN — SODIUM CHLORIDE, PRESERVATIVE FREE 3 ML: 5 INJECTION INTRAVENOUS at 06:11

## 2024-11-25 NOTE — PLAN OF CARE
10:46  SHELLI met with Pt at bedside. SHELLI explained that she would be agreeable to SNF and named her only preference as [facility]. SHELLI assured her that [facility] accepted and that we are waiting on authorization from her insurance company. SHELLI obtained a Pt choice and scanned into media.     9:53  SHELLI called in [facility] and faxed [facility]. Awaiting 142. SHELLI also sent clinicals and SNF orders for authorization for SNF both through e-mail and fax. SHELLI called [facility] to inform they have been sent, left VM.    10:43  SHELLI received 142. PAS[facility] and 142 scanned into  media.

## 2024-11-25 NOTE — PT/OT/SLP EVAL
Occupational Therapy   Evaluation, tx    Name: Maricel Abdul  MRN: [id number]  Admitting Diagnosis: Syncope  Recent Surgery: * No surgery found *    Diagnoses of Fall, Pacemaker, Dislocation closed, Chest pain, Syncope and collapse, and Syncope were pertinent to this visit.    Recommendations:     Discharge Recommendations: Moderate Intensity Therapy  Discharge Equipment Recommendations:  to be determined by next level of care  Barriers to discharge:  Decreased caregiver support    Assessment:     Maricel Abdul is a 84 y.o. female with a medical diagnosis of Syncope.  She presents with confusion, Ox person only, not birthday, anxious, fidgety and had removed sling from arm upon OT entrance to room. Pt is s/p fall with  nasal bone fracture, L shoulder dislocation and reduction, LUE sling and swathe x 3 weeks and NWB.  Performance deficits affecting function: weakness, impaired endurance, impaired self care skills, impaired functional mobility, gait instability, impaired balance, impaired cognition, decreased upper extremity function, decreased lower extremity function, decreased safety awareness, pain, decreased ROM, impaired joint extensibility, orthopedic precautions.      Rehab Prognosis: Good and Fair; patient would benefit from acute skilled OT services to address these deficits and reach maximum level of function.       Plan:     Patient to be seen 5 x/week to address the above listed problems via self-care/home management, therapeutic activities, therapeutic exercises  Plan of Care Expires: 12/24/24  Plan of Care Reviewed with: patient, daughter    Subjective     Chief Complaint: L shld pain with movement.   Patient/Family Comments/goals: per daughter, she keeps taking off the sling.     Occupational Profile:  Living Environment: Per daughter, pt lives in [facility], walk shower with .  Previous level of function: pt has chronic forgetfulness, though performed basic self care activities Indep-Mod I,  ambulated to facility dining room without a device; ambulated Indep.   Roles and Routines: active at facility. Hx CVA w/ aphasia.  Equipment Used at Home: grab bar  Assistance upon Discharge: daughter. Staff at [facility]    Pain/Comfort:  Pain Rating 1:  (unable to rate)  Location - Side 1: Left  Location - Orientation 1: generalized  Location 1: shoulder  Pain Addressed 1: Reposition, Distraction  Pain Rating Post-Intervention 1: 0/10    Patients cultural, spiritual, Temple conflicts given the current situation: no    Objective:     Communicated with: nurse prior to session.  Patient found HOB elevated with telemetry, PureWick, bed alarm upon OT entry to room.    General Precautions: Standard, fall (hx of aphasia)  Orthopedic Precautions: LUE non weight bearing (nasal bone fracture, s/p L shoulder reduction)  Braces: Sling and swathe (continue sling for 3 weeks per ortho)  Respiratory Status: Room air    Occupational Performance:    Bed Mobility:  max sequencing cues.  Patient completed Rolling/Turning to Right with minimum assistance  Patient completed Scooting/Bridging with minimum assistance  Patient completed Supine to Sit with minimum assistance  Patient completed Sit to Supine with minimum assistance    Functional Mobility/Transfers:  Patient completed Sit <> Stand Transfer with minimum assistance  with  hand-held assist   Functional Mobility: ambulated 10 ft x 2 with Min A w/HHA to sink and back. Pt unsteady, poor safety awareness.    Activities of Daily Living:  Feeding:  supervision .  Grooming: contact guard assistance washed face and brushed teeth standing at sink; used L hand as active assist to squeeze water from cloth. Had to hold L hand to avoid WB on sink  Upper Body Dressing: total assistance to don sling after pt found in bed with sling off.  Lower Body Dressing: maximal assistance shoes   Toileting: maximal assistance and purewick    Cognitive/Visual Perceptual:  Cognitive/Psychosocial Skills:    "  -       Oriented to: Person   -       Follows Commands/attention:Easily distracted and Follows one-step commands  -       Communication: expressive aphasia  -       Memory: Impaired STM, Impaired LTM, and Poor immediate recall  -       Safety awareness/insight to disability: impaired   -       Mood/Affect/Coping skills/emotional control: Anxious, Agitated, and fidgety  Visual/Perceptual:      -Intact     Physical Exam:  Balance:    -       sitting: fair plus  dynamic: fair  standing   standing: poor plus dynamic": poor  Postural examination/scapula alignment:    -       Rounded shoulders  Dominant hand:    -       right  Skin Integrity: lip stitches  Upper Extremity Range of Motion:     -       Right Upper Extremity: WFL  -       Left Upper Extremity:  NT immobilized in S&S   Upper Extremity Strength:    -       Right Upper Extremity: WFL  -       Left Upper Extremity:  NT   Strength:    -       Right Upper Extremity: WFL  -       Left Upper Extremity: Deficits: fair     AMPAC 6 Click ADL:  AMPAC Total Score: 14    Treatment & Education:  Purpose OT and POC  Pt seen for safe self care and fx mobility retraining as stated above.  Call don't fall.  Pt and dtr educated in ortho precautions and to keep sling on l times. DTR verbalized understanding. Ongoing for pt.   Pillow placed behind L elbow to prevent shld hyperextension.  Family education/ trn with DTR.  OT replaced sling and swathe after pt had removed in bed. Added dry  wash cloth to pad neck strap for comfort.  All questions/concerns addressed within scope.      Patient left HOB elevated with all lines intact, call button in reach, bed alarm on, and DTR present    GOALS:   Multidisciplinary Problems       Occupational Therapy Goals          Problem: Occupational Therapy    Goal Priority Disciplines Outcome Interventions   Occupational Therapy Goal     OT, PT/OT Progressing    Description: Goals to be met by: 12/14/2024     Patient will increase functional " independence with ADLs by performing:    UE Dressing with Supervision.  LE Dressing with Supervision.  Grooming while standing at sink with Supervision.  Toileting from toilet with Supervision for hygiene and clothing management.   Step transfer with Supervision  Toilet transfer to toilet with Supervision.                         History:     Past Medical History:   Diagnosis Date    Arthritis     Diverticulitis     Encounter for blood transfusion     Hypertension     Kidney stones     Pacemaker     Shingles     Squamous cell carcinoma of skin     TIA (transient ischemic attack) 12/2017         Past Surgical History:   Procedure Laterality Date    APPENDECTOMY      CARDIAC SURGERY      pacemaker    CERVICAL FUSION      CHOLECYSTECTOMY      COLONOSCOPY N/A 8/14/2019    Procedure: COLONOSCOPY;  Surgeon: Elio Perez MD;  Location: [facility];  Service: Endoscopy;  Laterality: N/A;    HYSTERECTOMY      SHOULDER SURGERY         Time Tracking:     OT Date of Treatment: 11/24/24  OT Start Time: 1700  OT Stop Time: 1718  OT Total Time (min): 18 min    Billable Minutes:Evaluation 10  Self Care/Home Management 8  Total Time 18    11/24/2024

## 2024-11-25 NOTE — ASSESSMENT & PLAN NOTE
Questionable syncope, unwitnessed fall   Patient denies but also has poor memory  Carotid ultrasound showed no significant stenosis, head CT showed no acute abnormalities   Echocardiogram showed ejection fraction 60%, mild valvular disease.  Plan on discharge to skilled nursing facility, accepted at [facility] we will plan on discharge tomorrow

## 2024-11-25 NOTE — SUBJECTIVE & OBJECTIVE
Interval History:  The, no acute complaints.  White blood cells downtrending.  Patient accepted at [facility], unable to take patient till tomorrow.  Urine cultures grew E coli.    Review of Systems   Constitutional:  Negative for chills and diaphoresis.   Respiratory:  Negative for shortness of breath.    Cardiovascular:  Negative for chest pain.     Objective:     Vital Signs (Most Recent):  Temp: 97.4 °F (36.3 °C) (11/25/24 1608)  Pulse: 91 (11/25/24 1608)  Resp: 18 (11/25/24 1608)  BP: 136/73 (94) (11/25/24 1608)  SpO2: 98 % (11/25/24 1608) Vital Signs (24h Range):  Temp:  [97.4 °F (36.3 °C)-98.7 °F (37.1 °C)] 97.4 °F (36.3 °C)  Pulse:  [76-91] 91  Resp:  [18] 18  SpO2:  [93 %-98 %] 98 %  BP: ()/(64-73) 136/73     Weight: 57 kg (125 lb 9 oz)  Body mass index is 25.36 kg/m².  No intake or output data in the 24 hours ending 11/25/24 1633      Physical Exam  Constitutional:       General: She is not in acute distress.  HENT:      Mouth/Throat:      Comments: Stitches upper lip  Eyes:      Pupils: Pupils are equal, round, and reactive to light.   Cardiovascular:      Rate and Rhythm: Normal rate.      Comments: Paced rhythm  Pulmonary:      Effort: No respiratory distress.      Breath sounds: No wheezing.   Abdominal:      Palpations: Abdomen is soft.   Musculoskeletal:         General: Tenderness (Left shoulder) present. No swelling.      Comments: LUE in sling   Skin:     General: Skin is dry.   Neurological:      General: No focal deficit present.      Mental Status: She is alert. Mental status is at baseline.             Significant Labs: All pertinent labs within the past 24 hours have been reviewed.  CBC:   Recent Labs   Lab 11/24/24  0759 11/25/24  0530   WBC 15.16* 9.16   HGB 12.5 13.2   HCT 39.8 42.1    214     CMP:   Recent Labs   Lab 11/24/24  0601 11/25/24  0530    135*   K 4.0 4.0    99   CO2 26 31*    110   BUN 16 16   CREATININE 0.7 0.7   CALCIUM 9.3 9.8   PROT 6.9 7.0    ALBUMIN 3.8 3.8   BILITOT 0.7 0.6   ALKPHOS 111 102   AST 95* 42*   * 73*   ANIONGAP 8 5*       Significant Imaging: I have reviewed all pertinent imaging results/findings within the past 24 hours.

## 2024-11-25 NOTE — PLAN OF CARE
Problem: Occupational Therapy  Goal: Occupational Therapy Goal  Description: Goals to be met by: 12/14/2024     Patient will increase functional independence with ADLs by performing:    UE Dressing with Supervision.  LE Dressing with Supervision.  Grooming while standing at sink with Supervision.  Toileting from toilet with Supervision for hygiene and clothing management.   Step transfer with Supervision  Toilet transfer to toilet with Supervision.    Outcome: Progressing

## 2024-11-25 NOTE — TELEPHONE ENCOUNTER
----- Message from Silvana sent at 11/25/2024  3:27 PM CST -----  Velia called from [facility]. The patient  needs a PA on [facility] for both of her  shoulders. They are both dislocated. They needs needs a PA ASAP. [facility]  fax  # [phone]  # [phone] GH

## 2024-11-25 NOTE — ASSESSMENT & PLAN NOTE
Patients blood pressure range in the last 24 hours was: BP  Min: 99/64  Max: 148/73.The patient's inpatient anti-hypertensive regimen is listed below:  Current Antihypertensives  losartan tablet 100 mg, Daily, Oral  hydroCHLOROthiazide tablet 12.5 mg, Daily, Oral  metoprolol succinate (TOPROL-XL) 24 hr tablet 25 mg, 2 times daily, Oral    Plan  - BP is controlled, no changes needed to their regimen  - resume home meds

## 2024-11-25 NOTE — PT/OT/SLP PROGRESS
Physical Therapy Treatment    Patient Name:  Maricel Abdul   MRN:  [id number]    Recommendations:     Discharge Recommendations: Moderate Intensity Therapy  Discharge Equipment Recommendations: to be determined by next level of care  Barriers to discharge:  weakness, impaired endurance, impaired self care skills, impaired functional mobility, gait instability, impaired balance, decreased safety awareness, orthopedic precautions.     Assessment:     Mariecl Abdul is a 84 y.o. female admitted with a medical diagnosis of Syncope.  She presents with the following impairments/functional limitations: weakness, impaired endurance, impaired self care skills, impaired functional mobility, gait instability, impaired balance, impaired cognition, decreased upper extremity function, decreased lower extremity function, decreased safety awareness, pain, decreased ROM, impaired joint extensibility, orthopedic precautions.    Pt found resting with HOB elevated, agreeable to skilled physical therapy session today.     Tara required to transfer from supine to sitting EOB. Once EOB, sling applied to LUE. She performed sit to stand from EOB with HHA and Tara.     She ambulated two bouts of 55ft with HHA. She ambulated with slow sharona and head down posture and verbal cues were given for correction.     She ambulated back to her room where she was agreeable to sitting up in her bed side chair. Pt left sitting up in chair, chair alarm on, call light within reach, all needs met, and RN notified.     Rehab Prognosis: Fair; patient would benefit from acute skilled PT services to address these deficits and reach maximum level of function.    Recent Surgery: * No surgery found *      Plan:     During this hospitalization, patient to be seen daily to address the identified rehab impairments via gait training, therapeutic exercises, therapeutic activities, neuromuscular re-education and progress toward the following goals:    Plan of Care  Expires:       Subjective     Chief Complaint: none  Patient/Family Comments/goals: to get better and go home  Pain/Comfort:  Pain Rating 1: 0/10      Objective:     Communicated with RN prior to session.  Patient found HOB elevated with telemetry, PureWick, bed alarm upon PT entry to room.     General Precautions: Standard, fall  Orthopedic Precautions: LUE non weight bearing  Braces: Sling and swathe  Respiratory Status: Room air     Functional Mobility:  Bed Mobility:     Supine to Sit: minimum assistance  Transfers:     Sit to Stand:  minimum assistance with hand-held assist  Gait: 2x55ft with HHA and CGA.       AM-PAC 6 CLICK MOBILITY          Treatment & Education:  Pt educated on importance of time OOB, importance of intermittent mobility, safe techniques for transfers/ambulation, discharge recommendations/options, and use of call light for assistance and fall prevention.      Patient left up in chair with all lines intact, call button in reach, chair alarm on, and RN notified..    GOALS:   Multidisciplinary Problems       Physical Therapy Goals          Problem: Physical Therapy    Goal Priority Disciplines Outcome Interventions   Physical Therapy Goal     PT, PT/OT     Description: Goals to be met by: 2024     Patient will increase functional independence with mobility by performin. Supine to sit with Contact Guard Assistance  2. Sit to stand transfer with Contact Guard Assistance  3. Gait  x 100  feet with Minimal Hand held Assistance using No Assistive Device.                          Time Tracking:     PT Received On: 24  PT Start Time: 1103     PT Stop Time: 1118  PT Total Time (min): 15 min     Billable Minutes: Gait Training 15    Treatment Type: Treatment  PT/PTA: PTA     Number of PTA visits since last PT visit: 1     2024

## 2024-11-25 NOTE — CARE UPDATE
11/24/24 1959   Patient Assessment/Suction   Level of Consciousness (AVPU) alert   Respiratory Effort Normal;Unlabored   Expansion/Accessory Muscles/Retractions no use of accessory muscles;expansion symmetric   Rhythm/Pattern, Respiratory unlabored   PRE-TX-O2   Device (Oxygen Therapy) room air   SpO2 (!) 93 %   Pulse Oximetry Type Intermittent   $ Pulse Oximetry - Multiple Charge Pulse Oximetry - Multiple   Pulse 76   Resp 18   Temp 98 °F (36.7 °C)   BP (!) 148/73  (98)   Education   $ Education 15 min;Oxygen

## 2024-11-25 NOTE — PT/OT/SLP PROGRESS
Occupational Therapy      Patient Name:  Maricel Abdul   MRN:  [id number]    Patient not seen today secondary to  (Occupational Therapist Unavailable.). Will follow-up tomorrow.    11/25/2024

## 2024-11-25 NOTE — PROGRESS NOTES
Critical access hospital Medicine  Progress Note    Patient Name: Maricel Abdul  MRN: [id number]  Patient Class: IP- Inpatient   Admission Date: 11/22/2024  Length of Stay: 1 days  Attending Physician: Tico Rhodes MD  Primary Care Provider: Shayla Tello NP        Subjective:     Principal Problem:Syncope        HPI:  Patient with a history of CVA, hypertension, complete heart block status post pacemaker placement, hyperlipidemia presents to the emergency room with complaints of ground level fall.  Patient reports she was taking a shower, denies any dizziness or syncope but also a poor historian per family member at bedside.  Unclear whether patient actually syncopized, but patient reports brief loss of consciousness after she fell.  Denies any chest pain, shortness for breath, fever, chills, nausea, vomiting.  In the emergency room was found to have anterior glenohumeral dislocation of the left shoulder which was reduced in the emergency room.  Placed in sling.  CT head showed no acute abnormalities.  CT cervical spine showed no acute fractures.  Mildly displaced fracture of the left nasal bone.  Patient required stitches of her upper lip.  Patient being admitted for possible syncope.  Carotid ultrasound showed no significant stenosis.  WBC 18.14, urinalysis shows possible UTI.  Echocardiogram pending.    Overview/Hospital Course:  Patient with history of CVA, hypertension, complete heart block requiring pacemaker presented after ground level fall, questionable syncope.  Bilateral carotid ultrasound showed no significant stenosis.  Found to have dislocated left shoulder, reduced in the emergency room and placed in sling.  CT maxillofacial showed mildly displaced fracture of the left nasal bone.  No acute fracture on CT cervical spine.  No acute abnormalities on CT head.  Found to have UTI, started on antibiotics.  Echocardiogram showed EF 60%.  Evaluated by PT/OT, recommended skilled nursing  facility.  Discharge pending placement.    Interval History:  The, no acute complaints.  White blood cells downtrending.  Patient accepted at [facility], unable to take patient till tomorrow.  Urine cultures grew E coli.    Review of Systems   Constitutional:  Negative for chills and diaphoresis.   Respiratory:  Negative for shortness of breath.    Cardiovascular:  Negative for chest pain.     Objective:     Vital Signs (Most Recent):  Temp: 97.4 °F (36.3 °C) (11/25/24 1608)  Pulse: 91 (11/25/24 1608)  Resp: 18 (11/25/24 1608)  BP: 136/73 (94) (11/25/24 1608)  SpO2: 98 % (11/25/24 1608) Vital Signs (24h Range):  Temp:  [97.4 °F (36.3 °C)-98.7 °F (37.1 °C)] 97.4 °F (36.3 °C)  Pulse:  [76-91] 91  Resp:  [18] 18  SpO2:  [93 %-98 %] 98 %  BP: ()/(64-73) 136/73     Weight: 57 kg (125 lb 9 oz)  Body mass index is 25.36 kg/m².  No intake or output data in the 24 hours ending 11/25/24 1633      Physical Exam  Constitutional:       General: She is not in acute distress.  HENT:      Mouth/Throat:      Comments: Stitches upper lip  Eyes:      Pupils: Pupils are equal, round, and reactive to light.   Cardiovascular:      Rate and Rhythm: Normal rate.      Comments: Paced rhythm  Pulmonary:      Effort: No respiratory distress.      Breath sounds: No wheezing.   Abdominal:      Palpations: Abdomen is soft.   Musculoskeletal:         General: Tenderness (Left shoulder) present. No swelling.      Comments: LUE in sling   Skin:     General: Skin is dry.   Neurological:      General: No focal deficit present.      Mental Status: She is alert. Mental status is at baseline.             Significant Labs: All pertinent labs within the past 24 hours have been reviewed.  CBC:   Recent Labs   Lab 11/24/24  0759 11/25/24  0530   WBC 15.16* 9.16   HGB 12.5 13.2   HCT 39.8 42.1    214     CMP:   Recent Labs   Lab 11/24/24  0601 11/25/24  0530    135*   K 4.0 4.0    99   CO2 26 31*    110   BUN 16 16    CREATININE 0.7 0.7   CALCIUM 9.3 9.8   PROT 6.9 7.0   ALBUMIN 3.8 3.8   BILITOT 0.7 0.6   ALKPHOS 111 102   AST 95* 42*   * 73*   ANIONGAP 8 5*       Significant Imaging: I have reviewed all pertinent imaging results/findings within the past 24 hours.    Assessment/Plan:      * Syncope  Questionable syncope, unwitnessed fall   Patient denies but also has poor memory  Carotid ultrasound showed no significant stenosis, head CT showed no acute abnormalities   Echocardiogram showed ejection fraction 60%, mild valvular disease.  Plan on discharge to skilled nursing facility, accepted at [facility] we will plan on discharge tomorrow      UTI (urinary tract infection)  Rocephin discontinued, started on Keflex  Cultures growing E coli, pansensitive      Shoulder dislocation, left, initial encounter  Reduced in the emergency room   Continue pain medications as needed   Continue sling for least 3 weeks, follow up with PCP outpatient  PT/OT, PT recommended moderate therapy   Family patient agreeable to skilled nursing facility, case management consulted.  Pending placement.      Gastroesophageal reflux disease  Continue Protonix      Cerebrovascular accident (CVA)  History of CVA   Continue aspirin and statin        Complete heart block  Aware, status post pacemaker placement   Follow up with Cardiology outpatient      Essential hypertension  Patients blood pressure range in the last 24 hours was: BP  Min: 99/64  Max: 148/73.The patient's inpatient anti-hypertensive regimen is listed below:  Current Antihypertensives  losartan tablet 100 mg, Daily, Oral  hydroCHLOROthiazide tablet 12.5 mg, Daily, Oral  metoprolol succinate (TOPROL-XL) 24 hr tablet 25 mg, 2 times daily, Oral    Plan  - BP is controlled, no changes needed to their regimen  - resume home meds       VTE Risk Mitigation (From admission, onward)           Ordered     IP VTE HIGH RISK PATIENT  Once         11/23/24 0724     Place sequential compression  device  Until discontinued         11/23/24 0724                    Discharge Planning   VINI: 11/25/2024     Code Status: Full Code   Is the patient medically ready for discharge?:     Reason for patient still in hospital (select all that apply): Patient trending condition  Discharge Plan A: Assisted Living                  Tico Rhodes MD  Department of Hospital Medicine   [facility]

## 2024-11-25 NOTE — PLAN OF CARE
Spoke with Alysha conley(Daughter)- [phone], regarding discharge IMM, Understands statement , and IMM will be scanned to chart.

## 2024-11-25 NOTE — PLAN OF CARE
Naomi ([place]) reports [facility] approved - they can accept patient tomorrow - MD aware - daughter Alysha updated to plan

## 2024-11-25 NOTE — TELEPHONE ENCOUNTER
Not a clinic auth, sent information to Sarah Arriola LCSW @ [facility] who is working on the case

## 2024-11-26 VITALS
RESPIRATION RATE: 19 BRPM | SYSTOLIC BLOOD PRESSURE: 131 MMHG | OXYGEN SATURATION: 97 % | HEART RATE: 104 BPM | DIASTOLIC BLOOD PRESSURE: 80 MMHG | WEIGHT: 125.56 LBS | TEMPERATURE: 98 F | HEIGHT: 59 IN | BODY MASS INDEX: 25.31 KG/M2

## 2024-11-26 PROBLEM — R55 SYNCOPE: Status: RESOLVED | Noted: 2024-11-23 | Resolved: 2024-11-26

## 2024-11-26 PROBLEM — S43.005A SHOULDER DISLOCATION, LEFT, INITIAL ENCOUNTER: Status: RESOLVED | Noted: 2024-11-23 | Resolved: 2024-11-26

## 2024-11-26 PROBLEM — N39.0 UTI (URINARY TRACT INFECTION): Status: RESOLVED | Noted: 2024-11-23 | Resolved: 2024-11-26

## 2024-11-26 PROCEDURE — 30200315 PPD INTRADERMAL TEST REV CODE 302: Performed by: STUDENT IN AN ORGANIZED HEALTH CARE EDUCATION/TRAINING PROGRAM

## 2024-11-26 PROCEDURE — 25000003 PHARM REV CODE 250: Performed by: STUDENT IN AN ORGANIZED HEALTH CARE EDUCATION/TRAINING PROGRAM

## 2024-11-26 PROCEDURE — 86580 TB INTRADERMAL TEST: CPT | Performed by: STUDENT IN AN ORGANIZED HEALTH CARE EDUCATION/TRAINING PROGRAM

## 2024-11-26 RX ORDER — DIPHENOXYLATE HYDROCHLORIDE AND ATROPINE SULFATE 2.5; .025 MG/1; MG/1
1 TABLET ORAL 3 TIMES DAILY PRN
Qty: 30 TABLET | Refills: 3 | Status: SHIPPED | OUTPATIENT
Start: 2024-11-26

## 2024-11-26 RX ORDER — CEPHALEXIN 500 MG/1
500 CAPSULE ORAL EVERY 6 HOURS
Qty: 8 CAPSULE | Refills: 0 | Status: SHIPPED | OUTPATIENT
Start: 2024-11-26 | End: 2024-11-28

## 2024-11-26 RX ADMIN — CHOLECALCIFEROL TAB 125 MCG (5000 UNIT) 5000 UNITS: 125 TAB at 09:11

## 2024-11-26 RX ADMIN — CEPHALEXIN 500 MG: 250 CAPSULE ORAL at 05:11

## 2024-11-26 RX ADMIN — CEPHALEXIN 500 MG: 250 CAPSULE ORAL at 02:11

## 2024-11-26 RX ADMIN — CEPHALEXIN 500 MG: 250 CAPSULE ORAL at 12:11

## 2024-11-26 RX ADMIN — TUBERCULIN PURIFIED PROTEIN DERIVATIVE 5 UNITS: 5 INJECTION INTRADERMAL at 11:11

## 2024-11-26 RX ADMIN — METOPROLOL SUCCINATE 25 MG: 25 TABLET, FILM COATED, EXTENDED RELEASE ORAL at 09:11

## 2024-11-26 RX ADMIN — PANTOPRAZOLE SODIUM 40 MG: 40 TABLET, DELAYED RELEASE ORAL at 05:11

## 2024-11-26 RX ADMIN — ASPIRIN 81 MG: 81 TABLET, COATED ORAL at 09:11

## 2024-11-26 RX ADMIN — LOSARTAN POTASSIUM 100 MG: 50 TABLET, FILM COATED ORAL at 09:11

## 2024-11-26 RX ADMIN — HYDROCHLOROTHIAZIDE 12.5 MG: 12.5 TABLET ORAL at 09:11

## 2024-11-26 RX ADMIN — ATORVASTATIN CALCIUM 80 MG: 40 TABLET, FILM COATED ORAL at 09:11

## 2024-11-26 RX ADMIN — SERTRALINE HYDROCHLORIDE 100 MG: 50 TABLET ORAL at 09:11

## 2024-11-26 NOTE — DISCHARGE INSTRUCTIONS
[facility]  Facility Transfer Orders        Admit to:  [facility]    Diagnoses:   Active Hospital Problems    Diagnosis  POA    Gastroesophageal reflux disease [K21.9]  Yes    Complete heart block [I44.2]  Yes    Essential hypertension [I10]  Yes    Cerebrovascular accident (CVA) [I63.9]  Yes      Resolved Hospital Problems    Diagnosis Date Resolved POA    *Syncope [R55] 11/26/2024 Yes    Shoulder dislocation, left, initial encounter [S43.005A] 11/26/2024 Yes    UTI (urinary tract infection) [N39.0] 11/26/2024 Yes    Cardiac pacemaker in situ [Z95.0] 11/23/2024 Yes     Allergies:   Review of patient's allergies indicates:   Allergen Reactions    Codeine Nausea And Vomiting       Code Status: full code    Vitals: Routine       Diet: regular diet    Activity: Activity as tolerated    Nursing Precautions: Fall    Bed/Surface: Pressure Redistribution    Consults: PT to evaluate and treat- 5 times a week and OT to evaluate and treat- 5 times a week L shoulder dislocation and reduction, LUE sling and swathe x 3 weeks and NWB.    Oxygen: room air      Pending Diagnostic Studies:       Procedure Component Value Units Date/Time    Echo [[id number]]     Order Status: Sent Lab Status: No result                Medications: Discontinue all previous medication orders, if any. See new list below.  Current Discharge Medication List        START taking these medications    Details   cephALEXin (KEFLEX) 500 MG capsule Take 1 capsule (500 mg total) by mouth every 6 (six) hours. for 2 days  Qty: 8 capsule, Refills: 0           CONTINUE these medications which have CHANGED    Details   diphenoxylate-atropine 2.5-0.025 mg (LOMOTIL) 2.5-0.025 mg per tablet Take 1 tablet by mouth 3 (three) times daily as needed for Diarrhea. TAKE 1 TABLET BY MOUTH 3 TIMES DAILY AS NEEDED FOR DIARRHEA  Qty: 30 tablet, Refills: 3    Associated Diagnoses: Chronic diarrhea           CONTINUE these medications which have NOT CHANGED    Details    amitriptyline (ELAVIL) 50 MG tablet Take 1 tablet (50 mg total) by mouth every evening.  Qty: 90 tablet, Refills: 3    Associated Diagnoses: Primary insomnia      atorvastatin (LIPITOR) 80 MG tablet Take 1 tablet (80 mg total) by mouth once daily.  Qty: 90 tablet, Refills: 3    Associated Diagnoses: Dyslipidemia      cholecalciferol, vitamin D3, 125 mcg (5,000 unit) Tab Take 5,000 Units by mouth once daily.      donepeziL (ARICEPT) 10 MG tablet Take 1 tablet (10 mg total) by mouth every evening.  Qty: 90 tablet, Refills: 3    Associated Diagnoses: Mild late onset Alzheimer's dementia without behavioral disturbance, psychotic disturbance, mood disturbance, or anxiety      fluticasone propionate (FLONASE) 50 mcg/actuation nasal spray 1 spray (50 mcg total) by Each Nostril route once daily.  Qty: 54 g, Refills: 3    Associated Diagnoses: Allergic rhinitis, unspecified seasonality, unspecified trigger      losartan-hydrochlorothiazide 100-12.5 mg (HYZAAR) 100-12.5 mg Tab Take 1 tablet by mouth once daily.  Qty: 90 tablet, Refills: 3    Comments: .  Associated Diagnoses: Essential hypertension      meloxicam (MOBIC) 7.5 MG tablet Take 1 tablet (7.5 mg total) by mouth once daily. Anti-inflammatory for arthritis pain  Qty: 90 tablet, Refills: 3    Associated Diagnoses: Primary osteoarthritis involving multiple joints      metoprolol succinate (TOPROL-XL) 25 MG 24 hr tablet Take 1 tablet (25 mg total) by mouth 2 (two) times daily.  Qty: 180 tablet, Refills: 3    Comments: .  Associated Diagnoses: Essential hypertension      multivitamin (ONE DAILY MULTIVITAMIN) per tablet Take 1 tablet by mouth once daily.      pantoprazole (PROTONIX) 40 MG tablet Take 1 tablet (40 mg total) by mouth once daily.  Qty: 90 tablet, Refills: 3    Associated Diagnoses: Gastroesophageal reflux disease, unspecified whether esophagitis present      sertraline (ZOLOFT) 100 MG tablet Take 1 tablet (100 mg total) by mouth once daily.  Qty: 90  tablet, Refills: 3    Associated Diagnoses: Primary insomnia      vit A/vit C/vit E/zinc/copper (PRESERVISION AREDS ORAL) Take 1 tablet by mouth 2 (two) times a day.      aspirin (ECOTRIN) 81 MG EC tablet Take 1 tablet (81 mg total) by mouth once daily.  Refills: 0    Associated Diagnoses: History of stroke           STOP taking these medications       QUEtiapine (SEROQUEL) 25 MG Tab Comments:   Reason for Stopping:             Follow up:    Follow-up Information       Shayla Tello NP Follow up in 1 week(s).    Specialty: Family Medicine  Contact information:  [address]  [address]  [place] [address]  [phone]                               Immunizations Administered as of 11/26/2024       Name Date Dose VIS Date Route Exp Date    COVID-19, MRNA, LN-S, PF (Pfizer) (Purple Cap) 10/22/2021 0.3 mL 5/10/2021 Intramuscular --    Site: Right arm     : Pf[employer] Inc     Lot: [id number]     Comment: Adminis     COVID-19, MRNA, LN-S, PF (Pfizer) (Purple Cap) 1/31/2021  9:21 AM 0.3 mL 12/12/2020 Intramuscular 5/31/2021    Site: Right deltoid     Given By: Suzi Lan MA     : Pf[employer]     Lot: [id number]     COVID-19, MRNA, LN-S, PF (Pfizer) (Purple Cap) 1/10/2021  9:28 AM 0.3 mL 12/12/2020 Intramuscular 4/30/2021    Site: Right deltoid     Given By: Elis Gomez RN     : Pf[employer] Inc     Lot: [id number]             This patient has had both covid vaccinations    Some patients may experience side effects after vaccination.  These may include fever, headache, muscle or joint aches.  Most symptoms resolve with 24-48 hours and do not require urgent medical evaluation unless they persist for more than 72 hours or symptoms are concerning for an unrelated medical condition.          Tico Rhodes MD

## 2024-11-26 NOTE — PLAN OF CARE
Discharge orders and chart reviewed with no further post-acute discharge needs identified at this time.     Pt is discharging SNF to [facility]. AVS, D/C orders, PASRR, 142, chest x ray and PPD sent and received in [facility] verified by Naomi. Call report to [phone], nurse for A19. [facility] will transport. Floor nurse notified.    SHELLI called Pt's daughter Alysha to notify her of Pt's discharge. Alysha stated she had no questions and would meet Pt at the facility with clothes.    At this time, patient is cleared for discharge from Case Management standpoint.         11/26/24 1256   Final Note   Assessment Type Final Discharge Note   Anticipated Discharge Disposition SNF   What phone number can be called within the next 1-3 days to see how you are doing after discharge? [phone]   Post-Acute Status   Post-Acute Authorization Placement   Post-Acute Placement Status Set-up Complete/Auth obtained   [facility] CHOICES -   Discharge Delays None known at this time

## 2024-11-26 NOTE — PLAN OF CARE
SHELLI received an e-mail from [facility] stating Pt received authorization for SNF. Josh, SRN [id number]. SHELLI informed Naomi at [facility] and informed Dr. Rhodes via secure chat.    SHELLI sent discharge orders, chest xray, PASRR, 142, but did not send PPD as it was not done yet.     Naomi called and asked if the seroquel can be Dced and the lomotil along with all other controlled substances be escripted. Naomi also requested WBS for Pt's left arm. SHELLI made Nurse  and Dr. Rhodes aware of the changes needed.

## 2024-11-26 NOTE — PT/OT/SLP PROGRESS
Physical Therapy      Patient Name:  Maricel Abdul   MRN:  [id number]    Patient not seen today secondary to Other (Comment) (Pt prepping for discharge.). Will follow-up next service date if discharge falls through.

## 2024-11-26 NOTE — NURSING
Patients IV removed. Monitor removed and returned. Report given to [facility]. Family requesting to bring patient to facility instead of waiting for facility transport. This nurse spoke with facility, facility stated it was ok if patients daughter wanted to transport patient to facility. Discharge instructions given to patients daughter. All belongings packed. Patient transferred via wheelchair downstairs where daughter awaits for discharge.

## 2024-11-26 NOTE — DISCHARGE SUMMARY
[facility] Medicine  Discharge Summary      Patient Name: Maricel Abdul  MRN: [id number]  JASSON: [id number]  Patient Class: IP- Inpatient  Admission Date: 11/22/2024  Hospital Length of Stay: 2 days  Discharge Date and Time: 11/26/2024  4:03 PM  Attending Physician: No att. providers found   Discharging Provider: Tico Rhodes MD  Primary Care Provider: Shayla Tello NP    Primary Care Team: Networked reference to record PCT     HPI:   Patient with a history of CVA, hypertension, complete heart block status post pacemaker placement, hyperlipidemia presents to the emergency room with complaints of ground level fall.  Patient reports she was taking a shower, denies any dizziness or syncope but also a poor historian per family member at bedside.  Unclear whether patient actually syncopized, but patient reports brief loss of consciousness after she fell.  Denies any chest pain, shortness for breath, fever, chills, nausea, vomiting.  In the emergency room was found to have anterior glenohumeral dislocation of the left shoulder which was reduced in the emergency room.  Placed in sling.  CT head showed no acute abnormalities.  CT cervical spine showed no acute fractures.  Mildly displaced fracture of the left nasal bone.  Patient required stitches of her upper lip.  Patient being admitted for possible syncope.  Carotid ultrasound showed no significant stenosis.  WBC 18.14, urinalysis shows possible UTI.  Echocardiogram pending.    * No surgery found *      Hospital Course:   Patient with history of CVA, hypertension, complete heart block requiring pacemaker presented after ground level fall, questionable syncope.  Bilateral carotid ultrasound showed no significant stenosis.  Found to have dislocated left shoulder, reduced in the emergency room and placed in sling.  CT maxillofacial showed mildly displaced fracture of the left nasal bone.  No acute fracture on CT cervical spine.  No acute  abnormalities on CT head.  Found to have UTI, started on antibiotics.  Echocardiogram showed EF 60%.  Evaluated by PT/OT, recommended skilled nursing facility.  Discharge to SNF.     Physical Exam  Constitutional:       General: She is not in acute distress.  Eyes:      Pupils: Pupils are equal, round, and reactive to light.   Cardiovascular:      Rate and Rhythm: Normal rate.     Goals of Care Treatment Preferences:  Code Status: Full Code      SDOH Screening:  The patient declined to be screened for utility difficulties, food insecurity, transport difficulties, housing insecurity, and interpersonal safety, so no concerns could be identified this admission.     Consults:   Consults (From admission, onward)          Status Ordering Provider     Inpatient consult to   Once        Provider:  (Not yet assigned)    Completed LILIBETH BERGER            No new Assessment & Plan notes have been filed under this hospital service since the last note was generated.  Service: Hospital Medicine    Final Active Diagnoses:    Diagnosis Date Noted POA    Gastroesophageal reflux disease [K21.9] 09/18/2020 Yes    Complete heart block [I44.2] 12/25/2017 Yes    Essential hypertension [I10] 12/25/2017 Yes    Cerebrovascular accident (CVA) [I63.9] 12/22/2017 Yes      Problems Resolved During this Admission:    Diagnosis Date Noted Date Resolved POA    PRINCIPAL PROBLEM:  Syncope [R55] 11/23/2024 11/26/2024 Yes    Shoulder dislocation, left, initial encounter [S43.005A] 11/23/2024 11/26/2024 Yes    UTI (urinary tract infection) [N39.0] 11/23/2024 11/26/2024 Yes    Cardiac pacemaker in situ [Z95.0] 03/28/2018 11/23/2024 Yes       Discharged Condition: good    Disposition: Skilled Nursing Facility    Follow Up:   Follow-up Information       Shayla Tello NP Follow up in 1 week(s).    Specialty: Family Medicine  Contact information:  [address]  [address]  [place] [address] [phone]                            Patient Instructions:      Diet Adult Regular     Notify your health care provider if you experience any of the following:  temperature >100.4     Notify your health care provider if you experience any of the following:  persistent nausea and vomiting or diarrhea     Notify your health care provider if you experience any of the following:  severe uncontrolled pain     Notify your health care provider if you experience any of the following:  redness, tenderness, or signs of infection (pain, swelling, redness, odor or green/yellow discharge around incision site)     Activity as tolerated   Order Comments: L shoulder dislocation and reduction, LUE sling and swathe x 3 weeks and NWB.       Significant Diagnostic Studies: Labs: CMP   Recent Labs   Lab 11/25/24  0530   *   K 4.0   CL 99   CO2 31*      BUN 16   CREATININE 0.7   CALCIUM 9.8   PROT 7.0   ALBUMIN 3.8   BILITOT 0.6   ALKPHOS 102   AST 42*   ALT 73*   ANIONGAP 5*    and CBC   Recent Labs   Lab 11/25/24  0530   WBC 9.16   HGB 13.2   HCT 42.1          Pending Diagnostic Studies:       Procedure Component Value Units Date/Time    Echo [[id number]]     Order Status: Sent Lab Status: No result            Medications:  Reconciled Home Medications:      Medication List        START taking these medications      cephALEXin 500 MG capsule  Commonly known as: KEFLEX  Take 1 capsule (500 mg total) by mouth every 6 (six) hours. for 2 days            CONTINUE taking these medications      amitriptyline 50 MG tablet  Commonly known as: ELAVIL  Take 1 tablet (50 mg total) by mouth every evening.     aspirin 81 MG EC tablet  Commonly known as: ECOTRIN  Take 1 tablet (81 mg total) by mouth once daily.     atorvastatin 80 MG tablet  Commonly known as: LIPITOR  Take 1 tablet (80 mg total) by mouth once daily.     cholecalciferol (vitamin D3) 125 mcg (5,000 unit) Tab  Take 5,000 Units by mouth once daily.     diphenoxylate-atropine 2.5-0.025 mg 2.5-0.025 mg  per tablet  Commonly known as: LOMOTIL  Take 1 tablet by mouth 3 (three) times daily as needed for Diarrhea. TAKE 1 TABLET BY MOUTH 3 TIMES DAILY AS NEEDED FOR DIARRHEA     donepeziL 10 MG tablet  Commonly known as: ARICEPT  Take 1 tablet (10 mg total) by mouth every evening.     fluticasone propionate 50 mcg/actuation nasal spray  Commonly known as: FLONASE  1 spray (50 mcg total) by Each Nostril route once daily.     losartan-hydrochlorothiazide 100-12.5 mg 100-12.5 mg Tab  Commonly known as: HYZAAR  Take 1 tablet by mouth once daily.     meloxicam 7.5 MG tablet  Commonly known as: MOBIC  Take 1 tablet (7.5 mg total) by mouth once daily. Anti-inflammatory for arthritis pain     metoprolol succinate 25 MG 24 hr tablet  Commonly known as: TOPROL-XL  Take 1 tablet (25 mg total) by mouth 2 (two) times daily.     ONE DAILY MULTIVITAMIN tablet  Generic drug: multivitamin  Take 1 tablet by mouth once daily.     pantoprazole 40 MG tablet  Commonly known as: PROTONIX  Take 1 tablet (40 mg total) by mouth once daily.     PRESERVISION AREDS ORAL  Take 1 tablet by mouth 2 (two) times a day.     sertraline 100 MG tablet  Commonly known as: ZOLOFT  Take 1 tablet (100 mg total) by mouth once daily.            STOP taking these medications      QUEtiapine 25 MG Tab  Commonly known as: SEROQUEL              Indwelling Lines/Drains at time of discharge:   Lines/Drains/Airways       None                   Time spent on the discharge of patient: 33   minutes         Tico Rhodes MD  Department of Hospital Medicine  Critical access hospital

## 2024-11-27 NOTE — PT/OT/SLP PROGRESS
Occupational Therapy      Patient Name:  Maricel Abdul   MRN:  [id number]    Patient not seen today secondary to Other (Comment) (d/c before seeing).     11/26/2024

## 2024-12-05 ENCOUNTER — OFFICE VISIT (OUTPATIENT)
Dept: FAMILY MEDICINE | Facility: CLINIC | Age: 84
End: 2024-12-05
Payer: MEDICARE

## 2024-12-05 VITALS
DIASTOLIC BLOOD PRESSURE: 70 MMHG | HEIGHT: 59 IN | OXYGEN SATURATION: 98 % | SYSTOLIC BLOOD PRESSURE: 122 MMHG | WEIGHT: 121.38 LBS | BODY MASS INDEX: 24.47 KG/M2 | HEART RATE: 78 BPM

## 2024-12-05 DIAGNOSIS — S42.92XD TRAUMATIC CLOSED DISPLACED FRACTURE OF LEFT SHOULDER WITH ANTERIOR DISLOCATION WITH ROUTINE HEALING, SUBSEQUENT ENCOUNTER: Primary | ICD-10-CM

## 2024-12-05 DIAGNOSIS — F02.A0 MILD LATE ONSET ALZHEIMER'S DEMENTIA WITHOUT BEHAVIORAL DISTURBANCE, PSYCHOTIC DISTURBANCE, MOOD DISTURBANCE, OR ANXIETY: ICD-10-CM

## 2024-12-05 DIAGNOSIS — G30.1 MILD LATE ONSET ALZHEIMER'S DEMENTIA WITHOUT BEHAVIORAL DISTURBANCE, PSYCHOTIC DISTURBANCE, MOOD DISTURBANCE, OR ANXIETY: ICD-10-CM

## 2024-12-05 DIAGNOSIS — I10 ESSENTIAL HYPERTENSION: ICD-10-CM

## 2024-12-05 NOTE — PROGRESS NOTES
Patient ID: Maricel Abdul is a 84 y.o. female.    Chief Complaint: Follow-up (No bottles//Pt is here for a checkup.//Pt had a fall around 11/22/2024, dislocated her left shoulder.//MUNDO )        Admit Date: 11/26/24  Discharge Date: 12/3/24  Discharge Facility: Skilled Nursing Facility    Medication Reconciliation:  Medications changed/added/deleted. Quetiapine stopped  New Prescriptions filled after discharge: not applicable  Discharge summary reviewed:  yes  Pending test results at discharge reviewed:   not applicable  Follow up appointments scheduled:  not applicable     Follow up labs/tests ordered:   no  Home Health ordered on discharge:   no  Home Health company name:   DME ordered at discharge:   shoulder sling  Disease/illness education: shoulder dislocation  Discussion with other health care providers: no  Establishment or re-establishment of referral orders for community resources: No other necessary community resources  Family and/or Caretaker present at visit?  yes    Discharge Summary: Patient with a history of CVA, hypertension, complete heart block status post pacemaker placement, hyperlipidemia presents to the emergency room with complaints of ground level fall.  Patient reports she was taking a shower, denies any dizziness or syncope but also a poor historian per family member at bedside.  Unclear whether patient actually syncopized, but patient reports brief loss of consciousness after she fell.  Denies any chest pain, shortness for breath, fever, chills, nausea, vomiting.  In the emergency room was found to have anterior glenohumeral dislocation of the left shoulder which was reduced in the emergency room.  Placed in sling.  CT head showed no acute abnormalities.  CT cervical spine showed no acute fractures.  Mildly displaced fracture of the left nasal bone.  Patient required stitches of her upper lip.  Patient being admitted for possible syncope.  Carotid ultrasound showed no significant  stenosis.  WBC 18.14, urinalysis shows possible UTI.  Echocardiogram pending.       Hospital Course:   Patient with history of CVA, hypertension, complete heart block requiring pacemaker presented after ground level fall, questionable syncope.  Bilateral carotid ultrasound showed no significant stenosis.  Found to have dislocated left shoulder, reduced in the emergency room and placed in sling.  CT maxillofacial showed mildly displaced fracture of the left nasal bone.  No acute fracture on CT cervical spine.  No acute abnormalities on CT head.  Found to have UTI, started on antibiotics.  Echocardiogram showed EF 60%.  Evaluated by PT/OT, recommended skilled nursing facility.  Discharge to SNF.     How patient is feeling since discharge from the hospital?  Pt here with her daughter- daughter reports she's going to be living with her from now on, no longer living at assisted living.    Pt reports overall she's doing well since SNF discharge. Wearing left arm sling and daughter reports she's supposed to be starting outpt PT. Denies any shoulder pain, reports has some mild numbness/tingling to dorsum of hand since fall/dislocation. No arm swelling    Appetite is good. Denies any urinary symptoms. Quetiapine stopped during hospital stay so daughter has been giving her melatonin and she seems to be sleeping okay    Patient follow up phone call documented on separate encounter.    No results displayed because visit has over 200 results.      Office Visit on 10/22/2024   Component Date Value Ref Range Status    Color, POC UA 10/22/2024 Yellow  Yellow, Straw, Colorless Final    Clarity, POC UA 10/22/2024 Clear  Clear Final    Glucose, POC UA 10/22/2024 Negative  Negative Final    Bilirubin, POC UA 10/22/2024 Negative  Negative Final    Ketones, POC UA 10/22/2024 Negative  Negative Final    Spec Grav POC UA 10/22/2024 1.015  1.005 - 1.030 Final    Blood, POC UA 10/22/2024 Negative  Negative Final    pH, POC UA 10/22/2024 7.5   5.0 - 8.0 Final    Protein, POC UA 10/22/2024 Negative  Negative Final    Urobilinogen, POC UA 10/22/2024 0.2  <=1.0 Final    Nitrite, POC UA 10/22/2024 Negative  Negative Final    WBC, POC UA 10/22/2024 Negative  Negative Final    Urine Culture, Routine 10/22/2024 SEE COMMENT (A)   Final       Past Medical History:   Diagnosis Date    Arthritis     Diverticulitis     Encounter for blood transfusion     Hypertension     Kidney stones     Pacemaker     Shingles     Squamous cell carcinoma of skin     TIA (transient ischemic attack) 12/2017     Past Surgical History:   Procedure Laterality Date    APPENDECTOMY      CARDIAC SURGERY      pacemaker    CERVICAL FUSION      CHOLECYSTECTOMY      COLONOSCOPY N/A 8/14/2019    Procedure: COLONOSCOPY;  Surgeon: Elio Perez MD;  Location: [facility];  Service: Endoscopy;  Laterality: N/A;    HYSTERECTOMY      SHOULDER SURGERY       Family History   Problem Relation Name Age of Onset    Cancer Father          lung    Cancer Brother          colon cancer    Colon cancer Brother      Pancreatitis Mother      Eczema Neg Hx      Psoriasis Neg Hx      Lupus Neg Hx      Melanoma Neg Hx      Stomach cancer Neg Hx      Esophageal cancer Neg Hx         All of your core healthy metrics are met.      Marital Status:   Alcohol History:  reports current alcohol use of about 7.0 standard drinks of alcohol per week.  Tobacco History:  reports that she quit smoking about 50 years ago. Her smoking use included cigarettes. She started smoking about 70 years ago. She has never been exposed to tobacco smoke. She has never used smokeless tobacco.  Drug History:  reports no history of drug use.    Review of patient's allergies indicates:   Allergen Reactions    Codeine Nausea And Vomiting       Current Outpatient Medications:     amitriptyline (ELAVIL) 50 MG tablet, Take 1 tablet (50 mg total) by mouth every evening., Disp: 90 tablet, Rfl: 3    aspirin (ECOTRIN) 81 MG EC tablet, Take 1  tablet (81 mg total) by mouth once daily., Disp: , Rfl: 0    atorvastatin (LIPITOR) 80 MG tablet, Take 1 tablet (80 mg total) by mouth once daily., Disp: 90 tablet, Rfl: 3    cholecalciferol, vitamin D3, 125 mcg (5,000 unit) Tab, Take 5,000 Units by mouth once daily., Disp: , Rfl:     diphenoxylate-atropine 2.5-0.025 mg (LOMOTIL) 2.5-0.025 mg per tablet, Take 1 tablet by mouth 3 (three) times daily as needed for Diarrhea. TAKE 1 TABLET BY MOUTH 3 TIMES DAILY AS NEEDED FOR DIARRHEA, Disp: 30 tablet, Rfl: 3    donepeziL (ARICEPT) 10 MG tablet, Take 1 tablet (10 mg total) by mouth every evening., Disp: 90 tablet, Rfl: 3    fluticasone propionate (FLONASE) 50 mcg/actuation nasal spray, 1 spray (50 mcg total) by Each Nostril route once daily., Disp: 54 g, Rfl: 3    losartan-hydrochlorothiazide 100-12.5 mg (HYZAAR) 100-12.5 mg Tab, Take 1 tablet by mouth once daily., Disp: 90 tablet, Rfl: 3    meloxicam (MOBIC) 7.5 MG tablet, Take 1 tablet (7.5 mg total) by mouth once daily. Anti-inflammatory for arthritis pain, Disp: 90 tablet, Rfl: 3    metoprolol succinate (TOPROL-XL) 25 MG 24 hr tablet, Take 1 tablet (25 mg total) by mouth 2 (two) times daily., Disp: 180 tablet, Rfl: 3    multivitamin (ONE DAILY MULTIVITAMIN) per tablet, Take 1 tablet by mouth once daily., Disp: , Rfl:     pantoprazole (PROTONIX) 40 MG tablet, Take 1 tablet (40 mg total) by mouth once daily., Disp: 90 tablet, Rfl: 3    sertraline (ZOLOFT) 100 MG tablet, Take 1 tablet (100 mg total) by mouth once daily., Disp: 90 tablet, Rfl: 3    vit A/vit C/vit E/zinc/copper (PRESERVISION AREDS ORAL), Take 1 tablet by mouth 2 (two) times a day., Disp: , Rfl:     Review of Systems   Constitutional:  Negative for appetite change, chills, fever and unexpected weight change.   Eyes:  Negative for visual disturbance.   Respiratory:  Negative for cough, shortness of breath and wheezing.    Cardiovascular:  Negative for chest pain, palpitations and leg swelling.  "  Gastrointestinal:  Negative for abdominal pain, constipation, diarrhea, nausea and vomiting.   Genitourinary:  Positive for frequency. Negative for dysuria, flank pain and hematuria.   Musculoskeletal:  Positive for back pain (chronic lower back pain worse first thing in am, does morning stretches which helps with pain). Negative for arthralgias (denies left shoulder pain), gait problem and neck pain.   Skin:  Negative for rash.   Neurological:  Negative for dizziness, syncope, speech difficulty, numbness and headaches.   Psychiatric/Behavioral:  Positive for confusion. Negative for agitation, behavioral problems, dysphoric mood (stable on med), hallucinations and sleep disturbance. The patient is not nervous/anxious (stable).         Objective:      Vitals:    12/05/24 0837   BP: 122/70   Pulse: 78   SpO2: 98%   Weight: 55.1 kg (121 lb 6.4 oz)   Height: 4' 11" (1.499 m)     Physical Exam  Vitals and nursing note reviewed.   Constitutional:       General: She is not in acute distress.     Appearance: She is well-developed.      Comments: Elderly WF, well groomed, very pleasant and cooperative   HENT:      Head: Normocephalic and atraumatic.   Neck:      Vascular: No carotid bruit.   Cardiovascular:      Rate and Rhythm: Normal rate and regular rhythm.      Heart sounds: Murmur heard.      Systolic murmur is present with a grade of 2/6.   Pulmonary:      Effort: Pulmonary effort is normal. No respiratory distress.      Breath sounds: Normal breath sounds. No wheezing or rales.   Abdominal:      General: There is no distension.      Palpations: Abdomen is soft.      Tenderness: There is no abdominal tenderness.   Musculoskeletal:      Left shoulder: No swelling, effusion or bony tenderness.      Left upper arm: No swelling.      Left forearm: No swelling.      Left hand: No swelling.      Cervical back: Neck supple.      Right lower leg: No edema.      Left lower leg: No edema.      Comments: Left shoulder sling in " place, ROM not tested   Lymphadenopathy:      Cervical: No cervical adenopathy.   Skin:     General: Skin is warm and dry.      Findings: No rash.   Neurological:      General: No focal deficit present.      Mental Status: She is alert. Mental status is at baseline.      Cranial Nerves: No cranial nerve deficit.      Motor: No weakness.      Gait: Gait normal.      Comments: Pleasant and conversant, cooperative.    Psychiatric:         Mood and Affect: Mood normal.         Assessment:       1. Traumatic closed displaced fracture of left shoulder with anterior dislocation with routine healing, subsequent encounter    2. Mild late onset Alzheimer's dementia without behavioral disturbance, psychotic disturbance, mood disturbance, or anxiety    3. Essential hypertension         Plan:       Traumatic closed displaced fracture of left shoulder with anterior dislocation with routine healing, subsequent encounter   -overall doing well, denies any significant pain. 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

## 2025-01-10 ENCOUNTER — PATIENT MESSAGE (OUTPATIENT)
Dept: FAMILY MEDICINE | Facility: CLINIC | Age: 85
End: 2025-01-10
Payer: MEDICARE

## 2025-01-10 DIAGNOSIS — G30.1 MILD LATE ONSET ALZHEIMER'S DEMENTIA WITHOUT BEHAVIORAL DISTURBANCE, PSYCHOTIC DISTURBANCE, MOOD DISTURBANCE, OR ANXIETY: Primary | ICD-10-CM

## 2025-01-10 DIAGNOSIS — F02.A0 MILD LATE ONSET ALZHEIMER'S DEMENTIA WITHOUT BEHAVIORAL DISTURBANCE, PSYCHOTIC DISTURBANCE, MOOD DISTURBANCE, OR ANXIETY: Primary | ICD-10-CM

## 2025-01-10 RX ORDER — QUETIAPINE FUMARATE 25 MG/1
TABLET, FILM COATED ORAL
Qty: 90 TABLET | Refills: 3 | Status: SHIPPED | OUTPATIENT
Start: 2025-01-10

## 2025-07-10 NOTE — TELEPHONE ENCOUNTER
----- Message from Shayla Tello NP sent at 4/24/2024  9:50 PM CDT -----  Please call pt/daughter and let them know final urine culture does show bacteria- if she's having symptoms of UTI would recommend treating with cipro 500mg BID for 7 days.  All the rest of her labs look good. kidney, liver, thyroid and blood count all in normal range. Blood sugar was 102.  Cholesterol levels are well controlled     Render Risk Assessment In Note?: no Additional Notes: LN2 Detail Level: Zone Additional Notes: Curettage and electrodesication after shave bx Detail Level: Detailed